# Patient Record
Sex: FEMALE | Race: WHITE | NOT HISPANIC OR LATINO | Employment: FULL TIME | URBAN - METROPOLITAN AREA
[De-identification: names, ages, dates, MRNs, and addresses within clinical notes are randomized per-mention and may not be internally consistent; named-entity substitution may affect disease eponyms.]

---

## 2017-02-07 ENCOUNTER — TELEPHONE (OUTPATIENT)
Dept: ENDOCRINOLOGY | Age: 47
End: 2017-02-07

## 2017-02-15 ENCOUNTER — TELEPHONE (OUTPATIENT)
Dept: ENDOCRINOLOGY | Age: 47
End: 2017-02-15

## 2017-03-03 RX ORDER — PIOGLITAZONEHYDROCHLORIDE 30 MG/1
30 TABLET ORAL DAILY
Qty: 30 TABLET | Refills: 0 | Status: SHIPPED | OUTPATIENT
Start: 2017-03-03 | End: 2017-08-04 | Stop reason: SDUPTHER

## 2017-08-04 RX ORDER — PIOGLITAZONEHYDROCHLORIDE 30 MG/1
30 TABLET ORAL DAILY
Qty: 30 TABLET | Refills: 0 | Status: SHIPPED | OUTPATIENT
Start: 2017-08-04 | End: 2017-08-07 | Stop reason: SDUPTHER

## 2017-08-07 RX ORDER — COLESEVELAM HYDROCHLORIDE 625 MG/1
1875 TABLET, FILM COATED ORAL 2 TIMES DAILY WITH MEALS
Qty: 120 TABLET | Refills: 2 | Status: SHIPPED | OUTPATIENT
Start: 2017-08-07 | End: 2017-08-11 | Stop reason: SDUPTHER

## 2017-08-07 RX ORDER — LISINOPRIL 20 MG/1
20 TABLET ORAL DAILY
Qty: 30 TABLET | Refills: 2 | Status: SHIPPED | OUTPATIENT
Start: 2017-08-07 | End: 2017-08-11 | Stop reason: SDUPTHER

## 2017-08-07 RX ORDER — PIOGLITAZONEHYDROCHLORIDE 30 MG/1
30 TABLET ORAL DAILY
Qty: 30 TABLET | Refills: 2 | Status: SHIPPED | OUTPATIENT
Start: 2017-08-07 | End: 2017-08-11 | Stop reason: SDUPTHER

## 2017-08-10 ENCOUNTER — OFFICE VISIT (OUTPATIENT)
Dept: ENDOCRINOLOGY | Age: 47
End: 2017-08-10

## 2017-08-10 VITALS
DIASTOLIC BLOOD PRESSURE: 78 MMHG | BODY MASS INDEX: 32.99 KG/M2 | HEIGHT: 65 IN | WEIGHT: 198 LBS | SYSTOLIC BLOOD PRESSURE: 126 MMHG

## 2017-08-10 DIAGNOSIS — E55.9 VITAMIN D DEFICIENCY: ICD-10-CM

## 2017-08-10 DIAGNOSIS — IMO0002 UNCONTROLLED TYPE 2 DIABETES MELLITUS WITH COMPLICATION, WITH LONG-TERM CURRENT USE OF INSULIN: ICD-10-CM

## 2017-08-10 DIAGNOSIS — Z91.14 NONCOMPLIANCE WITH MEDICATION REGIMEN: Primary | ICD-10-CM

## 2017-08-10 DIAGNOSIS — R80.9 MICROALBUMINURIA: ICD-10-CM

## 2017-08-10 DIAGNOSIS — E78.5 HYPERLIPIDEMIA, UNSPECIFIED HYPERLIPIDEMIA TYPE: ICD-10-CM

## 2017-08-10 DIAGNOSIS — E66.9 ADIPOSITY: ICD-10-CM

## 2017-08-10 DIAGNOSIS — I10 ESSENTIAL HYPERTENSION: ICD-10-CM

## 2017-08-10 PROCEDURE — 99214 OFFICE O/P EST MOD 30 MIN: CPT | Performed by: NURSE PRACTITIONER

## 2017-08-10 RX ORDER — DICYCLOMINE HCL 20 MG
20 TABLET ORAL
COMMUNITY
Start: 2017-08-08 | End: 2018-08-17 | Stop reason: ALTCHOICE

## 2017-08-10 RX ORDER — TERBINAFINE HYDROCHLORIDE 250 MG/1
250 TABLET ORAL DAILY
COMMUNITY
Start: 2017-08-07 | End: 2018-03-05 | Stop reason: ALTCHOICE

## 2017-08-10 RX ORDER — HYDROCHLOROTHIAZIDE 25 MG/1
25 TABLET ORAL DAILY
Refills: 4 | COMMUNITY
Start: 2017-08-04

## 2017-08-10 NOTE — PROGRESS NOTES
"Yenny Clement is a 47 y.o. female is here today for follow-up.  Chief Complaint   Patient presents with   • Diabetes     no recent labs,testing BG several times a week, pt brought meter   • Hyperlipidemia   • Hypertension   • Vitamin D Deficiency     /78  Ht 65\" (165.1 cm)  Wt 198 lb (89.8 kg)  BMI 32.95 kg/m2  Current Outpatient Prescriptions on File Prior to Visit   Medication Sig   • aspirin-acetaminophen-caffeine (EXCEDRIN MIGRAINE) 250-250-65 MG per tablet Take 1 tablet by mouth 3 (three) times a day.   • cetirizine (ZyrTEC) 10 MG tablet Take 1 tablet by mouth daily.   • Insulin Glargine 300 UNIT/ML solution pen-injector Inject 10 Units under the skin Daily.   • Insulin Pen Needle (BD PEN NEEDLE PAUL U/F) 32G X 4 MM misc Use as directed   • Liraglutide (VICTOZA) 18 MG/3ML solution pen-injector Inject 1.8 mg under the skin Daily.   • lisinopril (PRINIVIL,ZESTRIL) 20 MG tablet Take 1 tablet by mouth Daily.   • naproxen sodium (ALEVE) 220 MG tablet Take 1 tablet by mouth 2 (two) times a day.   • omeprazole (PriLOSEC) 20 MG capsule Take 1 capsule by mouth daily.   • pioglitazone (ACTOS) 30 MG tablet Take 1 tablet by mouth Daily. Patient needs to make an appt   • sertraline (ZOLOFT) 100 MG tablet Take 2 tablets by mouth.   • WELCHOL 625 MG tablet Take 3 tablets by mouth 2 (Two) Times a Day With Meals.   • [DISCONTINUED] chlorthalidone (HYGROTEN) 25 MG tablet Take 1 tablet by mouth daily.   • [DISCONTINUED] Insulin Pen Needle (BD PEN NEEDLE PAUL U/F) 32G X 4 MM misc Use as directed     No current facility-administered medications on file prior to visit.      Family History   Problem Relation Age of Onset   • Thyroid disease Mother    • Heart disease Father    • Stroke Father    • Hypertension Father      Social History   Substance Use Topics   • Smoking status: Current Every Day Smoker   • Smokeless tobacco: None   • Alcohol use Yes      Comment: SOCIAL     Allergies   Allergen Reactions   • " Metformin    • Statins      Muscle aches   • Xigduo Xr  [Dapagliflozin-Metformin Hcl Er]          History of Present Illness  Encounter Diagnoses   Name Primary?   • Noncompliance with medication regimen Yes   • Hyperlipidemia, unspecified hyperlipidemia type    • Essential hypertension    • Adiposity    • Vitamin D deficiency    • Uncontrolled type 2 diabetes mellitus with complication, with long-term current use of insulin    • Microalbuminuria      This is a 47-year-old female patient here today for a routine follow-up visit.  She was last seen in September of last year.  Her last hemoglobin A1c reflects her diabetes is not under good control.  She has had no recent labs.  She is here today because she needs her medications refilled.  She is not checking her blood sugars very often.  She states she is not exercising however she stays very busy with her job.  She is accompanied today by her daughter and granddaughter.  She denies any hypoglycemic events.  She has not lost weight however her weight has remained stable.  The following portions of the patient's history were reviewed and updated as appropriate: allergies, current medications, past family history, past medical history, past social history, past surgical history and problem list.    Review of Systems   Constitutional: Negative for fatigue.   HENT: Negative for trouble swallowing.    Eyes: Negative for visual disturbance.   Respiratory: Negative for shortness of breath.    Cardiovascular: Negative for leg swelling.   Endocrine: Negative for polyuria.   Skin: Negative for wound.   Neurological: Negative for numbness.       Objective   Physical Exam   Constitutional: She is oriented to person, place, and time. She appears well-developed and well-nourished. No distress.   HENT:   Head: Normocephalic and atraumatic.   Right Ear: External ear normal.   Left Ear: External ear normal.   Nose: Nose normal.   Mouth/Throat: Oropharynx is clear and moist. No  oropharyngeal exudate.   Eyes: EOM are normal. Pupils are equal, round, and reactive to light. Right eye exhibits no discharge. Left eye exhibits no discharge.   Neck: Trachea normal, normal range of motion and full passive range of motion without pain. Neck supple. No tracheal tenderness present. Carotid bruit is not present. No tracheal deviation, no edema and no erythema present. No thyroid mass and no thyromegaly present.   Cardiovascular: Normal rate, regular rhythm, normal heart sounds and intact distal pulses.  Exam reveals no gallop and no friction rub.    No murmur heard.  Pulmonary/Chest: Effort normal and breath sounds normal. No stridor. No respiratory distress. She has no wheezes. She has no rales.   Abdominal: Soft. Bowel sounds are normal. She exhibits no distension.   Musculoskeletal: Normal range of motion. She exhibits no edema or deformity.   Lymphadenopathy:     She has no cervical adenopathy.   Neurological: She is alert and oriented to person, place, and time.   Skin: Skin is warm and dry. No rash noted. She is not diaphoretic. No erythema. No pallor.   Psychiatric: She has a normal mood and affect. Her behavior is normal. Judgment and thought content normal.   Nursing note and vitals reviewed.      Office Visit on 09/08/2016   Component Date Value Ref Range Status   • Glucose 09/08/2016 146* 65 - 99 mg/dL Final   • BUN 09/08/2016 23* 6 - 20 mg/dL Final   • Creatinine 09/08/2016 0.94  0.57 - 1.00 mg/dL Final   • eGFR Non  Am 09/08/2016 64  >60 mL/min/1.73 Final    <15 Indicative of kidney failure.   • eGFR  Am 09/08/2016 77  >60 mL/min/1.73 Final    <15 Indicative of kidney failure.   • BUN/Creatinine Ratio 09/08/2016 24.5  7.0 - 25.0 Final   • Sodium 09/08/2016 139  136 - 145 mmol/L Final   • Potassium 09/08/2016 5.2  3.5 - 5.2 mmol/L Final   • Chloride 09/08/2016 99  98 - 107 mmol/L Final   • Total CO2 09/08/2016 24.4  22.0 - 29.0 mmol/L Final   • Calcium 09/08/2016 10.0  8.6 -  10.5 mg/dL Final   • Total Protein 09/08/2016 7.3  6.0 - 8.5 g/dL Final   • Albumin 09/08/2016 4.50  3.50 - 5.20 g/dL Final   • Globulin 09/08/2016 2.8  gm/dL Final   • A/G Ratio 09/08/2016 1.6  g/dL Final   • Total Bilirubin 09/08/2016 <0.2  0.1 - 1.2 mg/dL Final   • Alkaline Phosphatase 09/08/2016 53  39 - 117 U/L Final   • AST (SGOT) 09/08/2016 10  1 - 32 U/L Final   • ALT (SGPT) 09/08/2016 13  1 - 33 U/L Final   • C-Peptide 09/08/2016 4.7* 1.1 - 4.4 ng/mL Final    C-Peptide reference interval is for fasting patients.   • Hemoglobin A1C 09/08/2016 7.90* 4.80 - 5.60 % Final    Comment: Hemoglobin A1C Ranges:  Increased Risk for Diabetes  5.7% to 6.4%  Diabetes                     >= 6.5%  Diabetic Goal                < 7.0%     • Total Cholesterol 09/08/2016 331* 0 - 200 mg/dL Final   • Triglycerides 09/08/2016 287* 0 - 150 mg/dL Final   • HDL Cholesterol 09/08/2016 42  40 - 60 mg/dL Final   • VLDL Cholesterol 09/08/2016 57.4* 5 - 40 mg/dL Final   • LDL Cholesterol  09/08/2016 232* 0 - 100 mg/dL Final   • Microalbumin, Urine 09/08/2016 <3.0  Not Estab. ug/mL Final   • TSH 09/08/2016 1.090  0.270 - 4.200 mIU/mL Final   • 25 Hydroxy, Vitamin D 09/08/2016 38.8  30.0 - 100.0 ng/mL Final    Comment: Reference Range for Total Vitamin D 25(OH)  Deficiency    <20.0 ng/mL  Insufficiency 21-29 ng/mL  Sufficiency    ng/mL  Toxicity      >100 ng/ml            Assessment/Plan   Jade was seen today for diabetes, hyperlipidemia, hypertension and vitamin d deficiency.    Diagnoses and all orders for this visit:    Noncompliance with medication regimen    Hyperlipidemia, unspecified hyperlipidemia type    Essential hypertension    Adiposity    Vitamin D deficiency    Uncontrolled type 2 diabetes mellitus with complication, with long-term current use of insulin    Microalbuminuria    In summary, patient was seen and examined.  She will continue all her current medications with the exception of toujeo I have increased that to  20 units once daily from 10 units.  She has been advised to monitor blood sugars closely and has been cautioned to monitor for hypoglycemia.  I've asked that she check her blood sugars more often even it if is at random times throughout the day so that we can get a better idea of her we need to make changes based on blood sugars.  She will have extensive laboratory evaluation at today's visit will be notified of the results with any further recommendations.  Encouraged her to contact the office with any questions or concerns prior to her next visit.  Her prescriptions will be sent to her local pharmacy for 90 day refills per her request.

## 2017-08-11 DIAGNOSIS — E78.5 HYPERLIPIDEMIA, UNSPECIFIED HYPERLIPIDEMIA TYPE: ICD-10-CM

## 2017-08-11 DIAGNOSIS — E66.9 ADIPOSITY: ICD-10-CM

## 2017-08-11 DIAGNOSIS — IMO0002 UNCONTROLLED TYPE 2 DIABETES MELLITUS WITH COMPLICATION, WITH LONG-TERM CURRENT USE OF INSULIN: ICD-10-CM

## 2017-08-11 DIAGNOSIS — I10 ESSENTIAL HYPERTENSION: ICD-10-CM

## 2017-08-11 DIAGNOSIS — E55.9 VITAMIN D DEFICIENCY: ICD-10-CM

## 2017-08-11 DIAGNOSIS — R80.9 MICROALBUMINURIA: ICD-10-CM

## 2017-08-11 DIAGNOSIS — Z91.14 NONCOMPLIANCE WITH MEDICATION REGIMEN: ICD-10-CM

## 2017-08-11 LAB
25(OH)D3+25(OH)D2 SERPL-MCNC: 29.2 NG/ML (ref 30–100)
ALBUMIN SERPL-MCNC: 4.8 G/DL (ref 3.5–5.2)
ALBUMIN/GLOB SERPL: 1.4 G/DL
ALP SERPL-CCNC: 71 U/L (ref 39–117)
ALT SERPL-CCNC: 15 U/L (ref 1–33)
AST SERPL-CCNC: 11 U/L (ref 1–32)
BILIRUB SERPL-MCNC: <0.2 MG/DL (ref 0.1–1.2)
BUN SERPL-MCNC: 19 MG/DL (ref 6–20)
BUN/CREAT SERPL: 20.7 (ref 7–25)
C PEPTIDE SERPL-MCNC: 5.9 NG/ML (ref 1.1–4.4)
CALCIUM SERPL-MCNC: 10.6 MG/DL (ref 8.6–10.5)
CHLORIDE SERPL-SCNC: 99 MMOL/L (ref 98–107)
CHOLEST SERPL-MCNC: 434 MG/DL (ref 0–200)
CO2 SERPL-SCNC: 26.8 MMOL/L (ref 22–29)
CREAT SERPL-MCNC: 0.92 MG/DL (ref 0.57–1)
FT4I SERPL CALC-MCNC: 1.8 (ref 1.2–4.9)
GLOBULIN SER CALC-MCNC: 3.5 GM/DL
GLUCOSE SERPL-MCNC: 159 MG/DL (ref 65–99)
HBA1C MFR BLD: 9.39 % (ref 4.8–5.6)
HDLC SERPL-MCNC: 45 MG/DL (ref 40–60)
LDLC SERPL CALC-MCNC: ABNORMAL MG/DL
MICROALBUMIN UR-MCNC: 41.7 UG/ML
POTASSIUM SERPL-SCNC: 4.5 MMOL/L (ref 3.5–5.2)
PROT SERPL-MCNC: 8.3 G/DL (ref 6–8.5)
SODIUM SERPL-SCNC: 139 MMOL/L (ref 136–145)
T3FREE SERPL-MCNC: 3 PG/ML (ref 2–4.4)
T3RU NFR SERPL: 27 % (ref 24–39)
T4 FREE SERPL-MCNC: 1.12 NG/DL (ref 0.93–1.7)
T4 SERPL-MCNC: 6.8 UG/DL (ref 4.5–12)
TRIGL SERPL-MCNC: 715 MG/DL (ref 0–150)
TSH SERPL DL<=0.005 MIU/L-ACNC: 1.42 UIU/ML (ref 0.45–4.5)
VLDLC SERPL CALC-MCNC: ABNORMAL MG/DL

## 2017-08-11 RX ORDER — PIOGLITAZONEHYDROCHLORIDE 30 MG/1
30 TABLET ORAL DAILY
Qty: 90 TABLET | Refills: 1 | Status: SHIPPED | OUTPATIENT
Start: 2017-08-11 | End: 2018-02-05 | Stop reason: SDUPTHER

## 2017-08-11 RX ORDER — LISINOPRIL 20 MG/1
20 TABLET ORAL DAILY
Qty: 90 TABLET | Refills: 1 | Status: SHIPPED | OUTPATIENT
Start: 2017-08-11 | End: 2020-10-19

## 2017-08-11 RX ORDER — COLESEVELAM HYDROCHLORIDE 625 MG/1
1875 TABLET, FILM COATED ORAL 2 TIMES DAILY WITH MEALS
Qty: 360 TABLET | Refills: 1 | Status: SHIPPED | OUTPATIENT
Start: 2017-08-11 | End: 2017-08-15

## 2017-08-15 ENCOUNTER — TELEPHONE (OUTPATIENT)
Dept: ENDOCRINOLOGY | Age: 47
End: 2017-08-15

## 2017-08-15 PROBLEM — E83.52 HYPERCALCEMIA: Status: ACTIVE | Noted: 2017-08-15

## 2017-08-15 PROBLEM — E78.1 HYPERTRIGLYCERIDEMIA: Status: ACTIVE | Noted: 2017-08-15

## 2017-08-15 RX ORDER — FENOFIBRATE 145 MG/1
145 TABLET, COATED ORAL DAILY
Qty: 30 TABLET | Refills: 5 | Status: SHIPPED | OUTPATIENT
Start: 2017-08-15 | End: 2018-03-05

## 2017-08-15 RX ORDER — ERGOCALCIFEROL 1.25 MG/1
CAPSULE ORAL
Qty: 12 CAPSULE | Refills: 1 | Status: SHIPPED | OUTPATIENT
Start: 2017-08-15 | End: 2018-02-07 | Stop reason: SDUPTHER

## 2017-08-15 RX ORDER — ICOSAPENT ETHYL 1000 MG/1
2 CAPSULE ORAL 2 TIMES DAILY WITH MEALS
Qty: 120 CAPSULE | Refills: 5 | Status: SHIPPED | OUTPATIENT
Start: 2017-08-15 | End: 2018-04-24 | Stop reason: SDUPTHER

## 2017-08-15 RX ORDER — EMPAGLIFLOZIN 25 MG/1
1 TABLET, FILM COATED ORAL DAILY
Qty: 30 TABLET | Refills: 5 | Status: SHIPPED | OUTPATIENT
Start: 2017-08-15 | End: 2017-08-28 | Stop reason: CLARIF

## 2017-08-15 NOTE — TELEPHONE ENCOUNTER
I informed patient of med changes as well as her lab results. She expressed understanding and I will mail her copay cards as well.

## 2017-09-08 RX ORDER — PIOGLITAZONEHYDROCHLORIDE 30 MG/1
TABLET ORAL
Qty: 30 TABLET | Refills: 0 | Status: SHIPPED | OUTPATIENT
Start: 2017-09-08 | End: 2017-10-16 | Stop reason: SDUPTHER

## 2017-09-22 ENCOUNTER — TELEPHONE (OUTPATIENT)
Dept: ENDOCRINOLOGY | Age: 47
End: 2017-09-22

## 2017-09-22 RX ORDER — EMPAGLIFLOZIN 25 MG/1
1 TABLET, FILM COATED ORAL DAILY
Qty: 30 TABLET | Refills: 5 | Status: SHIPPED | OUTPATIENT
Start: 2017-09-22 | End: 2018-03-05

## 2017-09-22 NOTE — TELEPHONE ENCOUNTER
----- Message from KEVIN Hernández sent at 9/22/2017  9:33 AM EDT -----  jardiance 25 mg once daily  ----- Message -----     From: Poly Pierre MA     Sent: 9/22/2017   9:16 AM       To: KEVIN Hernández    Patient's insurance will not cover farxiga. They are asking for jardiance,januvia or invokana. Please advise.

## 2017-10-16 RX ORDER — PIOGLITAZONEHYDROCHLORIDE 30 MG/1
TABLET ORAL
Qty: 30 TABLET | Refills: 0 | Status: SHIPPED | OUTPATIENT
Start: 2017-10-16 | End: 2017-12-09 | Stop reason: SDUPTHER

## 2017-12-11 RX ORDER — PIOGLITAZONEHYDROCHLORIDE 30 MG/1
TABLET ORAL
Qty: 30 TABLET | Refills: 0 | Status: SHIPPED | OUTPATIENT
Start: 2017-12-11 | End: 2018-02-05 | Stop reason: SDUPTHER

## 2017-12-18 RX ORDER — LIRAGLUTIDE 6 MG/ML
INJECTION SUBCUTANEOUS
Qty: 3 PEN | Refills: 0 | Status: SHIPPED | OUTPATIENT
Start: 2017-12-18 | End: 2018-02-05 | Stop reason: SDUPTHER

## 2018-01-17 DIAGNOSIS — E66.9 OBESITY, UNSPECIFIED CLASSIFICATION, UNSPECIFIED OBESITY TYPE, UNSPECIFIED WHETHER SERIOUS COMORBIDITY PRESENT: ICD-10-CM

## 2018-01-17 DIAGNOSIS — Z91.14 NONCOMPLIANCE WITH MEDICATION REGIMEN: ICD-10-CM

## 2018-01-17 DIAGNOSIS — E55.9 VITAMIN D DEFICIENCY: ICD-10-CM

## 2018-01-17 DIAGNOSIS — R80.9 MICROALBUMINURIA: ICD-10-CM

## 2018-01-17 DIAGNOSIS — I10 ESSENTIAL HYPERTENSION: ICD-10-CM

## 2018-01-17 DIAGNOSIS — IMO0002 UNCONTROLLED TYPE 2 DIABETES MELLITUS WITH COMPLICATION, WITH LONG-TERM CURRENT USE OF INSULIN: ICD-10-CM

## 2018-01-17 DIAGNOSIS — E78.5 HYPERLIPIDEMIA, UNSPECIFIED HYPERLIPIDEMIA TYPE: ICD-10-CM

## 2018-01-18 DIAGNOSIS — IMO0002 UNCONTROLLED TYPE 2 DIABETES MELLITUS WITH COMPLICATION, WITH LONG-TERM CURRENT USE OF INSULIN: ICD-10-CM

## 2018-01-18 DIAGNOSIS — E66.9 OBESITY, UNSPECIFIED CLASSIFICATION, UNSPECIFIED OBESITY TYPE, UNSPECIFIED WHETHER SERIOUS COMORBIDITY PRESENT: ICD-10-CM

## 2018-01-18 DIAGNOSIS — R80.9 MICROALBUMINURIA: ICD-10-CM

## 2018-01-18 DIAGNOSIS — Z91.14 NONCOMPLIANCE WITH MEDICATION REGIMEN: ICD-10-CM

## 2018-01-18 DIAGNOSIS — E78.5 HYPERLIPIDEMIA, UNSPECIFIED HYPERLIPIDEMIA TYPE: ICD-10-CM

## 2018-01-18 DIAGNOSIS — I10 ESSENTIAL HYPERTENSION: ICD-10-CM

## 2018-01-18 DIAGNOSIS — E55.9 VITAMIN D DEFICIENCY: ICD-10-CM

## 2018-01-26 ENCOUNTER — PRIOR AUTHORIZATION (OUTPATIENT)
Dept: ENDOCRINOLOGY | Age: 48
End: 2018-01-26

## 2018-01-26 RX ORDER — PIOGLITAZONEHYDROCHLORIDE 30 MG/1
TABLET ORAL
Qty: 30 TABLET | Refills: 0 | OUTPATIENT
Start: 2018-01-26

## 2018-01-30 RX ORDER — LIRAGLUTIDE 6 MG/ML
INJECTION SUBCUTANEOUS
Refills: 0 | OUTPATIENT
Start: 2018-01-30

## 2018-02-05 RX ORDER — PIOGLITAZONEHYDROCHLORIDE 30 MG/1
30 TABLET ORAL DAILY
Qty: 30 TABLET | Refills: 0 | Status: SHIPPED | OUTPATIENT
Start: 2018-02-05 | End: 2018-03-05 | Stop reason: SDUPTHER

## 2018-02-07 RX ORDER — ERGOCALCIFEROL 1.25 MG/1
CAPSULE ORAL
Qty: 4 CAPSULE | Refills: 0 | Status: SHIPPED | OUTPATIENT
Start: 2018-02-07 | End: 2018-03-05 | Stop reason: SDUPTHER

## 2018-02-12 ENCOUNTER — PRIOR AUTHORIZATION (OUTPATIENT)
Dept: ENDOCRINOLOGY | Age: 48
End: 2018-02-12

## 2018-02-12 NOTE — TELEPHONE ENCOUNTER
PT'S PA FOR VICTOZA WAS SUBMITTED TO [facility] ON 2/12/18 AND WAS APPROVED ON 2/13/18. PHARMACY WAS NOTIFIED.

## 2018-03-05 ENCOUNTER — OFFICE VISIT (OUTPATIENT)
Dept: ENDOCRINOLOGY | Age: 48
End: 2018-03-05

## 2018-03-05 VITALS
BODY MASS INDEX: 33.09 KG/M2 | WEIGHT: 198.6 LBS | SYSTOLIC BLOOD PRESSURE: 122 MMHG | DIASTOLIC BLOOD PRESSURE: 84 MMHG | HEIGHT: 65 IN

## 2018-03-05 DIAGNOSIS — E83.52 HYPERCALCEMIA: ICD-10-CM

## 2018-03-05 DIAGNOSIS — IMO0002 UNCONTROLLED TYPE 2 DIABETES MELLITUS WITH COMPLICATION, WITH LONG-TERM CURRENT USE OF INSULIN: Primary | ICD-10-CM

## 2018-03-05 DIAGNOSIS — E55.9 VITAMIN D DEFICIENCY: ICD-10-CM

## 2018-03-05 DIAGNOSIS — E78.1 HYPERTRIGLYCERIDEMIA: ICD-10-CM

## 2018-03-05 DIAGNOSIS — I10 ESSENTIAL HYPERTENSION: ICD-10-CM

## 2018-03-05 DIAGNOSIS — E78.5 HYPERLIPIDEMIA, UNSPECIFIED HYPERLIPIDEMIA TYPE: ICD-10-CM

## 2018-03-05 DIAGNOSIS — E66.9 OBESITY, UNSPECIFIED CLASSIFICATION, UNSPECIFIED OBESITY TYPE, UNSPECIFIED WHETHER SERIOUS COMORBIDITY PRESENT: ICD-10-CM

## 2018-03-05 DIAGNOSIS — R80.9 MICROALBUMINURIA: ICD-10-CM

## 2018-03-05 DIAGNOSIS — Z91.14 NONCOMPLIANCE WITH MEDICATION REGIMEN: ICD-10-CM

## 2018-03-05 PROCEDURE — 99214 OFFICE O/P EST MOD 30 MIN: CPT | Performed by: NURSE PRACTITIONER

## 2018-03-05 RX ORDER — INSULIN DEGLUDEC 200 U/ML
40 INJECTION, SOLUTION SUBCUTANEOUS DAILY
Qty: 3 ML | Refills: 5 | Status: SHIPPED | OUTPATIENT
Start: 2018-03-05 | End: 2019-02-25

## 2018-03-05 RX ORDER — PIOGLITAZONEHYDROCHLORIDE 30 MG/1
30 TABLET ORAL DAILY
Qty: 30 TABLET | Refills: 5 | Status: SHIPPED | OUTPATIENT
Start: 2018-03-05 | End: 2018-06-27 | Stop reason: SDUPTHER

## 2018-03-05 RX ORDER — NAPROXEN 500 MG/1
1 TABLET ORAL 2 TIMES DAILY
Refills: 2 | COMMUNITY
Start: 2018-01-22 | End: 2018-03-05 | Stop reason: SDUPTHER

## 2018-03-05 RX ORDER — ERGOCALCIFEROL 1.25 MG/1
50000 CAPSULE ORAL
Qty: 12 CAPSULE | Refills: 1 | Status: SHIPPED | OUTPATIENT
Start: 2018-03-05 | End: 2018-08-16 | Stop reason: SDUPTHER

## 2018-03-05 NOTE — PATIENT INSTRUCTIONS
Schedule eye doctor appt  jardiance 25 mg once daily  Stop victoza  Start bydureon bcise 2 mg weekly subcut  Stop toujeo  Start tresiba 40 units once daily  Restart pioglitizone

## 2018-03-05 NOTE — PROGRESS NOTES
"Yenny Clement is a 48 y.o. female is here today for follow-up.  Chief Complaint   Patient presents with   • Diabetes     no recent labs, pt test BG occasionally, pt brought meter   • Hyperlipidemia     pt is not taking praluent, jardiance, farxiga   • Hypertension     pt is wanting to know if there is anyway to downsize on medications   • Vitamin D Deficiency   • microalbuminuria     /84  Ht 165.1 cm (65\")  Wt 90.1 kg (198 lb 9.6 oz)  BMI 33.05 kg/m2  Current Outpatient Prescriptions on File Prior to Visit   Medication Sig   • aspirin-acetaminophen-caffeine (EXCEDRIN MIGRAINE) 250-250-65 MG per tablet Take 1 tablet by mouth 3 (three) times a day.   • cetirizine (ZyrTEC) 10 MG tablet Take 1 tablet by mouth daily.   • dicyclomine (BENTYL) 20 MG tablet Take 20 mg by mouth 3 (Three) Times a Day Before Meals.   • hydrochlorothiazide (HYDRODIURIL) 25 MG tablet Take 25 mg by mouth Daily.   • Insulin Glargine 300 UNIT/ML solution pen-injector Inject 20 Units under the skin Daily.   • Insulin Pen Needle (BD PEN NEEDLE PAUL U/F) 32G X 4 MM misc Use to inject insulin 2 times daily   • Liraglutide (VICTOZA) 18 MG/3ML solution pen-injector injection Inject 1.8 mg under the skin Daily.   • lisinopril (PRINIVIL,ZESTRIL) 20 MG tablet Take 1 tablet by mouth Daily.   • naproxen sodium (ALEVE) 220 MG tablet Take 1 tablet by mouth As Needed.   • omeprazole (PriLOSEC) 20 MG capsule Take 1 capsule by mouth daily.   • pioglitazone (ACTOS) 30 MG tablet Take 1 tablet by mouth Daily.   • sertraline (ZOLOFT) 100 MG tablet Take 2 tablets by mouth.   • VASCEPA 1 g capsule capsule Take 2 g by mouth 2 (Two) Times a Day With Meals.   • vitamin D (ERGOCALCIFEROL) 35385 units capsule capsule TAKE 1 CAPSULE ONCE A WEEK   • [DISCONTINUED] JARDIANCE 25 MG tablet Take 1 tablet by mouth Daily.   • [DISCONTINUED] Dapagliflozin Propanediol (FARXIGA) 10 MG tablet Take 1 tablet by mouth Daily.   • [DISCONTINUED] fenofibrate (TRICOR) 145 MG " tablet Take 1 tablet by mouth Daily.   • [DISCONTINUED] PRALUENT 75 MG/ML solution prefilled syringe Inject 1 dose under the skin Every 14 (Fourteen) Days.   • [DISCONTINUED] terbinafine (lamiSIL) 250 MG tablet Take 250 mg by mouth Daily.     No current facility-administered medications on file prior to visit.      Family History   Problem Relation Age of Onset   • Thyroid disease Mother    • Heart disease Father    • Stroke Father    • Hypertension Father      Social History   Substance Use Topics   • Smoking status: Current Every Day Smoker   • Smokeless tobacco: None   • Alcohol use Yes      Comment: SOCIAL     Allergies   Allergen Reactions   • Metformin    • Statins      Muscle aches   • Xigduo Xr  [Dapagliflozin-Metformin Hcl Er]          History of Present Illness  Encounter Diagnoses   Name Primary?   • Hyperlipidemia, unspecified hyperlipidemia type    • Essential hypertension    • Hypertriglyceridemia    • Vitamin D deficiency    • Uncontrolled type 2 diabetes mellitus with complication, with long-term current use of insulin Yes   • Microalbuminuria    • Hypercalcemia    • Noncompliance with medication regimen    This is a 48-year-old female patient here today for routine follow-up visit.  She is being seen for the above-mentioned problems.  She has not had any recent labs done.  She states she has been without her medications for weeks due to the cost of her medications.  She states currently she is having to pay over $200 a month for her medications.  She states her Victoza at her toujeo are $50 each.  She is wanting cheaper medications.  She also states that she never was notified that jardiance was approved.  She was provided a copy of the approval letter at today's visit.  She states she also never heard any information on praluent.  Her medication list was reviewed today.  Her blood sugars when she checks her in the 2-300 range.  Her 14 day average according to her meter was 346    The following  portions of the patient's history were reviewed and updated as appropriate: allergies, current medications, past family history, past medical history, past social history, past surgical history and problem list.    Review of Systems   Constitutional: Positive for fatigue.   HENT: Negative for trouble swallowing.    Eyes: Positive for visual disturbance.   Respiratory: Negative for shortness of breath.    Cardiovascular: Negative for leg swelling.   Endocrine: Negative for polyuria.   Skin: Negative for wound.   Neurological: Negative for numbness.       Objective   Physical Exam   Constitutional: She is oriented to person, place, and time. She appears well-developed and well-nourished. No distress.   HENT:   Head: Normocephalic and atraumatic.   Right Ear: External ear normal.   Left Ear: External ear normal.   Nose: Nose normal.   Mouth/Throat: Oropharynx is clear and moist. No oropharyngeal exudate.   Eyes: EOM are normal. Pupils are equal, round, and reactive to light. Right eye exhibits no discharge. Left eye exhibits no discharge.   Neck: Trachea normal, normal range of motion and full passive range of motion without pain. Neck supple. No tracheal tenderness present. Carotid bruit is not present. No tracheal deviation, no edema and no erythema present. No thyroid mass and no thyromegaly present.   Cardiovascular: Normal rate, regular rhythm, normal heart sounds and intact distal pulses.  Exam reveals no gallop and no friction rub.    No murmur heard.  Pulmonary/Chest: Effort normal and breath sounds normal. No stridor. No respiratory distress. She has no wheezes. She has no rales.   Abdominal: Soft. Bowel sounds are normal. She exhibits no distension.   Musculoskeletal: Normal range of motion. She exhibits no edema or deformity.   Lymphadenopathy:     She has no cervical adenopathy.   Neurological: She is alert and oriented to person, place, and time.   Skin: Skin is warm and dry. No rash noted. She is not  diaphoretic. No erythema. No pallor.   Psychiatric: She has a normal mood and affect. Her behavior is normal. Judgment and thought content normal.   Nursing note and vitals reviewed.    Results for orders placed or performed in visit on 08/10/17   Comprehensive Metabolic Panel   Result Value Ref Range    Glucose 159 (H) 65 - 99 mg/dL    BUN 19 6 - 20 mg/dL    Creatinine 0.92 0.57 - 1.00 mg/dL    eGFR Non African Am 65 >60 mL/min/1.73    eGFR African Am 79 >60 mL/min/1.73    BUN/Creatinine Ratio 20.7 7.0 - 25.0    Sodium 139 136 - 145 mmol/L    Potassium 4.5 3.5 - 5.2 mmol/L    Chloride 99 98 - 107 mmol/L    Total CO2 26.8 22.0 - 29.0 mmol/L    Calcium 10.6 (H) 8.6 - 10.5 mg/dL    Total Protein 8.3 6.0 - 8.5 g/dL    Albumin 4.80 3.50 - 5.20 g/dL    Globulin 3.5 gm/dL    A/G Ratio 1.4 g/dL    Total Bilirubin <0.2 0.1 - 1.2 mg/dL    Alkaline Phosphatase 71 39 - 117 U/L    AST (SGOT) 11 1 - 32 U/L    ALT (SGPT) 15 1 - 33 U/L   C-Peptide   Result Value Ref Range    C-Peptide 5.9 (H) 1.1 - 4.4 ng/mL   Hemoglobin A1c   Result Value Ref Range    Hemoglobin A1C 9.39 (H) 4.80 - 5.60 %   Lipid Panel   Result Value Ref Range    Total Cholesterol 434 (H) 0 - 200 mg/dL    Triglycerides 715 (H) 0 - 150 mg/dL    HDL Cholesterol 45 40 - 60 mg/dL    VLDL Cholesterol CANCELED mg/dL    LDL Cholesterol  CANCELED mg/dL   MicroAlbumin, Urine, Random   Result Value Ref Range    Microalbumin, Urine 41.7 Not Estab. ug/mL   T3, Free   Result Value Ref Range    T3, Free 3.0 2.0 - 4.4 pg/mL   T4, Free   Result Value Ref Range    Free T4 1.12 0.93 - 1.70 ng/dL   Thyroid Panel With TSH   Result Value Ref Range    TSH 1.420 0.450 - 4.500 uIU/mL    T4, Total 6.8 4.5 - 12.0 ug/dL    T3 Uptake 27 24 - 39 %    Free Thyroxine Index 1.8 1.2 - 4.9   Vitamin D 25 Hydroxy   Result Value Ref Range    25 Hydroxy, Vitamin D 29.2 (L) 30.0 - 100.0 ng/mL         Assessment/Plan   Problems Addressed this Visit        Cardiovascular and Mediastinum     Hyperlipidemia    Hypertension    Hypertriglyceridemia       Digestive    Vitamin D deficiency       Endocrine    Uncontrolled type 2 diabetes mellitus with complication, with long-term current use of insulin - Primary       Genitourinary    Microalbuminuria       Other    Noncompliance with medication regimen    Hypercalcemia          In summary, patient was seen and examined.  She's had a history noncompliance with her medications due to cost.  Her medication list was reviewed at today's visit.  She has been restarted on Jardiance 25 mg once daily.  The rest of her changes are listed below.  She needs to have an eye doctors appointments and she is complaining of blurred vision.  She has been cautioned to monitor blood sugars closely and for any hypoglycemic events.  She will have extensive laboratory evaluation done at today's visit will be notified of the results along with any further recommendations  Schedule eye doctor appt  jardiance 25 mg once daily  Stop victoza  Start bydureon bcise 2 mg weekly subcut  Stop toujeo  Start tresiba 40 units once daily  Restart pioglitizone       Current Outpatient Prescriptions:   •  aspirin-acetaminophen-caffeine (EXCEDRIN MIGRAINE) 250-250-65 MG per tablet, Take 1 tablet by mouth 3 (three) times a day., Disp: , Rfl:   •  cetirizine (ZyrTEC) 10 MG tablet, Take 1 tablet by mouth daily., Disp: , Rfl:   •  dicyclomine (BENTYL) 20 MG tablet, Take 20 mg by mouth 3 (Three) Times a Day Before Meals., Disp: , Rfl:   •  exenatide er (BYDUREON BCISE) 2 MG/0.85ML auto-injector injection, Inject 0.85 mL under the skin 1 (One) Time Per Week., Disp: 4 pen, Rfl: 5  •  hydrochlorothiazide (HYDRODIURIL) 25 MG tablet, Take 25 mg by mouth Daily., Disp: , Rfl: 4  •  Insulin Pen Needle (BD PEN NEEDLE PAUL U/F) 32G X 4 MM misc, Use to inject insulin 2 times daily, Disp: 300 each, Rfl: 1  •  lisinopril (PRINIVIL,ZESTRIL) 20 MG tablet, Take 1 tablet by mouth Daily., Disp: 90 tablet, Rfl: 1  •   naproxen sodium (ALEVE) 220 MG tablet, Take 1 tablet by mouth As Needed., Disp: , Rfl:   •  omeprazole (PriLOSEC) 20 MG capsule, Take 1 capsule by mouth daily., Disp: , Rfl:   •  pioglitazone (ACTOS) 30 MG tablet, Take 1 tablet by mouth Daily., Disp: 30 tablet, Rfl: 5  •  sertraline (ZOLOFT) 100 MG tablet, Take 2 tablets by mouth., Disp: , Rfl:   •  VASCEPA 1 g capsule capsule, Take 2 g by mouth 2 (Two) Times a Day With Meals., Disp: 120 capsule, Rfl: 5  •  vitamin D (ERGOCALCIFEROL) 69561 units capsule capsule, Take 1 capsule by mouth Every 7 (Seven) Days., Disp: 12 capsule, Rfl: 1  •  TRESIBA FLEXTOUCH 200 UNIT/ML solution pen-injector, Inject 40 Units under the skin Daily., Disp: 3 mL, Rfl: 5

## 2018-03-06 LAB
25(OH)D3+25(OH)D2 SERPL-MCNC: 30.3 NG/ML (ref 30–100)
ALBUMIN SERPL-MCNC: 4.4 G/DL (ref 3.5–5.2)
ALBUMIN/GLOB SERPL: 1.6 G/DL
ALP SERPL-CCNC: 84 U/L (ref 39–117)
ALT SERPL-CCNC: 16 U/L (ref 1–33)
AST SERPL-CCNC: 11 U/L (ref 1–32)
BILIRUB SERPL-MCNC: 0.2 MG/DL (ref 0.1–1.2)
BUN SERPL-MCNC: 25 MG/DL (ref 6–20)
BUN/CREAT SERPL: 28.7 (ref 7–25)
C PEPTIDE SERPL-MCNC: 4.5 NG/ML (ref 1.1–4.4)
CALCIUM SERPL-MCNC: 10.5 MG/DL (ref 8.6–10.5)
CHLORIDE SERPL-SCNC: 97 MMOL/L (ref 98–107)
CHOLEST SERPL-MCNC: 361 MG/DL (ref 0–200)
CO2 SERPL-SCNC: 22.7 MMOL/L (ref 22–29)
CREAT SERPL-MCNC: 0.87 MG/DL (ref 0.57–1)
FSH SERPL-ACNC: 70.6 MIU/ML
FT4I SERPL CALC-MCNC: 2 (ref 1.2–4.9)
GFR SERPLBLD CREATININE-BSD FMLA CKD-EPI: 69 ML/MIN/1.73
GFR SERPLBLD CREATININE-BSD FMLA CKD-EPI: 84 ML/MIN/1.73
GLOBULIN SER CALC-MCNC: 2.8 GM/DL
GLUCOSE SERPL-MCNC: 256 MG/DL (ref 65–99)
HBA1C MFR BLD: 10.8 % (ref 4.8–5.6)
HDLC SERPL-MCNC: 38 MG/DL (ref 40–60)
INTERPRETATION: NORMAL
LDLC SERPL CALC-MCNC: ABNORMAL MG/DL
LH SERPL-ACNC: 40.1 MIU/ML
Lab: NORMAL
POTASSIUM SERPL-SCNC: 4.9 MMOL/L (ref 3.5–5.2)
PROT SERPL-MCNC: 7.2 G/DL (ref 6–8.5)
SODIUM SERPL-SCNC: 136 MMOL/L (ref 136–145)
T3FREE SERPL-MCNC: 2.8 PG/ML (ref 2–4.4)
T3RU NFR SERPL: 29 % (ref 24–39)
T4 FREE SERPL-MCNC: 1.11 NG/DL (ref 0.93–1.7)
T4 SERPL-MCNC: 7 UG/DL (ref 4.5–12)
TRIGL SERPL-MCNC: 771 MG/DL (ref 0–150)
TSH SERPL DL<=0.005 MIU/L-ACNC: 1.32 UIU/ML (ref 0.45–4.5)
VLDLC SERPL CALC-MCNC: ABNORMAL MG/DL

## 2018-03-07 ENCOUNTER — TELEPHONE (OUTPATIENT)
Dept: ENDOCRINOLOGY | Age: 48
End: 2018-03-07

## 2018-03-07 RX ORDER — EZETIMIBE 10 MG/1
10 TABLET ORAL DAILY
Qty: 30 TABLET | Refills: 5 | Status: SHIPPED | OUTPATIENT
Start: 2018-03-07 | End: 2018-08-23 | Stop reason: SDUPTHER

## 2018-03-07 RX ORDER — FENOFIBRATE 130 MG/1
130 CAPSULE ORAL
Qty: 30 CAPSULE | Refills: 5 | Status: SHIPPED | OUTPATIENT
Start: 2018-03-07 | End: 2018-07-26 | Stop reason: SDUPTHER

## 2018-03-07 NOTE — TELEPHONE ENCOUNTER
----- Message from KEVIN Hernández sent at 3/7/2018 10:17 AM EST -----  Med changes    Spoke with patient and informed her that Jolene is starting new medications. Informed patient to start novolog 10 units prior to meals up to 3x daily, Zetia 10mg once daily, and fenofibrate 130 mg daily. Pt expressed understanding.

## 2018-03-12 DIAGNOSIS — E11.9 CONTROLLED TYPE 2 DIABETES MELLITUS WITHOUT COMPLICATION, UNSPECIFIED LONG TERM INSULIN USE STATUS: Primary | ICD-10-CM

## 2018-03-29 ENCOUNTER — TELEPHONE (OUTPATIENT)
Dept: ENDOCRINOLOGY | Age: 48
End: 2018-03-29

## 2018-03-29 NOTE — TELEPHONE ENCOUNTER
----- Message from Agata Villanueva sent at 3/29/2018 10:59 AM EDT -----  Contact: PATIENT     PATIENT/PHAMRCY REQUEST TO REFIL MEDICATION:  MEDICATION:  insulin lispro (humaLOG) injection 15 Units   MESSAGE:  PATIENT HAS CALLED AGAIN IN REGARDS TO THIS SHE HAS BEEN WAITING FOR 2 WEEKS FOR THE MEDICATION. PLEASE RE FAX OVER THE ABOVE MEDIATION TO THE BELOW PHARMACY.    Pharmacy:  [facility]/pharmacy #[id number] - [place], [place] - [address] [place] [address] - [phone]  - [phone] FX Phone:  [phone] Fax:  [phone]   Address:  [address] [place] [id number]     Pharmacy Comments:  --      Patient is on novolog, Rx was sent in to the above pharmacy.

## 2018-03-29 NOTE — TELEPHONE ENCOUNTER
----- Message from Agata Villanueva sent at 3/29/2018 12:12 PM EDT -----  Contact: PATIENT  /PHARMACY   THE PHARMACY HAS NOW ALSO CALLED IN REGARDS TO THIS AND NEED TO RE FAX IN THE CORRECT MEDICATION.     [phone]    ----- Message -----  From: Agata Villanueva  Sent: 3/29/2018  10:59 AM  To: Sabrina Burger MA    PATIENT/PHAMRCY REQUEST TO REFIL MEDICATION:  MEDICATION:  insulin lispro (humaLOG) injection 15 Units   MESSAGE:  PATIENT HAS CALLED AGAIN IN REGARDS TO THIS SHE HAS BEEN WAITING FOR 2 WEEKS FOR THE MEDICATION. PLEASE RE FAX OVER THE ABOVE MEDIATION TO THE BELOW PHARMACY.    Pharmacy:  [facility]/pharmacy #[id number] - [place], [place] - [address] [place] [address] - [phone]  - [phone]  Phone:  [phone] Fax:  [phone]   Address:  [address] [place] [id number]     Pharmacy Comments:  --    Called pharmacy and they said the the preferred medication in humalog. Told pharmacy I would send in a new script and he said the medication change had already been okayed by whoever answered the phone.

## 2018-04-24 RX ORDER — ICOSAPENT ETHYL 1000 MG/1
2 CAPSULE ORAL 2 TIMES DAILY WITH MEALS
Qty: 120 CAPSULE | Refills: 5 | Status: SHIPPED | OUTPATIENT
Start: 2018-04-24 | End: 2020-10-19

## 2018-04-24 RX ORDER — LIRAGLUTIDE 6 MG/ML
INJECTION SUBCUTANEOUS
Refills: 0 | COMMUNITY
Start: 2018-02-13 | End: 2018-04-24

## 2018-06-27 DIAGNOSIS — Z91.14 NONCOMPLIANCE WITH MEDICATION REGIMEN: ICD-10-CM

## 2018-06-27 DIAGNOSIS — E83.52 HYPERCALCEMIA: ICD-10-CM

## 2018-06-27 DIAGNOSIS — I10 ESSENTIAL HYPERTENSION: ICD-10-CM

## 2018-06-27 DIAGNOSIS — E78.5 HYPERLIPIDEMIA, UNSPECIFIED HYPERLIPIDEMIA TYPE: ICD-10-CM

## 2018-06-27 DIAGNOSIS — E78.1 HYPERTRIGLYCERIDEMIA: ICD-10-CM

## 2018-06-27 DIAGNOSIS — E55.9 VITAMIN D DEFICIENCY: ICD-10-CM

## 2018-06-27 DIAGNOSIS — E66.9 OBESITY, UNSPECIFIED CLASSIFICATION, UNSPECIFIED OBESITY TYPE, UNSPECIFIED WHETHER SERIOUS COMORBIDITY PRESENT: ICD-10-CM

## 2018-06-27 DIAGNOSIS — R80.9 MICROALBUMINURIA: ICD-10-CM

## 2018-06-27 DIAGNOSIS — IMO0002 UNCONTROLLED TYPE 2 DIABETES MELLITUS WITH COMPLICATION, WITH LONG-TERM CURRENT USE OF INSULIN: ICD-10-CM

## 2018-06-27 RX ORDER — PIOGLITAZONEHYDROCHLORIDE 30 MG/1
30 TABLET ORAL DAILY
Qty: 30 TABLET | Refills: 1 | Status: SHIPPED | OUTPATIENT
Start: 2018-06-27 | End: 2018-08-17

## 2018-07-26 RX ORDER — FENOFIBRATE 130 MG/1
130 CAPSULE ORAL
Qty: 30 CAPSULE | Refills: 0 | Status: SHIPPED | OUTPATIENT
Start: 2018-07-26 | End: 2019-07-26

## 2018-08-16 DIAGNOSIS — I10 ESSENTIAL HYPERTENSION: ICD-10-CM

## 2018-08-16 DIAGNOSIS — R80.9 MICROALBUMINURIA: ICD-10-CM

## 2018-08-16 DIAGNOSIS — IMO0002 UNCONTROLLED TYPE 2 DIABETES MELLITUS WITH COMPLICATION, WITH LONG-TERM CURRENT USE OF INSULIN: ICD-10-CM

## 2018-08-16 DIAGNOSIS — E78.1 HYPERTRIGLYCERIDEMIA: ICD-10-CM

## 2018-08-16 DIAGNOSIS — Z91.14 NONCOMPLIANCE WITH MEDICATION REGIMEN: ICD-10-CM

## 2018-08-16 DIAGNOSIS — E83.52 HYPERCALCEMIA: ICD-10-CM

## 2018-08-16 DIAGNOSIS — E78.5 HYPERLIPIDEMIA, UNSPECIFIED HYPERLIPIDEMIA TYPE: ICD-10-CM

## 2018-08-16 DIAGNOSIS — E66.9 OBESITY, UNSPECIFIED CLASSIFICATION, UNSPECIFIED OBESITY TYPE, UNSPECIFIED WHETHER SERIOUS COMORBIDITY PRESENT: ICD-10-CM

## 2018-08-16 DIAGNOSIS — E55.9 VITAMIN D DEFICIENCY: ICD-10-CM

## 2018-08-17 ENCOUNTER — OFFICE VISIT (OUTPATIENT)
Dept: ENDOCRINOLOGY | Age: 48
End: 2018-08-17

## 2018-08-17 VITALS
DIASTOLIC BLOOD PRESSURE: 94 MMHG | BODY MASS INDEX: 34.32 KG/M2 | SYSTOLIC BLOOD PRESSURE: 140 MMHG | WEIGHT: 206 LBS | HEIGHT: 65 IN

## 2018-08-17 DIAGNOSIS — Z91.14 NONCOMPLIANCE WITH MEDICATION REGIMEN: ICD-10-CM

## 2018-08-17 DIAGNOSIS — E83.52 HYPERCALCEMIA: ICD-10-CM

## 2018-08-17 DIAGNOSIS — E55.9 VITAMIN D DEFICIENCY: ICD-10-CM

## 2018-08-17 DIAGNOSIS — R80.9 MICROALBUMINURIA: ICD-10-CM

## 2018-08-17 DIAGNOSIS — IMO0002 UNCONTROLLED TYPE 2 DIABETES MELLITUS WITH COMPLICATION, WITH LONG-TERM CURRENT USE OF INSULIN: ICD-10-CM

## 2018-08-17 DIAGNOSIS — E78.5 HYPERLIPIDEMIA, UNSPECIFIED HYPERLIPIDEMIA TYPE: Primary | ICD-10-CM

## 2018-08-17 DIAGNOSIS — R53.82 CHRONIC FATIGUE: ICD-10-CM

## 2018-08-17 DIAGNOSIS — I10 ESSENTIAL HYPERTENSION: ICD-10-CM

## 2018-08-17 DIAGNOSIS — E78.1 HYPERTRIGLYCERIDEMIA: ICD-10-CM

## 2018-08-17 PROCEDURE — 99214 OFFICE O/P EST MOD 30 MIN: CPT | Performed by: NURSE PRACTITIONER

## 2018-08-17 RX ORDER — SEMAGLUTIDE 1.34 MG/ML
0.5 INJECTION, SOLUTION SUBCUTANEOUS WEEKLY
Qty: 4 PEN | Refills: 5 | Status: SHIPPED | OUTPATIENT
Start: 2018-08-17 | End: 2018-10-02 | Stop reason: SDUPTHER

## 2018-08-17 RX ORDER — ERGOCALCIFEROL 1.25 MG/1
CAPSULE ORAL
Qty: 12 CAPSULE | Refills: 1 | Status: SHIPPED | OUTPATIENT
Start: 2018-08-17 | End: 2018-08-20 | Stop reason: SDUPTHER

## 2018-08-17 NOTE — PROGRESS NOTES
"Yenny Clement is a 48 y.o. female is here today for follow-up.  Chief Complaint   Patient presents with   • Diabetes     No recent labs, pt tests 1x daily, pt brought meter   • Abnormal Calcium     pt is no longer taking pioglitizone   • Hyperlipidemia   • Hypertension   • Vitamin D Deficiency     /94   Ht 165.1 cm (65\")   Wt 93.4 kg (206 lb)   BMI 34.28 kg/m²   Current Outpatient Prescriptions on File Prior to Visit   Medication Sig   • aspirin-acetaminophen-caffeine (EXCEDRIN MIGRAINE) 250-250-65 MG per tablet Take 1 tablet by mouth 3 (three) times a day.   • cetirizine (ZyrTEC) 10 MG tablet Take 1 tablet by mouth daily.   • ezetimibe (ZETIA) 10 MG tablet Take 1 tablet by mouth Daily.   • fenofibrate micronized (ANTARA) 130 MG capsule TAKE 1 CAPSULE BY MOUTH EVERY MORNING BEFORE BREAKFAST.   • hydrochlorothiazide (HYDRODIURIL) 25 MG tablet Take 25 mg by mouth Daily.   • Insulin Lispro (HUMALOG KWIKPEN) 100 UNIT/ML solution pen-injector Take 10 Units by mouth 3 (Three) Times a Day Before Meals.   • Insulin Pen Needle (BD PEN NEEDLE PAUL U/F) 32G X 4 MM misc Use to inject insulin 2 times daily   • lisinopril (PRINIVIL,ZESTRIL) 20 MG tablet Take 1 tablet by mouth Daily.   • naproxen sodium (ALEVE) 220 MG tablet Take 1 tablet by mouth As Needed.   • omeprazole (PriLOSEC) 20 MG capsule Take 1 capsule by mouth daily.   • sertraline (ZOLOFT) 100 MG tablet Take 1 tablet by mouth 2 (Two) Times a Day.   • TRESIBA FLEXTOUCH 200 UNIT/ML solution pen-injector Inject 40 Units under the skin Daily.   • VASCEPA 1 g capsule capsule Take 2 g by mouth 2 (Two) Times a Day With Meals.   • vitamin D (ERGOCALCIFEROL) 78260 units capsule capsule Take 1 capsule by mouth Every 7 (Seven) Days.   • [DISCONTINUED] dicyclomine (BENTYL) 20 MG tablet Take 20 mg by mouth 3 (Three) Times a Day Before Meals.   • [DISCONTINUED] pioglitazone (ACTOS) 30 MG tablet Take 1 tablet by mouth Daily.     Current Facility-Administered " Medications on File Prior to Visit   Medication   • insulin lispro (humaLOG) injection 15 Units     Family History   Problem Relation Age of Onset   • Thyroid disease Mother    • Heart disease Father    • Stroke Father    • Hypertension Father      Social History   Substance Use Topics   • Smoking status: Current Every Day Smoker   • Smokeless tobacco: Not on file   • Alcohol use Yes      Comment: SOCIAL     Allergies   Allergen Reactions   • Bydureon [Exenatide] Other (See Comments)     Site reaction   • Metformin    • Statins      Muscle aches   • Xigduo Xr  [Dapagliflozin-Metformin Hcl Er]          History of Present Illness  Encounter Diagnoses   Name Primary?   • Hyperlipidemia, unspecified hyperlipidemia type Yes   • Essential hypertension    • Hypertriglyceridemia    • Vitamin D deficiency    • Uncontrolled type 2 diabetes mellitus with complication, with long-term current use of insulin (CMS/[facility])    • Microalbuminuria    • Hypercalcemia    • Noncompliance with medication regimen    • Chronic fatigue      48yr female seen in office today for a routine follow up with the above mentioned problems. She brought her meter and her log was reviewed. She states she does not check her sugars regularly, but she has not experienced any lows. She feels at this point her insulin dose made need to be adjusted as she has had consistent high sugar readings when she does check it. Reviewed log demonstrates 14-day average of 188mg/dL, 30-day average of 211mg/dL. She has not tolerated bydureon in the past due to localized reaction.   She states she was seen by her pcp yesterday in office and diagnosed with bronchitis and was prescribed an antibiotic. She is also going to start taking chantix to help her quit smoking. Currently she smokes 1 pack per day. She states she is taking her medications as prescribed and has not had compliance issues. She will have comprehensive labs drawn today after visit.        The following portions  of the patient's history were reviewed and updated as appropriate: allergies, current medications, past family history, past medical history, past social history, past surgical history and problem list.    Review of Systems   Constitutional: Negative for fatigue.   HENT: Negative for trouble swallowing.    Eyes: Negative for visual disturbance.   Respiratory: Negative for shortness of breath.    Cardiovascular: Negative for leg swelling.   Endocrine: Negative for polyuria.   Skin: Negative for wound.   Neurological: Negative for numbness.       Objective   Physical Exam   Constitutional: She is oriented to person, place, and time. She appears well-developed and well-nourished. No distress.   HENT:   Head: Normocephalic and atraumatic.   Right Ear: External ear normal.   Left Ear: External ear normal.   Nose: Nose normal.   Mouth/Throat: Oropharynx is clear and moist. No oropharyngeal exudate.   Eyes: Pupils are equal, round, and reactive to light. EOM are normal. Right eye exhibits no discharge. Left eye exhibits no discharge.   Neck: Trachea normal, normal range of motion and full passive range of motion without pain. Neck supple. No tracheal tenderness present. Carotid bruit is not present. No tracheal deviation, no edema and no erythema present. No thyroid mass and no thyromegaly present.   Cardiovascular: Normal rate, regular rhythm, normal heart sounds and intact distal pulses.  Exam reveals no gallop and no friction rub.    No murmur heard.  Pulmonary/Chest: Effort normal and breath sounds normal. No stridor. No respiratory distress. She has no wheezes. She has no rales.   Abdominal: Soft. Bowel sounds are normal. She exhibits no distension.   Musculoskeletal: Normal range of motion. She exhibits no edema or deformity.   Lymphadenopathy:     She has no cervical adenopathy.   Neurological: She is alert and oriented to person, place, and time.   Skin: Skin is warm and dry. No rash noted. She is not diaphoretic.  No erythema. No pallor.   Psychiatric: She has a normal mood and affect. Her behavior is normal. Judgment and thought content normal.   Nursing note and vitals reviewed.    Results for orders placed or performed in visit on 03/05/18   Comprehensive Metabolic Panel   Result Value Ref Range    Glucose 256 (H) 65 - 99 mg/dL    BUN 25 (H) 6 - 20 mg/dL    Creatinine 0.87 0.57 - 1.00 mg/dL    eGFR Non African Am 69 >60 mL/min/1.73    eGFR African Am 84 >60 mL/min/1.73    BUN/Creatinine Ratio 28.7 (H) 7.0 - 25.0    Sodium 136 136 - 145 mmol/L    Potassium 4.9 3.5 - 5.2 mmol/L    Chloride 97 (L) 98 - 107 mmol/L    Total CO2 22.7 22.0 - 29.0 mmol/L    Calcium 10.5 8.6 - 10.5 mg/dL    Total Protein 7.2 6.0 - 8.5 g/dL    Albumin 4.40 3.50 - 5.20 g/dL    Globulin 2.8 gm/dL    A/G Ratio 1.6 g/dL    Total Bilirubin 0.2 0.1 - 1.2 mg/dL    Alkaline Phosphatase 84 39 - 117 U/L    AST (SGOT) 11 1 - 32 U/L    ALT (SGPT) 16 1 - 33 U/L   C-Peptide   Result Value Ref Range    C-Peptide 4.5 (H) 1.1 - 4.4 ng/mL   Hemoglobin A1c   Result Value Ref Range    Hemoglobin A1C 10.80 (H) 4.80 - 5.60 %   Lipid Panel   Result Value Ref Range    Total Cholesterol 361 (H) 0 - 200 mg/dL    Triglycerides 771 (H) 0 - 150 mg/dL    HDL Cholesterol 38 (L) 40 - 60 mg/dL    VLDL Cholesterol CANCELED mg/dL    LDL Cholesterol  CANCELED mg/dL   T3, Free   Result Value Ref Range    T3, Free 2.8 2.0 - 4.4 pg/mL   T4, Free   Result Value Ref Range    Free T4 1.11 0.93 - 1.70 ng/dL   Thyroid Panel With TSH   Result Value Ref Range    TSH 1.320 0.450 - 4.500 uIU/mL    T4, Total 7.0 4.5 - 12.0 ug/dL    T3 Uptake 29 24 - 39 %    Free Thyroxine Index 2.0 1.2 - 4.9   Vitamin D 25 Hydroxy   Result Value Ref Range    25 Hydroxy, Vitamin D 30.3 30.0 - 100.0 ng/ml   FSH & LH   Result Value Ref Range    LH 40.1 mIU/mL    FSH 70.6 mIU/mL   Cardiovascular Risk Assessment   Result Value Ref Range    Interpretation Note    Diabetes Patient Education   Result Value Ref Range     PDF Image Not applicable          Assessment/Plan   Problems Addressed this Visit        Cardiovascular and Mediastinum    Hyperlipidemia - Primary    Hypertension    Hypertriglyceridemia       Digestive    Vitamin D deficiency       Endocrine    Uncontrolled type 2 diabetes mellitus with complication, with long-term current use of insulin (CMS/[facility])       Genitourinary    Microalbuminuria       Other    Fatigue    Noncompliance with medication regimen    Hypercalcemia            In summary, the patient was seen an examined. She has not had recent labs drawn. She did bring her blood glucose monitor and log was reviewed but she states she does not check her sugar regularly. 14 day average blood glucose was 188, with a 30-day average of 211. At this point we will add ozempic 0.25 weekly for 4 weeks then increase to 0.5 mg weekly. She is advised to check her blood glucose more often and to notify us of any low readings. Continue all other current medications. Labs will be drawn on this visit and the patient will be notified with result and any other further recommendations at that time. She will follow up with us in office in 6 months and is advised to contact the office sooner if and questions or concerns arise.

## 2018-08-17 NOTE — PATIENT INSTRUCTIONS
ozempic 0.25 mg weekly for 4 weeks then increase to 0.5 mg weekly  Continue all other meds  Check bs's more often  Labs pending

## 2018-08-18 LAB
25(OH)D3+25(OH)D2 SERPL-MCNC: 29.9 NG/ML (ref 30–100)
ALBUMIN SERPL-MCNC: 5 G/DL (ref 3.5–5.2)
ALBUMIN/GLOB SERPL: 2 G/DL
ALP SERPL-CCNC: 69 U/L (ref 39–117)
ALT SERPL-CCNC: 17 U/L (ref 1–33)
AST SERPL-CCNC: 10 U/L (ref 1–32)
BILIRUB SERPL-MCNC: <0.2 MG/DL (ref 0.1–1.2)
BUN SERPL-MCNC: 28 MG/DL (ref 6–20)
BUN/CREAT SERPL: 30.1 (ref 7–25)
C PEPTIDE SERPL-MCNC: 5.8 NG/ML (ref 1.1–4.4)
CALCIUM SERPL-MCNC: 10 MG/DL (ref 8.6–10.5)
CHLORIDE SERPL-SCNC: 96 MMOL/L (ref 98–107)
CHOLEST SERPL-MCNC: 260 MG/DL (ref 0–200)
CO2 SERPL-SCNC: 25.5 MMOL/L (ref 22–29)
CREAT SERPL-MCNC: 0.93 MG/DL (ref 0.57–1)
FT4I SERPL CALC-MCNC: 2.1 (ref 1.2–4.9)
GLOBULIN SER CALC-MCNC: 2.5 GM/DL
GLUCOSE SERPL-MCNC: 249 MG/DL (ref 65–99)
HBA1C MFR BLD: 8.5 % (ref 4.8–5.6)
HDLC SERPL-MCNC: 39 MG/DL (ref 40–60)
INTERPRETATION: NORMAL
LDLC SERPL CALC-MCNC: 152 MG/DL (ref 0–100)
Lab: NORMAL
MICROALBUMIN UR-MCNC: 22.6 UG/ML
POTASSIUM SERPL-SCNC: 4.2 MMOL/L (ref 3.5–5.2)
PROT SERPL-MCNC: 7.5 G/DL (ref 6–8.5)
SODIUM SERPL-SCNC: 139 MMOL/L (ref 136–145)
T3FREE SERPL-MCNC: 3.1 PG/ML (ref 2–4.4)
T3RU NFR SERPL: 30 % (ref 24–39)
T4 FREE SERPL-MCNC: 1.25 NG/DL (ref 0.93–1.7)
T4 SERPL-MCNC: 6.9 UG/DL (ref 4.5–12)
TRIGL SERPL-MCNC: 344 MG/DL (ref 0–150)
TSH SERPL DL<=0.005 MIU/L-ACNC: 1.98 UIU/ML (ref 0.45–4.5)
VLDLC SERPL CALC-MCNC: 68.8 MG/DL (ref 5–40)

## 2018-08-20 DIAGNOSIS — E78.5 HYPERLIPIDEMIA, UNSPECIFIED HYPERLIPIDEMIA TYPE: ICD-10-CM

## 2018-08-20 DIAGNOSIS — E55.9 VITAMIN D DEFICIENCY: ICD-10-CM

## 2018-08-20 DIAGNOSIS — E78.1 HYPERTRIGLYCERIDEMIA: ICD-10-CM

## 2018-08-20 DIAGNOSIS — IMO0002 UNCONTROLLED TYPE 2 DIABETES MELLITUS WITH COMPLICATION, WITH LONG-TERM CURRENT USE OF INSULIN: ICD-10-CM

## 2018-08-20 DIAGNOSIS — R80.9 MICROALBUMINURIA: ICD-10-CM

## 2018-08-20 DIAGNOSIS — I10 ESSENTIAL HYPERTENSION: ICD-10-CM

## 2018-08-20 DIAGNOSIS — Z91.14 NONCOMPLIANCE WITH MEDICATION REGIMEN: ICD-10-CM

## 2018-08-20 DIAGNOSIS — E83.52 HYPERCALCEMIA: ICD-10-CM

## 2018-08-20 DIAGNOSIS — E66.9 OBESITY, UNSPECIFIED CLASSIFICATION, UNSPECIFIED OBESITY TYPE, UNSPECIFIED WHETHER SERIOUS COMORBIDITY PRESENT: ICD-10-CM

## 2018-08-20 RX ORDER — ERGOCALCIFEROL 1.25 MG/1
CAPSULE ORAL
Qty: 24 CAPSULE | Refills: 1 | Status: SHIPPED | OUTPATIENT
Start: 2018-08-20 | End: 2019-02-26 | Stop reason: SDUPTHER

## 2018-08-21 ENCOUNTER — TELEPHONE (OUTPATIENT)
Dept: ENDOCRINOLOGY | Age: 48
End: 2018-08-21

## 2018-08-21 NOTE — TELEPHONE ENCOUNTER
----- Message from KEVIN Hernández sent at 8/20/2018  8:35 PM EDT -----  Med change        Spoke to the patient and informed her that her A1c has improved, but is not at goal of less than 7. Informed her that hopefully with the addition of ozempic, it will improve. Told the patient to contact the office if her BG continues to stay above 150 on avg. Increase vit D to 2x weekly. Pt expressed understanding.

## 2018-08-23 RX ORDER — EZETIMIBE 10 MG/1
10 TABLET ORAL DAILY
Qty: 30 TABLET | Refills: 5 | Status: SHIPPED | OUTPATIENT
Start: 2018-08-23 | End: 2019-01-22 | Stop reason: SDUPTHER

## 2018-10-02 RX ORDER — SEMAGLUTIDE 1.34 MG/ML
0.5 INJECTION, SOLUTION SUBCUTANEOUS WEEKLY
Qty: 2 PEN | Refills: 4 | Status: SHIPPED | OUTPATIENT
Start: 2018-10-02 | End: 2018-12-12 | Stop reason: SINTOL

## 2019-01-22 RX ORDER — EZETIMIBE 10 MG/1
10 TABLET ORAL DAILY
Qty: 901 TABLET | Refills: 0 | Status: SHIPPED | OUTPATIENT
Start: 2019-01-22 | End: 2019-07-03 | Stop reason: SDUPTHER

## 2019-02-25 ENCOUNTER — OFFICE VISIT (OUTPATIENT)
Dept: ENDOCRINOLOGY | Age: 49
End: 2019-02-25

## 2019-02-25 VITALS
SYSTOLIC BLOOD PRESSURE: 128 MMHG | DIASTOLIC BLOOD PRESSURE: 80 MMHG | BODY MASS INDEX: 33.27 KG/M2 | HEIGHT: 66 IN | WEIGHT: 207 LBS

## 2019-02-25 DIAGNOSIS — E78.5 HYPERLIPIDEMIA, UNSPECIFIED HYPERLIPIDEMIA TYPE: ICD-10-CM

## 2019-02-25 DIAGNOSIS — E55.9 VITAMIN D DEFICIENCY: ICD-10-CM

## 2019-02-25 DIAGNOSIS — E66.9 OBESITY, UNSPECIFIED CLASSIFICATION, UNSPECIFIED OBESITY TYPE, UNSPECIFIED WHETHER SERIOUS COMORBIDITY PRESENT: ICD-10-CM

## 2019-02-25 DIAGNOSIS — E78.1 HYPERTRIGLYCERIDEMIA: ICD-10-CM

## 2019-02-25 DIAGNOSIS — E83.52 HYPERCALCEMIA: ICD-10-CM

## 2019-02-25 DIAGNOSIS — Z91.14 NONCOMPLIANCE WITH MEDICATION REGIMEN: ICD-10-CM

## 2019-02-25 DIAGNOSIS — I10 ESSENTIAL HYPERTENSION: ICD-10-CM

## 2019-02-25 DIAGNOSIS — IMO0002 UNCONTROLLED TYPE 2 DIABETES MELLITUS WITH COMPLICATION, WITH LONG-TERM CURRENT USE OF INSULIN: Primary | ICD-10-CM

## 2019-02-25 DIAGNOSIS — R80.9 MICROALBUMINURIA: ICD-10-CM

## 2019-02-25 PROCEDURE — 99214 OFFICE O/P EST MOD 30 MIN: CPT | Performed by: NURSE PRACTITIONER

## 2019-02-25 RX ORDER — INSULIN DEGLUDEC 200 U/ML
70 INJECTION, SOLUTION SUBCUTANEOUS DAILY
Qty: 18 ML | Refills: 5 | Status: SHIPPED | OUTPATIENT
Start: 2019-02-25 | End: 2020-10-19

## 2019-02-25 NOTE — PROGRESS NOTES
Yenny Clement is a 49 y.o. female is here today for follow-up.  Chief Complaint   Patient presents with   • Diabetes     no recent labs, testing BG 2 times daily, pt brought meter   • Hyperlipidemia     pt is not taking vascepa and antara   • Hypertension   • Vitamin D Deficiency   • Abnormal Calcium     Current Outpatient Medications on File Prior to Visit   Medication Sig   • aspirin-acetaminophen-caffeine (EXCEDRIN MIGRAINE) 250-250-65 MG per tablet Take 1 tablet by mouth 3 (three) times a day.   • cetirizine (ZyrTEC) 10 MG tablet Take 1 tablet by mouth daily.   • ezetimibe (ZETIA) 10 MG tablet Take 1 tablet by mouth Daily.   • hydrochlorothiazide (HYDRODIURIL) 25 MG tablet Take 25 mg by mouth Daily.   • Insulin Lispro (HUMALOG KWIKPEN) 100 UNIT/ML solution pen-injector Take 10 Units by mouth 3 (Three) Times a Day Before Meals.   • Insulin Pen Needle (BD PEN NEEDLE PAUL U/F) 32G X 4 MM misc Use to inject insulin 2 times daily   • lisinopril (PRINIVIL,ZESTRIL) 20 MG tablet Take 1 tablet by mouth Daily.   • naproxen sodium (ALEVE) 220 MG tablet Take 1 tablet by mouth As Needed.   • omeprazole (PriLOSEC) 20 MG capsule Take 1 capsule by mouth daily.   • sertraline (ZOLOFT) 100 MG tablet Take 1 tablet by mouth 2 (Two) Times a Day.   • TRESIBA FLEXTOUCH 200 UNIT/ML solution pen-injector Inject 40 Units under the skin Daily. (Patient taking differently: Inject 66 Units under the skin into the appropriate area as directed Daily.)   • vitamin D (ERGOCALCIFEROL) 17985 units capsule capsule Take 1 cap twice weekly   • fenofibrate micronized (ANTARA) 130 MG capsule TAKE 1 CAPSULE BY MOUTH EVERY MORNING BEFORE BREAKFAST.   • VASCEPA 1 g capsule capsule Take 2 g by mouth 2 (Two) Times a Day With Meals.     Current Facility-Administered Medications on File Prior to Visit   Medication   • insulin lispro (humaLOG) injection 15 Units     Family History   Problem Relation Age of Onset   • Thyroid disease Mother    • Heart  "disease Father    • Stroke Father    • Hypertension Father      Social History     Tobacco Use   • Smoking status: Current Every Day Smoker   Substance Use Topics   • Alcohol use: Yes     Comment: SOCIAL   • Drug use: Not on file     /80   Ht 167.6 cm (66\")   Wt 93.9 kg (207 lb)   BMI 33.41 kg/m²   Blood Pressures during visit    02/25/19 1111   BP: 128/80     Allergies   Allergen Reactions   • Bydureon [Exenatide] Other (See Comments)     Site reaction   • Metformin    • Ozempic [Semaglutide] GI Intolerance     Pt stopped due to stomach pain   • Statins      Muscle aches   • Xigduo Xr  [Dapagliflozin-Metformin Hcl Er]          Diabetes   She has type 2 diabetes mellitus. No MedicAlert identification noted. The initial diagnosis of diabetes was made 4 years ago. Hypoglycemia symptoms include headaches. Pertinent negatives for hypoglycemia include no confusion, dizziness, hunger, mood changes, nervousness/anxiousness, pallor, seizures, sleepiness, speech difficulty, sweats or tremors. Associated symptoms include polyuria. Pertinent negatives for diabetes include no blurred vision, no chest pain, no fatigue, no foot paresthesias, no foot ulcerations, no polyphagia, no weakness and no weight loss. Pertinent negatives for hypoglycemia complications include no blackouts, no hospitalization, no required assistance and no required glucagon injection. Symptoms are improving. Pertinent negatives for diabetic complications include no CVA, heart disease, impotence, nephropathy, peripheral neuropathy, PVD or retinopathy. Risk factors for coronary artery disease include dyslipidemia, family history, obesity and post-menopausal. Current diabetic treatment includes insulin injections. She is compliant with treatment most of the time. She is currently taking insulin pre-breakfast, pre-lunch and pre-dinner. Insulin injections are given by patient. Rotation sites for injection include the abdominal wall. Her weight is " fluctuating minimally. She is following a generally unhealthy diet. Meal planning includes carbohydrate counting. She has not had a previous visit with a dietitian. She rarely participates in exercise. She monitors blood glucose at home 3-4 x per week. She monitors urine at home <1 x per month. Blood glucose monitoring compliance is poor. Her home blood glucose trend is decreasing steadily. Her breakfast blood glucose is taken after 10 am. Her breakfast blood glucose range is generally 140-180 mg/dl. Her highest blood glucose is >200 mg/dl. Her overall blood glucose range is 140-180 mg/dl. She does not see a podiatrist.Eye exam is current.     Encounter Diagnoses   Name Primary?   • Hyperlipidemia, unspecified hyperlipidemia type    • Essential hypertension    • Hypertriglyceridemia    • Vitamin D deficiency    • Uncontrolled type 2 diabetes mellitus with complication, with long-term current use of insulin (CMS/[facility]) Yes   • Hypercalcemia    • Noncompliance with medication regimen      49-year-old female seen today for a routine office visit. She is here for the above diagnoses.  Patient reports that she has struggled with regularly checking her blood sugars 4 times a day as instructed.  Discussed and educated on setting alarms rather than reminders on her phone.  Verbal understanding received.  Patient also reports recurrent UTIs for 3-4 months and has been intermittently been treated with antibiotics.  Due to recurrence she is seeing her urologist tomorrow.  Patient currently taking Tresiba 66 units daily and increasing by 2 units every week for a goal blood sugar of 110 or less.  Patient denies issues with hypoglycemia episodes.  Patient normally obtains blood work same days evaluation.  Labs will be collected today.  She denies any hypoglycemic events.  She does states she has issues of compliance with medications at times.  Her medication list was reviewed and updated at today's visit.        The following  portions of the patient's history were reviewed and updated as appropriate: allergies, current medications, past family history, past medical history, past social history, past surgical history and problem list.    Review of Systems   Constitutional: Negative for fatigue and weight loss.   HENT: Negative for trouble swallowing.    Eyes: Negative for blurred vision and visual disturbance.   Cardiovascular: Negative for chest pain and leg swelling.   Endocrine: Positive for polyuria. Negative for polyphagia.   Genitourinary: Negative for impotence.   Skin: Negative for pallor and wound.   Neurological: Positive for headaches. Negative for dizziness, tremors, seizures, speech difficulty and weakness.   Psychiatric/Behavioral: Negative for confusion. The patient is not nervous/anxious.        Objective   Physical Exam   Constitutional: She is oriented to person, place, and time. She appears well-developed and well-nourished. No distress.   HENT:   Head: Normocephalic and atraumatic.   Right Ear: External ear normal.   Left Ear: External ear normal.   Nose: Nose normal.   Mouth/Throat: Oropharynx is clear and moist. No oropharyngeal exudate.   Eyes: EOM are normal. Pupils are equal, round, and reactive to light. Right eye exhibits no discharge. Left eye exhibits no discharge.   Neck: Trachea normal, normal range of motion and full passive range of motion without pain. Neck supple. No tracheal tenderness present. Carotid bruit is not present. No tracheal deviation, no edema and no erythema present. No thyroid mass and no thyromegaly present.   Cardiovascular: Normal rate, regular rhythm, normal heart sounds and intact distal pulses. Exam reveals no gallop and no friction rub.   No murmur heard.  Pulmonary/Chest: Effort normal and breath sounds normal. No stridor. No respiratory distress. She has no wheezes. She has no rales.   Abdominal: Soft. Bowel sounds are normal. She exhibits no distension.   Musculoskeletal: Normal  range of motion. She exhibits no edema or deformity.   Lymphadenopathy:     She has no cervical adenopathy.   Neurological: She is alert and oriented to person, place, and time.   Skin: Skin is warm and dry. No rash noted. She is not diaphoretic. No erythema. No pallor.   Psychiatric: She has a normal mood and affect. Her behavior is normal. Judgment and thought content normal.   Nursing note and vitals reviewed.    Results for orders placed or performed in visit on 08/17/18   Comprehensive Metabolic Panel   Result Value Ref Range    Glucose 249 (H) 65 - 99 mg/dL    BUN 28 (H) 6 - 20 mg/dL    Creatinine 0.93 0.57 - 1.00 mg/dL    eGFR Non African Am 64 >60 mL/min/1.73    eGFR African Am 77 >60 mL/min/1.73    BUN/Creatinine Ratio 30.1 (H) 7.0 - 25.0    Sodium 139 136 - 145 mmol/L    Potassium 4.2 3.5 - 5.2 mmol/L    Chloride 96 (L) 98 - 107 mmol/L    Total CO2 25.5 22.0 - 29.0 mmol/L    Calcium 10.0 8.6 - 10.5 mg/dL    Total Protein 7.5 6.0 - 8.5 g/dL    Albumin 5.00 3.50 - 5.20 g/dL    Globulin 2.5 gm/dL    A/G Ratio 2.0 g/dL    Total Bilirubin <0.2 0.1 - 1.2 mg/dL    Alkaline Phosphatase 69 39 - 117 U/L    AST (SGOT) 10 1 - 32 U/L    ALT (SGPT) 17 1 - 33 U/L   C-Peptide   Result Value Ref Range    C-Peptide 5.8 (H) 1.1 - 4.4 ng/mL   Hemoglobin A1c   Result Value Ref Range    Hemoglobin A1C 8.50 (H) 4.80 - 5.60 %   Lipid Panel   Result Value Ref Range    Total Cholesterol 260 (H) 0 - 200 mg/dL    Triglycerides 344 (H) 0 - 150 mg/dL    HDL Cholesterol 39 (L) 40 - 60 mg/dL    VLDL Cholesterol 68.8 (H) 5 - 40 mg/dL    LDL Cholesterol  152 (H) 0 - 100 mg/dL   MicroAlbumin, Urine, Random - Urine, Clean Catch   Result Value Ref Range    Microalbumin, Urine 22.6 Not Estab. ug/mL   T3, Free   Result Value Ref Range    T3, Free 3.1 2.0 - 4.4 pg/mL   T4, Free   Result Value Ref Range    Free T4 1.25 0.93 - 1.70 ng/dL   Thyroid Panel With TSH   Result Value Ref Range    TSH 1.980 0.450 - 4.500 uIU/mL    T4, Total 6.9 4.5 - 12.0  ug/dL    T3 Uptake 30 24 - 39 %    Free Thyroxine Index 2.1 1.2 - 4.9   Vitamin D 25 Hydroxy   Result Value Ref Range    25 Hydroxy, Vitamin D 29.9 (L) 30.0 - 100.0 ng/ml   Cardiovascular Risk Assessment   Result Value Ref Range    Interpretation Note    Diabetes Patient Education   Result Value Ref Range    PDF Image Not applicable          Assessment/Plan   Problems Addressed this Visit        Cardiovascular and Mediastinum    Hyperlipidemia    Hypertension    Hypertriglyceridemia       Digestive    Vitamin D deficiency       Endocrine    Uncontrolled type 2 diabetes mellitus with complication, with long-term current use of insulin (CMS/[facility]) - Primary       Other    Noncompliance with medication regimen    Hypercalcemia        In summary patient seen and evaluated.  Discussed A1c level from previous visit.  A1c has improved but due to previous A1c and the glucometer log patient will be increased to 70 units daily on Tresiba.  Labs to be completed today and results will be reviewed and patient will be updated accordingly for any changes or recommendations.  Patient is to continue 4 times daily blood sugar checks.  Discussed diet and exercise changes.  Patient will follow-up in 6 months.  Encouraged patient to call with any new or worsening conditions or concerns prior to the 6 months visit.  Based on her current blood sugar readings I have increased her Tresiba to 70 units once daily.  She is to continue to titrate by 2 units every 4 days for a goal of morning blood sugars less than 110 mg/dL.    Increase Tresiba to 70 units daily  Labs to be completed today

## 2019-02-26 ENCOUNTER — CONVERSION ENCOUNTER (OUTPATIENT)
Dept: SURGERY | Facility: CLINIC | Age: 49
End: 2019-02-26

## 2019-02-26 ENCOUNTER — OFFICE VISIT CONVERTED (OUTPATIENT)
Dept: UROLOGY | Facility: CLINIC | Age: 49
End: 2019-02-26
Attending: UROLOGY

## 2019-02-26 DIAGNOSIS — Z91.14 NONCOMPLIANCE WITH MEDICATION REGIMEN: ICD-10-CM

## 2019-02-26 DIAGNOSIS — IMO0002 UNCONTROLLED TYPE 2 DIABETES MELLITUS WITH COMPLICATION, WITH LONG-TERM CURRENT USE OF INSULIN: ICD-10-CM

## 2019-02-26 DIAGNOSIS — E78.5 HYPERLIPIDEMIA, UNSPECIFIED HYPERLIPIDEMIA TYPE: ICD-10-CM

## 2019-02-26 DIAGNOSIS — E55.9 VITAMIN D DEFICIENCY: ICD-10-CM

## 2019-02-26 DIAGNOSIS — E83.52 HYPERCALCEMIA: ICD-10-CM

## 2019-02-26 DIAGNOSIS — I10 ESSENTIAL HYPERTENSION: ICD-10-CM

## 2019-02-26 DIAGNOSIS — R80.9 MICROALBUMINURIA: ICD-10-CM

## 2019-02-26 DIAGNOSIS — E78.1 HYPERTRIGLYCERIDEMIA: ICD-10-CM

## 2019-02-26 DIAGNOSIS — E66.9 OBESITY, UNSPECIFIED CLASSIFICATION, UNSPECIFIED OBESITY TYPE, UNSPECIFIED WHETHER SERIOUS COMORBIDITY PRESENT: ICD-10-CM

## 2019-02-26 LAB
25(OH)D3+25(OH)D2 SERPL-MCNC: 27 NG/ML (ref 30–100)
ALBUMIN SERPL-MCNC: 4.6 G/DL (ref 3.5–5.2)
ALBUMIN/GLOB SERPL: 1.4 G/DL
ALP SERPL-CCNC: 71 U/L (ref 39–117)
ALT SERPL-CCNC: 13 U/L (ref 1–33)
AST SERPL-CCNC: 9 U/L (ref 1–32)
BILIRUB SERPL-MCNC: 0.2 MG/DL (ref 0.1–1.2)
BUN SERPL-MCNC: 23 MG/DL (ref 6–20)
BUN/CREAT SERPL: 28.4 (ref 7–25)
C PEPTIDE SERPL-MCNC: 2.4 NG/ML (ref 1.1–4.4)
CALCIUM SERPL-MCNC: 10.5 MG/DL (ref 8.6–10.5)
CHLORIDE SERPL-SCNC: 104 MMOL/L (ref 98–107)
CHOLEST SERPL-MCNC: 251 MG/DL (ref 0–200)
CO2 SERPL-SCNC: 23.1 MMOL/L (ref 22–29)
CREAT SERPL-MCNC: 0.81 MG/DL (ref 0.57–1)
FT4I SERPL CALC-MCNC: 1.8 (ref 1.2–4.9)
GLOBULIN SER CALC-MCNC: 3.2 GM/DL
GLUCOSE SERPL-MCNC: 175 MG/DL (ref 65–99)
HBA1C MFR BLD: 9.48 % (ref 4.8–5.6)
HDLC SERPL-MCNC: 42 MG/DL (ref 40–60)
INTERPRETATION: NORMAL
LDLC SERPL CALC-MCNC: ABNORMAL MG/DL
Lab: NORMAL
MICROALBUMIN UR-MCNC: 61.6 UG/ML
POTASSIUM SERPL-SCNC: 4.8 MMOL/L (ref 3.5–5.2)
PROT SERPL-MCNC: 7.8 G/DL (ref 6–8.5)
SODIUM SERPL-SCNC: 141 MMOL/L (ref 136–145)
T3FREE SERPL-MCNC: 2.9 PG/ML (ref 2–4.4)
T3RU NFR SERPL: 27 % (ref 24–39)
T4 FREE SERPL-MCNC: 1.07 NG/DL (ref 0.93–1.7)
T4 SERPL-MCNC: 6.5 UG/DL (ref 4.5–12)
TRIGL SERPL-MCNC: 401 MG/DL (ref 0–150)
TSH SERPL DL<=0.005 MIU/L-ACNC: 1.63 UIU/ML (ref 0.45–4.5)
VLDLC SERPL CALC-MCNC: ABNORMAL MG/DL

## 2019-02-26 RX ORDER — ERGOCALCIFEROL 1.25 MG/1
CAPSULE ORAL
Qty: 36 CAPSULE | Refills: 1 | Status: SHIPPED | OUTPATIENT
Start: 2019-02-26 | End: 2019-05-02 | Stop reason: SDUPTHER

## 2019-02-28 ENCOUNTER — TELEPHONE (OUTPATIENT)
Dept: ENDOCRINOLOGY | Age: 49
End: 2019-02-28

## 2019-03-06 DIAGNOSIS — IMO0002 UNCONTROLLED TYPE 2 DIABETES MELLITUS WITH COMPLICATION, WITH LONG-TERM CURRENT USE OF INSULIN: ICD-10-CM

## 2019-03-06 DIAGNOSIS — E66.9 OBESITY, UNSPECIFIED CLASSIFICATION, UNSPECIFIED OBESITY TYPE, UNSPECIFIED WHETHER SERIOUS COMORBIDITY PRESENT: ICD-10-CM

## 2019-03-06 DIAGNOSIS — E78.1 HYPERTRIGLYCERIDEMIA: ICD-10-CM

## 2019-03-06 DIAGNOSIS — I10 ESSENTIAL HYPERTENSION: ICD-10-CM

## 2019-03-06 DIAGNOSIS — E55.9 VITAMIN D DEFICIENCY: ICD-10-CM

## 2019-03-06 DIAGNOSIS — E83.52 HYPERCALCEMIA: ICD-10-CM

## 2019-03-06 DIAGNOSIS — E78.5 HYPERLIPIDEMIA, UNSPECIFIED HYPERLIPIDEMIA TYPE: ICD-10-CM

## 2019-03-06 DIAGNOSIS — Z91.14 NONCOMPLIANCE WITH MEDICATION REGIMEN: ICD-10-CM

## 2019-03-06 DIAGNOSIS — R80.9 MICROALBUMINURIA: ICD-10-CM

## 2019-03-06 RX ORDER — INSULIN DEGLUDEC 200 U/ML
40 INJECTION, SOLUTION SUBCUTANEOUS DAILY
Qty: 9 PEN | Refills: 5 | Status: SHIPPED | OUTPATIENT
Start: 2019-03-06 | End: 2020-10-19 | Stop reason: SDUPTHER

## 2019-03-11 ENCOUNTER — HOSPITAL ENCOUNTER (OUTPATIENT)
Dept: SURGERY | Facility: CLINIC | Age: 49
Discharge: HOME | End: 2019-03-11
Attending: UROLOGY

## 2019-03-13 LAB — BACTERIA UR CULT: NORMAL

## 2019-05-02 DIAGNOSIS — E78.1 HYPERTRIGLYCERIDEMIA: ICD-10-CM

## 2019-05-02 DIAGNOSIS — E66.9 OBESITY, UNSPECIFIED CLASSIFICATION, UNSPECIFIED OBESITY TYPE, UNSPECIFIED WHETHER SERIOUS COMORBIDITY PRESENT: ICD-10-CM

## 2019-05-02 DIAGNOSIS — E83.52 HYPERCALCEMIA: ICD-10-CM

## 2019-05-02 DIAGNOSIS — I10 ESSENTIAL HYPERTENSION: ICD-10-CM

## 2019-05-02 DIAGNOSIS — Z91.14 NONCOMPLIANCE WITH MEDICATION REGIMEN: ICD-10-CM

## 2019-05-02 DIAGNOSIS — R80.9 MICROALBUMINURIA: ICD-10-CM

## 2019-05-02 DIAGNOSIS — E55.9 VITAMIN D DEFICIENCY: ICD-10-CM

## 2019-05-02 DIAGNOSIS — E78.5 HYPERLIPIDEMIA, UNSPECIFIED HYPERLIPIDEMIA TYPE: ICD-10-CM

## 2019-05-02 DIAGNOSIS — IMO0002 UNCONTROLLED TYPE 2 DIABETES MELLITUS WITH COMPLICATION, WITH LONG-TERM CURRENT USE OF INSULIN: ICD-10-CM

## 2019-05-02 RX ORDER — ERGOCALCIFEROL 1.25 MG/1
CAPSULE ORAL
Qty: 36 CAPSULE | Refills: 1 | Status: SHIPPED | OUTPATIENT
Start: 2019-05-02 | End: 2020-12-15 | Stop reason: SDUPTHER

## 2019-07-03 RX ORDER — EZETIMIBE 10 MG/1
10 TABLET ORAL DAILY
Qty: 30 TABLET | Refills: 0 | Status: SHIPPED | OUTPATIENT
Start: 2019-07-03 | End: 2020-07-02

## 2020-02-12 RX ORDER — EZETIMIBE 10 MG/1
TABLET ORAL
Qty: 30 TABLET | Refills: 0 | OUTPATIENT
Start: 2020-02-12

## 2020-03-11 DIAGNOSIS — E78.5 HYPERLIPIDEMIA, UNSPECIFIED HYPERLIPIDEMIA TYPE: ICD-10-CM

## 2020-03-11 DIAGNOSIS — E83.52 HYPERCALCEMIA: ICD-10-CM

## 2020-03-11 DIAGNOSIS — E55.9 VITAMIN D DEFICIENCY: ICD-10-CM

## 2020-03-11 DIAGNOSIS — IMO0002 UNCONTROLLED TYPE 2 DIABETES MELLITUS WITH COMPLICATION, WITH LONG-TERM CURRENT USE OF INSULIN: ICD-10-CM

## 2020-03-11 DIAGNOSIS — E66.9 OBESITY, UNSPECIFIED CLASSIFICATION, UNSPECIFIED OBESITY TYPE, UNSPECIFIED WHETHER SERIOUS COMORBIDITY PRESENT: ICD-10-CM

## 2020-03-11 DIAGNOSIS — R80.9 MICROALBUMINURIA: ICD-10-CM

## 2020-03-11 DIAGNOSIS — Z91.14 NONCOMPLIANCE WITH MEDICATION REGIMEN: ICD-10-CM

## 2020-03-11 DIAGNOSIS — E78.1 HYPERTRIGLYCERIDEMIA: ICD-10-CM

## 2020-03-11 DIAGNOSIS — I10 ESSENTIAL HYPERTENSION: ICD-10-CM

## 2020-03-11 RX ORDER — INSULIN DEGLUDEC 200 U/ML
INJECTION, SOLUTION SUBCUTANEOUS
Qty: 18 PEN | Refills: 5 | OUTPATIENT
Start: 2020-03-11

## 2020-06-01 DIAGNOSIS — E55.9 VITAMIN D DEFICIENCY: ICD-10-CM

## 2020-06-01 DIAGNOSIS — E78.1 HYPERTRIGLYCERIDEMIA: ICD-10-CM

## 2020-06-01 DIAGNOSIS — I10 ESSENTIAL HYPERTENSION: ICD-10-CM

## 2020-06-01 DIAGNOSIS — IMO0002 UNCONTROLLED TYPE 2 DIABETES MELLITUS WITH COMPLICATION, WITH LONG-TERM CURRENT USE OF INSULIN: ICD-10-CM

## 2020-06-01 DIAGNOSIS — Z91.14 NONCOMPLIANCE WITH MEDICATION REGIMEN: ICD-10-CM

## 2020-06-01 DIAGNOSIS — R80.9 MICROALBUMINURIA: ICD-10-CM

## 2020-06-01 DIAGNOSIS — E83.52 HYPERCALCEMIA: ICD-10-CM

## 2020-06-01 DIAGNOSIS — E78.5 HYPERLIPIDEMIA, UNSPECIFIED HYPERLIPIDEMIA TYPE: ICD-10-CM

## 2020-06-01 DIAGNOSIS — E66.9 OBESITY, UNSPECIFIED CLASSIFICATION, UNSPECIFIED OBESITY TYPE, UNSPECIFIED WHETHER SERIOUS COMORBIDITY PRESENT: ICD-10-CM

## 2020-06-01 RX ORDER — EZETIMIBE 10 MG/1
TABLET ORAL
Qty: 30 TABLET | Refills: 0 | OUTPATIENT
Start: 2020-06-01

## 2020-06-01 RX ORDER — ERGOCALCIFEROL 1.25 MG/1
CAPSULE ORAL
Qty: 36 CAPSULE | Refills: 1 | OUTPATIENT
Start: 2020-06-01

## 2020-10-09 NOTE — PROGRESS NOTES
Yenny Clement is a 50 y.o. female.     F/u for dm 2, hyperlipidemia, hypertension, vitamin d def, abn calcium / testing bs 0 x day / last dm eye exam 2019/ last dm  foot exam today with dr Cheney / flu vaccine declined      Patient is a 50-year-old female who has been lost to follow-up since February 2019.  She is new to me.    She was diagnosed to have diabetes mellitus in 2015 and started on insulin in 2018.  She is on Tresiba 80 units every evening and Humalog 15 units 3 times a day with meals.  She does not check his blood sugars.  She has occasional hypoglycemic symptoms.  Her last meal was last night.    Metformin causes diarrhea.  Ozempic causes abdominal pain.  Bydureon because irritation and injection site.  Xigduo caused diarrhea.    Her last eye examination was in August 2019.  She denies retinopathy.  She denies numbness, tingling or burning in her hands or feet.  She denies nephropathy.    She has hyperlipidemia and stopped taking Livalo 4 mg on her own.  She is on red yeast rice 600 mg 2 capsules once a day several months ago.  She was on atorvastatin 80 mg and fenofibrate which were discontinued in September 2016 because she was having restless legs at that time.  Restless legs did not improve after the discontinuation of the medication.    She has hypertension since 2015 and is on hydrochlorothiazide 25 mg/day.  Lisinopril 20 mg was discontinued 6 weeks ago because of low blood pressure.  She has no history of heart attack or stroke.    She has a right adrenal mass and follow-up CT scan done in July 2020 showed a stable 2.4 cm right adrenal adenoma which is stable in appearance compared to October 2019.  CT scanning done on August 28, 2020 showed the right adrenal gland has an oval low-density nodule measuring 2.7 cm in maximum dimension and measures 11 Hounsfield units.  There are old studies dating back to at least 2015.  Radiologist conclusion was a stable right adrenal adenoma unchanged  for at least 4 years.  Work-up done in 2020 showed normal plasma free normetanephrine at 21 pg/mL, normal free metanephrine at 39 pg/mL, normal aldosterone 5 ng per DL and normal plasma renin activity at 1.15 ng/mL/h.  Serum dexamethasone was reported at 368.3 ng per DL on September 3, 2020.  No serum cortisol was available.      She complains of easy bruising.  She is able to get up from a deep couch.  She has daily headaches but no palpitations.  She denies increase in facial or body hair or deepening of her voice..    She has been complaining of poor energy and poor appetite for the past year.  She has lost 40 pounds since 2019.  She had a normal colonoscopy in .  She had an EGD done in 2020 and was found to have gastric ulcers and hiatal hernia.  She is on Prilosec 40 mg/day and Carafate 1 g 4 times daily.    She is .  She has been amenorrheic since age 44.  She is not on HRT.  She denies hot flashes.        She denies heavy snoring or waking herself up from sleep snoring.  She sleeps 3 hours a night and has difficulty staying asleep.                     The following portions of the patient's history were reviewed and updated as appropriate: allergies, current medications, past family history, past medical history, past social history, past surgical history and problem list.    Review of Systems   Constitutional: Positive for fatigue. Negative for chills and fever.   Eyes: Negative.    Respiratory: Negative for chest tightness, shortness of breath and wheezing.    Cardiovascular: Negative for chest pain, palpitations and leg swelling.   Gastrointestinal: Positive for abdominal distention, abdominal pain, diarrhea and nausea. Negative for anal bleeding, blood in stool, constipation and vomiting.   Endocrine: Negative for cold intolerance, heat intolerance and polyuria.   Genitourinary: Positive for urgency. Negative for difficulty urinating and frequency.        Nocturia 3-4 times  "  Musculoskeletal: Positive for joint swelling. Negative for back pain and neck pain. Arthralgias: right hand.   Neurological: Positive for light-headedness and headaches. Negative for dizziness, tremors, seizures and numbness.   Hematological: Bruises/bleeds easily (bruise ).   Psychiatric/Behavioral: Positive for agitation. Negative for confusion and sleep disturbance. The patient is nervous/anxious.      Objective      Vitals:    10/19/20 0950   BP: 118/62   BP Location: Right arm   Patient Position: Sitting   Cuff Size: Large Adult   Pulse: 64   SpO2: 99%   Weight: 76.1 kg (167 lb 12.8 oz)   Height: 162.6 cm (64\")     Physical Exam  Constitutional:       General: She is not in acute distress.     Appearance: Normal appearance. She is obese. She is not ill-appearing, toxic-appearing or diaphoretic.   HENT:      Nose: Nose normal. No congestion or rhinorrhea.      Mouth/Throat:      Mouth: Mucous membranes are dry.      Pharynx: Oropharynx is clear. No oropharyngeal exudate or posterior oropharyngeal erythema.   Eyes:      General: No scleral icterus.        Right eye: No discharge.         Left eye: No discharge.      Extraocular Movements: Extraocular movements intact.      Conjunctiva/sclera: Conjunctivae normal.   Neck:      Musculoskeletal: Neck supple. No neck rigidity or muscular tenderness.      Vascular: No carotid bruit.   Cardiovascular:      Rate and Rhythm: Normal rate and regular rhythm.      Pulses: Normal pulses.      Heart sounds: Normal heart sounds. No murmur. No friction rub. No gallop.    Pulmonary:      Effort: Pulmonary effort is normal. No respiratory distress.      Breath sounds: Normal breath sounds. No stridor. No wheezing, rhonchi or rales.   Chest:      Chest wall: No tenderness.   Abdominal:      General: Bowel sounds are normal. There is no distension.      Palpations: Abdomen is soft. There is no mass.      Tenderness: There is no abdominal tenderness. There is no right CVA " tenderness, left CVA tenderness, guarding or rebound.      Hernia: No hernia is present.      Comments: No prominent striae   Musculoskeletal: Normal range of motion.         General: No swelling, tenderness, deformity or signs of injury.      Right lower leg: No edema.      Left lower leg: No edema.   Lymphadenopathy:      Cervical: No cervical adenopathy.   Skin:     General: Skin is warm and dry.      Coloration: Skin is not jaundiced or pale.      Findings: No bruising.   Neurological:      General: No focal deficit present.      Mental Status: She is alert and oriented to person, place, and time.      Comments: Able to get up from a deep squat.  Intact light touch in lower extremities   Psychiatric:         Judgment: Judgment normal.       Office Visit on 02/25/2019   Component Date Value Ref Range Status   • Glucose 02/25/2019 175* 65 - 99 mg/dL Final   • BUN 02/25/2019 23* 6 - 20 mg/dL Final   • Creatinine 02/25/2019 0.81  0.57 - 1.00 mg/dL Final   • eGFR Non  Am 02/25/2019 75  >60 mL/min/1.73 Final   • eGFR African Am 02/25/2019 90  >60 mL/min/1.73 Final   • BUN/Creatinine Ratio 02/25/2019 28.4* 7.0 - 25.0 Final   • Sodium 02/25/2019 141  136 - 145 mmol/L Final   • Potassium 02/25/2019 4.8  3.5 - 5.2 mmol/L Final   • Chloride 02/25/2019 104  98 - 107 mmol/L Final   • Total CO2 02/25/2019 23.1  22.0 - 29.0 mmol/L Final   • Calcium 02/25/2019 10.5  8.6 - 10.5 mg/dL Final   • Total Protein 02/25/2019 7.8  6.0 - 8.5 g/dL Final   • Albumin 02/25/2019 4.60  3.50 - 5.20 g/dL Final   • Globulin 02/25/2019 3.2  gm/dL Final   • A/G Ratio 02/25/2019 1.4  g/dL Final   • Total Bilirubin 02/25/2019 0.2  0.1 - 1.2 mg/dL Final   • Alkaline Phosphatase 02/25/2019 71  39 - 117 U/L Final   • AST (SGOT) 02/25/2019 9  1 - 32 U/L Final   • ALT (SGPT) 02/25/2019 13  1 - 33 U/L Final   • C-Peptide 02/25/2019 2.4  1.1 - 4.4 ng/mL Final    C-Peptide reference interval is for fasting patients.   • Hemoglobin A1C 02/25/2019  9.48* 4.80 - 5.60 % Final    Comment: Hemoglobin A1C Ranges:  Increased Risk for Diabetes  5.7% to 6.4%  Diabetes                     >= 6.5%  Diabetic Goal                < 7.0%     • Total Cholesterol 02/25/2019 251* 0 - 200 mg/dL Final   • Triglycerides 02/25/2019 401* 0 - 150 mg/dL Final   • HDL Cholesterol 02/25/2019 42  40 - 60 mg/dL Final   • VLDL Cholesterol 02/25/2019 CANCELED  mg/dL Final-Edited    Comment: Test not performed  Unable to calculate    Result canceled by the ancillary.     • LDL Cholesterol  02/25/2019 CANCELED  mg/dL Final-Edited    Comment: Test not performed  Unable to calculate    Result canceled by the ancillary.     • 25 Hydroxy, Vitamin D 02/25/2019 27.0* 30.0 - 100.0 ng/ml Final    Comment: Reference Range for Total Vitamin D 25(OH)  Deficiency <20.0 ng/mL  Insufficiency 21-29 ng/mL  Sufficiency  ng/mL  Toxicity >100 ng/ml     • T3, Free 02/25/2019 2.9  2.0 - 4.4 pg/mL Final   • Free T4 02/25/2019 1.07  0.93 - 1.70 ng/dL Final   • TSH 02/25/2019 1.630  0.450 - 4.500 uIU/mL Final   • T4, Total 02/25/2019 6.5  4.5 - 12.0 ug/dL Final   • T3 Uptake 02/25/2019 27  24 - 39 % Final   • Free Thyroxine Index 02/25/2019 1.8  1.2 - 4.9 Final   • Microalbumin, Urine 02/25/2019 61.6  Not Estab. ug/mL Final   • Interpretation 02/25/2019 Note   Final    Comment: Medical Director's Note: A Cardiovascular Report was not  sent because both LDL cholesterol and non-HDL cholesterol  results are required to generate a report.  Supplemental report is available.     • PDF Image 02/25/2019 Not applicable   Final     Assessment/Plan   Diagnoses and all orders for this visit:    1. Poorly controlled type 2 diabetes mellitus (CMS/HCC) (Primary)  -     Comprehensive Metabolic Panel  -     Hemoglobin A1c  -     TSH  -     T4, Free  -     Microalbumin / Creatinine Urine Ratio - Urine, Clean Catch  -     C-Peptide  -     Glutamic Acid Decarboxylase    2. Adrenal nodule (CMS/HCC)    3. Hyperlipidemia,  unspecified hyperlipidemia type  -     Comprehensive Metabolic Panel  -     Lipid Panel  -     TSH  -     T4, Free    4. Essential hypertension  -     Comprehensive Metabolic Panel    5. Peptic ulcer disease    6. Hiatal hernia      Patient has diabetes mellitus and has been lost to follow-up since February 2019.  She was advised to check her blood sugars before meals and at bedtime and send results in 1 week.  Will adjust insulin at that point.    She has incidental adrenal nodule which is most likely nonfunctional.  Adrenal nodule size has no change since 2015.  She had a normal plasma catecholamine, aldosterone and renin.  She might have had an overnight dexamethasone suppression test but no serum cortisol report is available to be reviewed.  Patient was asked to send me the result.  She has no signs or symptoms of androgen excess.    She has hyperlipidemia and was on atorvastatin and fenofibrate in the past.  The reason atorvastatin and fenofibrate were discontinued because she was having restless leg problems which did not improve after the discontinuation of the medication.  She stopped Livalo on her own and switched red yeast rice several months ago.  Consider restarting low-dose statin.  Continue on low-fat diet.    Continue Prilosec and Carafate.  Suggest GI consultation.  Patient has poor sleep.  Suggest consultation with sleep specialist.    Copy of my note sent to Dr. Heavenly Monsivais and Jolene Martin NP    RTC 4 mos

## 2020-10-16 ENCOUNTER — RESULTS ENCOUNTER (OUTPATIENT)
Dept: ENDOCRINOLOGY | Age: 50
End: 2020-10-16

## 2020-10-16 DIAGNOSIS — I10 ESSENTIAL HYPERTENSION: ICD-10-CM

## 2020-10-16 DIAGNOSIS — R68.89 ABNORMAL ENDOCRINE LABORATORY TEST FINDING: ICD-10-CM

## 2020-10-16 DIAGNOSIS — E55.9 VITAMIN D DEFICIENCY: ICD-10-CM

## 2020-10-16 DIAGNOSIS — IMO0002 UNCONTROLLED TYPE 2 DIABETES MELLITUS WITH COMPLICATION, WITH LONG-TERM CURRENT USE OF INSULIN: ICD-10-CM

## 2020-10-16 DIAGNOSIS — E78.5 HYPERLIPIDEMIA, UNSPECIFIED HYPERLIPIDEMIA TYPE: Primary | ICD-10-CM

## 2020-10-16 DIAGNOSIS — E83.52 HYPERCALCEMIA: ICD-10-CM

## 2020-10-19 ENCOUNTER — OFFICE VISIT (OUTPATIENT)
Dept: ENDOCRINOLOGY | Age: 50
End: 2020-10-19

## 2020-10-19 VITALS
SYSTOLIC BLOOD PRESSURE: 118 MMHG | WEIGHT: 167.8 LBS | BODY MASS INDEX: 28.65 KG/M2 | OXYGEN SATURATION: 99 % | HEART RATE: 64 BPM | DIASTOLIC BLOOD PRESSURE: 62 MMHG | HEIGHT: 64 IN

## 2020-10-19 DIAGNOSIS — K27.9 PEPTIC ULCER DISEASE: ICD-10-CM

## 2020-10-19 DIAGNOSIS — E78.1 HYPERTRIGLYCERIDEMIA: ICD-10-CM

## 2020-10-19 DIAGNOSIS — R80.9 MICROALBUMINURIA: ICD-10-CM

## 2020-10-19 DIAGNOSIS — E11.65 POORLY CONTROLLED TYPE 2 DIABETES MELLITUS (HCC): Primary | ICD-10-CM

## 2020-10-19 DIAGNOSIS — E66.9 OBESITY, UNSPECIFIED CLASSIFICATION, UNSPECIFIED OBESITY TYPE, UNSPECIFIED WHETHER SERIOUS COMORBIDITY PRESENT: ICD-10-CM

## 2020-10-19 DIAGNOSIS — E55.9 VITAMIN D DEFICIENCY: ICD-10-CM

## 2020-10-19 DIAGNOSIS — E27.8 ADRENAL NODULE (HCC): ICD-10-CM

## 2020-10-19 DIAGNOSIS — I10 ESSENTIAL HYPERTENSION: ICD-10-CM

## 2020-10-19 DIAGNOSIS — K44.9 HIATAL HERNIA: ICD-10-CM

## 2020-10-19 DIAGNOSIS — E78.5 HYPERLIPIDEMIA, UNSPECIFIED HYPERLIPIDEMIA TYPE: ICD-10-CM

## 2020-10-19 DIAGNOSIS — E83.52 HYPERCALCEMIA: ICD-10-CM

## 2020-10-19 DIAGNOSIS — IMO0002 UNCONTROLLED TYPE 2 DIABETES MELLITUS WITH COMPLICATION, WITH LONG-TERM CURRENT USE OF INSULIN: ICD-10-CM

## 2020-10-19 DIAGNOSIS — Z91.14 NONCOMPLIANCE WITH MEDICATION REGIMEN: ICD-10-CM

## 2020-10-19 PROBLEM — E27.9 ADRENAL NODULE: Status: ACTIVE | Noted: 2020-10-19

## 2020-10-19 PROCEDURE — 99214 OFFICE O/P EST MOD 30 MIN: CPT | Performed by: INTERNAL MEDICINE

## 2020-10-19 RX ORDER — FLUCONAZOLE 150 MG/1
TABLET ORAL
COMMUNITY
Start: 2020-10-14 | End: 2020-10-19

## 2020-10-19 RX ORDER — BUSPIRONE HYDROCHLORIDE 5 MG/1
5 TABLET ORAL 2 TIMES DAILY
COMMUNITY

## 2020-10-19 RX ORDER — DEXAMETHASONE 2 MG/1
TABLET ORAL
COMMUNITY
Start: 2020-09-02 | End: 2021-03-16

## 2020-10-19 RX ORDER — SUCRALFATE 1 G/1
TABLET ORAL 2 TIMES DAILY
COMMUNITY
End: 2022-01-26

## 2020-10-19 RX ORDER — ONDANSETRON 4 MG/1
TABLET, FILM COATED ORAL
COMMUNITY
Start: 2020-08-28 | End: 2021-03-16

## 2020-10-19 RX ORDER — AMPICILLIN TRIHYDRATE 250 MG
CAPSULE ORAL
COMMUNITY
End: 2020-10-29

## 2020-10-19 RX ORDER — PREDNISONE 10 MG/1
TABLET ORAL
COMMUNITY
Start: 2020-08-21 | End: 2020-10-19

## 2020-10-19 RX ORDER — OMEPRAZOLE 40 MG/1
40 CAPSULE, DELAYED RELEASE ORAL DAILY
COMMUNITY
Start: 2020-10-09 | End: 2022-01-26

## 2020-10-19 RX ORDER — INSULIN DEGLUDEC 200 U/ML
80 INJECTION, SOLUTION SUBCUTANEOUS DAILY
Qty: 18 ML | Refills: 5 | Status: SHIPPED | COMMUNITY
Start: 2020-10-19 | End: 2020-12-15 | Stop reason: SDUPTHER

## 2020-10-21 ENCOUNTER — RESULTS ENCOUNTER (OUTPATIENT)
Dept: ENDOCRINOLOGY | Age: 50
End: 2020-10-21

## 2020-10-21 DIAGNOSIS — E78.5 HYPERLIPIDEMIA, UNSPECIFIED HYPERLIPIDEMIA TYPE: ICD-10-CM

## 2020-10-21 DIAGNOSIS — IMO0002 UNCONTROLLED TYPE 2 DIABETES MELLITUS WITH COMPLICATION, WITH LONG-TERM CURRENT USE OF INSULIN: ICD-10-CM

## 2020-10-23 LAB
ALBUMIN SERPL-MCNC: 4.6 G/DL (ref 3.8–4.9)
ALBUMIN/CREAT UR: 82 MG/G CREAT (ref 0–29)
ALBUMIN/GLOB SERPL: 1.4 {RATIO} (ref 1.2–2.2)
ALP SERPL-CCNC: 98 IU/L (ref 39–117)
ALT SERPL-CCNC: 14 IU/L (ref 0–32)
AST SERPL-CCNC: 16 IU/L (ref 0–40)
BILIRUB SERPL-MCNC: 0.3 MG/DL (ref 0–1.2)
BUN SERPL-MCNC: 18 MG/DL (ref 6–24)
BUN/CREAT SERPL: 18 (ref 9–23)
C PEPTIDE SERPL-MCNC: 3.6 NG/ML (ref 1.1–4.4)
CALCIUM SERPL-MCNC: 9.9 MG/DL (ref 8.7–10.2)
CHLORIDE SERPL-SCNC: 92 MMOL/L (ref 96–106)
CHOLEST SERPL-MCNC: 402 MG/DL (ref 100–199)
CO2 SERPL-SCNC: 22 MMOL/L (ref 20–29)
CREAT SERPL-MCNC: 1 MG/DL (ref 0.57–1)
CREAT UR-MCNC: 39.8 MG/DL
DHEA-S SERPL-MCNC: 76.7 UG/DL (ref 41.2–243.7)
GAD65 AB SER IA-ACNC: <5 U/ML (ref 0–5)
GLOBULIN SER CALC-MCNC: 3.3 G/DL (ref 1.5–4.5)
GLUCOSE SERPL-MCNC: 463 MG/DL (ref 65–99)
HBA1C MFR BLD: 12.1 % (ref 4.8–5.6)
HDLC SERPL-MCNC: 28 MG/DL
INTERPRETATION: NORMAL
LDLC SERPL CALC-MCNC: ABNORMAL MG/DL (ref 0–99)
Lab: NORMAL
MICROALBUMIN UR-MCNC: 32.7 UG/ML
POTASSIUM SERPL-SCNC: 3.8 MMOL/L (ref 3.5–5.2)
PROT SERPL-MCNC: 7.9 G/DL (ref 6–8.5)
SODIUM SERPL-SCNC: 133 MMOL/L (ref 134–144)
T4 FREE SERPL-MCNC: 1.22 NG/DL (ref 0.82–1.77)
TESTOST SERPL-MCNC: 18.1 NG/DL
TRIGL SERPL-MCNC: 1262 MG/DL (ref 0–149)
TSH SERPL DL<=0.005 MIU/L-ACNC: 1.01 UIU/ML (ref 0.45–4.5)
VLDLC SERPL CALC-MCNC: ABNORMAL MG/DL (ref 5–40)

## 2020-10-26 ENCOUNTER — TELEPHONE (OUTPATIENT)
Dept: ENDOCRINOLOGY | Age: 50
End: 2020-10-26

## 2020-10-26 NOTE — TELEPHONE ENCOUNTER
Called pt to advised [employer] was refunding credit card that was double chrgd for a visit on 10/19/20.the patient stated they actually credit her card back over the weekend..GRACIELAR

## 2020-11-03 ENCOUNTER — RESULTS ENCOUNTER (OUTPATIENT)
Dept: ENDOCRINOLOGY | Age: 50
End: 2020-11-03

## 2020-11-03 DIAGNOSIS — E78.5 HYPERLIPIDEMIA, UNSPECIFIED HYPERLIPIDEMIA TYPE: ICD-10-CM

## 2020-11-03 DIAGNOSIS — I10 ESSENTIAL HYPERTENSION: ICD-10-CM

## 2020-11-16 ENCOUNTER — RESULTS ENCOUNTER (OUTPATIENT)
Dept: ENDOCRINOLOGY | Age: 50
End: 2020-11-16

## 2020-11-16 DIAGNOSIS — R80.9 MICROALBUMINURIA: ICD-10-CM

## 2020-11-16 DIAGNOSIS — R68.89 ABNORMAL ENDOCRINE LABORATORY TEST FINDING: ICD-10-CM

## 2020-11-16 DIAGNOSIS — IMO0002 UNCONTROLLED TYPE 2 DIABETES MELLITUS WITH COMPLICATION, WITH LONG-TERM CURRENT USE OF INSULIN: ICD-10-CM

## 2020-11-16 DIAGNOSIS — Z91.14 NONCOMPLIANCE WITH MEDICATION REGIMEN: ICD-10-CM

## 2020-11-16 DIAGNOSIS — E55.9 VITAMIN D DEFICIENCY: ICD-10-CM

## 2020-11-16 DIAGNOSIS — E78.1 HYPERTRIGLYCERIDEMIA: ICD-10-CM

## 2020-11-16 DIAGNOSIS — I10 ESSENTIAL HYPERTENSION: ICD-10-CM

## 2020-11-16 DIAGNOSIS — E83.52 HYPERCALCEMIA: ICD-10-CM

## 2020-11-16 DIAGNOSIS — E78.5 HYPERLIPIDEMIA, UNSPECIFIED HYPERLIPIDEMIA TYPE: ICD-10-CM

## 2020-11-16 DIAGNOSIS — E66.9 OBESITY, UNSPECIFIED CLASSIFICATION, UNSPECIFIED OBESITY TYPE, UNSPECIFIED WHETHER SERIOUS COMORBIDITY PRESENT: ICD-10-CM

## 2020-11-16 RX ORDER — ERGOCALCIFEROL 1.25 MG/1
CAPSULE ORAL
Qty: 36 CAPSULE | Refills: 1 | OUTPATIENT
Start: 2020-11-16

## 2020-11-17 NOTE — TELEPHONE ENCOUNTER
Patient is taking a big dose of ergocalciferol.  Please check with patient if she taking the medication regularly and obtain 25 hydroxyvitamin D levels

## 2020-11-19 DIAGNOSIS — I10 ESSENTIAL HYPERTENSION: ICD-10-CM

## 2020-11-19 DIAGNOSIS — E55.9 VITAMIN D DEFICIENCY: ICD-10-CM

## 2020-11-19 DIAGNOSIS — E83.52 HYPERCALCEMIA: ICD-10-CM

## 2020-11-19 DIAGNOSIS — Z91.14 NONCOMPLIANCE WITH MEDICATION REGIMEN: ICD-10-CM

## 2020-11-19 DIAGNOSIS — IMO0002 UNCONTROLLED TYPE 2 DIABETES MELLITUS WITH COMPLICATION, WITH LONG-TERM CURRENT USE OF INSULIN: ICD-10-CM

## 2020-11-19 DIAGNOSIS — E78.5 HYPERLIPIDEMIA, UNSPECIFIED HYPERLIPIDEMIA TYPE: ICD-10-CM

## 2020-11-19 DIAGNOSIS — E78.1 HYPERTRIGLYCERIDEMIA: ICD-10-CM

## 2020-11-19 DIAGNOSIS — E66.9 OBESITY, UNSPECIFIED CLASSIFICATION, UNSPECIFIED OBESITY TYPE, UNSPECIFIED WHETHER SERIOUS COMORBIDITY PRESENT: ICD-10-CM

## 2020-11-19 DIAGNOSIS — R80.9 MICROALBUMINURIA: ICD-10-CM

## 2020-11-19 RX ORDER — ERGOCALCIFEROL 1.25 MG/1
CAPSULE ORAL
Qty: 36 CAPSULE | Refills: 0 | OUTPATIENT
Start: 2020-11-19

## 2020-12-03 DIAGNOSIS — E78.5 HYPERLIPIDEMIA, UNSPECIFIED HYPERLIPIDEMIA TYPE: ICD-10-CM

## 2020-12-03 DIAGNOSIS — Z91.14 NONCOMPLIANCE WITH MEDICATION REGIMEN: ICD-10-CM

## 2020-12-03 DIAGNOSIS — E55.9 VITAMIN D DEFICIENCY: ICD-10-CM

## 2020-12-03 DIAGNOSIS — E83.52 HYPERCALCEMIA: ICD-10-CM

## 2020-12-03 DIAGNOSIS — I10 ESSENTIAL HYPERTENSION: ICD-10-CM

## 2020-12-03 DIAGNOSIS — E66.9 OBESITY, UNSPECIFIED CLASSIFICATION, UNSPECIFIED OBESITY TYPE, UNSPECIFIED WHETHER SERIOUS COMORBIDITY PRESENT: ICD-10-CM

## 2020-12-03 DIAGNOSIS — IMO0002 UNCONTROLLED TYPE 2 DIABETES MELLITUS WITH COMPLICATION, WITH LONG-TERM CURRENT USE OF INSULIN: ICD-10-CM

## 2020-12-03 DIAGNOSIS — E78.1 HYPERTRIGLYCERIDEMIA: ICD-10-CM

## 2020-12-03 DIAGNOSIS — R80.9 MICROALBUMINURIA: ICD-10-CM

## 2020-12-04 RX ORDER — INSULIN DEGLUDEC 200 U/ML
80 INJECTION, SOLUTION SUBCUTANEOUS DAILY
Qty: 74 ML | Refills: 1 | Status: CANCELLED | OUTPATIENT
Start: 2020-12-04

## 2020-12-09 ENCOUNTER — RESULTS ENCOUNTER (OUTPATIENT)
Dept: ENDOCRINOLOGY | Age: 50
End: 2020-12-09

## 2020-12-09 DIAGNOSIS — R80.9 MICROALBUMINURIA: ICD-10-CM

## 2020-12-09 DIAGNOSIS — IMO0002 UNCONTROLLED TYPE 2 DIABETES MELLITUS WITH COMPLICATION, WITH LONG-TERM CURRENT USE OF INSULIN: ICD-10-CM

## 2020-12-09 DIAGNOSIS — R68.89 ABNORMAL ENDOCRINE LABORATORY TEST FINDING: ICD-10-CM

## 2020-12-09 DIAGNOSIS — E78.5 HYPERLIPIDEMIA, UNSPECIFIED HYPERLIPIDEMIA TYPE: Primary | ICD-10-CM

## 2020-12-09 DIAGNOSIS — I10 ESSENTIAL HYPERTENSION: ICD-10-CM

## 2020-12-14 ENCOUNTER — RESULTS ENCOUNTER (OUTPATIENT)
Dept: ENDOCRINOLOGY | Age: 50
End: 2020-12-14

## 2020-12-14 DIAGNOSIS — IMO0002 UNCONTROLLED TYPE 2 DIABETES MELLITUS WITH COMPLICATION, WITH LONG-TERM CURRENT USE OF INSULIN: ICD-10-CM

## 2020-12-14 DIAGNOSIS — I10 ESSENTIAL HYPERTENSION: ICD-10-CM

## 2020-12-14 DIAGNOSIS — E78.5 HYPERLIPIDEMIA, UNSPECIFIED HYPERLIPIDEMIA TYPE: ICD-10-CM

## 2020-12-15 ENCOUNTER — TELEPHONE (OUTPATIENT)
Dept: ENDOCRINOLOGY | Age: 50
End: 2020-12-15

## 2020-12-15 DIAGNOSIS — E66.9 OBESITY, UNSPECIFIED CLASSIFICATION, UNSPECIFIED OBESITY TYPE, UNSPECIFIED WHETHER SERIOUS COMORBIDITY PRESENT: ICD-10-CM

## 2020-12-15 DIAGNOSIS — Z91.14 NONCOMPLIANCE WITH MEDICATION REGIMEN: ICD-10-CM

## 2020-12-15 DIAGNOSIS — IMO0002 UNCONTROLLED TYPE 2 DIABETES MELLITUS WITH COMPLICATION, WITH LONG-TERM CURRENT USE OF INSULIN: ICD-10-CM

## 2020-12-15 DIAGNOSIS — E83.52 HYPERCALCEMIA: ICD-10-CM

## 2020-12-15 DIAGNOSIS — R80.9 MICROALBUMINURIA: ICD-10-CM

## 2020-12-15 DIAGNOSIS — E55.9 VITAMIN D DEFICIENCY: ICD-10-CM

## 2020-12-15 DIAGNOSIS — E78.5 HYPERLIPIDEMIA, UNSPECIFIED HYPERLIPIDEMIA TYPE: ICD-10-CM

## 2020-12-15 DIAGNOSIS — E78.1 HYPERTRIGLYCERIDEMIA: ICD-10-CM

## 2020-12-15 DIAGNOSIS — I10 ESSENTIAL HYPERTENSION: ICD-10-CM

## 2020-12-15 RX ORDER — ERGOCALCIFEROL 1.25 MG/1
CAPSULE ORAL
Qty: 36 CAPSULE | Refills: 1 | Status: SHIPPED | OUTPATIENT
Start: 2020-12-15 | End: 2021-05-26 | Stop reason: SDUPTHER

## 2020-12-15 RX ORDER — INSULIN DEGLUDEC 200 U/ML
80 INJECTION, SOLUTION SUBCUTANEOUS DAILY
Qty: 74 ML | Refills: 1 | Status: SHIPPED | OUTPATIENT
Start: 2020-12-15 | End: 2020-12-16 | Stop reason: ALTCHOICE

## 2020-12-15 RX ORDER — ERGOCALCIFEROL 1.25 MG/1
CAPSULE ORAL
Qty: 36 CAPSULE | Refills: 1 | OUTPATIENT
Start: 2020-12-15

## 2020-12-15 RX ORDER — INSULIN LISPRO 100 [IU]/ML
15 INJECTION, SOLUTION INTRAVENOUS; SUBCUTANEOUS 3 TIMES DAILY
Qty: 45 ML | Refills: 1 | Status: SHIPPED | OUTPATIENT
Start: 2020-12-15

## 2020-12-15 NOTE — TELEPHONE ENCOUNTER
Wants refills on both insulins and vitamin d rx sent to her pharmacy she is out and needs refills asap please. Also needs external labs sent to her by mail in order for her to get labs done closer to her so that she doesnt have to travel so far here do to weather concerns tomorrow.

## 2020-12-16 RX ORDER — INSULIN GLARGINE 100 [IU]/ML
80 INJECTION, SOLUTION SUBCUTANEOUS DAILY
Qty: 74 ML | Refills: 1 | Status: SHIPPED | OUTPATIENT
Start: 2020-12-16 | End: 2021-03-16 | Stop reason: SDUPTHER

## 2021-03-10 NOTE — PROGRESS NOTES
Yenny Clement is a 51 y.o. female.      F/u for dm 2, hyperlipidemia, hypertension, vitamin d def, abn calcium / testing bs 0 x day / last dm eye exam / last dm  foot exam today with dr Cheney       Patient is a 50-year-old female who consented to a video visit by Litaarthur     She was diagnosed to have diabetes mellitus in  and started on insulin in .  She is on Lantus 60 units every evening and Humalog 15 units 3 times a day with meals.  She reduced her Lantus dose from 80 units to 60 units every evening recently. She checks her blood sugars a few times a week.  She feels better and has more energy since her blood sugars are lower.  She has lost 5 pounds since 2020.  She did not get labs done.     Metformin causes diarrhea.  Ozempic causes abdominal pain.  Bydureon because irritation and injection site.  Xigduo caused diarrhea.     Her last eye examination was in 2019.  She denies retinopathy.  She denies numbness, tingling or burning in her hands or feet.  Urine microalbumin was elevated in 10/20.     She has hyperlipidemia. She is on red yeast rice 600 mg 2 capsules once a day.  She was on atorvastatin 80 mg and fenofibrate which were discontinued in 2016 because she was having restless legs at that time.  Restless legs did not improve after the discontinuation of the medication.  She has used Livalo 4 mg in the past but does not remember why she stopped taking it.     She has hypertension since  and is on hydrochlorothiazide 25 mg/day.  Lisinopril 20 mg was discontinued because of low blood pressure.  She has no history of heart attack or stroke.     She had an EGD done in 2020 and was found to have gastric ulcers and hiatal hernia.  She is on Prilosec 40 mg/day and Carafate 1 g 4 times daily.     She is .  She has been amenorrheic since age 44.  She is not on HRT.  She denies hot flashes.  She has history of vitamin D deficiency and is on ergocalciferol  50,000 units 3 times a week.        She denies heavy snoring or waking herself up from sleep snoring.  She sleeps 3 hours a night and has difficulty staying asleep.  She has not seen sleep specialist as suggested during previous visit.      The following portions of the patient's history were reviewed and updated as appropriate: allergies, current medications, past family history, past medical history, past social history, past surgical history and problem list.    Review of Systems   Respiratory: Negative for shortness of breath.    Cardiovascular: Negative for chest pain and palpitations.   Gastrointestinal: Negative.    Genitourinary: Negative.      Objective   Physical Exam  Constitutional:       General: She is not in acute distress.     Appearance: Normal appearance. She is not ill-appearing, toxic-appearing or diaphoretic.   Pulmonary:      Effort: No respiratory distress.   Neurological:      Mental Status: She is alert and oriented to person, place, and time.   Psychiatric:         Mood and Affect: Mood normal.         Behavior: Behavior normal.       Office Visit on 10/19/2020   Component Date Value Ref Range Status   • Glucose 10/19/2020 463* 65 - 99 mg/dL Final   • BUN 10/19/2020 18  6 - 24 mg/dL Final   • Creatinine 10/19/2020 1.00  0.57 - 1.00 mg/dL Final   • eGFR Non  Am 10/19/2020 65  >59 mL/min/1.73 Final   • eGFR African Am 10/19/2020 75  >59 mL/min/1.73 Final   • BUN/Creatinine Ratio 10/19/2020 18  9 - 23 Final   • Sodium 10/19/2020 133* 134 - 144 mmol/L Final   • Potassium 10/19/2020 3.8  3.5 - 5.2 mmol/L Final   • Chloride 10/19/2020 92* 96 - 106 mmol/L Final   • Total CO2 10/19/2020 22  20 - 29 mmol/L Final   • Calcium 10/19/2020 9.9  8.7 - 10.2 mg/dL Final   • Total Protein 10/19/2020 7.9  6.0 - 8.5 g/dL Final   • Albumin 10/19/2020 4.6  3.8 - 4.9 g/dL Final   • Globulin 10/19/2020 3.3  1.5 - 4.5 g/dL Final   • A/G Ratio 10/19/2020 1.4  1.2 - 2.2 Final   • Total Bilirubin 10/19/2020 0.3   0.0 - 1.2 mg/dL Final   • Alkaline Phosphatase 10/19/2020 98  39 - 117 IU/L Final   • AST (SGOT) 10/19/2020 16  0 - 40 IU/L Final   • ALT (SGPT) 10/19/2020 14  0 - 32 IU/L Final   • Total Cholesterol 10/19/2020 402* 100 - 199 mg/dL Final   • Triglycerides 10/19/2020 1,262* 0 - 149 mg/dL Final    Comment: Results confirmed on  dilution.     • HDL Cholesterol 10/19/2020 28* >39 mg/dL Final   • VLDL Cholesterol Keyur 10/19/2020 Comment* 5 - 40 mg/dL Final    Comment: The calculation for the VLDL cholesterol is not valid when  triglyceride level is >800 mg/dL.     • LDL Chol Calc (NIH) 10/19/2020 Comment* 0 - 99 mg/dL Final    Comment: Triglyceride result indicated is too high for an accurate LDL  cholesterol estimation.     • Hemoglobin A1C 10/19/2020 12.1* 4.8 - 5.6 % Final    Comment:          Prediabetes: 5.7 - 6.4           Diabetes: >6.4           Glycemic control for adults with diabetes: <7.0     • TSH 10/19/2020 1.010  0.450 - 4.500 uIU/mL Final   • Free T4 10/19/2020 1.22  0.82 - 1.77 ng/dL Final   • Creatinine, Urine 10/19/2020 39.8  Not Estab. mg/dL Final   • Microalbumin, Urine 10/19/2020 32.7  Not Estab. ug/mL Final   • Microalbumin/Creatinine Ratio 10/19/2020 82* 0 - 29 mg/g creat Final    Comment:                        Normal:                0 -  29                         Moderately increased: 30 - 300                         Severely increased:       >300                **Please note reference interval change**     • C-Peptide 10/19/2020 3.6  1.1 - 4.4 ng/mL Final    C-Peptide reference interval is for fasting patients.   • NOELLE-65 10/19/2020 <5.0  0.0 - 5.0 U/mL Final   • DHEA-Sulfate 10/19/2020 76.7  41.2 - 243.7 ug/dL Final   • Testosterone, Total 10/19/2020 18.1  ng/dL Final    Comment:                          Female:                            Premenopausal    10.0 - 55.0                            Postmenopausal    7.0 - 40.0  This test was developed and its performance  characteristics  determined by [facility]. It has not been cleared or approved  by the Food and Drug Administration.     • Interpretation 10/19/2020 Note   Final    Comment: Medical Director's Note: A Cardiovascular Report was not  sent because both LDL cholesterol and non-HDL cholesterol  results are required to generate a report.  Supplemental report is available.     • PDF Image 10/19/2020 Not applicable   Final     Assessment/Plan   Diagnoses and all orders for this visit:    1. Uncontrolled type 2 diabetes mellitus with complication, with long-term current use of insulin (CMS/[facility]) (Primary)    2. Hyperlipidemia, unspecified hyperlipidemia type    3. Essential hypertension    4. Microalbuminuria    5. Vitamin D deficiency    Other orders  -     Insulin Glargine (Lantus SoloStar) 100 UNIT/ML injection pen; 65 units every evening  Dispense: 20 pen; Refill: 2      Increase Lantus to 65 units every evening.  Continue Humalog 15 units with each meal  Check blood sugar before meals and 2 hours after main meal and send blood sugar results next week.  Recheck urine microalbumin and consider restarting a lower dose of lisinopril.  Suggest switching from red yeast rice to lovastatin.  Check 25-hydroxy vitamin D levels and consider switching to daily vitamin D3.  Lab request for CMP, lipid panel, hemoglobin A1c, urine microalbumin to creatinine ratio, and 25-hydroxy vitamin D level sent to the patient    Copy of my note sent to Dr. Heavenly Monsivais    Follow-up in 4 months

## 2021-03-16 ENCOUNTER — OFFICE VISIT (OUTPATIENT)
Dept: ENDOCRINOLOGY | Age: 51
End: 2021-03-16

## 2021-03-16 DIAGNOSIS — E55.9 VITAMIN D DEFICIENCY: ICD-10-CM

## 2021-03-16 DIAGNOSIS — I10 ESSENTIAL HYPERTENSION: ICD-10-CM

## 2021-03-16 DIAGNOSIS — R80.9 MICROALBUMINURIA: ICD-10-CM

## 2021-03-16 DIAGNOSIS — E78.5 HYPERLIPIDEMIA, UNSPECIFIED HYPERLIPIDEMIA TYPE: ICD-10-CM

## 2021-03-16 DIAGNOSIS — IMO0002 UNCONTROLLED TYPE 2 DIABETES MELLITUS WITH COMPLICATION, WITH LONG-TERM CURRENT USE OF INSULIN: Primary | ICD-10-CM

## 2021-03-16 PROCEDURE — 99214 OFFICE O/P EST MOD 30 MIN: CPT | Performed by: INTERNAL MEDICINE

## 2021-03-16 RX ORDER — UBIDECARENONE 100 MG
CAPSULE ORAL DAILY
COMMUNITY

## 2021-03-16 RX ORDER — INSULIN GLARGINE 100 [IU]/ML
INJECTION, SOLUTION SUBCUTANEOUS
Qty: 20 PEN | Refills: 2 | Status: SHIPPED | OUTPATIENT
Start: 2021-03-16 | End: 2021-09-28 | Stop reason: ALTCHOICE

## 2021-03-16 RX ORDER — CRANBERRY FRUIT EXTRACT 200 MG
1 CAPSULE ORAL DAILY
COMMUNITY

## 2021-04-09 ENCOUNTER — OFFICE VISIT (OUTPATIENT)
Dept: GASTROENTEROLOGY | Facility: CLINIC | Age: 51
End: 2021-04-09

## 2021-04-09 VITALS — WEIGHT: 171.6 LBS | HEIGHT: 64 IN | TEMPERATURE: 96 F | BODY MASS INDEX: 29.3 KG/M2

## 2021-04-09 DIAGNOSIS — R10.10 PAIN OF UPPER ABDOMEN: Primary | ICD-10-CM

## 2021-04-09 DIAGNOSIS — R10.13 DYSPEPSIA: ICD-10-CM

## 2021-04-09 DIAGNOSIS — R11.0 NAUSEA: ICD-10-CM

## 2021-04-09 DIAGNOSIS — R63.0 POOR APPETITE: ICD-10-CM

## 2021-04-09 LAB
BASOPHILS # BLD AUTO: 0.08 10*3/MM3 (ref 0–0.2)
BASOPHILS NFR BLD AUTO: 0.6 % (ref 0–1.5)
EOSINOPHIL # BLD AUTO: 0.3 10*3/MM3 (ref 0–0.4)
EOSINOPHIL NFR BLD AUTO: 2.3 % (ref 0.3–6.2)
ERYTHROCYTE [DISTWIDTH] IN BLOOD BY AUTOMATED COUNT: 11.9 % (ref 12.3–15.4)
HCT VFR BLD AUTO: 41.7 % (ref 34–46.6)
HGB BLD-MCNC: 14.7 G/DL (ref 12–15.9)
IMM GRANULOCYTES # BLD AUTO: 0.06 10*3/MM3 (ref 0–0.05)
IMM GRANULOCYTES NFR BLD AUTO: 0.5 % (ref 0–0.5)
LYMPHOCYTES # BLD AUTO: 3.5 10*3/MM3 (ref 0.7–3.1)
LYMPHOCYTES NFR BLD AUTO: 26.7 % (ref 19.6–45.3)
MCH RBC QN AUTO: 31.7 PG (ref 26.6–33)
MCHC RBC AUTO-ENTMCNC: 35.3 G/DL (ref 31.5–35.7)
MCV RBC AUTO: 90.1 FL (ref 79–97)
MONOCYTES # BLD AUTO: 0.52 10*3/MM3 (ref 0.1–0.9)
MONOCYTES NFR BLD AUTO: 4 % (ref 5–12)
NEUTROPHILS # BLD AUTO: 8.65 10*3/MM3 (ref 1.7–7)
NEUTROPHILS NFR BLD AUTO: 65.9 % (ref 42.7–76)
NRBC BLD AUTO-RTO: 0 /100 WBC (ref 0–0.2)
PLATELET # BLD AUTO: 281 10*3/MM3 (ref 140–450)
RBC # BLD AUTO: 4.63 10*6/MM3 (ref 3.77–5.28)
WBC # BLD AUTO: 13.11 10*3/MM3 (ref 3.4–10.8)

## 2021-04-09 PROCEDURE — 99214 OFFICE O/P EST MOD 30 MIN: CPT | Performed by: NURSE PRACTITIONER

## 2021-04-09 RX ORDER — VARENICLINE TARTRATE 1 MG/1
1 TABLET, FILM COATED ORAL 2 TIMES DAILY
COMMUNITY

## 2021-04-09 NOTE — PROGRESS NOTES
Chief Complaint   Patient presents with   • Abdominal Pain     HPI    Jade Clement is a  51 y.o. female here to establish care as a new patient seeking a third opinion for complaints of abdominal pain.  She will also follow with Dr. Nguyen.  She is best medical history of diabetes, GERD, and anxiety.  She is also had her gallbladder removed.    Apparently she was evaluated by Dr. Morales with [facility] in December of last year for complaints of abdominal pain, weight loss, and diarrhea.  Unfortunately documents were scanned into care everywhere and not available for review.  I did review Dr. Morales's notes reviewed and revealing the following information:    Last CLS- 2019- normal  Last EGD- 6/25/2020- Gastritis and small ulcers- no report. Dr. Lantigua - [facility]  Patient she has adrenal tumor- benign 2.5 cm    She had a repeat EGD with Dr. Morales on 12/4/2020 which demonstrated normal esophagus, regular Z-line, erythematous mucosa in the antrum and prepyloric region of the stomach as well as erosive gastropathy.  She was instructed to use PPI therapy p.o. daily.  I reviewed pathology which included antral and duodenal biopsies which were benign.    This patient became displeased with Dr. Lantigua ([facility]) and Dr. Morales ([facility]) therefore decided to seek out our services.    On visit today she still having issues with epigastric pain, nausea, poor appetite, and early satiety.  Symptoms despite once daily dosing omeprazole and twice daily dosing Carafate.  She has been on omeprazole for a number of years.  She avoids alcohol.  She occasionally takes Excedrin for headaches.  She uses marijuana nightly which helps improve her GI symptoms.  She reports having her gallbladder removed in 2017 for multiple gallstones 1 was quite large.  Frequently her upper abdominal pain can radiate to the right upper quadrant and around to her back.  She has been dealing with these issues for  approximately a year.  She initially lost around 30 pounds but weight loss has plateaued over the last 4 months.  Her weight has fluctuated between 160 pounds and 170 pounds as of late.  She weighs 171 pounds on visit today.    Her bowel pattern is fluctuant.  She is prone to episodes of diarrhea approximately twice a week otherwise she passes normal stools.  No rectal bleeding.    She brought over 100 pages of medical records for my review which were scanned into epic.    Additional data reviewed:    EGD 2020 with superficial prepyloric ulcerations and a small hiatal hernia.  Colonoscopy 5/10/2019 with diverticulosis of the sigmoid otherwise normal.  CT of the abdomen and pelvis with contrast 2020 normal findings due to cholecystectomy, stable right adrenal adenoma.  CT A/P without contrast 2020 with stable right at renal adenoma unchanged for 4 years.  Past Medical History:   Diagnosis Date   • Anxiety    • Diabetes mellitus (CMS/HCC)    • GERD (gastroesophageal reflux disease)    • Head ache    • Hyperlipidemia        Past Surgical History:   Procedure Laterality Date   •  SECTION     • CHOLECYSTECTOMY     • COLONOSCOPY  05/10/2019    Grzegorz ABEL   • CYSTOSCOPY     • ENDOSCOPY  2020   • UPPER GASTROINTESTINAL ENDOSCOPY  2020    Grzegorz ABEL gastritis, ulcers   • UPPER GASTROINTESTINAL ENDOSCOPY  2020    Andrew ABEL       Scheduled Meds:  Outpatient Encounter Medications as of 2021   Medication Sig Dispense Refill   • aspirin-acetaminophen-caffeine (EXCEDRIN MIGRAINE) 250-250-65 MG per tablet Take 1 tablet by mouth 3 (three) times a day.     • busPIRone (BUSPAR) 5 MG tablet 5 mg 2 (Two) Times a Day.     • cetirizine (ZyrTEC) 10 MG tablet Take 1 tablet by mouth daily.     • coenzyme Q10 100 MG capsule Take  by mouth.     • hydrochlorothiazide (HYDRODIURIL) 25 MG tablet Take 25 mg by mouth Daily.  4   • Insulin Glargine (Lantus SoloStar) 100 UNIT/ML injection pen 65 units  every evening 20 pen 2   • Insulin Lispro, 1 Unit Dial, (HumaLOG KwikPen) 100 UNIT/ML solution pen-injector Inject 15 Units under the skin into the appropriate area as directed 3 (Three) Times a Day. 45 mL 1   • Insulin Pen Needle (BD PEN NEEDLE PAUL U/F) 32G X 4 MM misc Use to inject insulin 2 times daily 300 each 1   • naproxen sodium (ALEVE) 220 MG tablet Take 1 tablet by mouth As Needed.     • omeprazole (priLOSEC) 40 MG capsule Take 40 mg by mouth Daily.     • Red Yeast Rice Extract 600 MG capsule Take 1 capsule by mouth.     • sertraline (ZOLOFT) 100 MG tablet Take 1 tablet by mouth 2 (Two) Times a Day.     • sucralfate (CARAFATE) 1 g tablet 2 (two) times a day.     • varenicline (CHANTIX) 1 MG tablet Take 1 mg by mouth 2 (Two) Times a Day.     • vitamin D (ERGOCALCIFEROL) 1.25 MG (55414 UT) capsule capsule TAKE 1 CAPSULE BY MOUTH 3 TIMES A WEEK 36 capsule 1     Facility-Administered Encounter Medications as of 4/9/2021   Medication Dose Route Frequency Provider Last Rate Last Admin   • insulin lispro (humaLOG) injection 15 Units  15 Units Subcutaneous TID With Meals Jolene Martin APRN           Continuous Infusions:     PRN Meds:.    Allergies   Allergen Reactions   • Metformin Diarrhea   • Ozempic [Semaglutide] GI Intolerance     Pt stopped due to stomach pain   • Bydureon [Exenatide] Other (See Comments)     Site reaction   • Statins Myalgia     Muscle aches   • Xigduo Xr [Dapagliflozin-Metformin Hcl Er] Diarrhea       Social History     Socioeconomic History   • Marital status: Single     Spouse name: Not on file   • Number of children: Not on file   • Years of education: Not on file   • Highest education level: Not on file   Tobacco Use   • Smoking status: Current Every Day Smoker     Packs/day: 1.00     Types: Cigarettes     Start date: 1983   • Smokeless tobacco: Never Used   Substance and Sexual Activity   • Alcohol use: Not Currently   • Drug use: Yes     Types: Marijuana     Comment: daily        Family History   Problem Relation Age of Onset   • Thyroid disease Mother         goiter   • Hyperlipidemia Mother    • ALS Mother    • Heart disease Father    • Stroke Father    • Hypertension Father    • Drug abuse Brother        Review of Systems   Constitutional: Positive for appetite change. Negative for activity change, fatigue, fever and unexpected weight change.   HENT: Negative for trouble swallowing and voice change.    Eyes: Negative.    Respiratory: Negative for apnea, cough, choking, chest tightness, shortness of breath and wheezing.    Cardiovascular: Negative for chest pain, palpitations and leg swelling.   Gastrointestinal: Positive for abdominal pain and nausea. Negative for abdominal distention, anal bleeding, blood in stool, constipation, diarrhea, rectal pain and vomiting.   Endocrine: Negative.    Genitourinary: Negative.    Musculoskeletal: Negative.    Skin: Negative.    Allergic/Immunologic: Negative.    Neurological: Negative.    Hematological: Negative.    Psychiatric/Behavioral: Negative.        Vitals:    04/09/21 1035   Temp: 96 °F (35.6 °C)       Physical Exam  Constitutional:       Appearance: She is well-developed.   HENT:      Head: Normocephalic.      Nose: Nose normal.   Eyes:      Pupils: Pupils are equal, round, and reactive to light.   Cardiovascular:      Rate and Rhythm: Normal rate and regular rhythm.      Heart sounds: Normal heart sounds.   Pulmonary:      Effort: Pulmonary effort is normal. No respiratory distress.      Breath sounds: Normal breath sounds. No wheezing.   Abdominal:      General: Bowel sounds are normal. There is no distension.      Palpations: Abdomen is soft. There is no mass.      Tenderness: There is abdominal tenderness. There is no guarding.      Hernia: No hernia is present.   Musculoskeletal:         General: Normal range of motion.      Cervical back: Normal range of motion.   Skin:     General: Skin is warm and dry.      Capillary Refill:  Capillary refill takes less than 2 seconds.   Neurological:      Mental Status: She is alert and oriented to person, place, and time.   Psychiatric:         Behavior: Behavior normal.     Assessment    Upper abdominal pain  Dyspepsia  Poor appetite  Nausea  Early satiety  History of cholecystectomy due to gallstones  History of gastric ulcers    Plan    Pleasant 51-year-old female seen today to seek a third opinion on her symptoms which include upper abdominal pain, dyspepsia, poor appetite, nausea, early satiety with history of cholecystectomy due to gallstones and a history of gastric ulcers.    Moving forward recommend MRCP to evaluate her biliary tree.  Stop omeprazole and start Protonix as she has been on this particular PPI for a number of years.  Continue Carafate at twice daily dosing.  Arrange EGD with Dr. Nguyen as well.    Labs today to include CBC, CMP, TSH, and celiac panel.  Depending on the aforementioned work-up moved to gastric emptying study to rule out gastroparesis.    Antireflux dietary precautions reviewed with the patient.  I also counseled her on the need to avoid NSAIDs.  She is in the process of tapering off of cigarettes on Chantix.    Patient to follow-up back in the office after the aforementioned work-up has been completed.

## 2021-04-10 LAB
ALBUMIN SERPL-MCNC: 4.6 G/DL (ref 3.5–5.2)
ALBUMIN/GLOB SERPL: 1.8 G/DL
ALP SERPL-CCNC: 71 U/L (ref 39–117)
ALT SERPL-CCNC: 11 U/L (ref 1–33)
AST SERPL-CCNC: 8 U/L (ref 1–32)
BILIRUB SERPL-MCNC: 0.2 MG/DL (ref 0–1.2)
BUN SERPL-MCNC: 30 MG/DL (ref 6–20)
BUN/CREAT SERPL: 33 (ref 7–25)
CALCIUM SERPL-MCNC: 10.1 MG/DL (ref 8.6–10.5)
CHLORIDE SERPL-SCNC: 100 MMOL/L (ref 98–107)
CO2 SERPL-SCNC: 25.6 MMOL/L (ref 22–29)
CREAT SERPL-MCNC: 0.91 MG/DL (ref 0.57–1)
GLOBULIN SER CALC-MCNC: 2.6 GM/DL
GLUCOSE SERPL-MCNC: 227 MG/DL (ref 65–99)
POTASSIUM SERPL-SCNC: 4.3 MMOL/L (ref 3.5–5.2)
PROT SERPL-MCNC: 7.2 G/DL (ref 6–8.5)
SODIUM SERPL-SCNC: 139 MMOL/L (ref 136–145)
TSH SERPL DL<=0.005 MIU/L-ACNC: 1.2 UIU/ML (ref 0.27–4.2)

## 2021-04-12 ENCOUNTER — TELEPHONE (OUTPATIENT)
Dept: GASTROENTEROLOGY | Facility: CLINIC | Age: 51
End: 2021-04-12

## 2021-04-12 ENCOUNTER — TRANSCRIBE ORDERS (OUTPATIENT)
Dept: SLEEP MEDICINE | Facility: HOSPITAL | Age: 51
End: 2021-04-12

## 2021-04-12 DIAGNOSIS — Z01.818 OTHER SPECIFIED PRE-OPERATIVE EXAMINATION: Primary | ICD-10-CM

## 2021-04-12 LAB
ENDOMYSIUM IGA SER QL: NEGATIVE
GLIADIN PEPTIDE IGA SER-ACNC: 4 UNITS (ref 0–19)
GLIADIN PEPTIDE IGG SER-ACNC: 1 UNITS (ref 0–19)
IGA SERPL-MCNC: 182 MG/DL (ref 87–352)
TTG IGA SER-ACNC: <2 U/ML (ref 0–3)
TTG IGG SER-ACNC: <2 U/ML (ref 0–5)

## 2021-04-12 NOTE — TELEPHONE ENCOUNTER
----- Message from Danni Prajapati sent at 4/12/2021  4:05 PM EDT -----  Regarding: blood results  Contact: [phone]  Pt called concerning her blood results. Please call pt. Thank you

## 2021-04-12 NOTE — TELEPHONE ENCOUNTER
Returned patient's phone call. She questions her lab results, states she had issues with diarrhea over the weekend. She suspects she may have  a GI bug. Advised will send an update to Lea. She verb understanding.

## 2021-04-13 NOTE — PROGRESS NOTES
Please inform the patient that her blood work shows elevated glucose of 227, follow-up with PCP or endocrinologist for that.  Normal celiac panel.  Normal liver function tests.  Normal thyroid function.  White blood cell count is elevated at 13.  Lets have her complete stool testing to include GI PCR and C. difficile to make sure she is not experiencing a colonic infection.  She is agreeable have her come by and  stool tests today.  Await MRI findings.

## 2021-04-16 ENCOUNTER — TELEPHONE (OUTPATIENT)
Dept: GASTROENTEROLOGY | Facility: CLINIC | Age: 51
End: 2021-04-16

## 2021-04-16 NOTE — TELEPHONE ENCOUNTER
----- Message from Danni Tsai sent at 4/16/2021  2:55 PM EDT -----  Regarding: Phenegran  Contact: [phone]  PT would like to have a prescription called in for Phenergan , please call PT      [facility] - [place], [place] - [facility] [address] - [phone]  - [phone] [facility] [address] [facility] [address]   Phone:  [phone]  Fax:  [phone]

## 2021-04-16 NOTE — TELEPHONE ENCOUNTER
Returned patient's phone call. She states she has had diarrhea since last Saturday. She has had issue with nausea too. Requesting something nausea and diarrhea. Advised an update will be sent to Lea. She verb understanding.

## 2021-04-19 DIAGNOSIS — R19.7 DIARRHEA, UNSPECIFIED TYPE: Primary | ICD-10-CM

## 2021-04-19 RX ORDER — ONDANSETRON 4 MG/1
4 TABLET, ORALLY DISINTEGRATING ORAL EVERY 8 HOURS PRN
Qty: 25 TABLET | Refills: 2 | Status: SHIPPED | OUTPATIENT
Start: 2021-04-19 | End: 2021-09-28

## 2021-04-19 NOTE — TELEPHONE ENCOUNTER
I called her in some Zofran.  Given complaints of diarrhea complete ordered stool testing with GI PCR, C. difficile, fecal calprotectin.

## 2021-04-21 NOTE — TELEPHONE ENCOUNTER
Patient called. Advised as per Lea's note. She states he diarrhea has stopped and she is feeling much better. Advised if her symptoms return to call and she will have to do the stool studies.

## 2021-04-23 ENCOUNTER — LAB (OUTPATIENT)
Dept: LAB | Facility: HOSPITAL | Age: 51
End: 2021-04-23

## 2021-04-23 DIAGNOSIS — Z01.818 OTHER SPECIFIED PRE-OPERATIVE EXAMINATION: ICD-10-CM

## 2021-04-23 PROCEDURE — C9803 HOPD COVID-19 SPEC COLLECT: HCPCS

## 2021-04-23 PROCEDURE — U0004 COV-19 TEST NON-CDC HGH THRU: HCPCS

## 2021-04-23 RX ORDER — PROMETHAZINE HYDROCHLORIDE 25 MG/1
25 TABLET ORAL EVERY 6 HOURS PRN
COMMUNITY
End: 2021-09-28

## 2021-04-24 LAB — SARS-COV-2 ORF1AB RESP QL NAA+PROBE: NOT DETECTED

## 2021-04-26 ENCOUNTER — ANESTHESIA (OUTPATIENT)
Dept: GASTROENTEROLOGY | Facility: HOSPITAL | Age: 51
End: 2021-04-26

## 2021-04-26 ENCOUNTER — HOSPITAL ENCOUNTER (OUTPATIENT)
Facility: HOSPITAL | Age: 51
Setting detail: HOSPITAL OUTPATIENT SURGERY
Discharge: HOME | End: 2021-04-26
Attending: INTERNAL MEDICINE | Admitting: INTERNAL MEDICINE

## 2021-04-26 ENCOUNTER — ANESTHESIA EVENT (OUTPATIENT)
Dept: GASTROENTEROLOGY | Facility: HOSPITAL | Age: 51
End: 2021-04-26

## 2021-04-26 VITALS
BODY MASS INDEX: 28.1 KG/M2 | HEIGHT: 64 IN | DIASTOLIC BLOOD PRESSURE: 89 MMHG | HEART RATE: 88 BPM | OXYGEN SATURATION: 98 % | SYSTOLIC BLOOD PRESSURE: 114 MMHG | RESPIRATION RATE: 18 BRPM | WEIGHT: 164.6 LBS

## 2021-04-26 DIAGNOSIS — R63.0 POOR APPETITE: ICD-10-CM

## 2021-04-26 DIAGNOSIS — R11.0 NAUSEA: ICD-10-CM

## 2021-04-26 DIAGNOSIS — R10.10 PAIN OF UPPER ABDOMEN: ICD-10-CM

## 2021-04-26 DIAGNOSIS — R10.13 DYSPEPSIA: ICD-10-CM

## 2021-04-26 LAB
GLUCOSE BLDC GLUCOMTR-MCNC: 320 MG/DL (ref 70–130)
GLUCOSE BLDC GLUCOMTR-MCNC: 348 MG/DL (ref 70–130)

## 2021-04-26 PROCEDURE — 25010000002 PROPOFOL 10 MG/ML EMULSION: Performed by: NURSE ANESTHETIST, CERTIFIED REGISTERED

## 2021-04-26 PROCEDURE — 43239 EGD BIOPSY SINGLE/MULTIPLE: CPT | Performed by: INTERNAL MEDICINE

## 2021-04-26 PROCEDURE — 82962 GLUCOSE BLOOD TEST: CPT

## 2021-04-26 PROCEDURE — 88305 TISSUE EXAM BY PATHOLOGIST: CPT | Performed by: INTERNAL MEDICINE

## 2021-04-26 RX ORDER — LIDOCAINE HYDROCHLORIDE 20 MG/ML
INJECTION, SOLUTION INFILTRATION; PERINEURAL AS NEEDED
Status: DISCONTINUED | OUTPATIENT
Start: 2021-04-26 | End: 2021-04-26 | Stop reason: SURG

## 2021-04-26 RX ORDER — SODIUM CHLORIDE, SODIUM LACTATE, POTASSIUM CHLORIDE, CALCIUM CHLORIDE 600; 310; 30; 20 MG/100ML; MG/100ML; MG/100ML; MG/100ML
30 INJECTION, SOLUTION INTRAVENOUS CONTINUOUS PRN
Status: DISCONTINUED | OUTPATIENT
Start: 2021-04-26 | End: 2021-04-26 | Stop reason: HOSPADM

## 2021-04-26 RX ORDER — FAMOTIDINE 10 MG/ML
20 INJECTION, SOLUTION INTRAVENOUS ONCE
Status: CANCELLED | OUTPATIENT
Start: 2021-04-26 | End: 2021-04-26

## 2021-04-26 RX ORDER — PROPOFOL 10 MG/ML
VIAL (ML) INTRAVENOUS CONTINUOUS PRN
Status: DISCONTINUED | OUTPATIENT
Start: 2021-04-26 | End: 2021-04-26 | Stop reason: SURG

## 2021-04-26 RX ORDER — SODIUM CHLORIDE, SODIUM LACTATE, POTASSIUM CHLORIDE, CALCIUM CHLORIDE 600; 310; 30; 20 MG/100ML; MG/100ML; MG/100ML; MG/100ML
9 INJECTION, SOLUTION INTRAVENOUS CONTINUOUS
Status: CANCELLED | OUTPATIENT
Start: 2021-04-26

## 2021-04-26 RX ORDER — FAMOTIDINE 10 MG/ML
INJECTION, SOLUTION INTRAVENOUS AS NEEDED
Status: DISCONTINUED | OUTPATIENT
Start: 2021-04-26 | End: 2021-04-26 | Stop reason: SURG

## 2021-04-26 RX ORDER — SODIUM CHLORIDE 0.9 % (FLUSH) 0.9 %
3 SYRINGE (ML) INJECTION EVERY 12 HOURS SCHEDULED
Status: DISCONTINUED | OUTPATIENT
Start: 2021-04-26 | End: 2021-04-26 | Stop reason: HOSPADM

## 2021-04-26 RX ORDER — SODIUM CHLORIDE 0.9 % (FLUSH) 0.9 %
3-10 SYRINGE (ML) INJECTION AS NEEDED
Status: CANCELLED | OUTPATIENT
Start: 2021-04-26

## 2021-04-26 RX ORDER — GLYCOPYRROLATE 0.2 MG/ML
INJECTION INTRAMUSCULAR; INTRAVENOUS AS NEEDED
Status: DISCONTINUED | OUTPATIENT
Start: 2021-04-26 | End: 2021-04-26 | Stop reason: SURG

## 2021-04-26 RX ORDER — PROPOFOL 10 MG/ML
VIAL (ML) INTRAVENOUS AS NEEDED
Status: DISCONTINUED | OUTPATIENT
Start: 2021-04-26 | End: 2021-04-26 | Stop reason: SURG

## 2021-04-26 RX ORDER — SODIUM CHLORIDE 0.9 % (FLUSH) 0.9 %
10 SYRINGE (ML) INJECTION AS NEEDED
Status: DISCONTINUED | OUTPATIENT
Start: 2021-04-26 | End: 2021-04-26 | Stop reason: HOSPADM

## 2021-04-26 RX ORDER — LIDOCAINE HYDROCHLORIDE 10 MG/ML
0.5 INJECTION, SOLUTION EPIDURAL; INFILTRATION; INTRACAUDAL; PERINEURAL ONCE AS NEEDED
Status: CANCELLED | OUTPATIENT
Start: 2021-04-26

## 2021-04-26 RX ORDER — SODIUM CHLORIDE 0.9 % (FLUSH) 0.9 %
3 SYRINGE (ML) INJECTION EVERY 12 HOURS SCHEDULED
Status: CANCELLED | OUTPATIENT
Start: 2021-04-26

## 2021-04-26 RX ADMIN — PROPOFOL 100 MG: 10 INJECTION, EMULSION INTRAVENOUS at 14:00

## 2021-04-26 RX ADMIN — FAMOTIDINE 20 MG: 10 INJECTION, SOLUTION INTRAVENOUS at 14:02

## 2021-04-26 RX ADMIN — LIDOCAINE HYDROCHLORIDE 100 MG: 20 INJECTION, SOLUTION INFILTRATION; PERINEURAL at 14:00

## 2021-04-26 RX ADMIN — PROPOFOL 250 MCG/KG/MIN: 10 INJECTION, EMULSION INTRAVENOUS at 14:00

## 2021-04-26 RX ADMIN — GLYCOPYRROLATE 0.2 MG: 0.2 INJECTION INTRAMUSCULAR; INTRAVENOUS at 13:57

## 2021-04-26 RX ADMIN — SODIUM CHLORIDE, POTASSIUM CHLORIDE, SODIUM LACTATE AND CALCIUM CHLORIDE: 600; 310; 30; 20 INJECTION, SOLUTION INTRAVENOUS at 13:55

## 2021-04-26 NOTE — ANESTHESIA PREPROCEDURE EVALUATION
Anesthesia Evaluation     Patient summary reviewed and Nursing notes reviewed   NPO Solid Status: > 8 hours  NPO Liquid Status: > 2 hours           Airway   Mallampati: II  Dental - normal exam     Pulmonary - normal exam   (+) a smoker Current Smoked day of surgery,   Cardiovascular - normal exam    (+) hypertension, hyperlipidemia,       Neuro/Psych  (+) headaches,     GI/Hepatic/Renal/Endo    (+) obesity,  hiatal hernia, GERD, PUD,  diabetes mellitus type 2 poorly controlled,     Musculoskeletal     Abdominal   (+) obese,    Substance History      OB/GYN          Other                        Anesthesia Plan    ASA 3     MAC

## 2021-04-26 NOTE — ANESTHESIA POSTPROCEDURE EVALUATION
"Patient: Jade VALADEZ Urbano    Procedure Summary     Date: 04/26/21 Room / Location: [facility] ENDOSCOPY 8 / [facility] ENDOSCOPY    Anesthesia Start: 1355 Anesthesia Stop: 1411    Procedure: ESOPHAGOGASTRODUODENOSCOPY WITH COLD BIOPSIES (N/A Esophagus) Diagnosis:       Pain of upper abdomen      Dyspepsia      Poor appetite      Nausea      (Pain of upper abdomen [R10.10])      (Dyspepsia [R10.13])      (Poor appetite [R63.0])      (Nausea [R11.0])    Surgeons: Jasson Nguyen MD Provider: Elba Lopez MD    Anesthesia Type: MAC ASA Status: 3          Anesthesia Type: MAC    Vitals  Vitals Value Taken Time   /89 04/26/21 1421   Temp     Pulse 88 04/26/21 1421   Resp 18 04/26/21 1421   SpO2 97 % 04/26/21 1411           Post Anesthesia Care and Evaluation    Patient location during evaluation: PHASE II  Patient participation: complete - patient participated  Level of consciousness: awake  Pain management: adequate  Airway patency: patent  Anesthetic complications: No anesthetic complications    Cardiovascular status: acceptable  Respiratory status: acceptable  Hydration status: acceptable    Comments: /89 (BP Location: Left arm, Patient Position: Sitting)   Pulse 88   Resp 18   Ht 162.6 cm (64\")   Wt 74.7 kg (164 lb 9.6 oz)   SpO2 97%   BMI 28.25 kg/m²       "

## 2021-04-27 LAB
CYTO UR: NORMAL
LAB AP CASE REPORT: NORMAL
PATH REPORT.FINAL DX SPEC: NORMAL
PATH REPORT.GROSS SPEC: NORMAL

## 2021-05-07 ENCOUNTER — HOSPITAL ENCOUNTER (OUTPATIENT)
Dept: MRI IMAGING | Facility: HOSPITAL | Age: 51
Discharge: HOME | End: 2021-05-07
Admitting: NURSE PRACTITIONER

## 2021-05-07 DIAGNOSIS — R10.10 PAIN OF UPPER ABDOMEN: ICD-10-CM

## 2021-05-07 PROCEDURE — A9577 INJ MULTIHANCE: HCPCS | Performed by: NURSE PRACTITIONER

## 2021-05-07 PROCEDURE — 0 GADOBENATE DIMEGLUMINE 529 MG/ML SOLUTION: Performed by: NURSE PRACTITIONER

## 2021-05-07 PROCEDURE — 74183 MRI ABD W/O CNTR FLWD CNTR: CPT

## 2021-05-07 RX ADMIN — GADOBENATE DIMEGLUMINE 15 ML: 529 INJECTION, SOLUTION INTRAVENOUS at 17:23

## 2021-05-13 ENCOUNTER — TELEPHONE (OUTPATIENT)
Dept: GASTROENTEROLOGY | Facility: CLINIC | Age: 51
End: 2021-05-13

## 2021-05-13 NOTE — TELEPHONE ENCOUNTER
----- Message from Danni Hernandez Rep sent at 5/13/2021 10:14 AM EDT -----  Regarding: Results  Contact: [phone]  Pt is calling for results

## 2021-05-14 NOTE — PROGRESS NOTES
Her MRCP results are in.  Looks like there is an area in the posterior dome of the liver that needs follow-up in to ensure stability.  Fatty liver and a right at renal adenoma.  Do you want me to have her set up for an MRCP in 3 months? Your thoughts?

## 2021-05-14 NOTE — TELEPHONE ENCOUNTER
CT scan results from 8/28/20 are under media tab.       Per Dr. Nguyen: have to get me the CT results, it appears there are images but no report  MRI shows the abnormality described, she will need a repeat MRI in 3 months

## 2021-05-15 VITALS — RESPIRATION RATE: 14 BRPM | HEIGHT: 66 IN | WEIGHT: 207 LBS | BODY MASS INDEX: 33.27 KG/M2

## 2021-05-26 DIAGNOSIS — E66.9 OBESITY, UNSPECIFIED CLASSIFICATION, UNSPECIFIED OBESITY TYPE, UNSPECIFIED WHETHER SERIOUS COMORBIDITY PRESENT: ICD-10-CM

## 2021-05-26 DIAGNOSIS — IMO0002 UNCONTROLLED TYPE 2 DIABETES MELLITUS WITH COMPLICATION, WITH LONG-TERM CURRENT USE OF INSULIN: ICD-10-CM

## 2021-05-26 DIAGNOSIS — E78.1 HYPERTRIGLYCERIDEMIA: ICD-10-CM

## 2021-05-26 DIAGNOSIS — I10 ESSENTIAL HYPERTENSION: ICD-10-CM

## 2021-05-26 DIAGNOSIS — R80.9 MICROALBUMINURIA: ICD-10-CM

## 2021-05-26 DIAGNOSIS — E83.52 HYPERCALCEMIA: ICD-10-CM

## 2021-05-26 DIAGNOSIS — E78.5 HYPERLIPIDEMIA, UNSPECIFIED HYPERLIPIDEMIA TYPE: ICD-10-CM

## 2021-05-26 DIAGNOSIS — Z91.14 NONCOMPLIANCE WITH MEDICATION REGIMEN: ICD-10-CM

## 2021-05-26 DIAGNOSIS — E55.9 VITAMIN D DEFICIENCY: ICD-10-CM

## 2021-05-27 RX ORDER — ERGOCALCIFEROL 1.25 MG/1
CAPSULE ORAL
Qty: 36 CAPSULE | Refills: 1 | Status: SHIPPED | OUTPATIENT
Start: 2021-05-27 | End: 2021-06-02 | Stop reason: SDUPTHER

## 2021-06-02 DIAGNOSIS — IMO0002 UNCONTROLLED TYPE 2 DIABETES MELLITUS WITH COMPLICATION, WITH LONG-TERM CURRENT USE OF INSULIN: ICD-10-CM

## 2021-06-02 DIAGNOSIS — R80.9 MICROALBUMINURIA: ICD-10-CM

## 2021-06-02 DIAGNOSIS — E55.9 VITAMIN D DEFICIENCY: ICD-10-CM

## 2021-06-02 DIAGNOSIS — E78.5 HYPERLIPIDEMIA, UNSPECIFIED HYPERLIPIDEMIA TYPE: ICD-10-CM

## 2021-06-02 DIAGNOSIS — E66.9 OBESITY, UNSPECIFIED CLASSIFICATION, UNSPECIFIED OBESITY TYPE, UNSPECIFIED WHETHER SERIOUS COMORBIDITY PRESENT: ICD-10-CM

## 2021-06-02 DIAGNOSIS — E83.52 HYPERCALCEMIA: ICD-10-CM

## 2021-06-02 DIAGNOSIS — I10 ESSENTIAL HYPERTENSION: ICD-10-CM

## 2021-06-02 DIAGNOSIS — E78.1 HYPERTRIGLYCERIDEMIA: ICD-10-CM

## 2021-06-02 DIAGNOSIS — Z91.14 NONCOMPLIANCE WITH MEDICATION REGIMEN: ICD-10-CM

## 2021-06-02 RX ORDER — ERGOCALCIFEROL 1.25 MG/1
CAPSULE ORAL
Qty: 36 CAPSULE | Refills: 1 | Status: SHIPPED | OUTPATIENT
Start: 2021-06-02 | End: 2021-12-22 | Stop reason: SDUPTHER

## 2021-09-16 NOTE — PROGRESS NOTES
Yenny Clement is a 51 y.o. female.       F/u for dm 2, hyperlipidemia, hypertension, vitamin d def, abn calcium / testing bs 0 x day / last dm eye exam aug 2021/ last dm  foot exam 3/16/21 with dr Cheney          She was diagnosed to have diabetes mellitus in 2015 and started on insulin in 2018.  She is on Lantus 65 units every evening and Humalog 15 units 3 times a day with meals.  She ran out of Lantus in 6/21. Lantus stings.  She does not check her blood sugars.  She grazes but no regular meals.  She has lost 17 pounds since October 2020.     Metformin causes diarrhea.  Ozempic causes abdominal pain.  Bydureon because irritation and injection site.  Xigduo caused diarrhea.     Her last eye examination was in August 2021.  She denies retinopathy.  She denies numbness, tingling or burning in her hands or feet.  Urine microalbumin was elevated in 10/20.     She has hyperlipidemia. She is on red yeast rice 600 mg 2 capsules once a day.  She was on atorvastatin 80 mg and fenofibrate which were discontinued in September 2016 because she was having restless legs at that time.  Restless legs did not improve after the discontinuation of the medication.  She has used Livalo 4 mg in the past but does not remember why she stopped taking it.     She has hypertension since 2015 and is on hydrochlorothiazide 25 mg/day.  Lisinopril 20 mg was discontinued because of low blood pressure.  She has no history of heart attack or stroke.     She had an EGD done in July 2020 and was found to have gastric ulcers and hiatal hernia.  She is on Prilosec 40 mg/day and Carafate as needed.    She had upper abdominal pain when she eats regular meals.  She was seen by Dr. Nguyen and had an EGD in April 2021 which was normal.  MRI of the abdomen done in May 2021 showed mild hepatic steatosis, a 2.4 cm lipid rich right adrenal nodule and subtle area of hyperenhancement within the posterior hepatic dome.  Celiac antibody was  "normal.     She is .  She has been amenorrheic since age 44.  She is not on HRT.  She denies hot flashes. She has never had a BMD.  She has no parental history of hip fractures.    She has history of vitamin D deficiency and is on ergocalciferol 50,000 units 3 times a week.       She had labs drawn at [facility] several days ago but results are not available.    She smokes 1/2 pack of cigarettes per day since age 13.  She denies chronic lung disease due to smoking.  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     She is sleeping better with melatonin.      The following portions of the patient's history were reviewed and updated as appropriate: allergies, current medications, past family history, past medical history, past social history, past surgical history and problem list.    Review of Systems   Eyes: Negative for visual disturbance.   Respiratory: Negative for shortness of breath.    Cardiovascular: Negative for chest pain and palpitations.   Gastrointestinal: Positive for abdominal pain. Negative for anal bleeding and blood in stool.   Endocrine: Negative.    Genitourinary: Negative.    Musculoskeletal: Negative.  Negative for myalgias.   Neurological: Negative for numbness.     Objective      Vitals:    21 1618   BP: 106/62   BP Location: Right arm   Patient Position: Sitting   Cuff Size: Small Adult   Weight: 68 kg (150 lb)   Height: 162.6 cm (64.02\")     Physical Exam  Constitutional:       General: She is not in acute distress.     Appearance: Normal appearance. She is not ill-appearing, toxic-appearing or diaphoretic.   Eyes:      General: No scleral icterus.        Right eye: No discharge.         Left eye: No discharge.      Extraocular Movements: Extraocular movements intact.      Conjunctiva/sclera: Conjunctivae normal.   Cardiovascular:      Rate and Rhythm: Normal rate and regular rhythm.      Heart sounds: Normal heart sounds. No murmur heard.   No friction rub. No gallop.    Pulmonary: "      Effort: Pulmonary effort is normal. No respiratory distress.      Breath sounds: Normal breath sounds. No stridor. No wheezing, rhonchi or rales.   Chest:      Chest wall: No tenderness.   Abdominal:      General: Bowel sounds are normal. There is no distension.      Palpations: Abdomen is soft.      Tenderness: There is no right CVA tenderness or left CVA tenderness.      Comments: No prominent striae   Musculoskeletal:         General: No tenderness. Normal range of motion.      Cervical back: Normal range of motion and neck supple. No tenderness.      Comments: No plantar ulcers.  Abrasion on on dorsum of right foot after she fell from her treadmill   Skin:     General: Skin is warm and dry.   Neurological:      Mental Status: She is alert and oriented to person, place, and time.      Comments: Intact light touch on lower  ext   Psychiatric:         Mood and Affect: Mood normal.         Behavior: Behavior normal.       Admission on 04/26/2021, Discharged on 04/26/2021   Component Date Value Ref Range Status   • Glucose 04/26/2021 348* 70 - 130 mg/dL Final   • Case Report 04/26/2021    Final                    Value:Surgical Pathology Report                         Case: [id number]                                  Authorizing Provider:  Jasson Nguyen MD        Collected:           04/26/2021 02:02 PM          Ordering Location:     [facility]  Received:            04/26/2021 02:48 PM                                 ENDO SUITES                                                                  Pathologist:           Nito Al MD                                                          Specimens:   1) - Small Intestine, Duodenum, DUODENAL BIOPSIES                                                   2) - Stomach, GASTRIC BIOPSIES                                                            • Final Diagnosis 04/26/2021    Final                    Value:This result contains rich text formatting  which cannot be displayed here.   • Gross Description 04/26/2021    Final                    Value:This result contains rich text formatting which cannot be displayed here.   • Microscopic Description 04/26/2021    Final                    Value:This result contains rich text formatting which cannot be displayed here.   • Glucose 04/26/2021 320* 70 - 130 mg/dL Final     Assessment/Plan   Diagnoses and all orders for this visit:    1. Type 2 diabetes mellitus with microalbuminuria, with long-term current use of insulin (HCC) (Primary)    2. Hyperlipidemia, unspecified hyperlipidemia type    3. Essential hypertension    4. Microalbuminuria    5. Vitamin D deficiency    6. Type 2 diabetes mellitus with complications (HCC)    Other orders  -     Insulin Degludec (Tresiba FlexTouch) 200 UNIT/ML solution pen-injector pen injection; 65 units every evening  Dispense: 32 mL; Refill: 2      Discontinue Lantus.  Start Tresiba U200 65 units every evening.  Continue Humalog 15 units 3 times a day.    Need labs that were done [facility].    Advised to follow-up with COURTNEY Miner NP for possible gastric emptying study.    Continue hydrochlorothiazide.  Consider switching back to lisinopril    Continue ergocalciferol 50,000 units 3 times a week.    Copy of my note sent to Dr. Heavenly Pablo and Lea Miner NP.    RTC 4 mos

## 2021-09-28 ENCOUNTER — OFFICE VISIT (OUTPATIENT)
Dept: ENDOCRINOLOGY | Age: 51
End: 2021-09-28

## 2021-09-28 VITALS
HEIGHT: 64 IN | WEIGHT: 150 LBS | SYSTOLIC BLOOD PRESSURE: 106 MMHG | BODY MASS INDEX: 25.61 KG/M2 | DIASTOLIC BLOOD PRESSURE: 62 MMHG

## 2021-09-28 DIAGNOSIS — R80.9 MICROALBUMINURIA: ICD-10-CM

## 2021-09-28 DIAGNOSIS — Z79.4 TYPE 2 DIABETES MELLITUS WITH MICROALBUMINURIA, WITH LONG-TERM CURRENT USE OF INSULIN (HCC): Primary | ICD-10-CM

## 2021-09-28 DIAGNOSIS — I10 ESSENTIAL HYPERTENSION: ICD-10-CM

## 2021-09-28 DIAGNOSIS — E11.29 TYPE 2 DIABETES MELLITUS WITH MICROALBUMINURIA, WITH LONG-TERM CURRENT USE OF INSULIN (HCC): Primary | ICD-10-CM

## 2021-09-28 DIAGNOSIS — E78.5 HYPERLIPIDEMIA, UNSPECIFIED HYPERLIPIDEMIA TYPE: ICD-10-CM

## 2021-09-28 DIAGNOSIS — E55.9 VITAMIN D DEFICIENCY: ICD-10-CM

## 2021-09-28 DIAGNOSIS — R80.9 TYPE 2 DIABETES MELLITUS WITH MICROALBUMINURIA, WITH LONG-TERM CURRENT USE OF INSULIN (HCC): Primary | ICD-10-CM

## 2021-09-28 DIAGNOSIS — E11.8 TYPE 2 DIABETES MELLITUS WITH COMPLICATIONS (HCC): ICD-10-CM

## 2021-09-28 PROCEDURE — 99214 OFFICE O/P EST MOD 30 MIN: CPT | Performed by: INTERNAL MEDICINE

## 2021-09-28 RX ORDER — INSULIN DEGLUDEC 200 U/ML
INJECTION, SOLUTION SUBCUTANEOUS
Qty: 32 ML | Refills: 2 | Status: SHIPPED | OUTPATIENT
Start: 2021-09-28 | End: 2021-10-04 | Stop reason: ALTCHOICE

## 2021-09-28 RX ORDER — MULTIVIT-MIN/IRON/FOLIC ACID/K 18-600-40
CAPSULE ORAL
COMMUNITY

## 2021-09-28 RX ORDER — MULTIPLE VITAMINS W/ MINERALS TAB 9MG-400MCG
1 TAB ORAL DAILY
COMMUNITY

## 2021-09-28 RX ORDER — PREDNISONE 20 MG/1
TABLET ORAL
COMMUNITY
Start: 2021-08-31 | End: 2021-09-28

## 2021-09-28 RX ORDER — DEXAMETHASONE SODIUM PHOSPHATE 10 MG/ML
10 INJECTION INTRAMUSCULAR; INTRAVENOUS
COMMUNITY
End: 2021-09-28

## 2021-09-28 RX ORDER — EZETIMIBE 10 MG/1
TABLET ORAL
COMMUNITY
End: 2021-09-28

## 2021-09-28 RX ORDER — LISINOPRIL 20 MG/1
TABLET ORAL
COMMUNITY
End: 2021-09-28

## 2021-12-22 DIAGNOSIS — E66.9 OBESITY, UNSPECIFIED CLASSIFICATION, UNSPECIFIED OBESITY TYPE, UNSPECIFIED WHETHER SERIOUS COMORBIDITY PRESENT: ICD-10-CM

## 2021-12-22 DIAGNOSIS — E78.1 HYPERTRIGLYCERIDEMIA: ICD-10-CM

## 2021-12-22 DIAGNOSIS — E78.5 HYPERLIPIDEMIA, UNSPECIFIED HYPERLIPIDEMIA TYPE: ICD-10-CM

## 2021-12-22 DIAGNOSIS — E55.9 VITAMIN D DEFICIENCY: ICD-10-CM

## 2021-12-22 DIAGNOSIS — R80.9 MICROALBUMINURIA: ICD-10-CM

## 2021-12-22 DIAGNOSIS — I10 ESSENTIAL HYPERTENSION: ICD-10-CM

## 2021-12-22 DIAGNOSIS — Z91.14 NONCOMPLIANCE WITH MEDICATION REGIMEN: ICD-10-CM

## 2021-12-22 DIAGNOSIS — IMO0002 UNCONTROLLED TYPE 2 DIABETES MELLITUS WITH COMPLICATION, WITH LONG-TERM CURRENT USE OF INSULIN: ICD-10-CM

## 2021-12-22 DIAGNOSIS — E83.52 HYPERCALCEMIA: ICD-10-CM

## 2021-12-22 RX ORDER — ERGOCALCIFEROL 1.25 MG/1
CAPSULE ORAL
Qty: 36 CAPSULE | Refills: 1 | Status: SHIPPED | OUTPATIENT
Start: 2021-12-22 | End: 2022-02-16 | Stop reason: SDUPTHER

## 2022-01-25 ENCOUNTER — OFFICE VISIT (OUTPATIENT)
Dept: GASTROENTEROLOGY | Facility: CLINIC | Age: 52
End: 2022-01-25

## 2022-01-25 VITALS — WEIGHT: 147 LBS | TEMPERATURE: 96.6 F | BODY MASS INDEX: 25.1 KG/M2 | HEIGHT: 64 IN

## 2022-01-25 DIAGNOSIS — R11.0 NAUSEA: ICD-10-CM

## 2022-01-25 DIAGNOSIS — R68.81 EARLY SATIETY: ICD-10-CM

## 2022-01-25 DIAGNOSIS — R93.2 ABNORMAL MRI, LIVER: Primary | ICD-10-CM

## 2022-01-25 DIAGNOSIS — R12 HEARTBURN: ICD-10-CM

## 2022-01-25 DIAGNOSIS — R10.13 EPIGASTRIC PAIN: ICD-10-CM

## 2022-01-25 PROCEDURE — 99214 OFFICE O/P EST MOD 30 MIN: CPT | Performed by: NURSE PRACTITIONER

## 2022-01-25 RX ORDER — METOCLOPRAMIDE 5 MG/1
5 TABLET ORAL
COMMUNITY
End: 2022-01-26

## 2022-01-25 RX ORDER — INSULIN DEGLUDEC 200 U/ML
INJECTION, SOLUTION SUBCUTANEOUS
COMMUNITY

## 2022-01-25 NOTE — PROGRESS NOTES
Chief Complaint   Patient presents with   • Heartburn       HPI    Jade Clement is a  52 y.o. female here for a follow up visit for abdominal pain.    This patient follows with Dr. Lowe and myself.    Underwent EGD for her symptoms on 2021:Normal findings.    MRCP performed in May:    Subtle area of hyperenhancement within the posterior hepatic dome  measuring 1.1 cm. In the absence of chronic liver disease and known  malignancy, findings are likely benign; however, they remain  nonspecific.  Mild hepatic steatosis. Findings of the right adrenal adenoma.    Follow-up imaging 3 months recommended to assess stability.    On visit today she complains of heartburn, epigastric pain described as a fullness aching sensation, early satiety, and nausea.  No vomiting, dysphagia, odynophagia.  Currently on omeprazole 40 mg once daily.  She is been on this particular PPI for a number of years.  Recently given a prescription for Reglan by PCP but did not start the medication.  Has not had a gastric emptying study.      No diarrhea, constipation, rectal bleeding.      Last colonoscopy was normal in .      Past Medical History:   Diagnosis Date   • Anxiety    • Diabetes mellitus (HCC)    • Diarrhea    • GERD (gastroesophageal reflux disease)    • Head ache    • Hyperlipidemia    • Nausea        Past Surgical History:   Procedure Laterality Date   •  SECTION     • CHOLECYSTECTOMY     • COLONOSCOPY  05/10/2019    Grzegorz ABEL   • CYSTOSCOPY     • ENDOSCOPY  2020   • ENDOSCOPY N/A 2021    Procedure: ESOPHAGOGASTRODUODENOSCOPY WITH COLD BIOPSIES;  Surgeon: Jasson Nguyen MD;  Location: [facility];  Service: Gastroenterology;  Laterality: N/A;  PRE- ABD PAIN  POST- NORMAL   • UPPER GASTROINTESTINAL ENDOSCOPY  2020    Grzegorz ABEL gastritis, ulcers   • UPPER GASTROINTESTINAL ENDOSCOPY  2020    Andrew ABEL       Scheduled Meds: insulin lispro, 15 Units, Subcutaneous, TID With  Meals        Continuous Infusions:      PRN Meds:     Allergies   Allergen Reactions   • Metformin Diarrhea   • Ozempic [Semaglutide] GI Intolerance     Pt stopped due to stomach pain   • Bydureon [Exenatide] Other (See Comments)     Site reaction   • Statins Myalgia     Muscle aches   • Xigduo Xr [Dapagliflozin-Metformin Hcl Er] Diarrhea       Social History     Socioeconomic History   • Marital status: Single   Tobacco Use   • Smoking status: Current Every Day Smoker     Packs/day: 0.50     Types: Cigarettes     Start date: 1983   • Smokeless tobacco: Never Used   • Tobacco comment: TAKING CHANTIX   Vaping Use   • Vaping Use: Never used   Substance and Sexual Activity   • Alcohol use: Not Currently   • Drug use: Yes     Types: Marijuana     Comment: daily   • Sexual activity: Defer       Family History   Problem Relation Age of Onset   • Thyroid disease Mother         goiter   • Hyperlipidemia Mother    • ALS Mother    • Heart disease Father    • Stroke Father    • Hypertension Father    • Drug abuse Brother    • Malig Hyperthermia Neg Hx        Review of Systems   Constitutional: Negative for activity change, appetite change, fatigue, fever and unexpected weight change.   HENT: Negative for trouble swallowing.    Respiratory: Negative for apnea, cough, choking, chest tightness, shortness of breath and wheezing.    Cardiovascular: Negative for chest pain, palpitations and leg swelling.   Gastrointestinal: Positive for abdominal pain and nausea. Negative for abdominal distention, anal bleeding, blood in stool, constipation, diarrhea, rectal pain and vomiting.       Vitals:    01/25/22 1117   Temp: 96.6 °F (35.9 °C)       Physical Exam  Constitutional:       Appearance: She is well-developed.   Abdominal:      General: Bowel sounds are normal. There is no distension.      Palpations: Abdomen is soft. There is no mass.      Tenderness: There is no abdominal tenderness. There is no guarding.      Hernia: No hernia is  present.   Skin:     General: Skin is warm and dry.      Capillary Refill: Capillary refill takes less than 2 seconds.   Neurological:      Mental Status: She is alert and oriented to person, place, and time.   Psychiatric:         Behavior: Behavior normal.       Assessment    Diagnoses and all orders for this visit:    1. Abnormal MRI, liver (Primary)  -     MRI abdomen w wo contrast mrcp; Future    2. Early satiety  -     NM Gastric Emptying; Future    3. Heartburn  -     NM Gastric Emptying; Future    4. Epigastric pain  -     NM Gastric Emptying; Future    5. Nausea  Overview:  Added automatically from request for surgery [id number]    Orders:  -     NM Gastric Emptying; Future    Other orders  -     pantoprazole (PROTONIX) 40 MG EC tablet; Take 1 tablet by mouth Daily.  Dispense: 30 tablet; Refill: 5     Plan    Arrange follow-up MRI as previously recommended.  Stop omeprazole.  Start Protonix 40 mg once daily.  Antireflux measures and dietary modifications reviewed. Low acid diet reviewed. Keep head of bed elevated. Stop eating/drinking at least 3 hours prior to bedtime. Eliminate caffeine and carbonated beverages.   Arrange gastric emptying study to rule out gastroparesis.  Further recommendations and follow-up pending the aforementioned work-up.          KEVIN Braswell  [facility]  [address] [place] [address]  Office: [phone]

## 2022-01-26 RX ORDER — PANTOPRAZOLE SODIUM 40 MG/1
40 TABLET, DELAYED RELEASE ORAL DAILY
Qty: 30 TABLET | Refills: 5 | Status: SHIPPED | OUTPATIENT
Start: 2022-01-26

## 2022-02-08 ENCOUNTER — TELEPHONE (OUTPATIENT)
Dept: GASTROENTEROLOGY | Facility: CLINIC | Age: 52
End: 2022-02-08

## 2022-02-08 NOTE — TELEPHONE ENCOUNTER
"----- Message from Danni Worthington sent at 2/8/2022 12:54 PM EST -----  Contact: [phone]  Pt is calling stating they they wont cover the MRI with or without contrast in a hospital setting. Stating that it would have to be done at a \" mobile one\" or a \"stand alone company\" says that he need more information on why it needs to be done joan hospital setting.     "

## 2022-02-08 NOTE — TELEPHONE ENCOUNTER
Returned patient's phone call. [facility] in [place], [place], so her insurance will cover. She verb understanding and is in agreement with the plan.

## 2022-02-16 ENCOUNTER — TELEPHONE (OUTPATIENT)
Dept: ENDOCRINOLOGY | Age: 52
End: 2022-02-16

## 2022-02-16 DIAGNOSIS — E55.9 VITAMIN D DEFICIENCY: ICD-10-CM

## 2022-02-16 RX ORDER — ERGOCALCIFEROL 1.25 MG/1
CAPSULE ORAL
Qty: 36 CAPSULE | Refills: 1 | Status: SHIPPED | OUTPATIENT
Start: 2022-02-16

## 2022-02-17 ENCOUNTER — HOSPITAL ENCOUNTER (OUTPATIENT)
Dept: NUCLEAR MEDICINE | Facility: HOSPITAL | Age: 52
Discharge: HOME | End: 2022-02-17

## 2022-02-17 ENCOUNTER — APPOINTMENT (OUTPATIENT)
Dept: MRI IMAGING | Facility: HOSPITAL | Age: 52
End: 2022-02-17

## 2022-02-17 ENCOUNTER — TELEPHONE (OUTPATIENT)
Dept: GASTROENTEROLOGY | Facility: CLINIC | Age: 52
End: 2022-02-17

## 2022-02-17 DIAGNOSIS — R11.0 NAUSEA: ICD-10-CM

## 2022-02-17 DIAGNOSIS — R10.13 EPIGASTRIC PAIN: ICD-10-CM

## 2022-02-17 DIAGNOSIS — R12 HEARTBURN: ICD-10-CM

## 2022-02-17 DIAGNOSIS — R68.81 EARLY SATIETY: ICD-10-CM

## 2022-02-17 PROCEDURE — 0 TECHNETIUM SULFUR COLLOID: Performed by: NURSE PRACTITIONER

## 2022-02-17 PROCEDURE — 78264 GASTRIC EMPTYING IMG STUDY: CPT

## 2022-02-17 PROCEDURE — A9541 TC99M SULFUR COLLOID: HCPCS | Performed by: NURSE PRACTITIONER

## 2022-02-17 RX ADMIN — TECHNETIUM TC 99M SULFUR COLLOID 1 DOSE: KIT at 08:10

## 2022-02-17 NOTE — TELEPHONE ENCOUNTER
Moraima Ocampo, Abiola Pratt, JENNIFER Culver-   From the notes in this patients chart- test is to be done at [facility]? Can you please confirm- if so we will cancel 2/18 @ [facility].     Thank you   Tana Ocampo   Financial    [address] [place] [address] NATY Rahman [address] [phone]

## 2022-02-17 NOTE — TELEPHONE ENCOUNTER
Called pt and advised of Lea NP's note.  Pt verbalized understanding.       13-Jun-2017 13-Jun-2017 11:09

## 2022-02-17 NOTE — TELEPHONE ENCOUNTER
----- Message from KEVIN Braswell sent at 2/17/2022 12:59 PM EST -----  Please inform the patient that her gastric emptying study is normal.

## 2022-02-25 ENCOUNTER — APPOINTMENT (OUTPATIENT)
Dept: CT IMAGING | Facility: HOSPITAL | Age: 52
End: 2022-02-25

## 2022-02-25 ENCOUNTER — HOSPITAL ENCOUNTER (EMERGENCY)
Facility: HOSPITAL | Age: 52
Discharge: ANOTHER HOSPITAL | End: 2022-02-25
Attending: EMERGENCY MEDICINE | Admitting: EMERGENCY MEDICINE

## 2022-02-25 VITALS
HEIGHT: 64 IN | HEART RATE: 81 BPM | TEMPERATURE: 98 F | DIASTOLIC BLOOD PRESSURE: 84 MMHG | RESPIRATION RATE: 19 BRPM | BODY MASS INDEX: 27.93 KG/M2 | SYSTOLIC BLOOD PRESSURE: 110 MMHG | OXYGEN SATURATION: 97 % | WEIGHT: 163.58 LBS

## 2022-02-25 DIAGNOSIS — S22.088A OTHER CLOSED FRACTURE OF TWELFTH THORACIC VERTEBRA, INITIAL ENCOUNTER: ICD-10-CM

## 2022-02-25 DIAGNOSIS — S22.41XA CLOSED FRACTURE OF MULTIPLE RIBS OF RIGHT SIDE, INITIAL ENCOUNTER: Primary | ICD-10-CM
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
## 2022-02-25 LAB
ALBUMIN SERPL-MCNC: 4.3 G/DL (ref 3.5–5.2)
ALBUMIN/GLOB SERPL: 1.3 G/DL
ALP SERPL-CCNC: 74 U/L (ref 39–117)
ALT SERPL W P-5'-P-CCNC: 15 U/L (ref 1–33)
ANION GAP SERPL CALCULATED.3IONS-SCNC: 13.1 MMOL/L (ref 5–15)
AST SERPL-CCNC: 26 U/L (ref 1–32)
BASOPHILS # BLD AUTO: 0.05 10*3/MM3 (ref 0–0.2)
BASOPHILS NFR BLD AUTO: 0.3 % (ref 0–1.5)
BILIRUB SERPL-MCNC: 0.3 MG/DL (ref 0–1.2)
BUN SERPL-MCNC: 27 MG/DL (ref 6–20)
BUN/CREAT SERPL: 31.4 (ref 7–25)
CALCIUM SPEC-SCNC: 10 MG/DL (ref 8.6–10.5)
CHLORIDE SERPL-SCNC: 100 MMOL/L (ref 98–107)
CO2 SERPL-SCNC: 22.9 MMOL/L (ref 22–29)
CREAT SERPL-MCNC: 0.86 MG/DL (ref 0.57–1)
DEPRECATED RDW RBC AUTO: 39.2 FL (ref 37–54)
EOSINOPHIL # BLD AUTO: 0.1 10*3/MM3 (ref 0–0.4)
EOSINOPHIL NFR BLD AUTO: 0.6 % (ref 0.3–6.2)
ERYTHROCYTE [DISTWIDTH] IN BLOOD BY AUTOMATED COUNT: 11.9 % (ref 12.3–15.4)
ETHANOL BLD-MCNC: <10 MG/DL (ref 0–10)
ETHANOL UR QL: <0.01 %
GFR SERPL CREATININE-BSD FRML MDRD: 69 ML/MIN/1.73
GFR SERPL CREATININE-BSD FRML MDRD: 83 ML/MIN/1.73
GLOBULIN UR ELPH-MCNC: 3.4 GM/DL
GLUCOSE SERPL-MCNC: 149 MG/DL (ref 65–99)
HCT VFR BLD AUTO: 42.7 % (ref 34–46.6)
HGB BLD-MCNC: 15.2 G/DL (ref 12–15.9)
IMM GRANULOCYTES # BLD AUTO: 0.14 10*3/MM3 (ref 0–0.05)
IMM GRANULOCYTES NFR BLD AUTO: 0.8 % (ref 0–0.5)
LIPASE SERPL-CCNC: 35 U/L (ref 13–60)
LYMPHOCYTES # BLD AUTO: 2.07 10*3/MM3 (ref 0.7–3.1)
LYMPHOCYTES NFR BLD AUTO: 12 % (ref 19.6–45.3)
MCH RBC QN AUTO: 31.9 PG (ref 26.6–33)
MCHC RBC AUTO-ENTMCNC: 35.6 G/DL (ref 31.5–35.7)
MCV RBC AUTO: 89.7 FL (ref 79–97)
MONOCYTES # BLD AUTO: 0.48 10*3/MM3 (ref 0.1–0.9)
MONOCYTES NFR BLD AUTO: 2.8 % (ref 5–12)
NEUTROPHILS NFR BLD AUTO: 14.42 10*3/MM3 (ref 1.7–7)
NEUTROPHILS NFR BLD AUTO: 83.5 % (ref 42.7–76)
NRBC BLD AUTO-RTO: 0 /100 WBC (ref 0–0.2)
PLATELET # BLD AUTO: 291 10*3/MM3 (ref 140–450)
PMV BLD AUTO: 9.1 FL (ref 6–12)
POTASSIUM SERPL-SCNC: 3.4 MMOL/L (ref 3.5–5.2)
PROT SERPL-MCNC: 7.7 G/DL (ref 6–8.5)
RBC # BLD AUTO: 4.76 10*6/MM3 (ref 3.77–5.28)
SODIUM SERPL-SCNC: 136 MMOL/L (ref 136–145)
WBC NRBC COR # BLD: 17.26 10*3/MM3 (ref 3.4–10.8)

## 2022-02-25 PROCEDURE — 72125 CT NECK SPINE W/O DYE: CPT

## 2022-02-25 PROCEDURE — 85025 COMPLETE CBC W/AUTO DIFF WBC: CPT | Performed by: EMERGENCY MEDICINE

## 2022-02-25 PROCEDURE — 74177 CT ABD & PELVIS W/CONTRAST: CPT

## 2022-02-25 PROCEDURE — 80053 COMPREHEN METABOLIC PANEL: CPT | Performed by: EMERGENCY MEDICINE

## 2022-02-25 PROCEDURE — 96376 TX/PRO/DX INJ SAME DRUG ADON: CPT

## 2022-02-25 PROCEDURE — 96375 TX/PRO/DX INJ NEW DRUG ADDON: CPT

## 2022-02-25 PROCEDURE — 99284 EMERGENCY DEPT VISIT MOD MDM: CPT

## 2022-02-25 PROCEDURE — 70450 CT HEAD/BRAIN W/O DYE: CPT

## 2022-02-25 PROCEDURE — 25010000002 FENTANYL CITRATE (PF) 50 MCG/ML SOLUTION: Performed by: EMERGENCY MEDICINE

## 2022-02-25 PROCEDURE — 0 IOPAMIDOL PER 1 ML: Performed by: EMERGENCY MEDICINE

## 2022-02-25 PROCEDURE — 71260 CT THORAX DX C+: CPT

## 2022-02-25 PROCEDURE — 25010000002 ONDANSETRON PER 1 MG: Performed by: EMERGENCY MEDICINE

## 2022-02-25 PROCEDURE — 83690 ASSAY OF LIPASE: CPT | Performed by: EMERGENCY MEDICINE

## 2022-02-25 PROCEDURE — 82077 ASSAY SPEC XCP UR&BREATH IA: CPT | Performed by: EMERGENCY MEDICINE

## 2022-02-25 PROCEDURE — 96374 THER/PROPH/DIAG INJ IV PUSH: CPT

## 2022-02-25 RX ORDER — OMEPRAZOLE 20 MG/1
20 CAPSULE, DELAYED RELEASE ORAL DAILY
COMMUNITY

## 2022-02-25 RX ORDER — FENTANYL CITRATE 50 UG/ML
50 INJECTION, SOLUTION INTRAMUSCULAR; INTRAVENOUS ONCE
Status: COMPLETED | OUTPATIENT
Start: 2022-02-25 | End: 2022-02-25

## 2022-02-25 RX ORDER — FENTANYL CITRATE 50 UG/ML
50 INJECTION, SOLUTION INTRAMUSCULAR; INTRAVENOUS
Status: DISCONTINUED | OUTPATIENT
Start: 2022-02-25 | End: 2022-02-25 | Stop reason: HOSPADM

## 2022-02-25 RX ORDER — ONDANSETRON 2 MG/ML
4 INJECTION INTRAMUSCULAR; INTRAVENOUS ONCE
Status: COMPLETED | OUTPATIENT
Start: 2022-02-25 | End: 2022-02-25

## 2022-02-25 RX ADMIN — FENTANYL CITRATE 50 MCG: 0.05 INJECTION, SOLUTION INTRAMUSCULAR; INTRAVENOUS at 12:56

## 2022-02-25 RX ADMIN — IOPAMIDOL 100 ML: 755 INJECTION, SOLUTION INTRAVENOUS at 10:05

## 2022-02-25 RX ADMIN — ONDANSETRON 4 MG: 2 INJECTION INTRAMUSCULAR; INTRAVENOUS at 09:06

## 2022-02-25 RX ADMIN — FENTANYL CITRATE 50 MCG: 50 INJECTION, SOLUTION INTRAMUSCULAR; INTRAVENOUS at 11:05

## 2022-02-25 RX ADMIN — FENTANYL CITRATE 50 MCG: 50 INJECTION, SOLUTION INTRAMUSCULAR; INTRAVENOUS at 09:06

## 2022-12-30 DIAGNOSIS — E55.9 VITAMIN D DEFICIENCY: ICD-10-CM

## 2022-12-30 RX ORDER — ERGOCALCIFEROL 1.25 MG/1
CAPSULE ORAL
Qty: 36 CAPSULE | Refills: 1 | OUTPATIENT
Start: 2022-12-30

## 2023-02-08 DIAGNOSIS — E55.9 VITAMIN D DEFICIENCY: ICD-10-CM

## 2023-02-08 RX ORDER — ERGOCALCIFEROL 1.25 MG/1
CAPSULE ORAL
Qty: 36 CAPSULE | Refills: 1 | OUTPATIENT
Start: 2023-02-08

## 2023-07-24 ENCOUNTER — OFFICE VISIT (OUTPATIENT)
Dept: ENDOCRINOLOGY | Age: 53
End: 2023-07-24
Payer: COMMERCIAL

## 2023-07-24 VITALS
HEIGHT: 64 IN | TEMPERATURE: 96.6 F | HEART RATE: 105 BPM | SYSTOLIC BLOOD PRESSURE: 110 MMHG | OXYGEN SATURATION: 98 % | BODY MASS INDEX: 23.73 KG/M2 | DIASTOLIC BLOOD PRESSURE: 74 MMHG | WEIGHT: 139 LBS

## 2023-07-24 DIAGNOSIS — E78.5 HYPERLIPIDEMIA, UNSPECIFIED HYPERLIPIDEMIA TYPE: ICD-10-CM

## 2023-07-24 DIAGNOSIS — R80.9 TYPE 2 DIABETES MELLITUS WITH MICROALBUMINURIA, WITH LONG-TERM CURRENT USE OF INSULIN: Primary | ICD-10-CM

## 2023-07-24 DIAGNOSIS — Z79.4 TYPE 2 DIABETES MELLITUS WITH MICROALBUMINURIA, WITH LONG-TERM CURRENT USE OF INSULIN: Primary | ICD-10-CM

## 2023-07-24 DIAGNOSIS — E55.9 VITAMIN D DEFICIENCY: ICD-10-CM

## 2023-07-24 DIAGNOSIS — I10 PRIMARY HYPERTENSION: ICD-10-CM

## 2023-07-24 DIAGNOSIS — E11.29 TYPE 2 DIABETES MELLITUS WITH MICROALBUMINURIA, WITH LONG-TERM CURRENT USE OF INSULIN: Primary | ICD-10-CM

## 2023-07-24 PROCEDURE — 99214 OFFICE O/P EST MOD 30 MIN: CPT | Performed by: NURSE PRACTITIONER

## 2023-07-24 RX ORDER — BUSPIRONE HYDROCHLORIDE 10 MG/1
10 TABLET ORAL DAILY
COMMUNITY

## 2023-07-24 RX ORDER — BLOOD-GLUCOSE SENSOR
1 EACH MISCELLANEOUS
Qty: 6 EACH | Refills: 1 | Status: SHIPPED | OUTPATIENT
Start: 2023-07-24

## 2023-07-24 RX ORDER — OMEPRAZOLE 40 MG/1
1 CAPSULE, DELAYED RELEASE ORAL DAILY
COMMUNITY
Start: 2023-06-24 | End: 2023-07-24 | Stop reason: SDUPTHER

## 2023-07-24 RX ORDER — ONDANSETRON 4 MG/1
TABLET, ORALLY DISINTEGRATING ORAL
COMMUNITY
Start: 2023-07-19

## 2023-07-24 NOTE — PATIENT INSTRUCTIONS
Continue with Tresiba 40 units every evening   Continue with Humalog 10 units 3 times a day with meals  Start Balwinder 3 - download after wearing it for 2 weeks

## 2023-07-24 NOTE — PROGRESS NOTES
Chief Complaint  Chief Complaint   Patient presents with    Diabetes     Does not check blood sugars often, due for eye exam hx of diabetic retinopathy, hx of neuropathy: yes    Need refill on vit D       Subjective          History of Present Illness    Jade Clement 53 y.o. presents for a follow-up evaluation for type 2 DM    She has been diabetic since 2015 and started insulin in 2018    Pt is on the following medications for their DM:  Tresiba 40 units every evening and Humalog 10 units 3 times a day with meals      Metformin causes diarrhea.    Ozempic causes abdominal pain.    Bydureon because irritation and injection site.    Xigduo caused diarrhea  Lantus changed to Tresiba        Pt complains of numbness and tingling in feet/hands and vision changes when BG is high.    Denies chest pain and shortness of breath    Weight loss of 11 lbs since last visit om 10/2021    Pt does not have a history of diabetic retinopathy.  Last eye exam was about a year ago    Pt does have a history of nephropathy.  Patient is not currently taking ACE/ARB    Pt does have neuropathy.  Current takes Cymbalta for this condition per PCP    Pt does not have a history of CAD or CVA.    Last A1C in 09/21 was 13.5    Last microalbumin in 09/21 was positive          Blood Sugars    Blood glucoses are checked once a week, if that    Fasting blood glucoses: 170 to under 200    Pt has rare episodes of hypoglycemia.            Hyperlipidemia     Pt is currently taking nothing  Statins cause myalgia  Used fenofibrate in the past  She has used Livalo 4 mg in the past but does not remember why she stopped taking it.     MRI of the abdomen done in May 2021 showed mild hepatic steatosis     Last lipid panel in 09/21 showed Total 388, HDL 41,  and Triglycerides 612            Hypertension    Pt denies any chest pain, palpitations, shortness of breath or headache    Current regimen includes hydrochlorothiazide 25 mg daily.    Lisinopril 20 mg  "was discontinued because of low blood pressure           Vitamin D Deficiency    Current treatment includes 50,000 units 3 times a week - been out a year    Last Vit D level was 73.8 in 09/21             Other History    Pt has a lot of stomach issues and takes CBD gummies to help with it    Gastric emptying study was normal per patient           I have reviewed the patient's allergies, medicines, past medical hx, family hx and social hx.    Objective   Vital Signs:   /74   Pulse 105   Temp 96.6 °F (35.9 °C) (Temporal)   Ht 162.6 cm (64\")   Wt 63 kg (139 lb)   SpO2 98%   BMI 23.86 kg/m²       Physical Exam   Physical Exam  Constitutional:       General: She is not in acute distress.     Appearance: Normal appearance. She is not diaphoretic.   HENT:      Head: Normocephalic and atraumatic.   Eyes:      General:         Right eye: No discharge.         Left eye: No discharge.   Skin:     General: Skin is warm and dry.   Neurological:      Mental Status: She is alert.   Psychiatric:         Mood and Affect: Mood normal.         Behavior: Behavior normal.                  Results Review:   Hemoglobin A1C   Date Value Ref Range Status   10/19/2020 12.1 (H) 4.8 - 5.6 % Final     Comment:              Prediabetes: 5.7 - 6.4           Diabetes: >6.4           Glycemic control for adults with diabetes: <7.0       Triglycerides   Date Value Ref Range Status   10/19/2020 1,262 (H) 0 - 149 mg/dL Final     Comment:     Results confirmed on  dilution.       HDL Cholesterol   Date Value Ref Range Status   10/19/2020 28 (L) >39 mg/dL Final     LDL Chol Calc (NIH)   Date Value Ref Range Status   10/19/2020 Comment (A) 0 - 99 mg/dL Final     Comment:     Triglyceride result indicated is too high for an accurate LDL  cholesterol estimation.       VLDL Cholesterol Keyur   Date Value Ref Range Status   10/19/2020 Comment (A) 5 - 40 mg/dL Final     Comment:     The calculation for the VLDL cholesterol is not valid " when  triglyceride level is >800 mg/dL.           Assessment and Plan {CC Problem List  Visit Diagnosis  ROS  Review (Popup)  Health Maintenance  Quality  BestPractice  Medications  SmartSets  SnapShot Encounters  Media :23  Diagnoses and all orders for this visit:    1. Type 2 diabetes mellitus with microalbuminuria, with long-term current use of insulin (Primary)  -     Continuous Blood Gluc Sensor (FreeStyle Balwinder 3 Sensor) misc; 1 each Every 14 (Fourteen) Days.  Dispense: 6 each; Refill: 1  -     Hemoglobin A1c  -     Comprehensive Metabolic Panel  -     Microalbumin / Creatinine Urine Ratio - Urine, Clean Catch  -     TSH Rfx On Abnormal To Free T4    Check labs today  Continue with Tresiba 40 units every evening   Continue with Humalog 10 units 3 times a day with meals  Have no BG numbers to adjust insulin at this time  Start Balwinder 3 - download after wearing it for 2 weeks  Advised to make eye exam appointment      2. Hyperlipidemia, unspecified hyperlipidemia type  -     Comprehensive Metabolic Panel  -     Lipid Panel    Check labs today  No on anything at this time  May need to try Zetia       3. Primary hypertension  -     Comprehensive Metabolic Panel    Stable  Continue with current medication regimen  Defer management to PCP      4. Vitamin D deficiency  -     Vitamin D,25-Hydroxy    Check labs today  Off supplement for over a year  Check levels before restarting          Labs today  RTC in 3 months with me and 6 months with Dr. Cheney      Follow Up     Patient was given instructions and counseling regarding her condition or for health maintenance advice. Please see specific information pulled into the AVS if appropriate.              Radha Vela, KEVIN  07/24/23

## 2023-07-25 DIAGNOSIS — E55.9 VITAMIN D DEFICIENCY: ICD-10-CM

## 2023-07-25 DIAGNOSIS — E78.5 HYPERLIPIDEMIA, UNSPECIFIED HYPERLIPIDEMIA TYPE: Primary | ICD-10-CM

## 2023-07-25 LAB
25(OH)D3+25(OH)D2 SERPL-MCNC: 19.7 NG/ML (ref 30–100)
ALBUMIN SERPL-MCNC: 4.2 G/DL (ref 3.8–4.9)
ALBUMIN/GLOB SERPL: 1.6 {RATIO} (ref 1.2–2.2)
ALP SERPL-CCNC: 86 IU/L (ref 44–121)
ALT SERPL-CCNC: 15 IU/L (ref 0–32)
AST SERPL-CCNC: 12 IU/L (ref 0–40)
BILIRUB SERPL-MCNC: <0.2 MG/DL (ref 0–1.2)
BUN SERPL-MCNC: 30 MG/DL (ref 6–24)
BUN/CREAT SERPL: 42 (ref 9–23)
CALCIUM SERPL-MCNC: 9.7 MG/DL (ref 8.7–10.2)
CHLORIDE SERPL-SCNC: 104 MMOL/L (ref 96–106)
CHOLEST SERPL-MCNC: 275 MG/DL (ref 100–199)
CO2 SERPL-SCNC: 22 MMOL/L (ref 20–29)
CREAT SERPL-MCNC: 0.71 MG/DL (ref 0.57–1)
EGFRCR SERPLBLD CKD-EPI 2021: 100 ML/MIN/1.73
GLOBULIN SER CALC-MCNC: 2.6 G/DL (ref 1.5–4.5)
GLUCOSE SERPL-MCNC: 183 MG/DL (ref 70–99)
HBA1C MFR BLD: 10.1 % (ref 4.8–5.6)
HDLC SERPL-MCNC: 44 MG/DL
IMP & REVIEW OF LAB RESULTS: NORMAL
LDLC SERPL CALC-MCNC: 133 MG/DL (ref 0–99)
POTASSIUM SERPL-SCNC: 3.9 MMOL/L (ref 3.5–5.2)
PROT SERPL-MCNC: 6.8 G/DL (ref 6–8.5)
SODIUM SERPL-SCNC: 141 MMOL/L (ref 134–144)
TRIGL SERPL-MCNC: 535 MG/DL (ref 0–149)
TSH SERPL DL<=0.005 MIU/L-ACNC: 1.61 UIU/ML (ref 0.45–4.5)
UNABLE TO VOID: NORMAL
VLDLC SERPL CALC-MCNC: 98 MG/DL (ref 5–40)

## 2023-07-25 RX ORDER — ERGOCALCIFEROL 1.25 MG/1
50000 CAPSULE ORAL WEEKLY
Qty: 12 CAPSULE | Refills: 1 | Status: SHIPPED | OUTPATIENT
Start: 2023-07-25

## 2023-07-25 RX ORDER — EZETIMIBE 10 MG/1
10 TABLET ORAL DAILY
Qty: 90 TABLET | Refills: 1 | Status: SHIPPED | OUTPATIENT
Start: 2023-07-25

## 2023-08-14 ENCOUNTER — TELEPHONE (OUTPATIENT)
Dept: ENDOCRINOLOGY | Age: 53
End: 2023-08-14

## 2023-08-14 NOTE — TELEPHONE ENCOUNTER
Caller: Jade Clement    Relationship to patient: Self    Best call back number: [phone]    Patient is needing: PER INSTRUCTION, PATIENT CALLED IN TO BE ADVISED ON HOW TO MAKE HER iLost 3 DATA VISIBLE TO THE OFFICE. PLEASE CALL TO ADVISE. OKAY TO LEAVE VM.

## 2023-10-03 ENCOUNTER — OFFICE VISIT (OUTPATIENT)
Dept: OBSTETRICS AND GYNECOLOGY | Facility: CLINIC | Age: 53
End: 2023-10-03
Payer: COMMERCIAL

## 2023-10-03 VITALS
SYSTOLIC BLOOD PRESSURE: 118 MMHG | HEIGHT: 64 IN | DIASTOLIC BLOOD PRESSURE: 83 MMHG | BODY MASS INDEX: 25.23 KG/M2 | WEIGHT: 147.8 LBS

## 2023-10-03 DIAGNOSIS — A63.0 CONDYLOMA ACUMINATUM IN FEMALE: Primary | ICD-10-CM

## 2023-10-03 NOTE — PROGRESS NOTES
"Chief Complaint   Patient presents with   • Follow-up     Patient is here today reporting the last time she was here she found out she had HPV. She was given a cream but it isn't working for her. States she needs something to help get rid of these \"places\"        SUBJECTIVE:     Jade Clement is a 53 y.o.  [facility] She was seen in  by Dr. Crowe for this and given 4-6 weeks of aldara to treat. She does not feel this has helped any. She would like to discuss other treatment options. This is my first time meeting Jade Clement      Past Medical History:   Diagnosis Date   • Adrenal adenoma    • Anxiety    • Diabetes mellitus    • Diarrhea    • GERD (gastroesophageal reflux disease)    • Gestational diabetes    • Head ache    • Hyperlipidemia    • Hypertension    • Kidney stone    • Nausea    • Type 2 diabetes mellitus    • Urinary tract infection     Frequently   • Varicella Unknown   • Vitamin D deficiency       Past Surgical History:   Procedure Laterality Date   •  SECTION     •  SECTION WITH TUBAL  2001   • CHOLECYSTECTOMY     • COLONOSCOPY  05/10/2019    Grzegorz ABEL   • CYSTOSCOPY     • ENDOSCOPY  2020   • ENDOSCOPY N/A 2021    Procedure: ESOPHAGOGASTRODUODENOSCOPY WITH COLD BIOPSIES;  Surgeon: Jasson Nguyen MD;  Location: [facility];  Service: Gastroenterology;  Laterality: N/A;  PRE- ABD PAIN  POST- NORMAL   • LAPAROSCOPIC CHOLECYSTECTOMY     • TUBAL ABDOMINAL LIGATION  2001   • UPPER GASTROINTESTINAL ENDOSCOPY  2020    Grzegorz ABEL gastritis, ulcers   • UPPER GASTROINTESTINAL ENDOSCOPY  2020    Andrew ABEL        Review of Systems   Constitutional:  Negative for chills, fatigue and fever.   Gastrointestinal:  Negative for abdominal distention and abdominal pain.   Genitourinary:  Positive for genital sores. Negative for dyspareunia, dysuria, frequency, pelvic pain, urgency, vaginal bleeding, vaginal discharge " "and vaginal pain.     OBJECTIVE:   Vitals:    10/03/23 1459   BP: 118/83   Weight: 67 kg (147 lb 12.8 oz)   Height: 162.6 cm (64.02\")        Physical Exam  Constitutional:       General: She is not in acute distress.     Appearance: Normal appearance. She is not ill-appearing, toxic-appearing or diaphoretic.   Genitourinary:      Right Labia: No rash, tenderness, lesions, skin changes or Bartholin's cyst.     Left Labia: lesions.      Left Labia: No tenderness, skin changes, Bartholin's cyst or rash.     No labial fusion noted.      Vulva exam comments: 4 condyloma in total, marked in image above.            No inguinal adenopathy present in the right or left side.  Abdominal:      General: There is no distension.      Palpations: Abdomen is soft. There is no mass.      Tenderness: There is no abdominal tenderness. There is no guarding.      Hernia: No hernia is present. There is no hernia in the left inguinal area or right inguinal area.   Lymphadenopathy:      Lower Body: No right inguinal adenopathy. No left inguinal adenopathy.   Neurological:      General: No focal deficit present.      Mental Status: She is alert and oriented to person, place, and time.      Cranial Nerves: No cranial nerve deficit.   Vitals and nursing note reviewed.     Assessment/Plan    Diagnoses and all orders for this visit:    1. Condyloma acuminatum in female (Primary)    Dede did not help  Discussed TCA treatments and she would like to proceed with this  Recommend treating 2 lesions today and returning in 1 week to treat another 1 or 2 lesions,she is still working every day. Concerned about discomfort if all are treated at once  Encouraged condoms with IC.   Call with any problems or concerns    Return in about 1 week (around 10/10/2023) for Follow up.    I spent 21 minutes caring for Jade on this date of service. This time includes time spent by me in the following activities: preparing for the visit, reviewing tests, obtaining " and/or reviewing a separately obtained history, performing a medically appropriate examination and/or evaluation, counseling and educating the patient/family/caregiver, ordering medications, tests, or procedures, referring and communicating with other health care professionals, and documenting information in the medical record    Carissa Metz, APRN  10/3/2023  15:17 EDT

## 2023-10-16 ENCOUNTER — OFFICE VISIT (OUTPATIENT)
Dept: OBSTETRICS AND GYNECOLOGY | Facility: CLINIC | Age: 53
End: 2023-10-16
Payer: COMMERCIAL

## 2023-10-16 VITALS
WEIGHT: 153 LBS | BODY MASS INDEX: 26.12 KG/M2 | DIASTOLIC BLOOD PRESSURE: 76 MMHG | HEIGHT: 64 IN | SYSTOLIC BLOOD PRESSURE: 115 MMHG

## 2023-10-16 DIAGNOSIS — A63.0 CONDYLOMA ACUMINATUM IN FEMALE: Primary | ICD-10-CM

## 2023-10-16 NOTE — PROGRESS NOTES
"Chief Complaint   Patient presents with    Follow-up     Patient is here today wanting to have warts from HPV removed.         SUBJECTIVE:     Jade Clement is a 53 y.o.  who presents for repeat TCA treatment of genital warts. She has tried aldara in the past with no improvement. She reports no negative side effects or significant discomfort following last application of TCA. She would therefore like to treat all 3 lesions today. She feels lesion treated 10/3/23 at [facility] is smaller, but is still present. This is a new problem.    Past Medical History:   Diagnosis Date    Adrenal adenoma     Anxiety     Diabetes mellitus     Diarrhea     GERD (gastroesophageal reflux disease)     Gestational diabetes     Head ache     Hyperlipidemia     Hypertension     Kidney stone     Nausea     Type 2 diabetes mellitus     Urinary tract infection     Frequently    Varicella Unknown    Vitamin D deficiency           Review of Systems   Constitutional:  Negative for chills, fatigue and fever.   Gastrointestinal:  Negative for abdominal distention and abdominal pain.   Genitourinary:  Negative for pelvic pain, vaginal bleeding, vaginal discharge and vaginal pain.        + genital warts       OBJECTIVE:   Vitals:    10/16/23 1123   BP: 115/76   Weight: 69.4 kg (153 lb)   Height: 162.6 cm (64.02\")        Physical Exam  Constitutional:       General: She is not in acute distress.     Appearance: Normal appearance. She is not ill-appearing, toxic-appearing or diaphoretic.   Genitourinary:      Right Labia: No rash, tenderness, lesions, skin changes or Bartholin's cyst.     Left Labia: lesions.      Left Labia: No tenderness, skin changes, Bartholin's cyst or rash.     No labial fusion noted.      Vulva exam comments: 3 condyloma present as marked in image.            No inguinal adenopathy present in the right or left side.  Cardiovascular:      Rate and Rhythm: Normal rate.   Pulmonary:      Effort: Pulmonary " effort is normal.   Abdominal:      Hernia: There is no hernia in the left inguinal area or right inguinal area.   Musculoskeletal:         General: Normal range of motion.      Cervical back: Normal range of motion.   Lymphadenopathy:      Lower Body: No right inguinal adenopathy. No left inguinal adenopathy.   Neurological:      General: No focal deficit present.      Mental Status: She is alert and oriented to person, place, and time.      Cranial Nerves: No cranial nerve deficit.   Skin:     General: Skin is warm and dry.   Psychiatric:         Mood and Affect: Mood normal.         Behavior: Behavior normal.         Thought Content: Thought content normal.         Judgment: Judgment normal.   Vitals and nursing note reviewed.         Assessment/Plan    Diagnoses and all orders for this visit:    1. Condyloma acuminatum in female (Primary)    Condyloma X3 treated with TCA. She tolerated this well  Discussed pain management with warm tub soaks, tucks pad, and lidocaine if needed  Discussed this may require repeat treatment. She will schedule a f/u in 1-2 weeks for repeat treatment if lesions are not resolved     Return in about 1 week (around 10/23/2023) for Follow up, KEVIN Cole.    I spent 20 minutes caring for Jade on this date of service. This time includes time spent by me in the following activities: preparing for the visit, reviewing tests, obtaining and/or reviewing a separately obtained history, performing a medically appropriate examination and/or evaluation, counseling and educating the patient/family/caregiver, ordering medications, tests, or procedures, referring and communicating with other health care professionals, and documenting information in the medical record    KEVIN Zayas  10/16/2023  11:46 EDT

## 2023-10-25 ENCOUNTER — OFFICE VISIT (OUTPATIENT)
Dept: ENDOCRINOLOGY | Age: 53
End: 2023-10-25
Payer: COMMERCIAL

## 2023-10-25 VITALS
SYSTOLIC BLOOD PRESSURE: 122 MMHG | HEART RATE: 97 BPM | WEIGHT: 153.4 LBS | HEIGHT: 64 IN | DIASTOLIC BLOOD PRESSURE: 82 MMHG | OXYGEN SATURATION: 99 % | TEMPERATURE: 97.2 F | BODY MASS INDEX: 26.19 KG/M2

## 2023-10-25 DIAGNOSIS — E78.5 HYPERLIPIDEMIA, UNSPECIFIED HYPERLIPIDEMIA TYPE: ICD-10-CM

## 2023-10-25 DIAGNOSIS — Z72.0 CURRENT TOBACCO USE: ICD-10-CM

## 2023-10-25 DIAGNOSIS — E11.29 TYPE 2 DIABETES MELLITUS WITH MICROALBUMINURIA, WITH LONG-TERM CURRENT USE OF INSULIN: Primary | ICD-10-CM

## 2023-10-25 DIAGNOSIS — I10 PRIMARY HYPERTENSION: ICD-10-CM

## 2023-10-25 DIAGNOSIS — R80.9 TYPE 2 DIABETES MELLITUS WITH MICROALBUMINURIA, WITH LONG-TERM CURRENT USE OF INSULIN: Primary | ICD-10-CM

## 2023-10-25 DIAGNOSIS — Z79.4 TYPE 2 DIABETES MELLITUS WITH MICROALBUMINURIA, WITH LONG-TERM CURRENT USE OF INSULIN: Primary | ICD-10-CM

## 2023-10-25 DIAGNOSIS — E55.9 VITAMIN D DEFICIENCY: ICD-10-CM

## 2023-10-25 DIAGNOSIS — K27.9 PEPTIC ULCER DISEASE: ICD-10-CM

## 2023-10-25 PROCEDURE — 99214 OFFICE O/P EST MOD 30 MIN: CPT | Performed by: INTERNAL MEDICINE

## 2023-10-25 RX ORDER — INSULIN DEGLUDEC 200 U/ML
INJECTION, SOLUTION SUBCUTANEOUS
Qty: 18 ML | Refills: 2 | Status: SHIPPED | OUTPATIENT
Start: 2023-10-25

## 2023-10-25 RX ORDER — INSULIN LISPRO 100 [IU]/ML
INJECTION, SOLUTION INTRAVENOUS; SUBCUTANEOUS
Qty: 38 ML | Refills: 2 | Status: SHIPPED | OUTPATIENT
Start: 2023-10-25

## 2023-10-25 NOTE — PROGRESS NOTES
Yenny Clement is a 53 y.o. female.     History of Present Illness     She was diagnosed to have diabetes mellitus in 2015 and started on insulin in 2018.  She is on Tresiba 20 units twice a day, and Humalog 10 units with each meal.  Lantus stings. She is using freestyle shadi 3 sensor but her co-pay is high.  She has lost 14 pounds since 7/23.  Hemoglobin A1c done at her PCP in     Metformin causes diarrhea. September 2023 is 6.8%  Ozempic causes abdominal pain.  Bydureon because irritation at injection site.  Xigduo caused diarrhea.    Sensor data from October 8, 2023 to October 21, 2023 reviewed.  Average glucose 172 mg per DL.  GMI 7.4%.  Time in target 56%.  Time above target 43%.  Time below target 1%.  She has intermittent hypoglycemia in the early morning.  She has intermittent postprandial hyperglycemia mostly after lunch and after supper.     Her last eye examination was in August 2021.  She denies retinopathy.  Urine microalbumin was elevated in October 2020.  She has tingling in her hands and feet and is on Cymbalta prescribed by her PCP.     She has hyperlipidemia. She is not red yeast rice 600 mg 2 capsules once a day.  She had leg pain while on Zetia.  She was on atorvastatin 80 mg and fenofibrate which were discontinued in September 2016 because she was having restless legs at that time.  Restless legs did not improve after the discontinuation of the medication.  She has used Livalo 4 mg in the past but does not remember why she stopped taking it.     She has hypertension since 2015 and is on hydrochlorothiazide 25 mg/day.  Lisinopril 20 mg was discontinued because of low blood pressure.  She has no history of heart attack or stroke.     She had an EGD done in July 2020 and was found to have gastric ulcers and hiatal hernia.  She is on Prilosec 20 mg/day and Carafate as needed.     She had upper abdominal pain when she eats regular meals.  She was seen by Dr. Nguyen and had an EGD in April  " which was normal.  MRI of the abdomen done in May 2021 showed mild hepatic steatosis, a 2.4 cm lipid rich right adrenal nodule and subtle area of hyperenhancement within the posterior hepatic dome.  Celiac antibody was normal.     She is .  She has been amenorrheic since age 44.  She is not on HRT.  She denies hot flashes. She has never had a BMD.  She has no parental history of hip fractures.     She has history of vitamin D deficiency and is on ergocalciferol 50,000 units/week.        She smokes 1/2 pack of cigarettes per day since age 13.  She denies chronic lung disease due to smoking.  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          The following portions of the patient's history were reviewed and updated as appropriate: allergies, current medications, past family history, past medical history, past social history, past surgical history, and problem list.    Review of Systems   Respiratory:  Negative for shortness of breath.    Cardiovascular:  Negative for chest pain and palpitations.   Gastrointestinal: Negative.    Endocrine: Positive for cold intolerance.   Genitourinary: Negative.    Musculoskeletal:  Negative for arthralgias and myalgias.   Neurological:  Positive for headaches. Negative for numbness.     Vitals:    10/25/23 1312   BP: 122/82   Pulse: 97   Temp: 97.2 °F (36.2 °C)   TempSrc: Temporal   SpO2: 99%   Weight: 69.6 kg (153 lb 6.4 oz)   Height: 162.6 cm (64.02\")      Objective   Physical Exam  Constitutional:       Appearance: Normal appearance.   Eyes:      General: No scleral icterus.        Right eye: No discharge.         Left eye: No discharge.   Neck:      Vascular: No carotid bruit.   Cardiovascular:      Rate and Rhythm: Normal rate and regular rhythm.      Pulses: Normal pulses.      Heart sounds: Normal heart sounds.   Pulmonary:      Breath sounds: Normal breath sounds. No rales.   Chest:      Chest wall: No tenderness.   Abdominal:      General: Bowel sounds are normal.      " Palpations: Abdomen is soft.      Tenderness: There is no right CVA tenderness or left CVA tenderness.   Musculoskeletal:      Right lower leg: No edema.      Left lower leg: No edema.   Lymphadenopathy:      Cervical: No cervical adenopathy.   Skin:     General: Skin is warm and dry.   Neurological:      General: No focal deficit present.      Mental Status: She is alert and oriented to person, place, and time.      Comments: Intact light touch on lower ext       Office Visit on 07/24/2023   Component Date Value Ref Range Status    Hemoglobin A1C 07/24/2023 10.1 (H)  4.8 - 5.6 % Final    Comment:          Prediabetes: 5.7 - 6.4           Diabetes: >6.4           Glycemic control for adults with diabetes: <7.0      Glucose 07/24/2023 183 (H)  70 - 99 mg/dL Final    BUN 07/24/2023 30 (H)  6 - 24 mg/dL Final    Creatinine 07/24/2023 0.71  0.57 - 1.00 mg/dL Final    EGFR Result 07/24/2023 100  >59 mL/min/1.73 Final    BUN/Creatinine Ratio 07/24/2023 42 (H)  9 - 23 Final    Sodium 07/24/2023 141  134 - 144 mmol/L Final    Potassium 07/24/2023 3.9  3.5 - 5.2 mmol/L Final    Chloride 07/24/2023 104  96 - 106 mmol/L Final    Total CO2 07/24/2023 22  20 - 29 mmol/L Final    Calcium 07/24/2023 9.7  8.7 - 10.2 mg/dL Final    Total Protein 07/24/2023 6.8  6.0 - 8.5 g/dL Final    Albumin 07/24/2023 4.2  3.8 - 4.9 g/dL Final                  **Please note reference interval change**    Globulin 07/24/2023 2.6  1.5 - 4.5 g/dL Final    A/G Ratio 07/24/2023 1.6  1.2 - 2.2 Final    Total Bilirubin 07/24/2023 <0.2  0.0 - 1.2 mg/dL Final    Alkaline Phosphatase 07/24/2023 86  44 - 121 IU/L Final    AST (SGOT) 07/24/2023 12  0 - 40 IU/L Final    ALT (SGPT) 07/24/2023 15  0 - 32 IU/L Final    Total Cholesterol 07/24/2023 275 (H)  100 - 199 mg/dL Final    Triglycerides 07/24/2023 535 (H)  0 - 149 mg/dL Final    HDL Cholesterol 07/24/2023 44  >39 mg/dL Final    VLDL Cholesterol Keyur 07/24/2023 98 (H)  5 - 40 mg/dL Final    LDL Chol Calc  ([facility]) 07/24/2023 133 (H)  0 - 99 mg/dL Final    TSH 07/24/2023 1.610  0.450 - 4.500 uIU/mL Final    25 Hydroxy, Vitamin D 07/24/2023 19.7 (L)  30.0 - 100.0 ng/mL Final    Comment: Vitamin D deficiency has been defined by the [place] of  Medicine and an Endocrine Society practice guideline as a  level of serum 25-OH vitamin D less than 20 ng/mL (1,2).  The Endocrine Society went on to further define vitamin D  insufficiency as a level between 21 and 29 ng/mL (2).  1. IOM ([place] of [employer]). 2010. Dietary reference     intakes for calcium and D. [place]: The     National Academies Press.  2. Joe MF, Manuel CERNA, Ashli CONDE, et al.     Evaluation, treatment, and prevention of vitamin D     deficiency: an Endocrine Society clinical practice     guideline. JCEM. 2011 Jul; 96(7):1911-30.      Unable to Void 07/24/2023 Comment   Final    Comment: The patient was not able to render a urine sample and has been  instructed to return for a urine collection at their earliest  convenience.  The urine testing that you have requested has  been deleted from this report.  When the patient returns and  provides a urine specimen, the urine testing will be performed  and separately reported.      Interpretation 07/24/2023 Note   Final    Supplemental report is available.     Assessment & Plan   Diagnoses and all orders for this visit:    1. Type 2 diabetes mellitus with microalbuminuria, with long-term current use of insulin (Primary)    2. Hyperlipidemia, unspecified hyperlipidemia type    3. Primary hypertension    4. Peptic ulcer disease    5. Vitamin D deficiency    6. Current tobacco use    Other orders  -     Insulin Degludec (Tresiba FlexTouch) 200 UNIT/ML solution pen-injector pen injection; 36 units every evening.  Prime needle with 4 units before each use.  Dispense: 18 mL; Refill: 2  -     Insulin Lispro, 1 Unit Dial, (HumaLOG KwikPen) 100 UNIT/ML solution pen-injector; 12 units 3 times daily with  each meal.  Prime needle with 2 units before each use.  Dispense: 38 mL; Refill: 2      Change Tresiba to 36 units every evening starting tomorrow.  Increase Humalog to 12 units with each meal.    Continue no concentrated sweet low-fat diet.  Check urine microalbumin.  Consider restarting lower dose of lisinopril and discontinue hydrochlorothiazide.  Continue Prilosec.  Continue ergocalciferol 50,000 units weekly.    Copy of my note sent to Dr. Monsivais.    RTC 4 mos with ANALY Vela NP and with me in 8 mos

## 2023-12-26 ENCOUNTER — HOSPITAL ENCOUNTER (INPATIENT)
Facility: HOSPITAL | Age: 53
LOS: 14 days | Discharge: HOME | End: 2024-01-09
Attending: INTERNAL MEDICINE | Admitting: INTERNAL MEDICINE
Payer: COMMERCIAL

## 2023-12-26 DIAGNOSIS — I25.700 CORONARY ARTERY DISEASE INVOLVING CORONARY BYPASS GRAFT OF NATIVE HEART WITH UNSTABLE ANGINA PECTORIS: ICD-10-CM

## 2023-12-26 DIAGNOSIS — E11.9 CONTROLLED TYPE 2 DIABETES MELLITUS WITHOUT COMPLICATION, WITH LONG-TERM CURRENT USE OF INSULIN: ICD-10-CM

## 2023-12-26 DIAGNOSIS — Z79.4 CONTROLLED TYPE 2 DIABETES MELLITUS WITHOUT COMPLICATION, WITH LONG-TERM CURRENT USE OF INSULIN: ICD-10-CM

## 2023-12-26 DIAGNOSIS — I24.9 ACS (ACUTE CORONARY SYNDROME): Primary | ICD-10-CM

## 2023-12-26 PROCEDURE — 93010 ELECTROCARDIOGRAM REPORT: CPT | Performed by: INTERNAL MEDICINE

## 2023-12-26 PROCEDURE — 99223 1ST HOSP IP/OBS HIGH 75: CPT | Performed by: INTERNAL MEDICINE

## 2023-12-26 PROCEDURE — 93005 ELECTROCARDIOGRAM TRACING: CPT | Performed by: INTERNAL MEDICINE

## 2023-12-26 RX ORDER — DEXTROSE MONOHYDRATE 25 G/50ML
25 INJECTION, SOLUTION INTRAVENOUS
Status: DISCONTINUED | OUTPATIENT
Start: 2023-12-26 | End: 2024-01-09 | Stop reason: HOSPADM

## 2023-12-26 RX ORDER — METOPROLOL SUCCINATE 25 MG/1
12.5 TABLET, EXTENDED RELEASE ORAL
Status: DISCONTINUED | OUTPATIENT
Start: 2023-12-27 | End: 2023-12-31

## 2023-12-26 RX ORDER — HEPARIN SODIUM 5000 [USP'U]/ML
30-55.7 INJECTION, SOLUTION INTRAVENOUS; SUBCUTANEOUS EVERY 6 HOURS PRN
Status: DISCONTINUED | OUTPATIENT
Start: 2023-12-26 | End: 2023-12-28

## 2023-12-26 RX ORDER — CETIRIZINE HYDROCHLORIDE 10 MG/1
10 TABLET ORAL DAILY
Status: DISCONTINUED | OUTPATIENT
Start: 2023-12-27 | End: 2024-01-09 | Stop reason: HOSPADM

## 2023-12-26 RX ORDER — BUSPIRONE HYDROCHLORIDE 10 MG/1
10 TABLET ORAL DAILY
Status: DISCONTINUED | OUTPATIENT
Start: 2023-12-27 | End: 2024-01-09 | Stop reason: HOSPADM

## 2023-12-26 RX ORDER — ASPIRIN 81 MG/1
81 TABLET ORAL DAILY
Status: DISCONTINUED | OUTPATIENT
Start: 2023-12-27 | End: 2024-01-09 | Stop reason: HOSPADM

## 2023-12-26 RX ORDER — SODIUM CHLORIDE 0.9 % (FLUSH) 0.9 %
10 SYRINGE (ML) INJECTION EVERY 12 HOURS SCHEDULED
Status: DISCONTINUED | OUTPATIENT
Start: 2023-12-27 | End: 2024-01-09 | Stop reason: HOSPADM

## 2023-12-26 RX ORDER — HEPARIN SODIUM 10000 [USP'U]/100ML
12 INJECTION, SOLUTION INTRAVENOUS
Status: DISCONTINUED | OUTPATIENT
Start: 2023-12-27 | End: 2023-12-28

## 2023-12-26 RX ORDER — SERTRALINE HYDROCHLORIDE 25 MG/1
25 TABLET, FILM COATED ORAL DAILY
Status: DISCONTINUED | OUTPATIENT
Start: 2023-12-27 | End: 2024-01-09 | Stop reason: HOSPADM

## 2023-12-26 RX ORDER — INSULIN LISPRO 100 [IU]/ML
15 INJECTION, SOLUTION INTRAVENOUS; SUBCUTANEOUS
Status: DISCONTINUED | OUTPATIENT
Start: 2023-12-27 | End: 2023-12-27

## 2023-12-26 RX ORDER — INSULIN LISPRO 100 [IU]/ML
3-14 INJECTION, SOLUTION INTRAVENOUS; SUBCUTANEOUS
Status: DISCONTINUED | OUTPATIENT
Start: 2023-12-27 | End: 2023-12-27

## 2023-12-26 RX ORDER — PANTOPRAZOLE SODIUM 40 MG/1
40 TABLET, DELAYED RELEASE ORAL
Status: DISCONTINUED | OUTPATIENT
Start: 2023-12-27 | End: 2024-01-09 | Stop reason: HOSPADM

## 2023-12-26 RX ORDER — DULOXETIN HYDROCHLORIDE 60 MG/1
60 CAPSULE, DELAYED RELEASE ORAL 2 TIMES DAILY
Status: DISCONTINUED | OUTPATIENT
Start: 2023-12-27 | End: 2024-01-09 | Stop reason: HOSPADM

## 2023-12-26 RX ORDER — NICOTINE POLACRILEX 4 MG
15 LOZENGE BUCCAL
Status: DISCONTINUED | OUTPATIENT
Start: 2023-12-26 | End: 2024-01-09 | Stop reason: HOSPADM

## 2023-12-26 RX ORDER — SODIUM CHLORIDE 9 MG/ML
40 INJECTION, SOLUTION INTRAVENOUS AS NEEDED
Status: DISCONTINUED | OUTPATIENT
Start: 2023-12-26 | End: 2023-12-28

## 2023-12-26 RX ORDER — IBUPROFEN 600 MG/1
1 TABLET ORAL
Status: DISCONTINUED | OUTPATIENT
Start: 2023-12-26 | End: 2024-01-09 | Stop reason: HOSPADM

## 2023-12-26 RX ORDER — PRAVASTATIN SODIUM 20 MG
20 TABLET ORAL NIGHTLY
Status: DISCONTINUED | OUTPATIENT
Start: 2023-12-27 | End: 2024-01-09 | Stop reason: HOSPADM

## 2023-12-26 RX ORDER — FUROSEMIDE 10 MG/ML
40 INJECTION INTRAMUSCULAR; INTRAVENOUS ONCE
Status: COMPLETED | OUTPATIENT
Start: 2023-12-27 | End: 2023-12-27

## 2023-12-26 RX ORDER — SODIUM CHLORIDE 0.9 % (FLUSH) 0.9 %
10 SYRINGE (ML) INJECTION AS NEEDED
Status: DISCONTINUED | OUTPATIENT
Start: 2023-12-26 | End: 2024-01-09 | Stop reason: HOSPADM

## 2023-12-26 NOTE — Clinical Note
First balloon inflation max pressure = 8 avery. Second inflation of balloon - Max pressure = 8 avery. The balloon is positioned in the Mid segment of the vessel. Third inflation of balloon - Max pressure = 8 avery. The balloon is positioned in the Proximal segment of the vessel.

## 2023-12-26 NOTE — Clinical Note
Hemostasis started on the right femoral artery. Perclose was used in achieving hemostasis. Closure device deployed in the vessel. Hemostasis achieved successfully. Closure device additional comment: Third Perclose device was deployed

## 2023-12-26 NOTE — Clinical Note
First balloon inflation max pressure = 12 avery. Second inflation of balloon - Max pressure = 10 avery.

## 2023-12-26 NOTE — Clinical Note
First balloon inflation max pressure = 20 avery. Second inflation of balloon - Max pressure = 16 avery.

## 2023-12-26 NOTE — Clinical Note
First balloon inflation max pressure = 8 avery. Second inflation of balloon - Max pressure = 8 avery. Third inflation of balloon - Max pressure = 8 avery.

## 2023-12-26 NOTE — Clinical Note
First balloon inflation max pressure = 16 avery. Second inflation of balloon - Max pressure = 20 avery.

## 2023-12-26 NOTE — Clinical Note
First balloon inflation max pressure = 8 avery. Second inflation of balloon - Max pressure = 10 avery.

## 2023-12-26 NOTE — Clinical Note
Hemostasis started on the right radial artery. R-Band was used in achieving hemostasis. Radial compression device applied to vessel. Hemostasis achieved successfully. Closure device additional comment: 13cc vasc band

## 2023-12-26 NOTE — Clinical Note
First balloon inflation max pressure = 8 avery. Second inflation of balloon - Max pressure = 8 avery.

## 2023-12-26 NOTE — Clinical Note
The left DP pulse is detected w/ doppler. The right DP pulse is non-palpable. The PT pulses are detected w/ doppler bilaterally.

## 2023-12-26 NOTE — Clinical Note
First balloon inflation max pressure = 6 avery. Second inflation of balloon - Max pressure = 12 avery. Third inflation of balloon - Max pressure = 20 avery.

## 2023-12-26 NOTE — Clinical Note
First balloon inflation max pressure = 12 avery. Second inflation of balloon - Max pressure = 16 avery.

## 2023-12-26 NOTE — Clinical Note
The left DP pulse is detected w/ doppler. The right DP pulse is non-palpable. The left PT pulse is detected w/ doppler. The right PT pulse is detected w/ doppler. The left femoral pulse is +2. The right femoral pulse is +1.

## 2023-12-26 NOTE — Clinical Note
First balloon inflation max pressure = 16 avery. Second inflation of balloon - Max pressure = 14 avery.

## 2023-12-27 ENCOUNTER — APPOINTMENT (OUTPATIENT)
Dept: GENERAL RADIOLOGY | Facility: HOSPITAL | Age: 53
End: 2023-12-27
Payer: COMMERCIAL

## 2023-12-27 ENCOUNTER — APPOINTMENT (OUTPATIENT)
Dept: CARDIOLOGY | Facility: HOSPITAL | Age: 53
End: 2023-12-27
Payer: COMMERCIAL

## 2023-12-27 LAB
ALBUMIN SERPL-MCNC: 3.8 G/DL (ref 3.5–5.2)
ALBUMIN/GLOB SERPL: 1.6 G/DL
ALP SERPL-CCNC: 85 U/L (ref 39–117)
ALT SERPL W P-5'-P-CCNC: 55 U/L (ref 1–33)
AMYLASE SERPL-CCNC: 22 U/L (ref 28–100)
ANION GAP SERPL CALCULATED.3IONS-SCNC: 11 MMOL/L (ref 5–15)
AORTIC DIMENSIONLESS INDEX: 0.6 (DI)
APTT PPP: 28.2 SECONDS (ref 22.7–35.4)
APTT PPP: 36.9 SECONDS (ref 22.7–35.4)
APTT PPP: 65.5 SECONDS (ref 22.7–35.4)
ASCENDING AORTA: 3.2 CM
AST SERPL-CCNC: 29 U/L (ref 1–32)
BASOPHILS # BLD AUTO: 0.05 10*3/MM3 (ref 0–0.2)
BASOPHILS # BLD AUTO: 0.05 10*3/MM3 (ref 0–0.2)
BASOPHILS NFR BLD AUTO: 0.5 % (ref 0–1.5)
BASOPHILS NFR BLD AUTO: 0.5 % (ref 0–1.5)
BH CV ECHO MEAS - ACS: 1.54 CM
BH CV ECHO MEAS - AO MAX PG: 3.3 MMHG
BH CV ECHO MEAS - AO MEAN PG: 1.6 MMHG
BH CV ECHO MEAS - AO ROOT DIAM: 2.7 CM
BH CV ECHO MEAS - AO V2 MAX: 90.9 CM/SEC
BH CV ECHO MEAS - AO V2 VTI: 14.8 CM
BH CV ECHO MEAS - AVA(I,D): 1.54 CM2
BH CV ECHO MEAS - EDV(CUBED): 157.1 ML
BH CV ECHO MEAS - EDV(MOD-SP2): 219 ML
BH CV ECHO MEAS - EDV(MOD-SP4): 194 ML
BH CV ECHO MEAS - EF(MOD-BP): 17.2 %
BH CV ECHO MEAS - EF(MOD-SP2): 18.7 %
BH CV ECHO MEAS - EF(MOD-SP4): 18 %
BH CV ECHO MEAS - ESV(CUBED): 110.8 ML
BH CV ECHO MEAS - ESV(MOD-SP2): 178 ML
BH CV ECHO MEAS - ESV(MOD-SP4): 159 ML
BH CV ECHO MEAS - FS: 11 %
BH CV ECHO MEAS - IVS/LVPW: 1.29 CM
BH CV ECHO MEAS - IVSD: 1.08 CM
BH CV ECHO MEAS - LAT PEAK E' VEL: 8.5 CM/SEC
BH CV ECHO MEAS - LV DIASTOLIC VOL/BSA (35-75): 109.6 CM2
BH CV ECHO MEAS - LV MASS(C)D: 195.4 GRAMS
BH CV ECHO MEAS - LV MAX PG: 0.95 MMHG
BH CV ECHO MEAS - LV MEAN PG: 0.51 MMHG
BH CV ECHO MEAS - LV SYSTOLIC VOL/BSA (12-30): 89.9 CM2
BH CV ECHO MEAS - LV V1 MAX: 48.7 CM/SEC
BH CV ECHO MEAS - LV V1 VTI: 6.9 CM
BH CV ECHO MEAS - LVIDD: 5.4 CM
BH CV ECHO MEAS - LVIDS: 4.8 CM
BH CV ECHO MEAS - LVOT AREA: 3.3 CM2
BH CV ECHO MEAS - LVOT DIAM: 2.05 CM
BH CV ECHO MEAS - LVPWD: 0.84 CM
BH CV ECHO MEAS - MED PEAK E' VEL: 7.5 CM/SEC
BH CV ECHO MEAS - MR MAX PG: 74.6 MMHG
BH CV ECHO MEAS - MR MAX VEL: 431.9 CM/SEC
BH CV ECHO MEAS - MV DEC SLOPE: 532.1 CM/SEC2
BH CV ECHO MEAS - MV DEC TIME: 0.18 SEC
BH CV ECHO MEAS - MV E MAX VEL: 81.5 CM/SEC
BH CV ECHO MEAS - MV MAX PG: 2.6 MMHG
BH CV ECHO MEAS - MV MEAN PG: 1.36 MMHG
BH CV ECHO MEAS - MV P1/2T: 45.7 MSEC
BH CV ECHO MEAS - MV V2 VTI: 12 CM
BH CV ECHO MEAS - MVA(P1/2T): 4.8 CM2
BH CV ECHO MEAS - MVA(VTI): 1.9 CM2
BH CV ECHO MEAS - PA ACC TIME: 0.1 SEC
BH CV ECHO MEAS - PA V2 MAX: 106.9 CM/SEC
BH CV ECHO MEAS - PI END-D VEL: 127.8 CM/SEC
BH CV ECHO MEAS - PULM A REVS DUR: 0.11 SEC
BH CV ECHO MEAS - PULM A REVS VEL: 29.9 CM/SEC
BH CV ECHO MEAS - PULM DIAS VEL: 34.6 CM/SEC
BH CV ECHO MEAS - PULM S/D: 1.38
BH CV ECHO MEAS - PULM SYS VEL: 47.7 CM/SEC
BH CV ECHO MEAS - QP/QS: 4.7
BH CV ECHO MEAS - RAP SYSTOLE: 8 MMHG
BH CV ECHO MEAS - RV MAX PG: 2.38 MMHG
BH CV ECHO MEAS - RV V1 MAX: 77.1 CM/SEC
BH CV ECHO MEAS - RV V1 VTI: 15.4 CM
BH CV ECHO MEAS - RVOT DIAM: 3 CM
BH CV ECHO MEAS - RVSP: 41 MMHG
BH CV ECHO MEAS - SI(MOD-SP2): 23.2 ML/M2
BH CV ECHO MEAS - SI(MOD-SP4): 19.8 ML/M2
BH CV ECHO MEAS - SUP REN AO DIAM: 1.9 CM
BH CV ECHO MEAS - SV(LVOT): 22.8 ML
BH CV ECHO MEAS - SV(MOD-SP2): 41 ML
BH CV ECHO MEAS - SV(MOD-SP4): 35 ML
BH CV ECHO MEAS - SV(RVOT): 107 ML
BH CV ECHO MEAS - TAPSE (>1.6): 1.7 CM
BH CV ECHO MEAS - TR MAX PG: 33.4 MMHG
BH CV ECHO MEAS - TR MAX VEL: 289 CM/SEC
BH CV ECHO MEASUREMENTS AVERAGE E/E' RATIO: 10.19
BH CV XLRA - RV BASE: 3.3 CM
BH CV XLRA - RV LENGTH: 7.8 CM
BH CV XLRA - RV MID: 2.33 CM
BH CV XLRA - TDI S': 8.6 CM/SEC
BILIRUB SERPL-MCNC: 0.4 MG/DL (ref 0–1.2)
BUN SERPL-MCNC: 16 MG/DL (ref 6–20)
BUN/CREAT SERPL: 23.2 (ref 7–25)
CALCIUM SPEC-SCNC: 9.3 MG/DL (ref 8.6–10.5)
CHLORIDE SERPL-SCNC: 101 MMOL/L (ref 98–107)
CHOLEST SERPL-MCNC: 199 MG/DL (ref 0–200)
CO2 SERPL-SCNC: 26 MMOL/L (ref 22–29)
CREAT SERPL-MCNC: 0.69 MG/DL (ref 0.57–1)
DEPRECATED RDW RBC AUTO: 41.8 FL (ref 37–54)
DEPRECATED RDW RBC AUTO: 42.5 FL (ref 37–54)
EGFRCR SERPLBLD CKD-EPI 2021: 102.6 ML/MIN/1.73
EOSINOPHIL # BLD AUTO: 0.08 10*3/MM3 (ref 0–0.4)
EOSINOPHIL # BLD AUTO: 0.11 10*3/MM3 (ref 0–0.4)
EOSINOPHIL NFR BLD AUTO: 0.8 % (ref 0.3–6.2)
EOSINOPHIL NFR BLD AUTO: 1.1 % (ref 0.3–6.2)
ERYTHROCYTE [DISTWIDTH] IN BLOOD BY AUTOMATED COUNT: 11.8 % (ref 12.3–15.4)
ERYTHROCYTE [DISTWIDTH] IN BLOOD BY AUTOMATED COUNT: 11.8 % (ref 12.3–15.4)
GEN 5 2HR TROPONIN T REFLEX: 229 NG/L
GLOBULIN UR ELPH-MCNC: 2.4 GM/DL
GLUCOSE BLDC GLUCOMTR-MCNC: 289 MG/DL (ref 70–130)
GLUCOSE BLDC GLUCOMTR-MCNC: 317 MG/DL (ref 70–130)
GLUCOSE BLDC GLUCOMTR-MCNC: 351 MG/DL (ref 70–130)
GLUCOSE BLDC GLUCOMTR-MCNC: 361 MG/DL (ref 70–130)
GLUCOSE BLDC GLUCOMTR-MCNC: 85 MG/DL (ref 70–130)
GLUCOSE SERPL-MCNC: 334 MG/DL (ref 65–99)
HBA1C MFR BLD: 8.1 % (ref 4.8–5.6)
HCT VFR BLD AUTO: 38.4 % (ref 34–46.6)
HCT VFR BLD AUTO: 38.9 % (ref 34–46.6)
HDLC SERPL-MCNC: 31 MG/DL (ref 40–60)
HGB BLD-MCNC: 12.8 G/DL (ref 12–15.9)
HGB BLD-MCNC: 12.9 G/DL (ref 12–15.9)
IMM GRANULOCYTES # BLD AUTO: 0.02 10*3/MM3 (ref 0–0.05)
IMM GRANULOCYTES # BLD AUTO: 0.03 10*3/MM3 (ref 0–0.05)
IMM GRANULOCYTES NFR BLD AUTO: 0.2 % (ref 0–0.5)
IMM GRANULOCYTES NFR BLD AUTO: 0.3 % (ref 0–0.5)
INR PPP: 1.17 (ref 0.9–1.1)
LDLC SERPL CALC-MCNC: 131 MG/DL (ref 0–100)
LDLC/HDLC SERPL: 4.08 {RATIO}
LEFT ATRIUM VOLUME INDEX: 36.2 ML/M2
LIPASE SERPL-CCNC: 21 U/L (ref 13–60)
LV EF 2D ECHO EST: 20 %
LYMPHOCYTES # BLD AUTO: 2.74 10*3/MM3 (ref 0.7–3.1)
LYMPHOCYTES # BLD AUTO: 3.73 10*3/MM3 (ref 0.7–3.1)
LYMPHOCYTES NFR BLD AUTO: 28.4 % (ref 19.6–45.3)
LYMPHOCYTES NFR BLD AUTO: 37.1 % (ref 19.6–45.3)
MCH RBC QN AUTO: 32.3 PG (ref 26.6–33)
MCH RBC QN AUTO: 32.4 PG (ref 26.6–33)
MCHC RBC AUTO-ENTMCNC: 33.2 G/DL (ref 31.5–35.7)
MCHC RBC AUTO-ENTMCNC: 33.3 G/DL (ref 31.5–35.7)
MCV RBC AUTO: 97.2 FL (ref 79–97)
MCV RBC AUTO: 97.3 FL (ref 79–97)
MONOCYTES # BLD AUTO: 0.55 10*3/MM3 (ref 0.1–0.9)
MONOCYTES # BLD AUTO: 0.59 10*3/MM3 (ref 0.1–0.9)
MONOCYTES NFR BLD AUTO: 5.7 % (ref 5–12)
MONOCYTES NFR BLD AUTO: 5.9 % (ref 5–12)
NEUTROPHILS NFR BLD AUTO: 5.58 10*3/MM3 (ref 1.7–7)
NEUTROPHILS NFR BLD AUTO: 55.4 % (ref 42.7–76)
NEUTROPHILS NFR BLD AUTO: 6.17 10*3/MM3 (ref 1.7–7)
NEUTROPHILS NFR BLD AUTO: 64.1 % (ref 42.7–76)
NRBC BLD AUTO-RTO: 0 /100 WBC (ref 0–0.2)
NRBC BLD AUTO-RTO: 0 /100 WBC (ref 0–0.2)
PLATELET # BLD AUTO: 261 10*3/MM3 (ref 140–450)
PLATELET # BLD AUTO: 284 10*3/MM3 (ref 140–450)
PMV BLD AUTO: 9.7 FL (ref 6–12)
PMV BLD AUTO: 9.8 FL (ref 6–12)
POTASSIUM SERPL-SCNC: 3.3 MMOL/L (ref 3.5–5.2)
PROT SERPL-MCNC: 6.2 G/DL (ref 6–8.5)
PROTHROMBIN TIME: 15 SECONDS (ref 11.7–14.2)
QT INTERVAL: 338 MS
QT INTERVAL: 375 MS
QTC INTERVAL: 460 MS
QTC INTERVAL: 498 MS
RBC # BLD AUTO: 3.95 10*6/MM3 (ref 3.77–5.28)
RBC # BLD AUTO: 4 10*6/MM3 (ref 3.77–5.28)
SINUS: 2.5 CM
SODIUM SERPL-SCNC: 138 MMOL/L (ref 136–145)
STJ: 2.06 CM
TRIGL SERPL-MCNC: 207 MG/DL (ref 0–150)
TROPONIN T DELTA: 21 NG/L
TROPONIN T SERPL HS-MCNC: 208 NG/L
TSH SERPL DL<=0.05 MIU/L-ACNC: 1.16 UIU/ML (ref 0.27–4.2)
VLDLC SERPL-MCNC: 37 MG/DL (ref 5–40)
WBC NRBC COR # BLD AUTO: 10.06 10*3/MM3 (ref 3.4–10.8)
WBC NRBC COR # BLD AUTO: 9.64 10*3/MM3 (ref 3.4–10.8)

## 2023-12-27 PROCEDURE — 84484 ASSAY OF TROPONIN QUANT: CPT | Performed by: INTERNAL MEDICINE

## 2023-12-27 PROCEDURE — 82948 REAGENT STRIP/BLOOD GLUCOSE: CPT

## 2023-12-27 PROCEDURE — 83690 ASSAY OF LIPASE: CPT | Performed by: INTERNAL MEDICINE

## 2023-12-27 PROCEDURE — 93005 ELECTROCARDIOGRAM TRACING: CPT | Performed by: INTERNAL MEDICINE

## 2023-12-27 PROCEDURE — 85025 COMPLETE CBC W/AUTO DIFF WBC: CPT | Performed by: INTERNAL MEDICINE

## 2023-12-27 PROCEDURE — 85730 THROMBOPLASTIN TIME PARTIAL: CPT | Performed by: INTERNAL MEDICINE

## 2023-12-27 PROCEDURE — 63710000001 INSULIN GLARGINE PER 5 UNITS: Performed by: HOSPITALIST

## 2023-12-27 PROCEDURE — 71046 X-RAY EXAM CHEST 2 VIEWS: CPT

## 2023-12-27 PROCEDURE — 25510000001 PERFLUTREN (DEFINITY) 8.476 MG IN SODIUM CHLORIDE (PF) 0.9 % 10 ML INJECTION: Performed by: INTERNAL MEDICINE

## 2023-12-27 PROCEDURE — 93306 TTE W/DOPPLER COMPLETE: CPT | Performed by: INTERNAL MEDICINE

## 2023-12-27 PROCEDURE — 84443 ASSAY THYROID STIM HORMONE: CPT | Performed by: INTERNAL MEDICINE

## 2023-12-27 PROCEDURE — 25010000002 FUROSEMIDE PER 20 MG: Performed by: INTERNAL MEDICINE

## 2023-12-27 PROCEDURE — 63710000001 INSULIN LISPRO (HUMAN) PER 5 UNITS: Performed by: INTERNAL MEDICINE

## 2023-12-27 PROCEDURE — 83036 HEMOGLOBIN GLYCOSYLATED A1C: CPT | Performed by: INTERNAL MEDICINE

## 2023-12-27 PROCEDURE — 25010000002 HEPARIN (PORCINE) 25000-0.45 UT/250ML-% SOLUTION: Performed by: INTERNAL MEDICINE

## 2023-12-27 PROCEDURE — 80053 COMPREHEN METABOLIC PANEL: CPT | Performed by: INTERNAL MEDICINE

## 2023-12-27 PROCEDURE — 80061 LIPID PANEL: CPT | Performed by: INTERNAL MEDICINE

## 2023-12-27 PROCEDURE — 82150 ASSAY OF AMYLASE: CPT | Performed by: INTERNAL MEDICINE

## 2023-12-27 PROCEDURE — 85610 PROTHROMBIN TIME: CPT | Performed by: INTERNAL MEDICINE

## 2023-12-27 PROCEDURE — 25010000002 HEPARIN (PORCINE) PER 1000 UNITS: Performed by: INTERNAL MEDICINE

## 2023-12-27 PROCEDURE — 99233 SBSQ HOSP IP/OBS HIGH 50: CPT | Performed by: INTERNAL MEDICINE

## 2023-12-27 PROCEDURE — 63710000001 INSULIN LISPRO (HUMAN) PER 5 UNITS: Performed by: HOSPITALIST

## 2023-12-27 PROCEDURE — 93010 ELECTROCARDIOGRAM REPORT: CPT | Performed by: INTERNAL MEDICINE

## 2023-12-27 PROCEDURE — 93306 TTE W/DOPPLER COMPLETE: CPT

## 2023-12-27 RX ORDER — FUROSEMIDE 10 MG/ML
80 INJECTION INTRAMUSCULAR; INTRAVENOUS ONCE
Status: COMPLETED | OUTPATIENT
Start: 2023-12-27 | End: 2023-12-27

## 2023-12-27 RX ORDER — INSULIN LISPRO 100 [IU]/ML
2-9 INJECTION, SOLUTION INTRAVENOUS; SUBCUTANEOUS
Status: DISCONTINUED | OUTPATIENT
Start: 2023-12-27 | End: 2024-01-09 | Stop reason: HOSPADM

## 2023-12-27 RX ORDER — INSULIN LISPRO 100 [IU]/ML
10 INJECTION, SOLUTION INTRAVENOUS; SUBCUTANEOUS
Status: DISCONTINUED | OUTPATIENT
Start: 2023-12-27 | End: 2023-12-29

## 2023-12-27 RX ORDER — INSULIN GLARGINE 100 [IU]/ML
10 INJECTION, SOLUTION SUBCUTANEOUS ONCE
Status: DISCONTINUED | OUTPATIENT
Start: 2023-12-27 | End: 2023-12-27

## 2023-12-27 RX ORDER — POTASSIUM CHLORIDE 750 MG/1
40 TABLET, FILM COATED, EXTENDED RELEASE ORAL ONCE
Status: COMPLETED | OUTPATIENT
Start: 2023-12-27 | End: 2023-12-27

## 2023-12-27 RX ADMIN — PRAVASTATIN SODIUM 20 MG: 20 TABLET ORAL at 01:31

## 2023-12-27 RX ADMIN — INSULIN LISPRO 6 UNITS: 100 INJECTION, SOLUTION INTRAVENOUS; SUBCUTANEOUS at 21:33

## 2023-12-27 RX ADMIN — HEPARIN SODIUM 4000 UNITS: 5000 INJECTION INTRAVENOUS; SUBCUTANEOUS at 10:23

## 2023-12-27 RX ADMIN — PERFLUTREN 2 ML: 6.52 INJECTION, SUSPENSION INTRAVENOUS at 08:53

## 2023-12-27 RX ADMIN — Medication 10 ML: at 09:39

## 2023-12-27 RX ADMIN — FUROSEMIDE 40 MG: 10 INJECTION, SOLUTION INTRAMUSCULAR; INTRAVENOUS at 00:46

## 2023-12-27 RX ADMIN — INSULIN LISPRO 15 UNITS: 100 INJECTION, SOLUTION INTRAVENOUS; SUBCUTANEOUS at 12:19

## 2023-12-27 RX ADMIN — INSULIN GLARGINE 18 UNITS: 100 INJECTION, SOLUTION SUBCUTANEOUS at 21:34

## 2023-12-27 RX ADMIN — CETIRIZINE HYDROCHLORIDE 10 MG: 10 TABLET ORAL at 09:38

## 2023-12-27 RX ADMIN — ASPIRIN 81 MG: 81 TABLET, COATED ORAL at 09:38

## 2023-12-27 RX ADMIN — DULOXETINE HYDROCHLORIDE 60 MG: 60 CAPSULE, DELAYED RELEASE ORAL at 00:45

## 2023-12-27 RX ADMIN — METOPROLOL SUCCINATE 12.5 MG: 25 TABLET, EXTENDED RELEASE ORAL at 01:31

## 2023-12-27 RX ADMIN — POTASSIUM CHLORIDE 40 MEQ: 750 TABLET, EXTENDED RELEASE ORAL at 21:33

## 2023-12-27 RX ADMIN — DULOXETINE HYDROCHLORIDE 60 MG: 60 CAPSULE, DELAYED RELEASE ORAL at 09:38

## 2023-12-27 RX ADMIN — PRAVASTATIN SODIUM 20 MG: 20 TABLET ORAL at 21:33

## 2023-12-27 RX ADMIN — HEPARIN SODIUM 12 UNITS/KG/HR: 10000 INJECTION, SOLUTION INTRAVENOUS at 01:05

## 2023-12-27 RX ADMIN — Medication 10 ML: at 21:34

## 2023-12-27 RX ADMIN — INSULIN LISPRO 8 UNITS: 100 INJECTION, SOLUTION INTRAVENOUS; SUBCUTANEOUS at 09:38

## 2023-12-27 RX ADMIN — Medication 10 ML: at 01:20

## 2023-12-27 RX ADMIN — FUROSEMIDE 80 MG: 40 INJECTION, SOLUTION INTRAMUSCULAR; INTRAVENOUS at 09:38

## 2023-12-27 RX ADMIN — DULOXETINE HYDROCHLORIDE 60 MG: 60 CAPSULE, DELAYED RELEASE ORAL at 21:33

## 2023-12-27 RX ADMIN — SERTRALINE 25 MG: 25 TABLET, FILM COATED ORAL at 09:38

## 2023-12-27 NOTE — CONSULTS
Patient Name:  Jade Clement  YOB: 1970  Patient Care Team:  Heavenly Monsivais MD as PCP - General (Family Medicine)  Hesham Cheney MD as Consulting Physician (Endocrinology)    Date of Admission:  2023  Date of Consult:  2023    Inpatient Internal Medicine Consult  Consult performed by: Jarrett Julien MD  Consult ordered by: Billy Esquivel MD  Reason for consult: Evaluate status and make recommendations regarding treatment for diabetes.          Subjective   History of Present Illness  Ms. Clement is a 53 y.o. female that has been admitted to [facility] due to cardiac issues.  She has been admitted by Billy Esquivel MD and we were asked to see and assist with her medical problems, specifically relating to her diabetes. She came in because of what she thought was a GI bug.  She has had nausea, vomiting and even diarrhea the last few days.  Decreased appetite.  On arrival was found to have elevated troponin and abnormal EKG and has been admitted by cardiology for further evaluation of acute MI.  We were asked to see and assist with her diabetes.  She is on insulin at home but states since she has not been eating very much she has not been taking her insulin as prescribed.  Generally she reports compliance and denies any complications related to her diabetes.  She follows Dr. cheney with endocrinology.  She reports her last A1c was 6.8% just a few months ago.      Past Medical History:   Diagnosis Date    Adrenal adenoma     Anxiety     Diabetes mellitus     Diarrhea     GERD (gastroesophageal reflux disease)     Gestational diabetes     Head ache     Hyperlipidemia     Hypertension     Kidney stone     Nausea     Type 2 diabetes mellitus     Urinary tract infection     Frequently    Varicella Unknown    Vitamin D deficiency      Past Surgical History:   Procedure Laterality Date     SECTION       SECTION WITH TUBAL  2001     CHOLECYSTECTOMY      COLONOSCOPY  05/10/2019    Grzegorz ABEL    CYSTOSCOPY      ENDOSCOPY  2020    ENDOSCOPY N/A 2021    Procedure: ESOPHAGOGASTRODUODENOSCOPY WITH COLD BIOPSIES;  Surgeon: Jasson Nguyen MD;  Location: [facility] ENDOSCOPY;  Service: Gastroenterology;  Laterality: N/A;  PRE- ABD PAIN  POST- NORMAL    LAPAROSCOPIC CHOLECYSTECTOMY  2016    TUBAL ABDOMINAL LIGATION  2001    UPPER GASTROINTESTINAL ENDOSCOPY  2020    Grzegorz ABEL gastritis, ulcers    UPPER GASTROINTESTINAL ENDOSCOPY  2020    Andrew ABEL     Family History   Problem Relation Age of Onset    Thyroid disease Mother         goiter    Hyperlipidemia Mother     ALS Mother     Heart disease Father     Stroke Father     Hypertension Father     Drug abuse Brother     Diabetes Paternal Grandfather     Diabetes Paternal Aunt     Diabetes Paternal Uncle     Malig Hyperthermia Neg Hx      Social History     Tobacco Use    Smoking status: Former     Packs/day: 0.50     Years: 25.00     Additional pack years: 0.00     Total pack years: 12.50     Types: Cigarettes     Start date: 1/10/1985     Quit date: 2023     Years since quittin.0    Smokeless tobacco: Never   Vaping Use    Vaping Use: Never used   Substance Use Topics    Alcohol use: Yes     Comment: Only drink about 2 times a year    Drug use: Yes     Types: Marijuana     Comment: daily     Facility-Administered Medications Prior to Admission   Medication Dose Route Frequency Provider Last Rate Last Admin    insulin lispro (humaLOG) injection 15 Units  15 Units Subcutaneous TID With Meals Mario, Jolene, APRN         Medications Prior to Admission   Medication Sig Dispense Refill Last Dose    cetirizine (ZyrTEC) 10 MG tablet Take 1 tablet by mouth Daily.   Past Week    Cymbalta 60 MG capsule Take 1 capsule by mouth 2 (Two) Times a Day.   Past Week    ergocalciferol (ERGOCALCIFEROL) 1.25 MG (85874 UT) capsule Take 1 capsule by mouth 1 (One) Time Per Week. 12  capsule 1 Past Week    hydrochlorothiazide (HYDRODIURIL) 25 MG tablet Take 1 tablet by mouth Daily.  4 Past Week    Insulin Degludec (Tresiba FlexTouch) 200 UNIT/ML solution pen-injector pen injection 36 units every evening.  Prime needle with 4 units before each use. 18 mL 2 Past Week    Insulin Lispro, 1 Unit Dial, (HumaLOG KwikPen) 100 UNIT/ML solution pen-injector 12 units 3 times daily with each meal.  Prime needle with 2 units before each use. (Patient taking differently: 12 units 3 times daily with each meal.  Prime needle with 2 units before each use.    Patient currently not takign this, only does sliding scale) 38 mL 2 Patient Taking Differently    omeprazole (priLOSEC) 20 MG capsule Take 1 capsule by mouth Daily.   Past Week    ondansetron ODT (ZOFRAN-ODT) 4 MG disintegrating tablet    12/25/2023    sertraline (ZOLOFT) 50 MG tablet Take 0.5 tablets by mouth Daily.   Past Week    aspirin-acetaminophen-caffeine (EXCEDRIN MIGRAINE) 250-250-65 MG per tablet Take 1 tablet by mouth Every 8 (Eight) Hours As Needed.   12/23/2023    busPIRone (BUSPAR) 10 MG tablet Take 1 tablet by mouth Daily.   More than a month    Continuous Blood Gluc Sensor (FreeStyle Balwinder 3 Sensor) misc 1 each Every 14 (Fourteen) Days. 6 each 1     Insulin Pen Needle (BD PEN NEEDLE PAUL U/F) 32G X 4 MM misc Use to inject insulin 2 times daily 300 each 1      Allergies:  Metformin, Ozempic [semaglutide], Bydureon [exenatide], Statins, and Xigduo xr [dapagliflozin pro-metformin er]    Review of Systems   Constitutional: Negative.    HENT: Negative.     Respiratory: Negative.     Cardiovascular: Negative.    Gastrointestinal: Negative.    Endocrine: Negative.    Genitourinary: Negative.    Musculoskeletal: Negative.    Skin: Negative.    Neurological: Negative.    Hematological: Negative.    Psychiatric/Behavioral: Negative.         Objective      Vital Signs  Temp:  [97.5 °F (36.4 °C)-98.2 °F (36.8 °C)] 98 °F (36.7 °C)  Heart Rate:  [101-117]  107  Resp:  [16-20] 16  BP: ()/(63-75) 96/74  Body mass index is 27.12 kg/m².    Physical Exam  Vitals and nursing note reviewed.   Eyes:      General: No scleral icterus.  Cardiovascular:      Rate and Rhythm: Normal rate and regular rhythm.   Pulmonary:      Effort: Pulmonary effort is normal.      Breath sounds: Normal breath sounds.   Abdominal:      General: Bowel sounds are normal.      Palpations: Abdomen is soft.      Tenderness: There is no abdominal tenderness.   Skin:     General: Skin is warm and dry.      Findings: No rash.   Neurological:      Mental Status: She is alert. Mental status is at baseline.         Results Review:   I reviewed the patient's new clinical results.  I reviewed the patient's new imaging results and agree with the interpretation.  I reviewed the patient's other test results and agree with the interpretation  I personally viewed and interpreted the patient's EKG/Telemetry data  Hemoglobin A1C   Date/Time Value Ref Range Status   12/27/2023 0052 8.10 (H) 4.80 - 5.60 % Final     Glucose   Date/Time Value Ref Range Status   12/27/2023 1059 361 (H) 70 - 130 mg/dL Final   12/27/2023 0923 317 (H) 70 - 130 mg/dL Final   12/27/2023 0542 351 (H) 70 - 130 mg/dL Final            Active Hospital Problems    Diagnosis  POA    **ACS (acute coronary syndrome) [I24.9]  Yes    Type 2 diabetes mellitus with complications [E11.8]  Yes    Type 2 diabetes mellitus with microalbuminuria, with long-term current use of insulin [E11.29, R80.9, Z79.4]  Not Applicable         She has been restarted on her home diabetic regimen.  Given there is no long-acting insulin ordered till this evening we will change her home basal unit from 36 units in the evening to 18 mg twice daily and give 10 units now.  She will be n.p.o. in the morning.  Blood sugars up now likely due to stress of illness along with poor recent compliance with her insulin.  Will provide correctional factor insulin.  Will make adjustments  to this regimen as needed.  Note plans for heart catheterization tomorrow.      Thank you very much for asking A to be involved in this patient's care. We will follow along with you.      Jarrett Julien MD  [facility]  12/27/23  13:35 EST    ADDENDUM  Subsequent blood sugar came back at 85.  Will hold on the one-time dose Lantus this afternoon.  Decrease mealtime and correctional factor insulin.    Electronically signed by Jarrett Julien MD, 12/27/23, 3:39 PM EST.

## 2023-12-27 NOTE — CASE MANAGEMENT/SOCIAL WORK
Discharge Planning Assessment  [facility]     Patient Name: aJde Clement  MRN: [id number]  Today's Date: 12/27/2023    Admit Date: 12/26/2023    Plan: Home   Discharge Needs Assessment       Row Name 12/27/23 1522       Living Environment    People in Home alone    Current Living Arrangements home    Potentially Unsafe Housing Conditions none    Primary Care Provided by self    Provides Primary Care For no one    Family Caregiver if Needed child(joanie), adult    Quality of Family Relationships helpful;involved;supportive    Able to Return to Prior Arrangements yes       Resource/Environmental Concerns    Resource/Environmental Concerns none    Transportation Concerns none       Transition Planning    Patient/Family Anticipates Transition to home    Patient/Family Anticipated Services at Transition none    Transportation Anticipated family or friend will provide       Discharge Needs Assessment    Readmission Within the Last 30 Days no previous admission in last 30 days    Equipment Currently Used at Home glucometer    Concerns to be Addressed denies needs/concerns at this time;no discharge needs identified    Anticipated Changes Related to Illness none    Equipment Needed After Discharge none    Provided Post Acute Provider List? N/A    Provided Post Acute Provider Quality & Resource List? N/A                   Discharge Plan       Row Name 12/27/23 1522       Plan    Plan Home    Plan Comments CCP met with the patient at bedside. Introduced self and explained role of CCP. Patient confirmed the information on her face sheet. Patients’ pharmacy is [facility]. Patients PCP is Heavenly Monsivais. Patient lives home alone and is independent. Patient does not use any DME. Patient has no history of SNF or HH. Patient plans home at discharge. States daughter can transport her. CCP to follow.                  Continued Care and Services - Admitted Since 12/26/2023    Coordination has not been started for this  encounter.       Expected Discharge Date and Time       Expected Discharge Date Expected Discharge Time    Dec 30, 2023            Demographic Summary       Row Name 12/27/23 1522       General Information    Admission Type inpatient    Arrived From emergency department    Referral Source admission list    Reason for Consult discharge planning    Preferred Language English                   Functional Status       Row Name 12/27/23 1522       Functional Status    Usual Activity Tolerance good    Current Activity Tolerance good       Functional Status, IADL    Medications independent    Meal Preparation independent    Housekeeping independent    Laundry independent    Shopping independent       Mental Status    General Appearance WDL WDL       Mental Status Summary    Recent Changes in Mental Status/Cognitive Functioning no changes       Employment/    Employment Status unemployed                   Psychosocial    No documentation.                  Abuse/Neglect    No documentation.                  Legal    No documentation.                  Substance Abuse    No documentation.                  Patient Forms    No documentation.

## 2023-12-27 NOTE — H&P
"Date of Admission: 23    Encounter Provider: Billy Esquivel MD    Group of Service: [facility]     Patient Name: Jade Clement    :1970    Chief complaint: Shortness of breath.    History of Present Illness:      This is a very pleasant 53 year-old female with a history of insulin dependent diabetes, hypertension, GERD, and tobacco use (quit last week).  The patient has had diabetes since , and her father had a three-vessel CABG in his early 60s.  She has not had known prior cardiac history in the past.    The patient states that on 2023, she ate a bag of hot potato chips.  Shortly afterwards, she began to feel epigastric discomfort and indigestion type sensation, which she attributed to the potato chips.  She became short of breath over the course of several hours, and eventually developed significant diarrhea and vomiting for the next 2 days.  After the gastrointestinal symptoms subsided, she continued to have fairly severe dyspnea on exertion.  She also was having orthopnea, and often had to lean forward from a recumbent position to catch her breath.  She was diagnosed as having a potential peptic ulcer at her PCP.  However, her symptoms persisted over the weekend and through Rian.  Today, she finally went to the emergency department because she states she could no longer walk several feet without having to stop and rest.  She also states that she had gained approximately 10 pounds over the last week for \"unclear reasons\".  She did continue to have intermittent epigastric pain and fullness as well, although not as severe as the initial episode.  She has not actually had upper chest pain.    She presented to the emergency department at [facility] in [place], [place].  Her initial EKG showed sinus tachycardia and a potential old anteroseptal infarct with Q waves noted in leads V1 through V3.  Her initial troponin was noted to be elevated in the " setting of normal renal function at 748.  This later peaked at 843.  She was found to be in congestive heart failure, and her proBNP was 11,256.  She did have a CT of the chest without contrast which showed cardiomegaly, pulmonary edema, moderate right pleural effusion, and small left pleural effusion.  She also had a right lower lobe groundglass opacity and smaller left apical opacity, and follow-up CT scan was recommended in 3 months.  Her pulmonary artery was also dilated, suggestive of pulmonary hypertension.  Her CT scan of the abdomen and pelvis showed nonspecific edema and a right adrenal adenoma.    She was treated with aspirin, Lasix 40 mg IV, and a cardiac heparin protocol drip.  She was then transferred to [facility] for further care.  She is still mildly tachycardic upon arrival, and still has evidence of congestive heart failure on exam.  She does feel much better since the Lasix earlier today.  She is not having any chest or epigastric pain currently.  She did tell me she quit smoking on 2023 when all of her symptoms started.  However, she did smoke about 1/2 pack of cigarettes per day for over 25 years.    Past Medical History:   Diagnosis Date    Adrenal adenoma     Anxiety     Diabetes mellitus     Diarrhea     GERD (gastroesophageal reflux disease)     Gestational diabetes     Head ache     Hyperlipidemia     Hypertension     Kidney stone     Nausea     Type 2 diabetes mellitus     Urinary tract infection 1986    Frequently    Varicella Unknown    Vitamin D deficiency          Past Surgical History:   Procedure Laterality Date     SECTION       SECTION WITH TUBAL  2001    CHOLECYSTECTOMY      COLONOSCOPY  05/10/2019    Grzegorz ABEL    CYSTOSCOPY      ENDOSCOPY  2020    ENDOSCOPY N/A 2021    Procedure: ESOPHAGOGASTRODUODENOSCOPY WITH COLD BIOPSIES;  Surgeon: Jasson Nguyen MD;  Location: [facility] ENDOSCOPY;  Service: Gastroenterology;   Laterality: N/A;  PRE- ABD PAIN  POST- NORMAL    LAPAROSCOPIC CHOLECYSTECTOMY  2016    TUBAL ABDOMINAL LIGATION  01/29/2001    UPPER GASTROINTESTINAL ENDOSCOPY  06/25/2020    Grzegorz ABEL gastritis, ulcers    UPPER GASTROINTESTINAL ENDOSCOPY  12/04/2020    Andrew ABEL         Allergies   Allergen Reactions    Metformin Diarrhea    Ozempic [Semaglutide] GI Intolerance     Pt stopped due to stomach pain    Bydureon [Exenatide] Other (See Comments)     Site reaction    Statins Myalgia     Muscle aches    Xigduo Xr [Dapagliflozin Pro-Metformin Er] Diarrhea         No current facility-administered medications on file prior to encounter.     Current Outpatient Medications on File Prior to Encounter   Medication Sig Dispense Refill    cetirizine (ZyrTEC) 10 MG tablet Take 1 tablet by mouth Daily.      Cymbalta 60 MG capsule Take 1 capsule by mouth 2 (Two) Times a Day.      ergocalciferol (ERGOCALCIFEROL) 1.25 MG (80922 UT) capsule Take 1 capsule by mouth 1 (One) Time Per Week. 12 capsule 1    hydrochlorothiazide (HYDRODIURIL) 25 MG tablet Take 1 tablet by mouth Daily.  4    Insulin Degludec (Tresiba FlexTouch) 200 UNIT/ML solution pen-injector pen injection 36 units every evening.  Prime needle with 4 units before each use. 18 mL 2    Insulin Lispro, 1 Unit Dial, (HumaLOG KwikPen) 100 UNIT/ML solution pen-injector 12 units 3 times daily with each meal.  Prime needle with 2 units before each use. (Patient taking differently: 12 units 3 times daily with each meal.  Prime needle with 2 units before each use.    Patient currently not takign this, only does sliding scale) 38 mL 2    omeprazole (priLOSEC) 20 MG capsule Take 1 capsule by mouth Daily.      ondansetron ODT (ZOFRAN-ODT) 4 MG disintegrating tablet       sertraline (ZOLOFT) 50 MG tablet Take 0.5 tablets by mouth Daily.      aspirin-acetaminophen-caffeine (EXCEDRIN MIGRAINE) 250-250-65 MG per tablet Take 1 tablet by mouth Every 8 (Eight) Hours As Needed.      busPIRone  "(BUSPAR) 10 MG tablet Take 1 tablet by mouth Daily.      Continuous Blood Gluc Sensor (FreeStyle Balwinder 3 Sensor) misc 1 each Every 14 (Fourteen) Days. 6 each 1    Insulin Pen Needle (BD PEN NEEDLE PAUL U/F) 32G X 4 MM misc Use to inject insulin 2 times daily 300 each 1         Social History     Socioeconomic History    Marital status: Single   Tobacco Use    Smoking status: Former     Packs/day: 0.50     Years: 25.00     Additional pack years: 0.00     Total pack years: 12.50     Types: Cigarettes     Start date: 1/10/1985     Quit date: 2023     Years since quittin.0    Smokeless tobacco: Never   Vaping Use    Vaping Use: Never used   Substance and Sexual Activity    Alcohol use: Yes     Comment: Only drink about 2 times a year    Drug use: Yes     Types: Marijuana     Comment: daily    Sexual activity: Yes     Partners: Male     Birth control/protection: Surgical, Post-menopausal         Family History   Problem Relation Age of Onset    Thyroid disease Mother         goiter    Hyperlipidemia Mother     ALS Mother     Heart disease Father     Stroke Father     Hypertension Father     Drug abuse Brother     Diabetes Paternal Grandfather     Diabetes Paternal Aunt     Diabetes Paternal Uncle     Malig Hyperthermia Neg Hx        REVIEW OF SYSTEMS:   Pertinent positives are noted in HPI above.  Otherwise, all other systems were reviewed, and are negative.     Objective:     Vitals:    23   BP: 101/63   BP Location: Left arm   Patient Position: Lying   Pulse: 117   Resp: 20   SpO2: 97%   Weight: 71.8 kg (158 lb 3.2 oz)   Height: 162.6 cm (64\")     Body mass index is 27.15 kg/m².  Flowsheet Rows      Flowsheet Row First Filed Value   Admission Height 162.6 cm (64\") Documented at 2023 221   Admission Weight 71.8 kg (158 lb 3.2 oz) Documented at 2023 221             General:    No acute distress, alert and oriented x4, pleasant                   Head:    Normocephalic, atraumatic. "   Eyes:          Conjunctivae and sclerae normal, no icterus.   Throat:   No oral lesions, no thrush, oral mucosa moist.    Neck:   Supple, trachea midline.   Lungs:     Bilateral rales, decreased breath sounds at the right base.    Heart:    Regular rhythm and tachycardic rate.  No murmurs, gallops, or rubs noted.   Abdomen:     Soft, non-tender, non-distended, positive bowel sounds.    Extremities:   Trace to 1+ edema of the lower extremities.   Pulses:   Pulses palpable and equal bilaterally.    Skin:   No bleeding or rash.   Neuro:   Non-focal.  Moves all extremities well.    Psychiatric:   Normal mood and affect.         Lab Review:                                              EKG (reviewed by me personally): EKG at [facility] is pending.  Review from outside hospital EKG by me personally shows sinus tachycardia, likely old anteroseptal infarct, and right axis deviation.      Assessment:   1.  Type I NSTEMI versus late presentation anteroseptal infarct  2.  Acute CHF, suspected systolic  3.  Moderate size right pleural effusion and small left pleural effusion by CT of the chest  4.  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  Sinus tachycardia  6.  Tobacco use, quit on 12/18/2023  7.  Nonspecific right lower lobe groundglass opacity and left apical opacity on CT (follow-up CT of the chest recommended in 3 months)  8.  Right adrenal adenoma by CT of the abdomen  9.  Hypertension  10.  Hyperlipidemia  11.  Statin intolerance (myalgias)  12.  Gastroesophageal reflux disease    Plan:       At this point, it is not entirely clear whether the patient has a primary type I NSTEMI or a late presentation anteroseptal infarct.  Her EKG is strongly suggestive that there may have already been an anteroseptal transmural infarct with Q waves in V1 through V3.  However, I am going to treat her for ACS until proven otherwise.  I will keep her on a cardiac protocol heparin drip.  She will be continued on  aspirin.  I am not going to use dual antiplatelet therapy at this juncture until she has her heart catheterization (to ensure we do not delay a potential CABG).  She does have evidence of congestive heart failure, which I suspect is systolic in nature.  I am going to give her a small dose of a beta-blocker with Toprol-XL 12.5 mg/day to treat the coronary artery disease.  I do not feel this dose will harm her from a CHF perspective.  Her blood pressure is too low to tolerate an ACE inhibitor or ARB currently.  She has been highly intolerant to multiple statins with myalgias, although she did agree to try a low-dose of pravastatin.  This can be discontinued if she develops significant side effects.    She still needs diuresis.  I am going to give her another 40 mg of IV Lasix tonight and reevaluate her volume status in the morning.  She also did have a moderate-sized right pleural effusion which needs to be monitored with imaging while hospitalized.    An echocardiogram has been ordered for first thing tomorrow morning.  This should give us more information about the type of congestive heart failure, as well as her ejection fraction.  I did order an amylase and lipase to ensure that her epigastric symptoms are not from her pancreas, although I suspect that this may be an anginal equivalent (especially in the setting of insulin-dependent diabetes).    She ultimately will need a left heart catheterization, although the timing on this will depend on her clinical status.  This could be as early as tomorrow depending on how she diuresis overnight.  She is not having any unstable symptoms currently.     Of note, her CT scan at the outside facility did show a nonspecific opacity in the right lower lobe and left apex.  They recommended a repeat CT in 3 months.  Also, she had a benign appearing right adrenal adenoma incidentally noted.    I discussed all this in detail with the patient, and she was in agreement with the  plan.    Chente Esquivel MD

## 2023-12-27 NOTE — PROGRESS NOTES
LOS: 1 day   Patient Care Team:  Heavenly Monsivais MD as PCP - General (Family Medicine)  Hesham Cheney MD as Consulting Physician (Endocrinology)    Chief Complaint: Follow-up NSTEMI versus late presentation anteroseptal infarct, acute systolic CHF, new cardiomyopathy.    Interval History: No further epigastric discomfort.  Her shortness of breath has improved, although she still has significant rales on exam.  Her ejection fraction was approximately 20% on her echocardiogram.    Vital Signs:  Temp:  [97.5 °F (36.4 °C)-98.2 °F (36.8 °C)] 97.5 °F (36.4 °C)  Heart Rate:  [] 93  Resp:  [16-20] 16  BP: ()/(52-75) 94/52    Intake/Output Summary (Last 24 hours) at 12/27/2023 1702  Last data filed at 12/27/2023 1519  Gross per 24 hour   Intake 720 ml   Output 1750 ml   Net -1030 ml       Physical Exam:   General Appearance:    No acute distress, alert and oriented x4   Lungs:     Prominent bilateral rales.    Heart:    Regular rhythm and normal rate.  No murmurs, gallops, or   rubs.   Abdomen:     Soft, nontender, nondistended.    Extremities:   Trace edema of the lower extremities.     Results Review:    Results from last 7 days   Lab Units 12/27/23 0922   SODIUM mmol/L 138   POTASSIUM mmol/L 3.3*   CHLORIDE mmol/L 101   CO2 mmol/L 26.0   BUN mg/dL 16   CREATININE mg/dL 0.69   GLUCOSE mg/dL 334*   CALCIUM mg/dL 9.3     Results from last 7 days   Lab Units 12/27/23 0922 12/27/23 0052   HSTROP T ng/L 229* 208*     Results from last 7 days   Lab Units 12/27/23 0922   WBC 10*3/mm3 9.64   HEMOGLOBIN g/dL 12.9   HEMATOCRIT % 38.9   PLATELETS 10*3/mm3 284     Results from last 7 days   Lab Units 12/27/23 0922 12/27/23 0052   INR   --  1.17*   APTT seconds 36.9* 28.2     Results from last 7 days   Lab Units 12/27/23 0922   CHOLESTEROL mg/dL 199         Results from last 7 days   Lab Units 12/27/23 0922   CHOLESTEROL mg/dL 199   TRIGLYCERIDES mg/dL 207*   HDL CHOL mg/dL 31*   LDL CHOL mg/dL 131*        I reviewed the patient's new clinical results.        Assessment:  1.  Type I NSTEMI versus late presentation LAD distribution infarct  2.  Acute systolic CHF  3.  New cardiomyopathy, likely ischemic - EF 20% (akinesis of the septum, apex, mid to distal anterior wall, and mid to distal lateral wall)   4.  Insulin-dependent diabetes since 2014, with neuropathy  5.  Moderate right pleural effusion a CT of the chest  6.  Sinus tachycardia  7.  Tobacco use, quit on 12/18/2023  8.  Nonspecific right lower lobe groundglass opacity and left apical opacity on CT (follow-up CT of the chest recommended in 3 months)   9.  Right adrenal adenoma by CT of the abdomen  10.  Dyslipidemia   11.  Statin intolerance (myalgias)  12.  Gastroesophageal reflux disease  13.  Hypertension    Plan:  -I had a long and detailed discussion with her earlier today.  Unfortunately, her echocardiogram showed multiple akinetic segments with an EF of 20%.  I suspect that this is going to be an ischemic cardiomyopathy.    -The distribution of segments would point to an LAD infarct.  I am concerned based on her echo and EKG that she actually infarcted approximately 1 week ago when her symptoms first started, and now has significant congestive heart failure.    -I am going to hold on a heart catheterization today as she is not having unstable symptoms.  She still has prominent rales on exam, and I feel she needs more diuresis before the heart catheterization.  I am going to give her 80 mg of IV Lasix one-time today.    -Plan tentatively for left heart catheterization tomorrow morning if her renal function is stable and she is able to lie flat.  Continue heparin per ACS protocol until called to the cath lab.    -Continue Toprol-XL 12.5 mg/day as her blood pressure tolerates.  She cannot be on an ACE inhibitor, ARB, Entresto, spironolactone, or SGLT2 inhibitor for now given lower blood pressure.    -Continue aspirin.  She has had severe myalgias  with statins in the past.  I started her on pravastatin 20 mg/day.  Hopefully, she can tolerate this.    -Appreciate Dr. Julien seeing the patient and assisting with glucose management.    Billy Esquivel MD  12/27/23  17:02 EST

## 2023-12-28 LAB
ANION GAP SERPL CALCULATED.3IONS-SCNC: 10.1 MMOL/L (ref 5–15)
APTT PPP: 49.1 SECONDS (ref 22.7–35.4)
APTT PPP: 61.4 SECONDS (ref 22.7–35.4)
ARTERIAL PATENCY WRIST A: POSITIVE
ATMOSPHERIC PRESS: 743.6 MMHG
BASE EXCESS BLDA CALC-SCNC: 0.9 MMOL/L (ref 0–2)
BASOPHILS # BLD AUTO: 0.07 10*3/MM3 (ref 0–0.2)
BASOPHILS NFR BLD AUTO: 0.6 % (ref 0–1.5)
BDY SITE: ABNORMAL
BUN SERPL-MCNC: 18 MG/DL (ref 6–20)
BUN/CREAT SERPL: 26.1 (ref 7–25)
CALCIUM SPEC-SCNC: 9.1 MG/DL (ref 8.6–10.5)
CHLORIDE SERPL-SCNC: 102 MMOL/L (ref 98–107)
CO2 BLDA-SCNC: 25.5 MMOL/L (ref 23–27)
CO2 SERPL-SCNC: 24.9 MMOL/L (ref 22–29)
CREAT SERPL-MCNC: 0.69 MG/DL (ref 0.57–1)
DEPRECATED RDW RBC AUTO: 41 FL (ref 37–54)
EGFRCR SERPLBLD CKD-EPI 2021: 102.6 ML/MIN/1.73
EOSINOPHIL # BLD AUTO: 0.22 10*3/MM3 (ref 0–0.4)
EOSINOPHIL NFR BLD AUTO: 1.9 % (ref 0.3–6.2)
ERYTHROCYTE [DISTWIDTH] IN BLOOD BY AUTOMATED COUNT: 11.9 % (ref 12.3–15.4)
GLUCOSE BLDC GLUCOMTR-MCNC: 136 MG/DL (ref 70–130)
GLUCOSE BLDC GLUCOMTR-MCNC: 176 MG/DL (ref 70–130)
GLUCOSE BLDC GLUCOMTR-MCNC: 212 MG/DL (ref 70–130)
GLUCOSE BLDC GLUCOMTR-MCNC: 216 MG/DL (ref 70–130)
GLUCOSE BLDC GLUCOMTR-MCNC: 250 MG/DL (ref 70–130)
GLUCOSE SERPL-MCNC: 207 MG/DL (ref 65–99)
HCO3 BLDA-SCNC: 24.4 MMOL/L (ref 22–28)
HCT VFR BLD AUTO: 39.1 % (ref 34–46.6)
HEMODILUTION: NO
HGB BLD-MCNC: 13.4 G/DL (ref 12–15.9)
IMM GRANULOCYTES # BLD AUTO: 0.04 10*3/MM3 (ref 0–0.05)
IMM GRANULOCYTES NFR BLD AUTO: 0.3 % (ref 0–0.5)
LYMPHOCYTES # BLD AUTO: 4.46 10*3/MM3 (ref 0.7–3.1)
LYMPHOCYTES NFR BLD AUTO: 37.6 % (ref 19.6–45.3)
MAGNESIUM SERPL-MCNC: 1.8 MG/DL (ref 1.6–2.6)
MCH RBC QN AUTO: 32.5 PG (ref 26.6–33)
MCHC RBC AUTO-ENTMCNC: 34.3 G/DL (ref 31.5–35.7)
MCV RBC AUTO: 94.9 FL (ref 79–97)
MODALITY: ABNORMAL
MONOCYTES # BLD AUTO: 0.64 10*3/MM3 (ref 0.1–0.9)
MONOCYTES NFR BLD AUTO: 5.4 % (ref 5–12)
NEUTROPHILS NFR BLD AUTO: 54.2 % (ref 42.7–76)
NEUTROPHILS NFR BLD AUTO: 6.43 10*3/MM3 (ref 1.7–7)
NRBC BLD AUTO-RTO: 0 /100 WBC (ref 0–0.2)
PCO2 BLDA: 34.7 MM HG (ref 35–45)
PH BLDA: 7.46 PH UNITS (ref 7.35–7.45)
PLATELET # BLD AUTO: 289 10*3/MM3 (ref 140–450)
PMV BLD AUTO: 9.8 FL (ref 6–12)
PO2 BLDA: 58.7 MM HG (ref 80–100)
POTASSIUM SERPL-SCNC: 3.9 MMOL/L (ref 3.5–5.2)
RBC # BLD AUTO: 4.12 10*6/MM3 (ref 3.77–5.28)
SAO2 % BLDCOA: 91.6 % (ref 92–98.5)
SODIUM SERPL-SCNC: 137 MMOL/L (ref 136–145)
TOTAL RATE: 18 BREATHS/MINUTE
WBC NRBC COR # BLD AUTO: 11.86 10*3/MM3 (ref 3.4–10.8)

## 2023-12-28 PROCEDURE — 85730 THROMBOPLASTIN TIME PARTIAL: CPT | Performed by: INTERNAL MEDICINE

## 2023-12-28 PROCEDURE — 25010000002 HEPARIN (PORCINE) PER 1000 UNITS: Performed by: INTERNAL MEDICINE

## 2023-12-28 PROCEDURE — 93458 L HRT ARTERY/VENTRICLE ANGIO: CPT | Performed by: INTERNAL MEDICINE

## 2023-12-28 PROCEDURE — B2111ZZ FLUOROSCOPY OF MULTIPLE CORONARY ARTERIES USING LOW OSMOLAR CONTRAST: ICD-10-PCS | Performed by: INTERNAL MEDICINE

## 2023-12-28 PROCEDURE — 25010000002 ONDANSETRON PER 1 MG: Performed by: INTERNAL MEDICINE

## 2023-12-28 PROCEDURE — 25010000002 FUROSEMIDE PER 20 MG: Performed by: INTERNAL MEDICINE

## 2023-12-28 PROCEDURE — C1894 INTRO/SHEATH, NON-LASER: HCPCS | Performed by: INTERNAL MEDICINE

## 2023-12-28 PROCEDURE — 82948 REAGENT STRIP/BLOOD GLUCOSE: CPT

## 2023-12-28 PROCEDURE — 86901 BLOOD TYPING SEROLOGIC RH(D): CPT

## 2023-12-28 PROCEDURE — 80048 BASIC METABOLIC PNL TOTAL CA: CPT | Performed by: INTERNAL MEDICINE

## 2023-12-28 PROCEDURE — 86900 BLOOD TYPING SEROLOGIC ABO: CPT

## 2023-12-28 PROCEDURE — 82803 BLOOD GASES ANY COMBINATION: CPT

## 2023-12-28 PROCEDURE — 25010000002 LORAZEPAM PER 2 MG: Performed by: INTERNAL MEDICINE

## 2023-12-28 PROCEDURE — 63710000001 INSULIN GLARGINE PER 5 UNITS: Performed by: INTERNAL MEDICINE

## 2023-12-28 PROCEDURE — 25010000002 MIDAZOLAM PER 1 MG: Performed by: INTERNAL MEDICINE

## 2023-12-28 PROCEDURE — 83735 ASSAY OF MAGNESIUM: CPT | Performed by: INTERNAL MEDICINE

## 2023-12-28 PROCEDURE — 99255 IP/OBS CONSLTJ NEW/EST HI 80: CPT | Performed by: PHYSICIAN ASSISTANT

## 2023-12-28 PROCEDURE — 99152 MOD SED SAME PHYS/QHP 5/>YRS: CPT | Performed by: INTERNAL MEDICINE

## 2023-12-28 PROCEDURE — C1769 GUIDE WIRE: HCPCS | Performed by: INTERNAL MEDICINE

## 2023-12-28 PROCEDURE — 25510000001 IOPAMIDOL PER 1 ML: Performed by: INTERNAL MEDICINE

## 2023-12-28 PROCEDURE — 25010000002 FENTANYL CITRATE (PF) 50 MCG/ML SOLUTION: Performed by: INTERNAL MEDICINE

## 2023-12-28 PROCEDURE — 63710000001 INSULIN LISPRO (HUMAN) PER 5 UNITS: Performed by: HOSPITALIST

## 2023-12-28 PROCEDURE — 85025 COMPLETE CBC W/AUTO DIFF WBC: CPT | Performed by: INTERNAL MEDICINE

## 2023-12-28 PROCEDURE — 63710000001 INSULIN LISPRO (HUMAN) PER 5 UNITS: Performed by: INTERNAL MEDICINE

## 2023-12-28 PROCEDURE — 25010000002 HEPARIN (PORCINE) 25000-0.45 UT/250ML-% SOLUTION: Performed by: INTERNAL MEDICINE

## 2023-12-28 PROCEDURE — 99232 SBSQ HOSP IP/OBS MODERATE 35: CPT | Performed by: INTERNAL MEDICINE

## 2023-12-28 PROCEDURE — 36600 WITHDRAWAL OF ARTERIAL BLOOD: CPT

## 2023-12-28 PROCEDURE — 4A023N7 MEASUREMENT OF CARDIAC SAMPLING AND PRESSURE, LEFT HEART, PERCUTANEOUS APPROACH: ICD-10-PCS | Performed by: INTERNAL MEDICINE

## 2023-12-28 RX ORDER — SODIUM CHLORIDE 0.9 % (FLUSH) 0.9 %
10 SYRINGE (ML) INJECTION AS NEEDED
Status: DISCONTINUED | OUTPATIENT
Start: 2023-12-28 | End: 2023-12-28

## 2023-12-28 RX ORDER — SODIUM CHLORIDE 9 MG/ML
40 INJECTION, SOLUTION INTRAVENOUS AS NEEDED
Status: DISCONTINUED | OUTPATIENT
Start: 2023-12-28 | End: 2024-01-03

## 2023-12-28 RX ORDER — CHLORHEXIDINE GLUCONATE ORAL RINSE 1.2 MG/ML
15 SOLUTION DENTAL ONCE
Status: DISCONTINUED | OUTPATIENT
Start: 2024-01-03 | End: 2024-01-02

## 2023-12-28 RX ORDER — ACETAMINOPHEN 325 MG/1
650 TABLET ORAL EVERY 4 HOURS PRN
Status: DISCONTINUED | OUTPATIENT
Start: 2023-12-28 | End: 2024-01-03

## 2023-12-28 RX ORDER — NITROGLYCERIN 0.4 MG/1
0.4 TABLET SUBLINGUAL
Status: DISCONTINUED | OUTPATIENT
Start: 2023-12-28 | End: 2024-01-09 | Stop reason: HOSPADM

## 2023-12-28 RX ORDER — MIDAZOLAM HYDROCHLORIDE 1 MG/ML
INJECTION INTRAMUSCULAR; INTRAVENOUS
Status: DISCONTINUED | OUTPATIENT
Start: 2023-12-28 | End: 2023-12-28 | Stop reason: HOSPADM

## 2023-12-28 RX ORDER — ONDANSETRON 2 MG/ML
4 INJECTION INTRAMUSCULAR; INTRAVENOUS EVERY 6 HOURS PRN
Status: DISCONTINUED | OUTPATIENT
Start: 2023-12-28 | End: 2024-01-09 | Stop reason: HOSPADM

## 2023-12-28 RX ORDER — HEPARIN SODIUM 1000 [USP'U]/ML
INJECTION, SOLUTION INTRAVENOUS; SUBCUTANEOUS
Status: DISCONTINUED | OUTPATIENT
Start: 2023-12-28 | End: 2023-12-28 | Stop reason: HOSPADM

## 2023-12-28 RX ORDER — SODIUM CHLORIDE 9 MG/ML
INJECTION, SOLUTION INTRAVENOUS
Status: COMPLETED | OUTPATIENT
Start: 2023-12-28 | End: 2023-12-28

## 2023-12-28 RX ORDER — CHLORHEXIDINE GLUCONATE 500 MG/1
1 CLOTH TOPICAL EVERY 12 HOURS PRN
Status: DISCONTINUED | OUTPATIENT
Start: 2024-01-02 | End: 2024-01-03

## 2023-12-28 RX ORDER — VERAPAMIL HYDROCHLORIDE 2.5 MG/ML
INJECTION, SOLUTION INTRAVENOUS
Status: DISCONTINUED | OUTPATIENT
Start: 2023-12-28 | End: 2023-12-28 | Stop reason: HOSPADM

## 2023-12-28 RX ORDER — FENTANYL CITRATE 50 UG/ML
INJECTION, SOLUTION INTRAMUSCULAR; INTRAVENOUS
Status: DISCONTINUED | OUTPATIENT
Start: 2023-12-28 | End: 2023-12-28 | Stop reason: HOSPADM

## 2023-12-28 RX ORDER — LORAZEPAM 2 MG/ML
1 INJECTION INTRAMUSCULAR ONCE AS NEEDED
Status: COMPLETED | OUTPATIENT
Start: 2023-12-28 | End: 2023-12-28

## 2023-12-28 RX ORDER — SODIUM CHLORIDE 0.9 % (FLUSH) 0.9 %
10 SYRINGE (ML) INJECTION EVERY 12 HOURS SCHEDULED
Status: DISCONTINUED | OUTPATIENT
Start: 2023-12-28 | End: 2024-01-03

## 2023-12-28 RX ORDER — FUROSEMIDE 10 MG/ML
40 INJECTION INTRAMUSCULAR; INTRAVENOUS ONCE
Status: COMPLETED | OUTPATIENT
Start: 2023-12-28 | End: 2023-12-28

## 2023-12-28 RX ORDER — ONDANSETRON 4 MG/1
4 TABLET, ORALLY DISINTEGRATING ORAL EVERY 6 HOURS PRN
Status: DISCONTINUED | OUTPATIENT
Start: 2023-12-28 | End: 2024-01-09 | Stop reason: HOSPADM

## 2023-12-28 RX ORDER — CEFAZOLIN SODIUM 2 G/100ML
2000 INJECTION, SOLUTION INTRAVENOUS ONCE
Status: DISCONTINUED | OUTPATIENT
Start: 2024-01-03 | End: 2024-01-02

## 2023-12-28 RX ORDER — ALPRAZOLAM 0.25 MG/1
0.25 TABLET ORAL 4 TIMES DAILY PRN
Status: DISPENSED | OUTPATIENT
Start: 2023-12-28 | End: 2024-01-07

## 2023-12-28 RX ORDER — LIDOCAINE HYDROCHLORIDE 20 MG/ML
INJECTION, SOLUTION INFILTRATION; PERINEURAL
Status: DISCONTINUED | OUTPATIENT
Start: 2023-12-28 | End: 2023-12-28 | Stop reason: HOSPADM

## 2023-12-28 RX ADMIN — INSULIN LISPRO 2 UNITS: 100 INJECTION, SOLUTION INTRAVENOUS; SUBCUTANEOUS at 16:16

## 2023-12-28 RX ADMIN — METOPROLOL SUCCINATE 12.5 MG: 25 TABLET, EXTENDED RELEASE ORAL at 09:04

## 2023-12-28 RX ADMIN — INSULIN LISPRO 4 UNITS: 100 INJECTION, SOLUTION INTRAVENOUS; SUBCUTANEOUS at 21:39

## 2023-12-28 RX ADMIN — ONDANSETRON HYDROCHLORIDE 4 MG: 2 INJECTION, SOLUTION INTRAMUSCULAR; INTRAVENOUS at 09:32

## 2023-12-28 RX ADMIN — PRAVASTATIN SODIUM 20 MG: 20 TABLET ORAL at 21:40

## 2023-12-28 RX ADMIN — Medication 10 ML: at 21:40

## 2023-12-28 RX ADMIN — DULOXETINE HYDROCHLORIDE 60 MG: 60 CAPSULE, DELAYED RELEASE ORAL at 21:40

## 2023-12-28 RX ADMIN — Medication 10 ML: at 09:04

## 2023-12-28 RX ADMIN — FUROSEMIDE 40 MG: 10 INJECTION, SOLUTION INTRAMUSCULAR; INTRAVENOUS at 17:00

## 2023-12-28 RX ADMIN — DULOXETINE HYDROCHLORIDE 60 MG: 60 CAPSULE, DELAYED RELEASE ORAL at 09:04

## 2023-12-28 RX ADMIN — ASPIRIN 81 MG: 81 TABLET, COATED ORAL at 09:04

## 2023-12-28 RX ADMIN — INSULIN GLARGINE 18 UNITS: 100 INJECTION, SOLUTION SUBCUTANEOUS at 21:39

## 2023-12-28 RX ADMIN — SERTRALINE 25 MG: 25 TABLET, FILM COATED ORAL at 09:04

## 2023-12-28 RX ADMIN — HEPARIN SODIUM 2200 UNITS: 5000 INJECTION INTRAVENOUS; SUBCUTANEOUS at 05:16

## 2023-12-28 RX ADMIN — PANTOPRAZOLE SODIUM 40 MG: 40 TABLET, DELAYED RELEASE ORAL at 06:39

## 2023-12-28 RX ADMIN — INSULIN LISPRO 4 UNITS: 100 INJECTION, SOLUTION INTRAVENOUS; SUBCUTANEOUS at 06:44

## 2023-12-28 RX ADMIN — LORAZEPAM 1 MG: 2 INJECTION INTRAMUSCULAR; INTRAVENOUS at 11:28

## 2023-12-28 RX ADMIN — CETIRIZINE HYDROCHLORIDE 10 MG: 10 TABLET ORAL at 09:04

## 2023-12-28 RX ADMIN — HEPARIN SODIUM 15 UNITS/KG/HR: 10000 INJECTION, SOLUTION INTRAVENOUS at 04:10

## 2023-12-28 NOTE — PROGRESS NOTES
LOS: 2 days   Patient Care Team:  Heavenly Monsivais MD as PCP - General (Family Medicine)  Hesham Cheney MD as Consulting Physician (Endocrinology)    Chief Complaint: Follow-up NSTEMI versus late presentation anteroseptal infarct, acute systolic CHF, new cardiomyopathy.    Interval History: She was nauseated this morning when I saw her.  No chest or epigastric pain.  Her shortness of breath is improved.  Unfortunately, her heart catheterization showed severe multivessel coronary artery disease.  She is going to need a CABG next week.    Vital Signs:  Temp:  [97.4 °F (36.3 °C)-98.7 °F (37.1 °C)] 97.8 °F (36.6 °C)  Heart Rate:  [] 89  Resp:  [12-25] 24  BP: ()/(61-78) 92/61  FiO2 (%):  [88 %] 88 %    Intake/Output Summary (Last 24 hours) at 12/28/2023 1811  Last data filed at 12/28/2023 1700  Gross per 24 hour   Intake 240 ml   Output --   Net 240 ml       Physical Exam:   General Appearance:    No acute distress, alert and oriented x4   Lungs:     Faint rales at the bases (much improved).    Heart:    Regular rhythm and normal rate.  No murmurs, gallops, or   rubs.   Abdomen:     Soft, nontender, nondistended.    Extremities:   No clubbing, cyanosis, or edema.      Results Review:    Results from last 7 days   Lab Units 12/28/23  0330   SODIUM mmol/L 137   POTASSIUM mmol/L 3.9   CHLORIDE mmol/L 102   CO2 mmol/L 24.9   BUN mg/dL 18   CREATININE mg/dL 0.69   GLUCOSE mg/dL 207*   CALCIUM mg/dL 9.1     Results from last 7 days   Lab Units 12/27/23  0922 12/27/23  0052   HSTROP T ng/L 229* 208*     Results from last 7 days   Lab Units 12/28/23  0330   WBC 10*3/mm3 11.86*   HEMOGLOBIN g/dL 13.4   HEMATOCRIT % 39.1   PLATELETS 10*3/mm3 289     Results from last 7 days   Lab Units 12/28/23  1134 12/28/23  0330 12/27/23  1725 12/27/23  0922 12/27/23  0052   INR   --   --   --   --  1.17*   APTT seconds 49.1* 61.4* 65.5*   < > 28.2    < > = values in this interval not displayed.     Results from last 7  days   Lab Units 12/27/23  0922   CHOLESTEROL mg/dL 199     Results from last 7 days   Lab Units 12/28/23  0330   MAGNESIUM mg/dL 1.8     Results from last 7 days   Lab Units 12/27/23  0922   CHOLESTEROL mg/dL 199   TRIGLYCERIDES mg/dL 207*   HDL CHOL mg/dL 31*   LDL CHOL mg/dL 131*       I reviewed the patient's new clinical results.        Assessment:  1.  Type I NSTEMI with severe multivessel coronary artery disease  2.  Acute systolic CHF secondary to ischemic cardiomyopathy and coronary artery disease  3.  Ischemic cardiomyopathy, new (EF 20% or less)  4.  Insulin-dependent diabetes since 2014, with neuropathy  5.  Mild to moderate mitral regurgitation  6.  Mild to moderate tricuspid regurgitation  7.  Tobacco use, quit on 12/18/2023  8.  Nonspecific right lower lobe groundglass opacity and left apical opacity on CT (follow-up CT of the chest recommended in 3 months)   9.  Right adrenal adenoma by CT of the abdomen  10.  Dyslipidemia   11.  Statin intolerance (myalgias)  12.  Gastroesophageal reflux disease  13.  Moderate right pleural effusion on admission  14.  Relative hypotension    Plan:  -Unfortunately, her heart catheterization earlier today showed severe multivessel coronary artery disease.  This included an 80% ostial and 90% mid LAD, 80% ostial OM1, and 95% proximal to mid RCA with left-to-right collateral filling.    -Spoke with Dr. Asif earlier today from cardiovascular surgery.  Her LVEDP was between 28 and 30.  Plans are to continue to diurese her over the weekend and hopefully get her in better shape prior to a CABG next week (tentatively set up for Wednesday).  She may also need a mitral valve repair or tricuspid valve repair (this will be decided in the OR).    -Given the 40 mg of IV Lasix today.  If her blood pressure permits, I will diurese her again tomorrow and over the weekend.    -Blood pressure has been low.  I have been unable to add guideline directed medical therapy because of  this.  She is on Toprol-XL 12.5 mg/day if she can tolerate it from a blood pressure standpoint.    -She cannot be on an ACE inhibitor, ARB, Entresto, spironolactone, or SGLT2 inhibitor for now given lower blood pressure.    -Continue aspirin.  She has had severe myalgias with statins in the past.  I started her on pravastatin 20 mg/day.  She has tolerated this so far.    -Appreciate Dr. Julien seeing the patient and assisting with glucose management.  Also appreciate Dr. Asif's assistance.    Billy Esquivel MD  12/28/23  18:11 EST

## 2023-12-28 NOTE — PAYOR COMM NOTE
"Jade Jaramillo (53 y.o. Female)                        ATTENTION INITIAL CLINICALS PENDING CASE REF [id number]                     REPLY TO [facility] DEPT  [phone] OR CALL  CHINO CALLE LPJOSHUA           Date of Birth   1970    Social Security Number       Address   [address] [id number]    Home Phone   [phone]    MRN   [id number]       Tenriism   [employer]    Marital Status   Single                            Admission Date   12/26/23    Admission Type   Urgent    Admitting Provider   Billy Esquivel MD    Attending Provider   Billy Esquivel MD    Department, Room/Bed   [facility], 3106/1       Discharge Date       Discharge Disposition       Discharge Destination                                 Attending Provider: Billy Esquivel MD    Allergies: Metformin, Ozempic [Semaglutide], Bydureon [Exenatide], Statins, Xigduo Xr [Dapagliflozin Pro-metformin Er]    Isolation: None   Infection: None   Code Status: Not on file    Ht: 162.6 cm (64\")   Wt: 70.5 kg (155 lb 6.8 oz)    Admission Cmt: None   Principal Problem: ACS (acute coronary syndrome) [I24.9]                   Active Insurance as of 12/26/2023       Primary Coverage       Payor Plan Insurance Group Employer/Plan Group    MISC COMMERCIAL MISC COMMERCIAL [id number]       Coverage Address Coverage Phone Number Coverage Fax Number Effective Dates    [address] [address] [phone]  4/1/2023 - None Entered    [place] [id number]         Subscriber Name Subscriber Birth Date Member ID       JADE JARAMILLO 1970 [id number]                     Emergency Contacts        (Rel.) Home Phone Work Phone Mobile Phone    Millicent Martin (Daughter) [phone] -- [phone]                 History & Physical        Billy Esquivel MD at 12/26/23 9442          Date of Admission: 12/26/23    Encounter Provider: Billy Esquivel MD    Group of Service: [facility] " "Group     Patient Name: Jade VALADEZ [facility]    :1970    Chief complaint: Shortness of breath.    History of Present Illness:      This is a very pleasant 53 year-old female with a history of insulin dependent diabetes, hypertension, GERD, and tobacco use (quit last week).  The patient has had diabetes since , and her father had a three-vessel CABG in his early 60s.  She has not had known prior cardiac history in the past.    The patient states that on 2023, she ate a bag of hot potato chips.  Shortly afterwards, she began to feel epigastric discomfort and indigestion type sensation, which she attributed to the potato chips.  She became short of breath over the course of several hours, and eventually developed significant diarrhea and vomiting for the next 2 days.  After the gastrointestinal symptoms subsided, she continued to have fairly severe dyspnea on exertion.  She also was having orthopnea, and often had to lean forward from a recumbent position to catch her breath.  She was diagnosed as having a potential peptic ulcer at her PCP.  However, her symptoms persisted over the weekend and through [place].  Today, she finally went to the emergency department because she states she could no longer walk several feet without having to stop and rest.  She also states that she had gained approximately 10 pounds over the last week for \"unclear reasons\".  She did continue to have intermittent epigastric pain and fullness as well, although not as severe as the initial episode.  She has not actually had upper chest pain.    She presented to the emergency department at [facility] in [place], [place].  Her initial EKG showed sinus tachycardia and a potential old anteroseptal infarct with Q waves noted in leads V1 through V3.  Her initial troponin was noted to be elevated in the setting of normal renal function at 748.  This later peaked at 843.  She was found to be in congestive heart failure, " and her proBNP was 11,256.  She did have a CT of the chest without contrast which showed cardiomegaly, pulmonary edema, moderate right pleural effusion, and small left pleural effusion.  She also had a right lower lobe groundglass opacity and smaller left apical opacity, and follow-up CT scan was recommended in 3 months.  Her pulmonary artery was also dilated, suggestive of pulmonary hypertension.  Her CT scan of the abdomen and pelvis showed nonspecific edema and a right adrenal adenoma.    She was treated with aspirin, Lasix 40 mg IV, and a cardiac heparin protocol drip.  She was then transferred to [facility] for further care.  She is still mildly tachycardic upon arrival, and still has evidence of congestive heart failure on exam.  She does feel much better since the Lasix earlier today.  She is not having any chest or epigastric pain currently.  She did tell me she quit smoking on 2023 when all of her symptoms started.  However, she did smoke about 1/2 pack of cigarettes per day for over 25 years.    Past Medical History:   Diagnosis Date    Adrenal adenoma     Anxiety     Diabetes mellitus     Diarrhea     GERD (gastroesophageal reflux disease)     Gestational diabetes     Head ache     Hyperlipidemia     Hypertension     Kidney stone     Nausea     Type 2 diabetes mellitus     Urinary tract infection     Frequently    Varicella Unknown    Vitamin D deficiency          Past Surgical History:   Procedure Laterality Date     SECTION       SECTION WITH TUBAL  2001    CHOLECYSTECTOMY      COLONOSCOPY  05/10/2019    Grzegorz ABEL    CYSTOSCOPY      ENDOSCOPY  2020    ENDOSCOPY N/A 2021    Procedure: ESOPHAGOGASTRODUODENOSCOPY WITH COLD BIOPSIES;  Surgeon: Jasson Nguyen MD;  Location: [facility] ENDOSCOPY;  Service: Gastroenterology;  Laterality: N/A;  PRE- ABD PAIN  POST- NORMAL    LAPAROSCOPIC CHOLECYSTECTOMY  2016    TUBAL ABDOMINAL LIGATION   01/29/2001    UPPER GASTROINTESTINAL ENDOSCOPY  06/25/2020    Grzegorz ABEL gastritis, ulcers    UPPER GASTROINTESTINAL ENDOSCOPY  12/04/2020    Andrew ABEL         Allergies   Allergen Reactions    Metformin Diarrhea    Ozempic [Semaglutide] GI Intolerance     Pt stopped due to stomach pain    Bydureon [Exenatide] Other (See Comments)     Site reaction    Statins Myalgia     Muscle aches    Xigduo Xr [Dapagliflozin Pro-Metformin Er] Diarrhea         No current facility-administered medications on file prior to encounter.     Current Outpatient Medications on File Prior to Encounter   Medication Sig Dispense Refill    cetirizine (ZyrTEC) 10 MG tablet Take 1 tablet by mouth Daily.      Cymbalta 60 MG capsule Take 1 capsule by mouth 2 (Two) Times a Day.      ergocalciferol (ERGOCALCIFEROL) 1.25 MG (22962 UT) capsule Take 1 capsule by mouth 1 (One) Time Per Week. 12 capsule 1    hydrochlorothiazide (HYDRODIURIL) 25 MG tablet Take 1 tablet by mouth Daily.  4    Insulin Degludec (Tresiba FlexTouch) 200 UNIT/ML solution pen-injector pen injection 36 units every evening.  Prime needle with 4 units before each use. 18 mL 2    Insulin Lispro, 1 Unit Dial, (HumaLOG KwikPen) 100 UNIT/ML solution pen-injector 12 units 3 times daily with each meal.  Prime needle with 2 units before each use. (Patient taking differently: 12 units 3 times daily with each meal.  Prime needle with 2 units before each use.    Patient currently not takign this, only does sliding scale) 38 mL 2    omeprazole (priLOSEC) 20 MG capsule Take 1 capsule by mouth Daily.      ondansetron ODT (ZOFRAN-ODT) 4 MG disintegrating tablet       sertraline (ZOLOFT) 50 MG tablet Take 0.5 tablets by mouth Daily.      aspirin-acetaminophen-caffeine (EXCEDRIN MIGRAINE) 250-250-65 MG per tablet Take 1 tablet by mouth Every 8 (Eight) Hours As Needed.      busPIRone (BUSPAR) 10 MG tablet Take 1 tablet by mouth Daily.      Continuous Blood Gluc Sensor (FreeStyle Balwinder 3 Sensor)  "misc 1 each Every 14 (Fourteen) Days. 6 each 1    Insulin Pen Needle (BD PEN NEEDLE PAUL U/F) 32G X 4 MM misc Use to inject insulin 2 times daily 300 each 1         Social History     Socioeconomic History    Marital status: Single   Tobacco Use    Smoking status: Former     Packs/day: 0.50     Years: 25.00     Additional pack years: 0.00     Total pack years: 12.50     Types: Cigarettes     Start date: 1/10/1985     Quit date: 2023     Years since quittin.0    Smokeless tobacco: Never   Vaping Use    Vaping Use: Never used   Substance and Sexual Activity    Alcohol use: Yes     Comment: Only drink about 2 times a year    Drug use: Yes     Types: Marijuana     Comment: daily    Sexual activity: Yes     Partners: Male     Birth control/protection: Surgical, Post-menopausal         Family History   Problem Relation Age of Onset    Thyroid disease Mother         goiter    Hyperlipidemia Mother     ALS Mother     Heart disease Father     Stroke Father     Hypertension Father     Drug abuse Brother     Diabetes Paternal Grandfather     Diabetes Paternal Aunt     Diabetes Paternal Uncle     Malig Hyperthermia Neg Hx        REVIEW OF SYSTEMS:   Pertinent positives are noted in HPI above.  Otherwise, all other systems were reviewed, and are negative.     Objective:     Vitals:    23   BP: 101/63   BP Location: Left arm   Patient Position: Lying   Pulse: 117   Resp: 20   SpO2: 97%   Weight: 71.8 kg (158 lb 3.2 oz)   Height: 162.6 cm (64\")     Body mass index is 27.15 kg/m².  Flowsheet Rows      Flowsheet Row First Filed Value   Admission Height 162.6 cm (64\") Documented at 2023   Admission Weight 71.8 kg (158 lb 3.2 oz) Documented at 2023             General:    No acute distress, alert and oriented x4, pleasant                   Head:    Normocephalic, atraumatic.   Eyes:          Conjunctivae and sclerae normal, no icterus.   Throat:   No oral lesions, no thrush, oral mucosa " moist.    Neck:   Supple, trachea midline.   Lungs:     Bilateral rales, decreased breath sounds at the right base.    Heart:    Regular rhythm and tachycardic rate.  No murmurs, gallops, or rubs noted.   Abdomen:     Soft, non-tender, non-distended, positive bowel sounds.    Extremities:   Trace to 1+ edema of the lower extremities.   Pulses:   Pulses palpable and equal bilaterally.    Skin:   No bleeding or rash.   Neuro:   Non-focal.  Moves all extremities well.    Psychiatric:   Normal mood and affect.         Lab Review:                                              EKG (reviewed by me personally): EKG at [facility] is pending.  Review from outside hospital EKG by me personally shows sinus tachycardia, likely old anteroseptal infarct, and right axis deviation.      Assessment:   1.  Type I NSTEMI versus late presentation anteroseptal infarct  2.  Acute CHF, suspected systolic  3.  Moderate size right pleural effusion and small left pleural effusion by CT of the chest  4.  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  Sinus tachycardia  6.  Tobacco use, quit on 12/18/2023  7.  Nonspecific right lower lobe groundglass opacity and left apical opacity on CT (follow-up CT of the chest recommended in 3 months)  8.  Right adrenal adenoma by CT of the abdomen  9.  Hypertension  10.  Hyperlipidemia  11.  Statin intolerance (myalgias)  12.  Gastroesophageal reflux disease    Plan:       At this point, it is not entirely clear whether the patient has a primary type I NSTEMI or a late presentation anteroseptal infarct.  Her EKG is strongly suggestive that there may have already been an anteroseptal transmural infarct with Q waves in V1 through V3.  However, I am going to treat her for ACS until proven otherwise.  I will keep her on a cardiac protocol heparin drip.  She will be continued on aspirin.  I am not going to use dual antiplatelet therapy at this juncture until she has her heart catheterization  (to ensure we do not delay a potential CABG).  She does have evidence of congestive heart failure, which I suspect is systolic in nature.  I am going to give her a small dose of a beta-blocker with Toprol-XL 12.5 mg/day to treat the coronary artery disease.  I do not feel this dose will harm her from a CHF perspective.  Her blood pressure is too low to tolerate an ACE inhibitor or ARB currently.  She has been highly intolerant to multiple statins with myalgias, although she did agree to try a low-dose of pravastatin.  This can be discontinued if she develops significant side effects.    She still needs diuresis.  I am going to give her another 40 mg of IV Lasix tonight and reevaluate her volume status in the morning.  She also did have a moderate-sized right pleural effusion which needs to be monitored with imaging while hospitalized.    An echocardiogram has been ordered for first thing tomorrow morning.  This should give us more information about the type of congestive heart failure, as well as her ejection fraction.  I did order an amylase and lipase to ensure that her epigastric symptoms are not from her pancreas, although I suspect that this may be an anginal equivalent (especially in the setting of insulin-dependent diabetes).    She ultimately will need a left heart catheterization, although the timing on this will depend on her clinical status.  This could be as early as tomorrow depending on how she diuresis overnight.  She is not having any unstable symptoms currently.     Of note, her CT scan at the outside facility did show a nonspecific opacity in the right lower lobe and left apex.  They recommended a repeat CT in 3 months.  Also, she had a benign appearing right adrenal adenoma incidentally noted.    I discussed all this in detail with the patient, and she was in agreement with the plan.    Chente Esquivel MD    Electronically signed by Billy Esquivel MD at 12/26/23 9574            Vital Signs  (last 2 days)       Date/Time Temp Temp src Pulse Resp BP Patient Position SpO2    12/28/23 0417 97.7 (36.5) Oral 108 -- 92/72 Lying --    12/27/23 2356 97.6 (36.4) -- -- -- -- Lying --    12/27/23 1958 97.4 (36.3) Oral -- -- -- Lying --    12/27/23 1947 97.4 (36.3) Oral -- 16 -- -- --    12/27/23 1540 97.5 (36.4) Oral 93 16 94/52 Lying --    12/27/23 1211 98 (36.7) Oral 107 16 96/74 Sitting --    12/27/23 0904 97.5 (36.4) Oral -- 16 100/75 Sitting --    12/27/23 0852 -- -- 105 -- 104/75 -- --    12/27/23 0331 97.7 (36.5) Oral 101 16 90/67 Sitting 94    12/27/23 0041 98.2 (36.8) Oral 113 20 102/75 Lying 99    12/26/23 2215 -- -- 117 20 101/63 Lying 97          Oxygen Therapy (last 2 days)       Date/Time SpO2 Device (Oxygen Therapy) Flow (L/min) Oxygen Concentration (%) ETCO2 (mmHg)    12/28/23 0410 -- room air -- -- --    12/28/23 0002 -- room air -- -- --    12/27/23 1947 -- room air -- -- --    12/27/23 1400 -- room air -- -- --    12/27/23 0934 -- room air -- -- --    12/27/23 0331 94 -- -- -- --    12/27/23 0041 99 room air -- -- --    12/26/23 2237 -- room air -- -- --    12/26/23 2215 97 room air -- -- --          Lines, Drains & Airways       Active LDAs       Name Placement date Placement time Site Days    Peripheral IV 12/26/23 2359 Left Antecubital 12/26/23 2359  Antecubital  1              Inactive LDAs       Name Placement date Placement time Removal date Removal time Site Days    [REMOVED] Peripheral IV 02/25/22 0900 Right Antecubital 02/25/22  0900  12/27/23  not present on admission  0120  Antecubital  669                  Facility-Administered Medications as of 12/28/2023   Medication Dose Route Frequency Provider Last Rate Last Admin    aspirin EC tablet 81 mg  81 mg Oral Daily Billy Esquivel MD   81 mg at 12/27/23 0938    busPIRone (BUSPAR) tablet 10 mg  10 mg Oral Daily Billy Esquivel MD        cetirizine (zyrTEC) tablet 10 mg  10 mg Oral Daily Billy Esquivel MD   10 mg at  12/27/23 0938    dextrose (D50W) (25 g/50 mL) IV injection 25 g  25 g Intravenous Q15 Min PRN Billy Esquivel MD        dextrose (GLUTOSE) oral gel 15 g  15 g Oral Q15 Min PRN Billy Esquivel MD        DULoxetine (CYMBALTA) DR capsule 60 mg  60 mg Oral BID Billy Esquivel MD   60 mg at 12/27/23 2133    [COMPLETED] furosemide (LASIX) injection 40 mg  40 mg Intravenous Once Billy Esquivel MD   40 mg at 12/27/23 0046    [COMPLETED] furosemide (LASIX) injection 80 mg  80 mg Intravenous Once Billy Esquivel MD   80 mg at 12/27/23 0938    glucagon (GLUCAGEN) injection 1 mg  1 mg Intramuscular Q15 Min PRN Billy Esquivel MD        heparin (porcine) 5000 UNIT/ML injection 2,200-4,000 Units  30-55.7 Units/kg Intravenous Q6H PRN Billy Esquivel MD   2,200 Units at 12/28/23 0516    heparin 55485 units/250 mL (100 units/mL) in 0.45 % NaCl infusion  12 Units/kg/hr Intravenous Titrated Billy Esquivel MD 11.48 mL/hr at 12/28/23 0515 16 Units/kg/hr at 12/28/23 0515    insulin glargine (LANTUS, SEMGLEE) injection 18 Units  18 Units Subcutaneous BID Jarrett Julien MD   18 Units at 12/27/23 2134    insulin lispro (HUMALOG/ADMELOG) injection 10 Units  10 Units Subcutaneous TID With Meals Jarrett Julien MD        insulin lispro (HUMALOG/ADMELOG) injection 2-9 Units  2-9 Units Subcutaneous 4x Daily AC & at Bedtime Jarrett Julien MD   6 Units at 12/27/23 2133    Magnesium Standard Dose Replacement - Follow Nurse / BPA Driven Protocol   Does not apply PRN Billy Esquivel MD        metoprolol succinate XL (TOPROL-XL) 24 hr tablet 12.5 mg  12.5 mg Oral Q24H Billy Esquivel MD   12.5 mg at 12/27/23 0131    pantoprazole (PROTONIX) EC tablet 40 mg  40 mg Oral Q AM Billy Esquivel MD   40 mg at 12/28/23 0639    [COMPLETED] perflutren (DEFINITY) 8.476 mg in Sodium Chloride (PF) 0.9 % 10 mL injection  2 mL Intravenous Once in imaging Billy Esquivel MD   2 mL at 12/27/23 0830     [COMPLETED] potassium chloride (K-DUR,KLOR-CON) ER tablet 40 mEq  40 mEq Oral Once Billy Esquivel MD   40 mEq at 12/27/23 2133    Potassium Replacement - Follow Nurse / BPA Driven Protocol   Does not apply PRN Billy Esquivel MD        pravastatin (PRAVACHOL) tablet 20 mg  20 mg Oral Nightly Billy Esquivel MD   20 mg at 12/27/23 2133    sertraline (ZOLOFT) tablet 25 mg  25 mg Oral Daily Billy Esquivel MD   25 mg at 12/27/23 0938    sodium chloride 0.9 % flush 10 mL  10 mL Intravenous Q12H Billy Esquivel MD   10 mL at 12/27/23 2134    sodium chloride 0.9 % flush 10 mL  10 mL Intravenous PRN Billy Esquivel MD        sodium chloride 0.9 % infusion 40 mL  40 mL Intravenous PRN Billy Esquivel MD         Orders (last 48 hrs)        Start     Ordered    12/28/23 1119  aPTT  Timed         12/28/23 0519    12/28/23 0600  Basic Metabolic Panel  Morning Draw         12/27/23 1711    12/28/23 0600  Magnesium  Morning Draw         12/27/23 1712    12/28/23 0600  CBC Auto Differential  PROCEDURE ONCE        Comments: Discontinue After Heparin Stopped      12/27/23 2201    12/28/23 0421  POC Glucose Once  PROCEDURE ONCE        Comments: Complete no more than 45 minutes prior to patient eating      12/28/23 0419    12/28/23 0001  NPO Diet NPO Type: Sips with Meds  Diet Effective Midnight         12/27/23 0916    12/27/23 2100  insulin glargine (LANTUS, SEMGLEE) injection 36 Units  Every Evening,   Status:  Discontinued         12/26/23 2309    12/27/23 2100  insulin glargine (LANTUS, SEMGLEE) injection 18 Units  2 Times Daily         12/27/23 1338    12/27/23 2003  POC Glucose Once  PROCEDURE ONCE        Comments: Complete no more than 45 minutes prior to patient eating      12/27/23 2000 12/27/23 1800  insulin lispro (HUMALOG/ADMELOG) injection 10 Units  3 Times Daily With Meals         12/27/23 1538    12/27/23 1800  potassium chloride (K-DUR,KLOR-CON) ER tablet 40 mEq  Once          12/27/23 1712    12/27/23 1730  aPTT  Timed         12/27/23 1028    12/27/23 1730  insulin lispro (HUMALOG/ADMELOG) injection 2-9 Units  4 Times Daily Before Meals & Nightly         12/27/23 1538    12/27/23 1712  Potassium Replacement - Follow Nurse / BPA Driven Protocol  As Needed         12/27/23 1712    12/27/23 1712  Magnesium Standard Dose Replacement - Follow Nurse / BPA Driven Protocol  As Needed         12/27/23 1712    12/27/23 1527  POC Glucose Once  PROCEDURE ONCE        Comments: Complete no more than 45 minutes prior to patient eating      12/27/23 1516    12/27/23 1430  insulin glargine (LANTUS, SEMGLEE) injection 10 Units  Once,   Status:  Discontinued         12/27/23 1338    12/27/23 1102  POC Glucose Once  PROCEDURE ONCE        Comments: Complete no more than 45 minutes prior to patient eating      12/27/23 1059    12/27/23 1000  furosemide (LASIX) injection 80 mg  Once         12/27/23 0911    12/27/23 0945  perflutren (DEFINITY) 8.476 mg in Sodium Chloride (PF) 0.9 % 10 mL injection  Once in Imaging         12/27/23 0853    12/27/23 0924  POC Glucose Once  PROCEDURE ONCE        Comments: Complete no more than 45 minutes prior to patient eating      12/27/23 0923    12/27/23 0916  Diet: Cardiac Diets; Healthy Heart (2-3 Na+); Texture: Regular Texture (IDDSI 7); Fluid Consistency: Thin (IDDSI 0)  Diet Effective Now,   Status:  Canceled         12/27/23 0916    12/27/23 0915  Inpatient Internal Medicine Consult  Once        Specialty:  Internal Medicine  Provider:  J Carlos Briggs MD    12/27/23 0915    12/27/23 0900  busPIRone (BUSPAR) tablet 10 mg  Daily         12/26/23 2309 12/27/23 0900  cetirizine (zyrTEC) tablet 10 mg  Daily         12/26/23 2309 12/27/23 0900  sertraline (ZOLOFT) tablet 25 mg  Daily         12/26/23 2309 12/27/23 0900  aspirin EC tablet 81 mg  Daily         12/26/23 2311    12/27/23 0845  Hemoglobin A1c  Once         12/27/23 0845    12/27/23 0800  insulin  lispro (HUMALOG/ADMELOG) injection 15 Units  3 Times Daily With Meals,   Status:  Discontinued         12/26/23 2309 12/27/23 0800  Oral Care  2 Times Daily       12/26/23 2316 12/27/23 0730  insulin lispro (HUMALOG/ADMELOG) injection 3-14 Units  4 Times Daily Before Meals & Nightly,   Status:  Discontinued         12/26/23 2310 12/27/23 0700  POC Glucose 4x Daily Before Meals & at Bedtime  4 Times Daily Before Meals & at Bedtime      Comments: Complete no more than 45 minutes prior to patient eating      12/26/23 2310 12/27/23 0700  aPTT  Timed,   Status:  Canceled         12/27/23 0430    12/27/23 0630  aPTT  Timed         12/27/23 0608    12/27/23 0615  POC Glucose Once  PROCEDURE ONCE        Comments: Complete no more than 45 minutes prior to patient eating      12/27/23 0542    12/27/23 0600  pantoprazole (PROTONIX) EC tablet 40 mg  Every Early Morning         12/26/23 2309 12/27/23 0600  CBC Auto Differential  PROCEDURE ONCE        Comments: Discontinue After Heparin Stopped      12/26/23 2311 12/27/23 0600  Comprehensive Metabolic Panel  Morning Draw         12/26/23 2314 12/27/23 0600  High Sensitivity Troponin T  Morning Draw,   Status:  Canceled         12/26/23 2314 12/27/23 0600  XR Chest PA & Lateral  1 Time Imaging         12/26/23 2314 12/27/23 0600  Lipid Panel  Morning Draw         12/26/23 2338    12/27/23 0500  ECG 12 Lead Dyspnea  Tomorrow AM         12/26/23 2317    12/27/23 0430  aPTT  Daily      Comments: Cancel If Patient Has Infusion Changes      12/26/23 2311 12/27/23 0430  CBC & Differential  Daily      Comments: Discontinue After Heparin Stopped      12/26/23 2311 12/27/23 0430  CBC Auto Differential  PROCEDURE ONCE,   Status:  Canceled        Comments: Discontinue After Heparin Stopped      12/26/23 2311 12/27/23 0350  Amylase  Once         12/26/23 2313    12/27/23 0350  Lipase  Once         12/26/23 2313    12/27/23 0350  TSH  Once         12/26/23  2314 12/27/23 0350  High Sensitivity Troponin T 2Hr  PROCEDURE ONCE         12/27/23 0126    12/27/23 0015  furosemide (LASIX) injection 40 mg  Once         12/26/23 2315 12/27/23 0015  sodium chloride 0.9 % flush 10 mL  Every 12 Hours Scheduled         12/26/23 2316 12/27/23 0001  NPO Diet NPO Type: Sips with Meds  Diet Effective Midnight,   Status:  Canceled         12/26/23 2316 12/27/23 0000  DULoxetine (CYMBALTA) DR capsule 60 mg  2 Times Daily         12/26/23 2309 12/27/23 0000  heparin 58733 units/250 mL (100 units/mL) in 0.45 % NaCl infusion  Titrated         12/26/23 2311 12/27/23 0000  metoprolol succinate XL (TOPROL-XL) 24 hr tablet 12.5 mg  Every 24 Hours Scheduled         12/26/23 2312 12/27/23 0000  pravastatin (PRAVACHOL) tablet 20 mg  Nightly         12/26/23 2312 12/27/23 0000  Vital Signs  Every 4 Hours       12/26/23 2316 12/27/23 0000  Strict Intake & Output  Every Hour       12/26/23 2316 12/27/23 0000  Adult Transthoracic Echo Complete W/ Cont if Necessary Per Protocol  Once        Comments: PLEASE MAKE THIS ECHO PRIORITY IN AM ON 12/27/2023 12/26/23 2318 12/26/23 2319  Cardiac Rehab Evaluation and Enrollment  Once        Provider:  (Not yet assigned)    12/26/23 2318 12/26/23 2317  Daily Weights  Daily       12/26/23 2316 12/26/23 2317  Diet: Cardiac Diets, Diabetic Diets; Healthy Heart (2-3 Na+); Consistent Carbohydrate; Texture: Regular Texture (IDDSI 7); Fluid Consistency: Thin (IDDSI 0)  Diet Effective Now,   Status:  Canceled         12/26/23 2316 12/26/23 2317  Bowel Regimen Not Indicated  Once         12/26/23 2316 12/26/23 2317  Cardiac Monitoring  Continuous        Comments: Follow Standing Orders As Outlined in Process Instructions (Open Order Report to View Full Instructions)    12/26/23 2316 12/26/23 2317  ECG 12 Lead Dyspnea  Once         12/26/23 2317 12/26/23 2316  Insert Peripheral IV  Once         12/26/23 2154    12/26/23  2316  Saline Lock & Maintain IV Access  Continuous         12/26/23 2316 12/26/23 2316  Inpatient Admission  Once         12/26/23 2316 12/26/23 2316  VTE Prophylaxis Not Indicated: Other: Patient Currently Anticoagulated / Receiving Prophylaxis  Once         12/26/23 2316 12/26/23 2316  Activity - Bed Rest With Exceptions (Specify)  Until Discontinued         12/26/23 2316 12/26/23 2315  sodium chloride 0.9 % flush 10 mL  As Needed         12/26/23 2316 12/26/23 2315  sodium chloride 0.9 % infusion 40 mL  As Needed         12/26/23 2316 12/26/23 2313  High Sensitivity Troponin T  STAT         12/26/23 2312 12/26/23 2311  heparin (porcine) 5000 UNIT/ML injection 2,200-4,000 Units  Every 6 Hours PRN         12/26/23 2311 12/26/23 2311  Initiate & Follow Heparin Protocol  Continuous         12/26/23 2311 12/26/23 2311  Adjust Heparin Rate Based on aPTT Using Nomogram  Continuous         12/26/23 2311 12/26/23 2311  Verify All Anticoagulant Orders Are Discontinued Upon Initiation of Heparin Protocol (eg Enoxaparin, Fondaparinux, Apixaban, Dabigatran, Edoxaban, or Rivaroxaban)  Once         12/26/23 2311 12/26/23 2311  Protime-INR  STAT        Comments: Prior to Initial Heparin Bolus      12/26/23 2311 12/26/23 2311  aPTT  STAT        Comments: Prior to Initial Heparin Bolus      12/26/23 2311 12/26/23 2311  CBC & Differential  STAT        Comments: Prior to Initial Heparin Bolus      12/26/23 2311 12/26/23 2311  CBC Auto Differential  PROCEDURE ONCE        Comments: Prior to Initial Heparin Bolus      12/26/23 2311 12/26/23 2310  dextrose (GLUTOSE) oral gel 15 g  Every 15 Minutes PRN         12/26/23 2310 12/26/23 2310  dextrose (D50W) (25 g/50 mL) IV injection 25 g  Every 15 Minutes PRN         12/26/23 2310 12/26/23 2310  glucagon (GLUCAGEN) injection 1 mg  Every 15 Minutes PRN         12/26/23 2310    Unscheduled  Follow Hypoglycemia Standing Orders For Blood  Glucose <70 & Notify Provider of Treatment  As Needed      Comments: Follow Hypoglycemia Orders As Outlined in Process Instructions (Open Order Report to View Full Instructions)  Notify Provider Any Time Hypoglycemia Treatment is Administered    12/26/23 2310    Unscheduled  aPTT  As Needed       12/26/23 2311    Unscheduled  RN To Release aPTT Order 6 Hours After Heparin Bolus & 6 Hours After Any Heparin Rate Change  As Needed       12/26/23 2311    --  sertraline (ZOLOFT) 50 MG tablet  Daily         12/26/23 2232    --  SCANNED - TELEMETRY           12/26/23 0000    --  SCANNED - TELEMETRY           12/26/23 0000    --  SCANNED - TELEMETRY           12/26/23 0000    --  SCANNED - TELEMETRY           12/26/23 0000    --  SCANNED - TELEMETRY           12/26/23 0000    --  SCANNED - TELEMETRY           12/26/23 0000                     Physician Progress Notes         Billy Esquivel MD at 12/27/23 1701           LOS: 1 day   Patient Care Team:  Heavenly Monsivais MD as PCP - General (Family Medicine)  Hesham Cheney MD as Consulting Physician (Endocrinology)    Chief Complaint: Follow-up NSTEMI versus late presentation anteroseptal infarct, acute systolic CHF, new cardiomyopathy.    Interval History: No further epigastric discomfort.  Her shortness of breath has improved, although she still has significant rales on exam.  Her ejection fraction was approximately 20% on her echocardiogram.    Vital Signs:  Temp:  [97.5 °F (36.4 °C)-98.2 °F (36.8 °C)] 97.5 °F (36.4 °C)  Heart Rate:  [] 93  Resp:  [16-20] 16  BP: ()/(52-75) 94/52    Intake/Output Summary (Last 24 hours) at 12/27/2023 1702  Last data filed at 12/27/2023 1519  Gross per 24 hour   Intake 720 ml   Output 1750 ml   Net -1030 ml       Physical Exam:   General Appearance:    No acute distress, alert and oriented x4   Lungs:     Prominent bilateral rales.    Heart:    Regular rhythm and normal rate.  No murmurs, gallops, or   rubs.    Abdomen:     Soft, nontender, nondistended.    Extremities:   Trace edema of the lower extremities.     Results Review:    Results from last 7 days   Lab Units 12/27/23 0922   SODIUM mmol/L 138   POTASSIUM mmol/L 3.3*   CHLORIDE mmol/L 101   CO2 mmol/L 26.0   BUN mg/dL 16   CREATININE mg/dL 0.69   GLUCOSE mg/dL 334*   CALCIUM mg/dL 9.3     Results from last 7 days   Lab Units 12/27/23 0922 12/27/23 0052   HSTROP T ng/L 229* 208*     Results from last 7 days   Lab Units 12/27/23 0922   WBC 10*3/mm3 9.64   HEMOGLOBIN g/dL 12.9   HEMATOCRIT % 38.9   PLATELETS 10*3/mm3 284     Results from last 7 days   Lab Units 12/27/23 0922 12/27/23 0052   INR   --  1.17*   APTT seconds 36.9* 28.2     Results from last 7 days   Lab Units 12/27/23 0922   CHOLESTEROL mg/dL 199         Results from last 7 days   Lab Units 12/27/23 0922   CHOLESTEROL mg/dL 199   TRIGLYCERIDES mg/dL 207*   HDL CHOL mg/dL 31*   LDL CHOL mg/dL 131*       I reviewed the patient's new clinical results.        Assessment:  1.  Type I NSTEMI versus late presentation LAD distribution infarct  2.  Acute systolic CHF  3.  New cardiomyopathy, likely ischemic - EF 20% (akinesis of the septum, apex, mid to distal anterior wall, and mid to distal lateral wall)   4.  Insulin-dependent diabetes since 2014, with neuropathy  5.  Moderate right pleural effusion a CT of the chest  6.  Sinus tachycardia  7.  Tobacco use, quit on 12/18/2023  8.  Nonspecific right lower lobe groundglass opacity and left apical opacity on CT (follow-up CT of the chest recommended in 3 months)   9.  Right adrenal adenoma by CT of the abdomen  10.  Dyslipidemia   11.  Statin intolerance (myalgias)  12.  Gastroesophageal reflux disease  13.  Hypertension    Plan:  -I had a long and detailed discussion with her earlier today.  Unfortunately, her echocardiogram showed multiple akinetic segments with an EF of 20%.  I suspect that this is going to be an ischemic cardiomyopathy.    -The  distribution of segments would point to an LAD infarct.  I am concerned based on her echo and EKG that she actually infarcted approximately 1 week ago when her symptoms first started, and now has significant congestive heart failure.    -I am going to hold on a heart catheterization today as she is not having unstable symptoms.  She still has prominent rales on exam, and I feel she needs more diuresis before the heart catheterization.  I am going to give her 80 mg of IV Lasix one-time today.    -Plan tentatively for left heart catheterization tomorrow morning if her renal function is stable and she is able to lie flat.  Continue heparin per ACS protocol until called to the cath lab.    -Continue Toprol-XL 12.5 mg/day as her blood pressure tolerates.  She cannot be on an ACE inhibitor, ARB, Entresto, spironolactone, or SGLT2 inhibitor for now given lower blood pressure.    -Continue aspirin.  She has had severe myalgias with statins in the past.  I started her on pravastatin 20 mg/day.  Hopefully, she can tolerate this.    -Appreciate Dr. Julien seeing the patient and assisting with glucose management.    Billy Esquivel MD  12/27/23  17:02 EST         Electronically signed by Billy Esquivel MD at 12/27/23 1711          Consult Notes (last 72 hours)        Jarrett Julien MD at 12/27/23 1226       Consult Orders    1. Inpatient Internal Medicine Consult [[id number]] ordered by Billy Esquivel MD                     Patient Name:  Jade Clement  YOB: 1970  Patient Care Team:  Heavenly Monsivais MD as PCP - General (Family Medicine)  Hesham Cheney MD as Consulting Physician (Endocrinology)    Date of Admission:  12/26/2023  Date of Consult:  12/27/2023    Inpatient Internal Medicine Consult  Consult performed by: Jarrett Julien MD  Consult ordered by: Billy Esquivel MD  Reason for consult: Evaluate status and make recommendations regarding treatment for  diabetes.          Subjective   History of Present Illness  Ms. Clement is a 53 y.o. female that has been admitted to [facility] due to cardiac issues.  She has been admitted by Billy Esquivel MD and we were asked to see and assist with her medical problems, specifically relating to her diabetes. She came in because of what she thought was a GI bug.  She has had nausea, vomiting and even diarrhea the last few days.  Decreased appetite.  On arrival was found to have elevated troponin and abnormal EKG and has been admitted by cardiology for further evaluation of acute MI.  We were asked to see and assist with her diabetes.  She is on insulin at home but states since she has not been eating very much she has not been taking her insulin as prescribed.  Generally she reports compliance and denies any complications related to her diabetes.  She follows Dr. phan with endocrinology.  She reports her last A1c was 6.8% just a few months ago.      Past Medical History:   Diagnosis Date    Adrenal adenoma     Anxiety     Diabetes mellitus     Diarrhea     GERD (gastroesophageal reflux disease)     Gestational diabetes     Head ache     Hyperlipidemia     Hypertension     Kidney stone     Nausea     Type 2 diabetes mellitus     Urinary tract infection     Frequently    Varicella Unknown    Vitamin D deficiency      Past Surgical History:   Procedure Laterality Date     SECTION       SECTION WITH TUBAL  2001    CHOLECYSTECTOMY      COLONOSCOPY  05/10/2019    Grzegorz ABEL    CYSTOSCOPY      ENDOSCOPY  2020    ENDOSCOPY N/A 2021    Procedure: ESOPHAGOGASTRODUODENOSCOPY WITH COLD BIOPSIES;  Surgeon: Jasson Nguyen MD;  Location: [facility] ENDOSCOPY;  Service: Gastroenterology;  Laterality: N/A;  PRE- ABD PAIN  POST- NORMAL    LAPAROSCOPIC CHOLECYSTECTOMY  2016    TUBAL ABDOMINAL LIGATION  2001    UPPER GASTROINTESTINAL ENDOSCOPY  2020    Grzegorz ABEL gastritis,  ulcers    UPPER GASTROINTESTINAL ENDOSCOPY  2020    Andrew ABEL     Family History   Problem Relation Age of Onset    Thyroid disease Mother         goiter    Hyperlipidemia Mother     ALS Mother     Heart disease Father     Stroke Father     Hypertension Father     Drug abuse Brother     Diabetes Paternal Grandfather     Diabetes Paternal Aunt     Diabetes Paternal Uncle     Malig Hyperthermia Neg Hx      Social History     Tobacco Use    Smoking status: Former     Packs/day: 0.50     Years: 25.00     Additional pack years: 0.00     Total pack years: 12.50     Types: Cigarettes     Start date: 1/10/1985     Quit date: 2023     Years since quittin.0    Smokeless tobacco: Never   Vaping Use    Vaping Use: Never used   Substance Use Topics    Alcohol use: Yes     Comment: Only drink about 2 times a year    Drug use: Yes     Types: Marijuana     Comment: daily     Facility-Administered Medications Prior to Admission   Medication Dose Route Frequency Provider Last Rate Last Admin    insulin lispro (humaLOG) injection 15 Units  15 Units Subcutaneous TID With Meals Jolene Martin APRN         Medications Prior to Admission   Medication Sig Dispense Refill Last Dose    cetirizine (ZyrTEC) 10 MG tablet Take 1 tablet by mouth Daily.   Past Week    Cymbalta 60 MG capsule Take 1 capsule by mouth 2 (Two) Times a Day.   Past Week    ergocalciferol (ERGOCALCIFEROL) 1.25 MG (93680 UT) capsule Take 1 capsule by mouth 1 (One) Time Per Week. 12 capsule 1 Past Week    hydrochlorothiazide (HYDRODIURIL) 25 MG tablet Take 1 tablet by mouth Daily.  4 Past Week    Insulin Degludec (Tresiba FlexTouch) 200 UNIT/ML solution pen-injector pen injection 36 units every evening.  Prime needle with 4 units before each use. 18 mL 2 Past Week    Insulin Lispro, 1 Unit Dial, (HumaLOG KwikPen) 100 UNIT/ML solution pen-injector 12 units 3 times daily with each meal.  Prime needle with 2 units before each use. (Patient taking differently:  12 units 3 times daily with each meal.  Prime needle with 2 units before each use.    Patient currently not takign this, only does sliding scale) 38 mL 2 Patient Taking Differently    omeprazole (priLOSEC) 20 MG capsule Take 1 capsule by mouth Daily.   Past Week    ondansetron ODT (ZOFRAN-ODT) 4 MG disintegrating tablet    12/25/2023    sertraline (ZOLOFT) 50 MG tablet Take 0.5 tablets by mouth Daily.   Past Week    aspirin-acetaminophen-caffeine (EXCEDRIN MIGRAINE) 250-250-65 MG per tablet Take 1 tablet by mouth Every 8 (Eight) Hours As Needed.   12/23/2023    busPIRone (BUSPAR) 10 MG tablet Take 1 tablet by mouth Daily.   More than a month    Continuous Blood Gluc Sensor (FreeStyle Balwinder 3 Sensor) misc 1 each Every 14 (Fourteen) Days. 6 each 1     Insulin Pen Needle (BD PEN NEEDLE PAUL U/F) 32G X 4 MM misc Use to inject insulin 2 times daily 300 each 1      Allergies:  Metformin, Ozempic [semaglutide], Bydureon [exenatide], Statins, and Xigduo xr [dapagliflozin pro-metformin er]    Review of Systems   Constitutional: Negative.    HENT: Negative.     Respiratory: Negative.     Cardiovascular: Negative.    Gastrointestinal: Negative.    Endocrine: Negative.    Genitourinary: Negative.    Musculoskeletal: Negative.    Skin: Negative.    Neurological: Negative.    Hematological: Negative.    Psychiatric/Behavioral: Negative.         Objective      Vital Signs  Temp:  [97.5 °F (36.4 °C)-98.2 °F (36.8 °C)] 98 °F (36.7 °C)  Heart Rate:  [101-117] 107  Resp:  [16-20] 16  BP: ()/(63-75) 96/74  Body mass index is 27.12 kg/m².    Physical Exam  Vitals and nursing note reviewed.   Eyes:      General: No scleral icterus.  Cardiovascular:      Rate and Rhythm: Normal rate and regular rhythm.   Pulmonary:      Effort: Pulmonary effort is normal.      Breath sounds: Normal breath sounds.   Abdominal:      General: Bowel sounds are normal.      Palpations: Abdomen is soft.      Tenderness: There is no abdominal tenderness.    Skin:     General: Skin is warm and dry.      Findings: No rash.   Neurological:      Mental Status: She is alert. Mental status is at baseline.         Results Review:   I reviewed the patient's new clinical results.  I reviewed the patient's new imaging results and agree with the interpretation.  I reviewed the patient's other test results and agree with the interpretation  I personally viewed and interpreted the patient's EKG/Telemetry data  Hemoglobin A1C   Date/Time Value Ref Range Status   12/27/2023 0052 8.10 (H) 4.80 - 5.60 % Final     Glucose   Date/Time Value Ref Range Status   12/27/2023 1059 361 (H) 70 - 130 mg/dL Final   12/27/2023 0923 317 (H) 70 - 130 mg/dL Final   12/27/2023 0542 351 (H) 70 - 130 mg/dL Final           Active Hospital Problems    Diagnosis  POA    **ACS (acute coronary syndrome) [I24.9]  Yes    Type 2 diabetes mellitus with complications [E11.8]  Yes    Type 2 diabetes mellitus with microalbuminuria, with long-term current use of insulin [E11.29, R80.9, Z79.4]  Not Applicable         She has been restarted on her home diabetic regimen.  Given there is no long-acting insulin ordered till this evening we will change her home basal unit from 36 units in the evening to 18 mg twice daily and give 10 units now.  She will be n.p.o. in the morning.  Blood sugars up now likely due to stress of illness along with poor recent compliance with her insulin.  Will provide correctional factor insulin.  Will make adjustments to this regimen as needed.  Note plans for heart catheterization tomorrow.      Thank you very much for asking [facility] to be involved in this patient's care. We will follow along with you.      Jarrett Julien MD  [facility]  12/27/23  13:35 EST    ADDENDUM  Subsequent blood sugar came back at 85.  Will hold on the one-time dose Lantus this afternoon.  Decrease mealtime and correctional factor insulin.    Electronically signed by Jarrett Julien MD, 12/27/23, 3:39  PM EST.      Electronically signed by Jarrett Julien MD at 12/27/23 5677

## 2023-12-28 NOTE — PROGRESS NOTES
Name: Jade Clement ADMIT: 2023   : 1970  PCP: Heavenly Monsivais MD    MRN: [id number] LOS: 2 days   AGE/SEX: 53 y.o. female  ROOM: [facility]     Subjective   Subjective   Doing okay.  No new complaints.  Not the best of appetite so far today.     Objective   Objective   Vital Signs  Temp:  [97.4 °F (36.3 °C)-98.7 °F (37.1 °C)] 97.8 °F (36.6 °C)  Heart Rate:  [] 89  Resp:  [12-25] 24  BP: ()/(61-78) 92/61  FiO2 (%):  [88 %] 88 %  SpO2:  [87 %-98 %] 96 %  on  Flow (L/min):  [3] 3;   Device (Oxygen Therapy): room air  Body mass index is 26.68 kg/m².  Physical Exam  Vitals and nursing note reviewed.   Constitutional:       Appearance: Normal appearance.   Pulmonary:      Effort: Pulmonary effort is normal.   Neurological:      Mental Status: She is alert. Mental status is at baseline.   Psychiatric:         Mood and Affect: Mood normal.         Results Review:       I reviewed the patient's new clinical results.  Results from last 7 days   Lab Units 23  03323  0052   WBC 10*3/mm3 11.86* 9.64 10.06   HEMOGLOBIN g/dL 13.4 12.9 12.8   PLATELETS 10*3/mm3 289 284 261     Results from last 7 days   Lab Units 23  0330 23  09   SODIUM mmol/L 137 138   POTASSIUM mmol/L 3.9 3.3*   CHLORIDE mmol/L 102 101   CO2 mmol/L 24.9 26.0   BUN mg/dL 18 16   CREATININE mg/dL 0.69 0.69   GLUCOSE mg/dL 207* 334*   EGFR mL/min/1.73 102.6 102.6     Results from last 7 days   Lab Units 23  09   ALBUMIN g/dL 3.8   BILIRUBIN mg/dL 0.4   ALK PHOS U/L 85   AST (SGOT) U/L 29   ALT (SGPT) U/L 55*     Results from last 7 days   Lab Units 23  0330 23  0922   CALCIUM mg/dL 9.1 9.3   ALBUMIN g/dL  --  3.8   MAGNESIUM mg/dL 1.8  --        Hemoglobin A1C   Date/Time Value Ref Range Status   2023 0052 8.10 (H) 4.80 - 5.60 % Final     Glucose   Date/Time Value Ref Range Status   2023 1538 176 (H) 70 - 130 mg/dL Final   2023 1213 136 (H) 70 - 130 mg/dL  Final   12/28/2023 0642 216 (H) 70 - 130 mg/dL Final   12/28/2023 0419 250 (H) 70 - 130 mg/dL Final   12/27/2023 2000 289 (H) 70 - 130 mg/dL Final   12/27/2023 1516 85 70 - 130 mg/dL Final   12/27/2023 1059 361 (H) 70 - 130 mg/dL Final       I have personally reviewed all medications:  Scheduled Medications  aspirin, 81 mg, Oral, Daily  busPIRone, 10 mg, Oral, Daily  [START ON 1/3/2024] ceFAZolin, 2,000 mg, Intravenous, Once  cetirizine, 10 mg, Oral, Daily  [START ON 1/3/2024] chlorhexidine, 15 mL, Mouth/Throat, Once  DULoxetine, 60 mg, Oral, BID  furosemide, 40 mg, Intravenous, Once  insulin glargine, 18 Units, Subcutaneous, BID  insulin lispro, 10 Units, Subcutaneous, TID With Meals  insulin lispro, 2-9 Units, Subcutaneous, 4x Daily AC & at Bedtime  metoprolol succinate XL, 12.5 mg, Oral, Q24H  [START ON 1/3/2024] metoprolol tartrate, 12.5 mg, Oral, On Call to OR  [START ON 1/2/2024] mupirocin, 1 application , Each Nare, Q12H  pantoprazole, 40 mg, Oral, Q AM  pravastatin, 20 mg, Oral, Nightly  sertraline, 25 mg, Oral, Daily  sodium chloride, 10 mL, Intravenous, Q12H  sodium chloride, 10 mL, Intravenous, Q12H    Infusions   Diet  Diet: Cardiac Diets, Diabetic Diets; Healthy Heart (2-3 Na+); Consistent Carbohydrate; Texture: Regular Texture (IDDSI 7); Fluid Consistency: Thin (IDDSI 0)  NPO Diet NPO Type: Sips with Meds    I have personally reviewed:  [x]  Laboratory   [x]  Microbiology   [x]  Radiology   []  EKG/Telemetry  []  Cardiology/Vascular   []  Pathology    []  Records       Assessment/Plan     Active Hospital Problems    Diagnosis  POA    **ACS (acute coronary syndrome) [I24.9]  Yes    Type 2 diabetes mellitus with complications [E11.8]  Yes    Type 2 diabetes mellitus with microalbuminuria, with long-term current use of insulin [E11.29, R80.9, Z79.4]  Not Applicable      Resolved Hospital Problems   No resolved problems to display.       53 y.o. female who is s/p Left Heart Cath, Coronary  angiography.    Blood sugars so far today acceptable. Morning dose of Lantus was held due to her n.p.o. status.  She is essentially on her home regimen except the Lantus is split to twice daily dosing.  Will see how her blood sugars do before deciding on making any further adjustments.  Note plans for CABG next week.      Jarrett Julien MD  [facility]  12/28/23  16:11 EST

## 2023-12-28 NOTE — CONSULTS
Patient Care Team:  Heavenly Monsivais MD as PCP - General (Family Medicine)  Hesham Cheney MD as Consulting Physician (Endocrinology)    Chief complaint: Dyspnea on exertion     Subjective     History of Present Illness:   Ms. Clement is a 53 year-old female with PMH of HTN, HLD, DM type 2, and GERD who presented to the ED at [facility] in [place], [place] on 12/26/23 with a chief complaint of dyspnea on exertion. Patient states a week ago she had an episode of epigastric pain, but attributed it to indigestion. Over the next several hours she became short of breath and then developed diarrhea and vomiting. The vomiting and diarrhea lasted for about 2 days and then resolved, but the dyspnea on exertion persisted. She was seen by her PCP and diagnosed with a potential peptic ulcer. Her dyspnea on exertion progressed and worsened to the point that she could barely walk more than a few feet before having to stop and rest. She also reports orthopnea and a 10 pound weight gain over the course of a week. In the ED her troponin and proBNP were found to be elevated and she was diagnosed with a NSTEMI and congestive heart failure. She was treated start on Aspirin, Lasix, and a Heparin drip and transferred to [facility] for further evaluation and treatment. Transthoracic echocardiogram on 12/27/23 revealed EF 20%. Cardiac catheterization performed today revealed severe 3 vessel CAD. Cardiac Surgery has been consulted for surgical evaluation.      Review of Systems   Constitutional:  Positive for activity change, appetite change and unexpected weight change.   Respiratory:  Positive for shortness of breath.    Cardiovascular:  Positive for chest pain (epigastric).        Orthopnea   Gastrointestinal:  Positive for diarrhea, nausea and vomiting.   All other systems reviewed and are negative.       Past Medical History:   Diagnosis Date    Adrenal adenoma     Anxiety     Diabetes mellitus      Diarrhea     GERD (gastroesophageal reflux disease)     Gestational diabetes     Head ache     Hyperlipidemia     Hypertension     Kidney stone     Nausea     Type 2 diabetes mellitus     Urinary tract infection     Frequently    Varicella Unknown    Vitamin D deficiency      Past Surgical History:   Procedure Laterality Date     SECTION       SECTION WITH TUBAL  2001    CHOLECYSTECTOMY      COLONOSCOPY  05/10/2019    Grzegorz ABEL    CYSTOSCOPY      ENDOSCOPY  2020    ENDOSCOPY N/A 2021    Procedure: ESOPHAGOGASTRODUODENOSCOPY WITH COLD BIOPSIES;  Surgeon: Jasson Nguyen MD;  Location: [facility] ENDOSCOPY;  Service: Gastroenterology;  Laterality: N/A;  PRE- ABD PAIN  POST- NORMAL    LAPAROSCOPIC CHOLECYSTECTOMY  2016    TUBAL ABDOMINAL LIGATION  2001    UPPER GASTROINTESTINAL ENDOSCOPY  2020    Grzegorz ABEL gastritis, ulcers    UPPER GASTROINTESTINAL ENDOSCOPY  2020    Andrew ABEL     Family History   Problem Relation Age of Onset    Thyroid disease Mother         goiter    Hyperlipidemia Mother     ALS Mother     Heart disease Father     Stroke Father     Hypertension Father     Drug abuse Brother     Diabetes Paternal Grandfather     Diabetes Paternal Aunt     Diabetes Paternal Uncle     Malig Hyperthermia Neg Hx      Social History     Tobacco Use    Smoking status: Former     Packs/day: 0.50     Years: 25.00     Additional pack years: 0.00     Total pack years: 12.50     Types: Cigarettes     Start date: 1/10/1985     Quit date: 2023     Years since quittin.0    Smokeless tobacco: Never   Vaping Use    Vaping Use: Never used   Substance Use Topics    Alcohol use: Yes     Comment: Only drink about 2 times a year    Drug use: Yes     Types: Marijuana     Comment: daily     Facility-Administered Medications Prior to Admission   Medication Dose Route Frequency Provider Last Rate Last Admin    insulin lispro (humaLOG) injection 15 Units  15 Units  Subcutaneous TID With Meals Bryanna Martina, KEVIN         Medications Prior to Admission   Medication Sig Dispense Refill Last Dose    cetirizine (ZyrTEC) 10 MG tablet Take 1 tablet by mouth Daily.   Past Week    Cymbalta 60 MG capsule Take 1 capsule by mouth 2 (Two) Times a Day.   Past Week    ergocalciferol (ERGOCALCIFEROL) 1.25 MG (18587 UT) capsule Take 1 capsule by mouth 1 (One) Time Per Week. 12 capsule 1 Past Week    hydrochlorothiazide (HYDRODIURIL) 25 MG tablet Take 1 tablet by mouth Daily.  4 Past Week    Insulin Degludec (Tresiba FlexTouch) 200 UNIT/ML solution pen-injector pen injection 36 units every evening.  Prime needle with 4 units before each use. 18 mL 2 Past Week    Insulin Lispro, 1 Unit Dial, (HumaLOG KwikPen) 100 UNIT/ML solution pen-injector 12 units 3 times daily with each meal.  Prime needle with 2 units before each use. (Patient taking differently: 12 units 3 times daily with each meal.  Prime needle with 2 units before each use.    Patient currently not takign this, only does sliding scale) 38 mL 2 Patient Taking Differently    omeprazole (priLOSEC) 20 MG capsule Take 1 capsule by mouth Daily.   Past Week    ondansetron ODT (ZOFRAN-ODT) 4 MG disintegrating tablet    12/25/2023    sertraline (ZOLOFT) 50 MG tablet Take 0.5 tablets by mouth Daily.   Past Week    aspirin-acetaminophen-caffeine (EXCEDRIN MIGRAINE) 250-250-65 MG per tablet Take 1 tablet by mouth Every 8 (Eight) Hours As Needed.   12/23/2023    busPIRone (BUSPAR) 10 MG tablet Take 1 tablet by mouth Daily.   More than a month    Continuous Blood Gluc Sensor (FreeStyle Balwinder 3 Sensor) misc 1 each Every 14 (Fourteen) Days. 6 each 1     Insulin Pen Needle (BD PEN NEEDLE PAUL U/F) 32G X 4 MM misc Use to inject insulin 2 times daily 300 each 1      [MAR Hold] aspirin, 81 mg, Oral, Daily  [MAR Hold] busPIRone, 10 mg, Oral, Daily  [MAR Hold] cetirizine, 10 mg, Oral, Daily  [MAR Hold] DULoxetine, 60 mg, Oral, BID  [MAR Hold] insulin  "glargine, 18 Units, Subcutaneous, BID  [MAR Hold] insulin lispro, 10 Units, Subcutaneous, TID With Meals  [MAR Hold] insulin lispro, 2-9 Units, Subcutaneous, 4x Daily AC & at Bedtime  metoprolol succinate XL, 12.5 mg, Oral, Q24H  [MAR Hold] pantoprazole, 40 mg, Oral, Q AM  [MAR Hold] pravastatin, 20 mg, Oral, Nightly  [MAR Hold] sertraline, 25 mg, Oral, Daily  [MAR Hold] sodium chloride, 10 mL, Intravenous, Q12H      Allergies:  Metformin, Ozempic [semaglutide], Bydureon [exenatide], Statins, and Xigduo xr [dapagliflozin pro-metformin er]    Objective      Vital Signs  Temp:  [97.4 °F (36.3 °C)-98.7 °F (37.1 °C)] 98.7 °F (37.1 °C)  Heart Rate:  [] 102  Resp:  [12-25] 24  BP: ()/(52-73) 100/73  FiO2 (%):  [88 %] 88 %    Flowsheet Rows      Flowsheet Row First Filed Value   Admission Height 162.6 cm (64\") Documented at 12/26/2023 2215   Admission Weight 71.8 kg (158 lb 3.2 oz) Documented at 12/26/2023 2215          162.6 cm (64\")    Physical Exam  Vitals and nursing note reviewed.   Constitutional:       General: She is not in acute distress.     Appearance: Normal appearance. She is normal weight. She is not ill-appearing, toxic-appearing or diaphoretic.   HENT:      Head: Normocephalic and atraumatic.      Nose: Nose normal.      Mouth/Throat:      Mouth: Mucous membranes are moist.      Pharynx: Oropharynx is clear.   Eyes:      Extraocular Movements: Extraocular movements intact.      Conjunctiva/sclera: Conjunctivae normal.      Pupils: Pupils are equal, round, and reactive to light.   Cardiovascular:      Rate and Rhythm: Normal rate and regular rhythm.      Heart sounds: Normal heart sounds. No murmur heard.  Pulmonary:      Effort: Pulmonary effort is normal. No respiratory distress.      Breath sounds: Rales present. No wheezing.   Abdominal:      General: Bowel sounds are normal.      Palpations: Abdomen is soft.   Musculoskeletal:         General: Normal range of motion.      Cervical back: " Normal range of motion and neck supple.      Right lower leg: No edema.      Left lower leg: No edema.   Skin:     General: Skin is warm and dry.   Neurological:      General: No focal deficit present.      Mental Status: She is alert and oriented to person, place, and time.   Psychiatric:         Mood and Affect: Mood normal.         Behavior: Behavior normal.         Thought Content: Thought content normal.         Judgment: Judgment normal.         Results Review:   Lab Results (last 24 hours)       Procedure Component Value Units Date/Time    POC Glucose Once [[id number]]  (Abnormal) Collected: 12/28/23 1213    Specimen: Blood Updated: 12/28/23 1215     Glucose 136 mg/dL     aPTT [[id number]]  (Abnormal) Collected: 12/28/23 1134    Specimen: Blood from Arm, Right Updated: 12/28/23 1209     PTT 49.1 seconds     POC Glucose Once [[id number]]  (Abnormal) Collected: 12/28/23 0642    Specimen: Blood Updated: 12/28/23 0643     Glucose 216 mg/dL     POC Glucose Once [[id number]]  (Abnormal) Collected: 12/28/23 0419    Specimen: Blood Updated: 12/28/23 0420     Glucose 250 mg/dL     aPTT [[id number]]  (Abnormal) Collected: 12/28/23 0330    Specimen: Blood Updated: 12/28/23 0413     PTT 61.4 seconds     Basic Metabolic Panel [[id number]]  (Abnormal) Collected: 12/28/23 0330    Specimen: Blood Updated: 12/28/23 0405     Glucose 207 mg/dL      BUN 18 mg/dL      Creatinine 0.69 mg/dL      Sodium 137 mmol/L      Potassium 3.9 mmol/L      Chloride 102 mmol/L      CO2 24.9 mmol/L      Calcium 9.1 mg/dL      BUN/Creatinine Ratio 26.1     Anion Gap 10.1 mmol/L      eGFR 102.6 mL/min/1.73     Narrative:      GFR Normal >60  Chronic Kidney Disease <60  Kidney Failure <15      Magnesium [[id number]]  (Normal) Collected: 12/28/23 0330    Specimen: Blood Updated: 12/28/23 0405     Magnesium 1.8 mg/dL     CBC & Differential [[id number]]  (Abnormal) Collected: 12/28/23 0330    Specimen: Blood Updated: 12/28/23 0350    Narrative:      The  following orders were created for panel order CBC & Differential.  Procedure                               Abnormality         Status                     ---------                               -----------         ------                     CBC Auto Differential[[id number]]        Abnormal            Final result                 Please view results for these tests on the individual orders.    CBC Auto Differential [[id number]]  (Abnormal) Collected: 12/28/23 0330    Specimen: Blood Updated: 12/28/23 0350     WBC 11.86 10*3/mm3      RBC 4.12 10*6/mm3      Hemoglobin 13.4 g/dL      Hematocrit 39.1 %      MCV 94.9 fL      MCH 32.5 pg      MCHC 34.3 g/dL      RDW 11.9 %      RDW-SD 41.0 fl      MPV 9.8 fL      Platelets 289 10*3/mm3      Neutrophil % 54.2 %      Lymphocyte % 37.6 %      Monocyte % 5.4 %      Eosinophil % 1.9 %      Basophil % 0.6 %      Immature Grans % 0.3 %      Neutrophils, Absolute 6.43 10*3/mm3      Lymphocytes, Absolute 4.46 10*3/mm3      Monocytes, Absolute 0.64 10*3/mm3      Eosinophils, Absolute 0.22 10*3/mm3      Basophils, Absolute 0.07 10*3/mm3      Immature Grans, Absolute 0.04 10*3/mm3      nRBC 0.0 /100 WBC     POC Glucose Once [[id number]]  (Abnormal) Collected: 12/27/23 2000    Specimen: Blood Updated: 12/27/23 2002     Glucose 289 mg/dL     aPTT [[id number]]  (Abnormal) Collected: 12/27/23 1725    Specimen: Blood Updated: 12/27/23 1909     PTT 65.5 seconds     POC Glucose Once [[id number]]  (Normal) Collected: 12/27/23 1516    Specimen: Blood Updated: 12/27/23 1526     Glucose 85 mg/dL           CARDIAC CATHETERIZATION 12/28/23:  1.  Severe multivessel coronary artery disease:  *80% ostial and 90% mid LAD stenosis  *80% ostial OM1 stenosis  *95% proximal mid RCA stenosis with left-to-right collateral filling  2.  Ischemic cardiomyopathy  3.  Elevated left ventricular filling pressures with an LVEDP of 28 to 30 mmHg    TRANSTHORACIC ECHOCARDIOGRAM 12/27/23:    The left ventricular cavity is  mildly dilated.    There is akinesis of the entire septum, the apex, mid to distal anterior wall, and mid to distal lateral wall    Left ventricular systolic function is severely decreased. Estimated left ventricular EF = 20%    There is a trivial pericardial effusion.    There is a small left pleural effusion.    Left ventricular diastolic function was indeterminate    Normal right ventricular cavity size and systolic function noted.    The left atrial cavity is mildly dilated.    Mild mitral valve regurgitation is present    Mild to moderate tricuspid valve regurgitation is present.    Calculated right ventricular systolic pressure from tricuspid regurgitation is 41 mmHg.    There is a trivial pericardial effusion. There is a small left pleural effusion.         Assessment & Plan    Severe 3 vessel CAD  NSTEMI  Ischemic cardiomyopathy---EF 20%  Mild MR  Mild to moderate TR  HTN  HLD  Insulin dependent DM type 2  GERD  Recent tobacco cessation---quit 12/18/23  Daily marijuana use  Right adrenal adenoma       Work-up ongoing for CABG/possible MVR/possible TVR. Plan for surgery next Wednesday to allow time to medically optimize. Will check carotid duplex and vein mapping.       JACLYN Escobedo  12/28/23  12:56 EST

## 2023-12-28 NOTE — PLAN OF CARE
Goal Outcome Evaluation:  Plan of Care Reviewed With: patient           Outcome Evaluation: Patient with no c/o pain this pm. Patient b/p 's/70's -110's remains SR/ST. Patient Heparin infusing at 16 units am PTT was 61.4 heparin increased and PTT scheduled for 1119 am. Will continue to monitor and await discharge plans.       Diuretic in Use: lasix 80mg one time dose   Response to Diuretics (Output greater than intake): yes  Daily Weight (up or down): down   O2 Requirements: room air   Functional Status (Activity level, tolerance and respiratory symptoms): no c/o shortness of air   Discharge Plans: TBD await discharge plans.

## 2023-12-29 ENCOUNTER — APPOINTMENT (OUTPATIENT)
Dept: CARDIOLOGY | Facility: HOSPITAL | Age: 53
End: 2023-12-29
Payer: COMMERCIAL

## 2023-12-29 LAB
ALBUMIN SERPL-MCNC: 3.9 G/DL (ref 3.5–5.2)
ALBUMIN/GLOB SERPL: 1.8 G/DL
ALP SERPL-CCNC: 90 U/L (ref 39–117)
ALT SERPL W P-5'-P-CCNC: 41 U/L (ref 1–33)
ANION GAP SERPL CALCULATED.3IONS-SCNC: 11 MMOL/L (ref 5–15)
APTT PPP: 25.6 SECONDS (ref 22.7–35.4)
AST SERPL-CCNC: 20 U/L (ref 1–32)
BACTERIA UR QL AUTO: ABNORMAL /HPF
BASOPHILS # BLD AUTO: 0.06 10*3/MM3 (ref 0–0.2)
BASOPHILS NFR BLD AUTO: 0.5 % (ref 0–1.5)
BH CV XLRA MEAS - DIST GSV CALF DIST LEFT: 0.07 CM
BH CV XLRA MEAS - DIST GSV CALF DIST RIGHT: 0.12 CM
BH CV XLRA MEAS - DIST GSV THIGH DIST LEFT: 0.21 CM
BH CV XLRA MEAS - DIST GSV THIGH DIST RIGHT: 0.28 CM
BH CV XLRA MEAS - DIST LSV CALF DIST LEFT: 0.11 CM
BH CV XLRA MEAS - DIST LSV CALF DIST RIGHT: 0.15 CM
BH CV XLRA MEAS - GSV ANKLE DIST LEFT: 0.1 CM
BH CV XLRA MEAS - GSV ANKLE DIST RIGHT: 0.11 CM
BH CV XLRA MEAS - GSV KNEE DIST LEFT: 0.2 CM
BH CV XLRA MEAS - GSV KNEE DIST RIGHT: 0.18 CM
BH CV XLRA MEAS - GSV ORIGIN DIST LEFT: 0.31 CM
BH CV XLRA MEAS - GSV ORIGIN DIST RIGHT: 0.38 CM
BH CV XLRA MEAS - MID GSV CALF LEFT: 0.14 CM
BH CV XLRA MEAS - MID GSV CALF RIGHT: 0.09 CM
BH CV XLRA MEAS - MID GSV THIGH  LEFT: 0.22 CM
BH CV XLRA MEAS - MID GSV THIGH  RIGHT: 0.28 CM
BH CV XLRA MEAS - MID LSV CALF DIST LEFT: 0.11 CM
BH CV XLRA MEAS - MID LSV CALF DIST RIGHT: 0.16 CM
BH CV XLRA MEAS - PROX GSV CALF DIST LEFT: 0.15 CM
BH CV XLRA MEAS - PROX GSV CALF DIST RIGHT: 0.23 CM
BH CV XLRA MEAS - PROX GSV THIGH  LEFT: 0.2 CM
BH CV XLRA MEAS - PROX GSV THIGH  RIGHT: 0.31 CM
BH CV XLRA MEAS - PROX LSV CALF DIST LEFT: 0.11 CM
BH CV XLRA MEAS - PROX LSV CALF DIST RIGHT: 0.19 CM
BH CV XLRA MEAS LEFT DIST CCA EDV: -26.7 CM/SEC
BH CV XLRA MEAS LEFT DIST CCA PSV: -80 CM/SEC
BH CV XLRA MEAS LEFT DIST ICA EDV: -33.4 CM/SEC
BH CV XLRA MEAS LEFT DIST ICA PSV: -70.7 CM/SEC
BH CV XLRA MEAS LEFT ICA/CCA RATIO: 0.95
BH CV XLRA MEAS LEFT MID ICA EDV: -22.4 CM/SEC
BH CV XLRA MEAS LEFT MID ICA PSV: -53.1 CM/SEC
BH CV XLRA MEAS LEFT PROX CCA EDV: -24.3 CM/SEC
BH CV XLRA MEAS LEFT PROX CCA PSV: -76.4 CM/SEC
BH CV XLRA MEAS LEFT PROX ECA EDV: -33.8 CM/SEC
BH CV XLRA MEAS LEFT PROX ECA PSV: -167.2 CM/SEC
BH CV XLRA MEAS LEFT PROX ICA EDV: 30.7 CM/SEC
BH CV XLRA MEAS LEFT PROX ICA PSV: 76 CM/SEC
BH CV XLRA MEAS LEFT PROX SCLA PSV: 64.5 CM/SEC
BH CV XLRA MEAS LEFT VERTEBRAL A EDV: -13.8 CM/SEC
BH CV XLRA MEAS LEFT VERTEBRAL A PSV: -33.7 CM/SEC
BH CV XLRA MEAS RIGHT DIST CCA EDV: -27.3 CM/SEC
BH CV XLRA MEAS RIGHT DIST CCA PSV: -75.7 CM/SEC
BH CV XLRA MEAS RIGHT DIST ICA EDV: -29 CM/SEC
BH CV XLRA MEAS RIGHT DIST ICA PSV: -72 CM/SEC
BH CV XLRA MEAS RIGHT ICA/CCA RATIO: 1.51
BH CV XLRA MEAS RIGHT MID ICA EDV: 40.6 CM/SEC
BH CV XLRA MEAS RIGHT MID ICA PSV: 114.2 CM/SEC
BH CV XLRA MEAS RIGHT PROX CCA EDV: 18.9 CM/SEC
BH CV XLRA MEAS RIGHT PROX CCA PSV: 55.3 CM/SEC
BH CV XLRA MEAS RIGHT PROX ECA EDV: 10.5 CM/SEC
BH CV XLRA MEAS RIGHT PROX ECA PSV: 50.4 CM/SEC
BH CV XLRA MEAS RIGHT PROX ICA EDV: -29.9 CM/SEC
BH CV XLRA MEAS RIGHT PROX ICA PSV: -86.9 CM/SEC
BH CV XLRA MEAS RIGHT PROX SCLA PSV: 86.9 CM/SEC
BH CV XLRA MEAS RIGHT VERTEBRAL A EDV: 12.6 CM/SEC
BH CV XLRA MEAS RIGHT VERTEBRAL A PSV: 34.7 CM/SEC
BILIRUB SERPL-MCNC: 0.3 MG/DL (ref 0–1.2)
BILIRUB UR QL STRIP: NEGATIVE
BUN SERPL-MCNC: 17 MG/DL (ref 6–20)
BUN/CREAT SERPL: 21 (ref 7–25)
CALCIUM SPEC-SCNC: 9 MG/DL (ref 8.6–10.5)
CHLORIDE SERPL-SCNC: 103 MMOL/L (ref 98–107)
CLARITY UR: ABNORMAL
CLOSE TME COLL+ADP + EPINEP PNL BLD: 41 % (ref 86–100)
CO2 SERPL-SCNC: 26 MMOL/L (ref 22–29)
COLOR UR: YELLOW
CREAT SERPL-MCNC: 0.81 MG/DL (ref 0.57–1)
DEPRECATED RDW RBC AUTO: 41.6 FL (ref 37–54)
EGFRCR SERPLBLD CKD-EPI 2021: 85.9 ML/MIN/1.73
EOSINOPHIL # BLD AUTO: 0.23 10*3/MM3 (ref 0–0.4)
EOSINOPHIL NFR BLD AUTO: 2 % (ref 0.3–6.2)
ERYTHROCYTE [DISTWIDTH] IN BLOOD BY AUTOMATED COUNT: 11.9 % (ref 12.3–15.4)
GLOBULIN UR ELPH-MCNC: 2.2 GM/DL
GLUCOSE BLDC GLUCOMTR-MCNC: 131 MG/DL (ref 70–130)
GLUCOSE BLDC GLUCOMTR-MCNC: 182 MG/DL (ref 70–130)
GLUCOSE BLDC GLUCOMTR-MCNC: 200 MG/DL (ref 70–130)
GLUCOSE BLDC GLUCOMTR-MCNC: 321 MG/DL (ref 70–130)
GLUCOSE SERPL-MCNC: 267 MG/DL (ref 65–99)
GLUCOSE UR STRIP-MCNC: NEGATIVE MG/DL
HCT VFR BLD AUTO: 39.5 % (ref 34–46.6)
HGB BLD-MCNC: 13.5 G/DL (ref 12–15.9)
HGB UR QL STRIP.AUTO: NEGATIVE
HYALINE CASTS UR QL AUTO: ABNORMAL /LPF
IMM GRANULOCYTES # BLD AUTO: 0.02 10*3/MM3 (ref 0–0.05)
IMM GRANULOCYTES NFR BLD AUTO: 0.2 % (ref 0–0.5)
INR PPP: 1.08 (ref 0.9–1.1)
KETONES UR QL STRIP: NEGATIVE
LEUKOCYTE ESTERASE UR QL STRIP.AUTO: ABNORMAL
LYMPHOCYTES # BLD AUTO: 3.85 10*3/MM3 (ref 0.7–3.1)
LYMPHOCYTES NFR BLD AUTO: 32.9 % (ref 19.6–45.3)
MAGNESIUM SERPL-MCNC: 1.4 MG/DL (ref 1.6–2.6)
MCH RBC QN AUTO: 32.9 PG (ref 26.6–33)
MCHC RBC AUTO-ENTMCNC: 34.2 G/DL (ref 31.5–35.7)
MCV RBC AUTO: 96.3 FL (ref 79–97)
MONOCYTES # BLD AUTO: 0.6 10*3/MM3 (ref 0.1–0.9)
MONOCYTES NFR BLD AUTO: 5.1 % (ref 5–12)
NEUTROPHILS NFR BLD AUTO: 59.3 % (ref 42.7–76)
NEUTROPHILS NFR BLD AUTO: 6.95 10*3/MM3 (ref 1.7–7)
NITRITE UR QL STRIP: NEGATIVE
NRBC BLD AUTO-RTO: 0 /100 WBC (ref 0–0.2)
NT-PROBNP SERPL-MCNC: 8025 PG/ML (ref 0–900)
PH UR STRIP.AUTO: 6 [PH] (ref 5–8)
PLATELET # BLD AUTO: 268 10*3/MM3 (ref 140–450)
PMV BLD AUTO: 9.8 FL (ref 6–12)
POTASSIUM SERPL-SCNC: 3.3 MMOL/L (ref 3.5–5.2)
POTASSIUM SERPL-SCNC: 4.1 MMOL/L (ref 3.5–5.2)
PROT SERPL-MCNC: 6.1 G/DL (ref 6–8.5)
PROT UR QL STRIP: NEGATIVE
PROTHROMBIN TIME: 14.2 SECONDS (ref 11.7–14.2)
RBC # BLD AUTO: 4.1 10*6/MM3 (ref 3.77–5.28)
RBC # UR STRIP: ABNORMAL /HPF
REF LAB TEST METHOD: ABNORMAL
SARS-COV-2 RNA RESP QL NAA+PROBE: NOT DETECTED
SODIUM SERPL-SCNC: 140 MMOL/L (ref 136–145)
SP GR UR STRIP: 1.02 (ref 1–1.03)
SQUAMOUS #/AREA URNS HPF: ABNORMAL /HPF
UROBILINOGEN UR QL STRIP: ABNORMAL
WBC # UR STRIP: ABNORMAL /HPF
WBC NRBC COR # BLD AUTO: 11.71 10*3/MM3 (ref 3.4–10.8)

## 2023-12-29 PROCEDURE — 99232 SBSQ HOSP IP/OBS MODERATE 35: CPT | Performed by: INTERNAL MEDICINE

## 2023-12-29 PROCEDURE — 83735 ASSAY OF MAGNESIUM: CPT

## 2023-12-29 PROCEDURE — 85730 THROMBOPLASTIN TIME PARTIAL: CPT

## 2023-12-29 PROCEDURE — 25010000002 FUROSEMIDE PER 20 MG: Performed by: INTERNAL MEDICINE

## 2023-12-29 PROCEDURE — 87635 SARS-COV-2 COVID-19 AMP PRB: CPT | Performed by: INTERNAL MEDICINE

## 2023-12-29 PROCEDURE — 63710000001 INSULIN GLARGINE PER 5 UNITS: Performed by: HOSPITALIST

## 2023-12-29 PROCEDURE — 85025 COMPLETE CBC W/AUTO DIFF WBC: CPT

## 2023-12-29 PROCEDURE — 82948 REAGENT STRIP/BLOOD GLUCOSE: CPT

## 2023-12-29 PROCEDURE — 84132 ASSAY OF SERUM POTASSIUM: CPT | Performed by: INTERNAL MEDICINE

## 2023-12-29 PROCEDURE — 63710000001 INSULIN LISPRO (HUMAN) PER 5 UNITS: Performed by: INTERNAL MEDICINE

## 2023-12-29 PROCEDURE — 81001 URINALYSIS AUTO W/SCOPE: CPT | Performed by: INTERNAL MEDICINE

## 2023-12-29 PROCEDURE — 93970 EXTREMITY STUDY: CPT

## 2023-12-29 PROCEDURE — 85576 BLOOD PLATELET AGGREGATION: CPT

## 2023-12-29 PROCEDURE — 63710000001 INSULIN GLARGINE PER 5 UNITS: Performed by: INTERNAL MEDICINE

## 2023-12-29 PROCEDURE — 25010000002 MAGNESIUM SULFATE 2 GM/50ML SOLUTION: Performed by: INTERNAL MEDICINE

## 2023-12-29 PROCEDURE — 87086 URINE CULTURE/COLONY COUNT: CPT | Performed by: INTERNAL MEDICINE

## 2023-12-29 PROCEDURE — 85610 PROTHROMBIN TIME: CPT

## 2023-12-29 PROCEDURE — 99232 SBSQ HOSP IP/OBS MODERATE 35: CPT | Performed by: NURSE PRACTITIONER

## 2023-12-29 PROCEDURE — 83880 ASSAY OF NATRIURETIC PEPTIDE: CPT

## 2023-12-29 PROCEDURE — 80053 COMPREHEN METABOLIC PANEL: CPT

## 2023-12-29 PROCEDURE — 93880 EXTRACRANIAL BILAT STUDY: CPT

## 2023-12-29 RX ORDER — INSULIN LISPRO 100 [IU]/ML
8 INJECTION, SOLUTION INTRAVENOUS; SUBCUTANEOUS
Status: DISCONTINUED | OUTPATIENT
Start: 2023-12-29 | End: 2024-01-09 | Stop reason: HOSPADM

## 2023-12-29 RX ORDER — POTASSIUM CHLORIDE 750 MG/1
40 TABLET, FILM COATED, EXTENDED RELEASE ORAL EVERY 4 HOURS
Status: COMPLETED | OUTPATIENT
Start: 2023-12-29 | End: 2023-12-29

## 2023-12-29 RX ORDER — FUROSEMIDE 10 MG/ML
40 INJECTION INTRAMUSCULAR; INTRAVENOUS 2 TIMES DAILY
Status: DISCONTINUED | OUTPATIENT
Start: 2023-12-29 | End: 2023-12-29

## 2023-12-29 RX ORDER — MAGNESIUM SULFATE HEPTAHYDRATE 40 MG/ML
2 INJECTION, SOLUTION INTRAVENOUS
Status: COMPLETED | OUTPATIENT
Start: 2023-12-29 | End: 2023-12-29

## 2023-12-29 RX ORDER — CHOLECALCIFEROL (VITAMIN D3) 125 MCG
5 CAPSULE ORAL NIGHTLY PRN
Status: DISCONTINUED | OUTPATIENT
Start: 2023-12-29 | End: 2024-01-09 | Stop reason: HOSPADM

## 2023-12-29 RX ADMIN — INSULIN GLARGINE 20 UNITS: 100 INJECTION, SOLUTION SUBCUTANEOUS at 21:03

## 2023-12-29 RX ADMIN — INSULIN LISPRO 2 UNITS: 100 INJECTION, SOLUTION INTRAVENOUS; SUBCUTANEOUS at 21:03

## 2023-12-29 RX ADMIN — DULOXETINE HYDROCHLORIDE 60 MG: 60 CAPSULE, DELAYED RELEASE ORAL at 21:03

## 2023-12-29 RX ADMIN — BUSPIRONE HYDROCHLORIDE 10 MG: 10 TABLET ORAL at 08:03

## 2023-12-29 RX ADMIN — Medication 10 ML: at 08:04

## 2023-12-29 RX ADMIN — INSULIN GLARGINE 18 UNITS: 100 INJECTION, SOLUTION SUBCUTANEOUS at 08:04

## 2023-12-29 RX ADMIN — DULOXETINE HYDROCHLORIDE 60 MG: 60 CAPSULE, DELAYED RELEASE ORAL at 08:03

## 2023-12-29 RX ADMIN — INSULIN LISPRO 4 UNITS: 100 INJECTION, SOLUTION INTRAVENOUS; SUBCUTANEOUS at 16:31

## 2023-12-29 RX ADMIN — ALPRAZOLAM 0.25 MG: 0.25 TABLET ORAL at 16:07

## 2023-12-29 RX ADMIN — INSULIN LISPRO 7 UNITS: 100 INJECTION, SOLUTION INTRAVENOUS; SUBCUTANEOUS at 06:32

## 2023-12-29 RX ADMIN — PANTOPRAZOLE SODIUM 40 MG: 40 TABLET, DELAYED RELEASE ORAL at 05:52

## 2023-12-29 RX ADMIN — MAGNESIUM SULFATE HEPTAHYDRATE 2 G: 40 INJECTION, SOLUTION INTRAVENOUS at 13:40

## 2023-12-29 RX ADMIN — FUROSEMIDE 40 MG: 40 INJECTION, SOLUTION INTRAMUSCULAR; INTRAVENOUS at 09:46

## 2023-12-29 RX ADMIN — FUROSEMIDE 40 MG: 40 INJECTION, SOLUTION INTRAMUSCULAR; INTRAVENOUS at 16:00

## 2023-12-29 RX ADMIN — CETIRIZINE HYDROCHLORIDE 10 MG: 10 TABLET ORAL at 08:03

## 2023-12-29 RX ADMIN — ASPIRIN 81 MG: 81 TABLET, COATED ORAL at 08:03

## 2023-12-29 RX ADMIN — ACETAMINOPHEN 650 MG: 325 TABLET, FILM COATED ORAL at 16:07

## 2023-12-29 RX ADMIN — Medication 10 ML: at 21:04

## 2023-12-29 RX ADMIN — POTASSIUM CHLORIDE 40 MEQ: 750 TABLET, EXTENDED RELEASE ORAL at 08:03

## 2023-12-29 RX ADMIN — MAGNESIUM SULFATE HEPTAHYDRATE 2 G: 40 INJECTION, SOLUTION INTRAVENOUS at 07:56

## 2023-12-29 RX ADMIN — PRAVASTATIN SODIUM 20 MG: 20 TABLET ORAL at 21:03

## 2023-12-29 RX ADMIN — Medication 5 MG: at 22:19

## 2023-12-29 RX ADMIN — POTASSIUM CHLORIDE 40 MEQ: 750 TABLET, EXTENDED RELEASE ORAL at 11:32

## 2023-12-29 RX ADMIN — SERTRALINE 25 MG: 25 TABLET, FILM COATED ORAL at 08:03

## 2023-12-29 RX ADMIN — MAGNESIUM SULFATE HEPTAHYDRATE 2 G: 40 INJECTION, SOLUTION INTRAVENOUS at 11:32

## 2023-12-29 NOTE — CASE MANAGEMENT/SOCIAL WORK
Continued Stay Note  [facility]     Patient Name: Jade Clement  MRN: [id number]  Today's Date: 12/29/2023    Admit Date: 12/26/2023    Plan: Home   Discharge Plan       Row Name 12/29/23 1436       Plan    Plan Home    Patient/Family in Agreement with Plan yes    Plan Comments Patients plan remains home. Patient is independent. Daughter can transport.                   Discharge Codes    No documentation.                 Expected Discharge Date and Time       Expected Discharge Date Expected Discharge Time    Jan 8, 2024

## 2023-12-29 NOTE — PROGRESS NOTES
Name: Jade Clement ADMIT: 2023   : 1970  PCP: Heavenly Monsivais MD    MRN: [id number] LOS: 3 days   AGE/SEX: 53 y.o. female  ROOM: [facility]     Subjective   Subjective   Feels okay but still not the greatest of appetite.  Not eating all of her meals.           Objective   Objective   Vital Signs  Temp:  [97.7 °F (36.5 °C)-98.7 °F (37.1 °C)] 98.5 °F (36.9 °C)  Heart Rate:  [] 95  Resp:  [12-25] 22  BP: ()/(61-78) 98/67  FiO2 (%):  [88 %] 88 %  SpO2:  [87 %-98 %] 97 %  on  Flow (L/min):  [3] 3;   Device (Oxygen Therapy): room air  Body mass index is 26.49 kg/m².  Physical Exam  Vitals and nursing note reviewed.   Constitutional:       Appearance: Normal appearance.   Pulmonary:      Effort: Pulmonary effort is normal.   Neurological:      Mental Status: She is alert. Mental status is at baseline.   Psychiatric:         Mood and Affect: Mood normal.         Results Review:       I reviewed the patient's new clinical results.  Results from last 7 days   Lab Units 23  0546 23  0052   WBC 10*3/mm3 11.71* 11.86* 9.64 10.06   HEMOGLOBIN g/dL 13.5 13.4 12.9 12.8   PLATELETS 10*3/mm3 268 289 284 261     Results from last 7 days   Lab Units 23  0546 230 23   SODIUM mmol/L 140 137 138   POTASSIUM mmol/L 3.3* 3.9 3.3*   CHLORIDE mmol/L 103 102 101   CO2 mmol/L 26.0 24.9 26.0   BUN mg/dL 17 18 16   CREATININE mg/dL 0.81 0.69 0.69   GLUCOSE mg/dL 267* 207* 334*   EGFR mL/min/1.73 85.9 102.6 102.6     Results from last 7 days   Lab Units 23  0546 23   ALBUMIN g/dL 3.9 3.8   BILIRUBIN mg/dL 0.3 0.4   ALK PHOS U/L 90 85   AST (SGOT) U/L 20 29   ALT (SGPT) U/L 41* 55*     Results from last 7 days   Lab Units 23  0546 23  0330 23  0922   CALCIUM mg/dL 9.0 9.1 9.3   ALBUMIN g/dL 3.9  --  3.8   MAGNESIUM mg/dL 1.4* 1.8  --        Hemoglobin A1C   Date/Time Value Ref Range Status   2023 0052 8.10  (H) 4.80 - 5.60 % Final     Glucose   Date/Time Value Ref Range Status   12/29/2023 0600 321 (H) 70 - 130 mg/dL Final   12/28/2023 2019 212 (H) 70 - 130 mg/dL Final   12/28/2023 1538 176 (H) 70 - 130 mg/dL Final   12/28/2023 1213 136 (H) 70 - 130 mg/dL Final   12/28/2023 0642 216 (H) 70 - 130 mg/dL Final   12/28/2023 0419 250 (H) 70 - 130 mg/dL Final   12/27/2023 2000 289 (H) 70 - 130 mg/dL Final       I have personally reviewed all medications:  Scheduled Medications  aspirin, 81 mg, Oral, Daily  busPIRone, 10 mg, Oral, Daily  [START ON 1/3/2024] ceFAZolin, 2,000 mg, Intravenous, Once  cetirizine, 10 mg, Oral, Daily  [START ON 1/3/2024] chlorhexidine, 15 mL, Mouth/Throat, Once  DULoxetine, 60 mg, Oral, BID  furosemide, 40 mg, Intravenous, BID  insulin glargine, 18 Units, Subcutaneous, BID  insulin lispro, 10 Units, Subcutaneous, TID With Meals  insulin lispro, 2-9 Units, Subcutaneous, 4x Daily AC & at Bedtime  magnesium sulfate, 2 g, Intravenous, Q2H  metoprolol succinate XL, 12.5 mg, Oral, Q24H  [START ON 1/3/2024] metoprolol tartrate, 12.5 mg, Oral, On Call to OR  [START ON 1/2/2024] mupirocin, 1 application , Each Nare, Q12H  pantoprazole, 40 mg, Oral, Q AM  potassium chloride ER, 40 mEq, Oral, Q4H  pravastatin, 20 mg, Oral, Nightly  sertraline, 25 mg, Oral, Daily  sodium chloride, 10 mL, Intravenous, Q12H  sodium chloride, 10 mL, Intravenous, Q12H    Infusions   Diet  Diet: Cardiac Diets, Diabetic Diets; Healthy Heart (2-3 Na+); Consistent Carbohydrate; Texture: Regular Texture (IDDSI 7); Fluid Consistency: Thin (IDDSI 0)  NPO Diet NPO Type: Sips with Meds    I have personally reviewed:  [x]  Laboratory   [x]  Microbiology   [x]  Radiology   []  EKG/Telemetry  []  Cardiology/Vascular   []  Pathology    []  Records       Assessment/Plan     Active Hospital Problems    Diagnosis  POA    **ACS (acute coronary syndrome) [I24.9]  Yes    Type 2 diabetes mellitus with complications [E11.8]  Yes    Type 2 diabetes  mellitus with microalbuminuria, with long-term current use of insulin [E11.29, R80.9, Z79.4]  Not Applicable      Resolved Hospital Problems   No resolved problems to display.       53 y.o. female who is s/p Left Heart Cath, Coronary angiography.    Blood sugar this morning was quite elevated.  Subsequently before lunch blood sugar well-controlled.  Will see how she does before dinner and continue make adjustments as needed.  She says she does not really take scheduled insulin with meals at home because previously when she did that she would bottom out.      Jarrett Julien MD  [facility]  12/29/23  09:53 EST

## 2023-12-29 NOTE — PROGRESS NOTES
" LOS: 3 days   Patient Care Team:  Heavenly Monsivais MD as PCP - General (Family Medicine)  Hesham Chneey MD as Consulting Physician (Endocrinology)    Chief Complaint:   Multivessel CAD, valvular heart disease    Subjective:  Symptoms:  No shortness of breath or chest pain.    Diet:  Adequate intake.  No nausea or vomiting.    Activity level: Normal.    Pain:  She reports no pain.        Resting in bed. Reports feeling much better today. Denies any chest pain or shortness of breath.    Vital Signs  Temp:  [97.7 °F (36.5 °C)-98.7 °F (37.1 °C)] 98.5 °F (36.9 °C)  Heart Rate:  [] 95  Resp:  [12-25] 22  BP: ()/(61-78) 98/67  FiO2 (%):  [88 %] 88 %      12/27/23  0852 12/28/23  0500 12/29/23  0518   Weight: 71.7 kg (158 lb) 70.5 kg (155 lb 6.8 oz) 70 kg (154 lb 5.2 oz)     Body mass index is 26.49 kg/m².    Intake/Output Summary (Last 24 hours) at 12/29/2023 0916  Last data filed at 12/29/2023 0805  Gross per 24 hour   Intake 240 ml   Output --   Net 240 ml     No intake/output data recorded.      Objective:  General Appearance:  Comfortable and in no acute distress.    Vital signs: (most recent): Blood pressure 98/67, pulse 95, temperature 98.5 °F (36.9 °C), temperature source Oral, resp. rate 22, height 162.6 cm (64\"), weight 70 kg (154 lb 5.2 oz), SpO2 97%.  Vital signs are normal.  No fever.    Output: Producing urine.    Lungs:  Normal effort and normal respiratory rate.  Breath sounds clear to auscultation.  She is not in respiratory distress.    Heart: Normal rate.  Regular rhythm.    Neurological: Patient is alert and oriented to person, place and time.    Skin:  Warm and dry.            Results Review:      WBC WBC   Date Value Ref Range Status   12/29/2023 11.71 (H) 3.40 - 10.80 10*3/mm3 Final   12/28/2023 11.86 (H) 3.40 - 10.80 10*3/mm3 Final   12/27/2023 9.64 3.40 - 10.80 10*3/mm3 Final   12/27/2023 10.06 3.40 - 10.80 10*3/mm3 Final      HGB Hemoglobin   Date Value Ref Range Status "   12/29/2023 13.5 12.0 - 15.9 g/dL Final   12/28/2023 13.4 12.0 - 15.9 g/dL Final   12/27/2023 12.9 12.0 - 15.9 g/dL Final   12/27/2023 12.8 12.0 - 15.9 g/dL Final      HCT Hematocrit   Date Value Ref Range Status   12/29/2023 39.5 34.0 - 46.6 % Final   12/28/2023 39.1 34.0 - 46.6 % Final   12/27/2023 38.9 34.0 - 46.6 % Final   12/27/2023 38.4 34.0 - 46.6 % Final      Platelets Platelets   Date Value Ref Range Status   12/29/2023 268 140 - 450 10*3/mm3 Final   12/28/2023 289 140 - 450 10*3/mm3 Final   12/27/2023 284 140 - 450 10*3/mm3 Final   12/27/2023 261 140 - 450 10*3/mm3 Final        PT/INR:    Protime   Date Value Ref Range Status   12/29/2023 14.2 11.7 - 14.2 Seconds Final   12/27/2023 15.0 (H) 11.7 - 14.2 Seconds Final   /  INR   Date Value Ref Range Status   12/29/2023 1.08 0.90 - 1.10 Final   12/27/2023 1.17 (H) 0.90 - 1.10 Final       Sodium Sodium   Date Value Ref Range Status   12/29/2023 140 136 - 145 mmol/L Final   12/28/2023 137 136 - 145 mmol/L Final   12/27/2023 138 136 - 145 mmol/L Final      Potassium Potassium   Date Value Ref Range Status   12/29/2023 3.3 (L) 3.5 - 5.2 mmol/L Final   12/28/2023 3.9 3.5 - 5.2 mmol/L Final   12/27/2023 3.3 (L) 3.5 - 5.2 mmol/L Final      Chloride Chloride   Date Value Ref Range Status   12/29/2023 103 98 - 107 mmol/L Final   12/28/2023 102 98 - 107 mmol/L Final   12/27/2023 101 98 - 107 mmol/L Final      Bicarbonate CO2   Date Value Ref Range Status   12/29/2023 26.0 22.0 - 29.0 mmol/L Final   12/28/2023 24.9 22.0 - 29.0 mmol/L Final   12/27/2023 26.0 22.0 - 29.0 mmol/L Final      BUN BUN   Date Value Ref Range Status   12/29/2023 17 6 - 20 mg/dL Final   12/28/2023 18 6 - 20 mg/dL Final   12/27/2023 16 6 - 20 mg/dL Final      Creatinine Creatinine   Date Value Ref Range Status   12/29/2023 0.81 0.57 - 1.00 mg/dL Final   12/28/2023 0.69 0.57 - 1.00 mg/dL Final   12/27/2023 0.69 0.57 - 1.00 mg/dL Final      Calcium Calcium   Date Value Ref Range Status   12/29/2023  9.0 8.6 - 10.5 mg/dL Final   12/28/2023 9.1 8.6 - 10.5 mg/dL Final   12/27/2023 9.3 8.6 - 10.5 mg/dL Final      Magnesium Magnesium   Date Value Ref Range Status   12/29/2023 1.4 (L) 1.6 - 2.6 mg/dL Final   12/28/2023 1.8 1.6 - 2.6 mg/dL Final        aspirin, 81 mg, Oral, Daily  busPIRone, 10 mg, Oral, Daily  [START ON 1/3/2024] ceFAZolin, 2,000 mg, Intravenous, Once  cetirizine, 10 mg, Oral, Daily  [START ON 1/3/2024] chlorhexidine, 15 mL, Mouth/Throat, Once  DULoxetine, 60 mg, Oral, BID  furosemide, 40 mg, Intravenous, BID  insulin glargine, 18 Units, Subcutaneous, BID  insulin lispro, 10 Units, Subcutaneous, TID With Meals  insulin lispro, 2-9 Units, Subcutaneous, 4x Daily AC & at Bedtime  magnesium sulfate, 2 g, Intravenous, Q2H  metoprolol succinate XL, 12.5 mg, Oral, Q24H  [START ON 1/3/2024] metoprolol tartrate, 12.5 mg, Oral, On Call to OR  [START ON 1/2/2024] mupirocin, 1 application , Each Nare, Q12H  pantoprazole, 40 mg, Oral, Q AM  potassium chloride ER, 40 mEq, Oral, Q4H  pravastatin, 20 mg, Oral, Nightly  sertraline, 25 mg, Oral, Daily  sodium chloride, 10 mL, Intravenous, Q12H  sodium chloride, 10 mL, Intravenous, Q12H             ACS (acute coronary syndrome)    Type 2 diabetes mellitus with microalbuminuria, with long-term current use of insulin    Type 2 diabetes mellitus with complications      Assessment & Plan  Severe 3 vessel CAD  NSTEMI  Ischemic cardiomyopathy---EF 20%  Mild MR  Mild to moderate TR  HTN  HLD  Insulin dependent DM type 2  GERD  Recent tobacco cessation---quit 12/18/23  Daily marijuana use  Right adrenal adenoma    Pre-op vascular studies pending. Tentatively plan for CABG/possible MVR/possible TVR on Wednesday with Dr. Asif.     Amina Nguyen, APRN  12/29/23  09:16 EST

## 2023-12-29 NOTE — CONSULTS
Met with patient to discuss the benefits of cardiac rehab. Provided phase II information along with the contact information for cardiac rehab here at [facility]. Pt reports that she is scheduled for surgery on Wednesday and plans to stay with her daughter for a few weeks after discharge from hospital. Explained if receiving home health would not be able to attend cardiac rehab until finished with home health. Pt will call when ready to schedule.

## 2023-12-29 NOTE — PAYOR COMM NOTE
"Jade Jaramillo (53 y.o. Female)                        ATTENTION CONTINUED CLINICALS CASE REF #[id number]                        REPLY TO [facility] DEPT  [phone] OR CALL   CHINO CALLE LPJOSHUA           Date of Birth   1970    Social Security Number       Address   [address] [id number]    Home Phone   [phone]    MRN   [id number]       Hoahaoism   Nondenominational    Marital Status   Single                            Admission Date   12/26/23    Admission Type   Urgent    Admitting Provider   Billy Esquivel MD    Attending Provider   Billy Esquivel MD    Department, Room/Bed   [facility], 3106/1       Discharge Date       Discharge Disposition       Discharge Destination                                 Attending Provider: Billy Esquivel MD    Allergies: Metformin, Ozempic [Semaglutide], Bydureon [Exenatide], Statins, Xigduo Xr [Dapagliflozin Pro-metformin Er]    Isolation: None   Infection: None   Code Status: CPR    Ht: 162.6 cm (64\")   Wt: 70 kg (154 lb 5.2 oz)    Admission Cmt: None   Principal Problem: ACS (acute coronary syndrome) [I24.9]                   Active Insurance as of 12/26/2023       Primary Coverage       Payor Plan Insurance Group Employer/Plan Group    MISC COMMERCIAL MISC COMMERCIAL [id number]       Coverage Address Coverage Phone Number Coverage Fax Number Effective Dates    [address] [address] [phone]  4/1/2023 - None Entered    [facility] [id number]         Subscriber Name Subscriber Birth Date Member ID       JADE JARAMILLO 1970 [id number]                     Emergency Contacts        (Rel.) Home Phone Work Phone Mobile Phone    Millicent Martin (Daughter) [phone] -- [phone]              Vital Signs (last day)       Date/Time Temp Temp src Pulse Resp BP Patient Position SpO2    12/29/23 1058 98.4 (36.9) Oral 105 22 97/70 Lying 84    12/29/23 0803 98.5 (36.9) Oral 95 22 98/67 Lying --    " 12/29/23 0556 -- -- 98 -- 81/61 -- --    12/29/23 0552 -- -- 98 -- 74/63 -- 97    12/29/23 0400 97.7 (36.5) Oral 104 22 87/62 Lying 96    12/28/23 2315 98 (36.7) Oral 101 22 102/67 Lying 96    12/28/23 1922 98 (36.7) Oral 96 24 99/73 Lying 96    12/28/23 1539 97.8 (36.6) Oral 89 24 92/61 Lying 96    12/28/23 1400 -- -- 100 -- 104/70 -- 96    12/28/23 1345 -- -- 101 -- 100/78 -- --    12/28/23 1330 -- -- 98 -- 93/68 -- 95    12/28/23 1315 -- -- 99 -- 97/72 -- 98    12/28/23 1309 98 (36.7) Oral 99 24 91/67 Lying --    12/28/23 1250 -- -- -- 24 -- -- --    12/28/23 12:45:14 -- -- -- 25 -- -- --    12/28/23 12:40:33 -- -- -- 20 -- -- --    12/28/23 12:36:07 -- -- -- 22 -- -- --    12/28/23 12:30:44 -- -- -- 20 -- -- --    12/28/23 12:25:42 -- -- -- -- -- -- 87    12/28/23 12:25:33 -- -- -- 12 -- -- --    12/28/23 1213 98.7 (37.1) Oral 102 16 100/73 Lying --    12/28/23 0750 98.4 (36.9) Oral 104 16 98/70 Lying --    12/28/23 0417 97.7 (36.5) Oral 108 -- 92/72 Lying --          Oxygen Therapy (last day)       Date/Time SpO2 Device (Oxygen Therapy) Flow (L/min) Oxygen Concentration (%) ETCO2 (mmHg)    12/29/23 1413 -- room air -- -- --    12/29/23 1058 84 -- -- -- --    12/29/23 0802 -- room air -- -- --    12/29/23 0552 97 -- -- -- --    12/29/23 0420 -- room air -- -- --    12/29/23 0400 96 room air -- -- --    12/29/23 0010 -- room air -- -- --    12/28/23 2315 96 room air -- -- --    12/28/23 2015 -- room air -- -- --    12/28/23 1922 96 room air -- -- --    12/28/23 1539 96 -- -- -- --    12/28/23 1430 -- room air -- -- --    12/28/23 1400 96 -- -- -- --    12/28/23 1330 95 -- -- -- --    12/28/23 1315 98 -- -- -- --    12/28/23 12:25:42 87 nasal cannula with ETCO2 3 -- --    12/28/23 0904 -- room air -- -- --    12/28/23 0410 -- room air -- -- --    12/28/23 0002 -- room air -- -- --          Lines, Drains & Airways       Active LDAs       Name Placement date Placement time Site Days    Peripheral IV 12/29/23 1020  Right Forearm 12/29/23  1020  Forearm  less than 1                  Current Facility-Administered Medications   Medication Dose Route Frequency Provider Last Rate Last Admin    acetaminophen (TYLENOL) tablet 650 mg  650 mg Oral Q4H PRN Erick Carson PA-C        ALPRAZolam (XANAX) tablet 0.25 mg  0.25 mg Oral 4x Daily PRN Billy Esquivel MD        aspirin EC tablet 81 mg  81 mg Oral Daily Lilli Greene MD   81 mg at 12/29/23 0803    busPIRone (BUSPAR) tablet 10 mg  10 mg Oral Daily Lilli Greene MD   10 mg at 12/29/23 0803    [START ON 1/3/2024] ceFAZolin in dextrose (ANCEF) IVPB solution 2,000 mg  2,000 mg Intravenous Once Erick Carson PA-C        cetirizine (zyrTEC) tablet 10 mg  10 mg Oral Daily Lilli Greene MD   10 mg at 12/29/23 0803    [START ON 1/3/2024] chlorhexidine (PERIDEX) 0.12 % solution 15 mL  15 mL Mouth/Throat Once Erick Carson PA-C        [START ON 1/2/2024] Chlorhexidine Gluconate Cloth 2 % pads 1 application   1 application  Topical Q12H PRN Erick Carson PA-C        dextrose (D50W) (25 g/50 mL) IV injection 25 g  25 g Intravenous Q15 Min PRN Lilli Greene MD        dextrose (GLUTOSE) oral gel 15 g  15 g Oral Q15 Min PRN Lilli Greene MD        DULoxetine (CYMBALTA) DR capsule 60 mg  60 mg Oral BID Lilli Greene MD   60 mg at 12/29/23 0803    furosemide (LASIX) injection 40 mg  40 mg Intravenous BID Billy Esquivel MD   40 mg at 12/29/23 0946    glucagon (GLUCAGEN) injection 1 mg  1 mg Intramuscular Q15 Min PRN Lilli Greene MD        insulin glargine (LANTUS, SEMGLEE) injection 20 Units  20 Units Subcutaneous BID Jarrett Julien MD        insulin lispro (HUMALOG/ADMELOG) injection 2-9 Units  2-9 Units Subcutaneous 4x Daily AC & at Bedtime Lilli Greene MD   7 Units at 12/29/23 0632    insulin lispro (HUMALOG/ADMELOG) injection 8 Units  8 Units Subcutaneous TID With Meals Jarrett Julien MD        Magnesium Standard Dose Replacement - Follow Nurse / BPA Driven  Protocol   Does not apply PRN Lilli Greene MD        magnesium sulfate 2g/50 mL (PREMIX) infusion  2 g Intravenous Q2H Billy Esquivel MD   2 g at 12/29/23 1340    metoprolol succinate XL (TOPROL-XL) 24 hr tablet 12.5 mg  12.5 mg Oral Q24H Lilli Greene MD   12.5 mg at 12/28/23 0904    [START ON 1/3/2024] metoprolol tartrate (LOPRESSOR) tablet 12.5 mg  12.5 mg Oral On Call to OR Erick Carson PA-C        [START ON 1/2/2024] mupirocin (BACTROBAN) 2 % nasal ointment 1 application   1 application  Each Nare Q12H Erick Carson PA-C        nitroglycerin (NITROSTAT) SL tablet 0.4 mg  0.4 mg Sublingual Q5 Min PRN Lilli Greene MD        ondansetron ODT (ZOFRAN-ODT) disintegrating tablet 4 mg  4 mg Oral Q6H PRN Lilli Greene MD        Or    ondansetron (ZOFRAN) injection 4 mg  4 mg Intravenous Q6H PRN Lilli Greene MD   4 mg at 12/28/23 0932    pantoprazole (PROTONIX) EC tablet 40 mg  40 mg Oral Q AM Lilli Greene MD   40 mg at 12/29/23 0552    Potassium Replacement - Follow Nurse / BPA Driven Protocol   Does not apply PRN Lilli Greene MD        pravastatin (PRAVACHOL) tablet 20 mg  20 mg Oral Nightly Lilli Greene MD   20 mg at 12/28/23 2140    sertraline (ZOLOFT) tablet 25 mg  25 mg Oral Daily Lilli Greene MD   25 mg at 12/29/23 0803    sodium chloride 0.9 % flush 10 mL  10 mL Intravenous Q12H Lilli Greene MD   10 mL at 12/29/23 0804    sodium chloride 0.9 % flush 10 mL  10 mL Intravenous PRN Lilli Greene MD        sodium chloride 0.9 % flush 10 mL  10 mL Intravenous Q12H Erick Carson PA-C   10 mL at 12/29/23 0804    sodium chloride 0.9 % infusion 40 mL  40 mL Intravenous PRN Erick Carson PA-C         Orders (last 24 hrs)        Start     Ordered    01/03/24 0600  metoprolol tartrate (LOPRESSOR) tablet 12.5 mg  On Call to O.R.         12/28/23 1503    01/03/24 0600  chlorhexidine (PERIDEX) 0.12 % solution 15 mL  Once         12/28/23 1503    01/03/24 0600  ceFAZolin in  dextrose (ANCEF) IVPB solution 2,000 mg  Once         12/28/23 1503    01/03/24 0001  NPO Diet NPO Type: Sips with Meds  Diet Effective Midnight         12/28/23 1503    01/02/24 1800  Chlorhexidine Gluconate Cloth 2 % pads 1 application   Every 12 Hours PRN         12/28/23 1503    01/02/24 1800  mupirocin (BACTROBAN) 2 % nasal ointment 1 application   Every 12 Hours         12/28/23 1503    01/02/24 1800  Weigh Patient Night Before Surgery  Once        Comments: Need Actual Weight, Not Stated Weight    12/28/23 1503    01/02/24 1800  Clip Hair Chin to Ankles  Once         12/28/23 1503    01/02/24 0600  Type & Screen  Once         12/28/23 1503    01/02/24 0600  COVID PRE-OP / PRE-PROCEDURE SCREENING ORDER (NO ISOLATION) - Swab, Nasopharynx  Once,   Status:  Canceled         12/28/23 1503    12/30/23 0600  Basic Metabolic Panel  Morning Draw         12/29/23 0850    12/30/23 0600  CBC (No Diff)  Morning Draw         12/29/23 0850    12/30/23 0000  Magnesium  Morning Draw         12/29/23 0728    12/29/23 2100  insulin glargine (LANTUS, SEMGLEE) injection 20 Units  2 Times Daily         12/29/23 1459    12/29/23 1800  insulin lispro (HUMALOG/ADMELOG) injection 8 Units  3 Times Daily With Meals         12/29/23 1459    12/29/23 1628  Potassium  Timed         12/29/23 0728    12/29/23 1158  Urine Culture - Urine, Urine, Clean Catch  Once         12/29/23 1157    12/29/23 1155  Urinalysis, Microscopic Only - Urine, Clean Catch  Once         12/29/23 1154    12/29/23 1142  Urinalysis With Culture If Indicated - Urine, Clean Catch  Once         12/29/23 1142    12/29/23 1126  Inpatient Diabetes Educator Consult  Once        Provider:  (Not yet assigned)    12/29/23 1125    12/29/23 1100  POC Glucose Once  PROCEDURE ONCE        Comments: Complete no more than 45 minutes prior to patient eating      12/29/23 1057    12/29/23 0959  COVID PRE-OP / PRE-PROCEDURE SCREENING ORDER (NO ISOLATION) - Swab, Nasopharynx  Once          12/29/23 0958    12/29/23 0959  COVID-19,[facility] IN-HOUSE CEPHEID/LANE NP SWAB IN TRANSPORT MEDIA 1 HR TAT - Swab, Nasopharynx  PROCEDURE ONCE         12/29/23 0958    12/29/23 0945  furosemide (LASIX) injection 40 mg  2 Times Daily         12/29/23 0850    12/29/23 0815  magnesium sulfate 2g/50 mL (PREMIX) infusion  Every 2 Hours         12/29/23 0728    12/29/23 0815  potassium chloride (K-DUR,KLOR-CON) ER tablet 40 mEq  Every 4 Hours         12/29/23 0728    12/29/23 0602  POC Glucose Once  PROCEDURE ONCE        Comments: Complete no more than 45 minutes prior to patient eating      12/29/23 0600    12/29/23 0600  CBC & Differential  Morning Draw         12/28/23 1503    12/29/23 0600  Comprehensive Metabolic Panel  Morning Draw         12/28/23 1503    12/29/23 0600  Magnesium  Morning Draw         12/28/23 1503    12/29/23 0600  BNP  Once         12/28/23 1503    12/29/23 0600  aPTT  Morning Draw         12/28/23 1503    12/29/23 0600  Protime-INR  Morning Draw         12/28/23 1503    12/29/23 0600  Platelet Function ADP  Morning Draw         12/28/23 1503    12/29/23 0600  Urinalysis With Culture If Indicated - Urine, Clean Catch  Once,   Status:  Canceled         12/28/23 1503    12/29/23 0600  Blood Gas, Arterial -  Morning Draw,   Status:  Canceled         12/28/23 1503    12/29/23 0600  CBC Auto Differential  PROCEDURE ONCE         12/28/23 2201    12/28/23 2100  sodium chloride 0.9 % flush 10 mL  Every 12 Hours Scheduled         12/28/23 1503    12/28/23 2021  POC Glucose Once  PROCEDURE ONCE        Comments: Complete no more than 45 minutes prior to patient eating      12/28/23 2019    12/28/23 1830  ALPRAZolam (XANAX) tablet 0.25 mg  4 Times Daily PRN         12/28/23 1830    12/28/23 1800  Instruct Patient on Coughing, Deep Breathing and Incentive Spirometry  Every 4 Hours While Awake       12/28/23 1503    12/28/23 1800  Instruct Patient on Coughing, Deep Breathing and Incentive Spirometry  Every 4  Hours While Awake       12/28/23 1503    12/28/23 1655  Blood Gas, Arterial -  PROCEDURE ONCE         12/28/23 1651    12/28/23 1623  Spirometry - Pre & Post Bronchodilator  Once        Comments: Pre op. 20 pack year history    12/28/23 1623    12/28/23 1600  Strict intake and output  Every 4 Hours       12/28/23 1302    12/28/23 1600  Strict Intake & Output  Every 8 Hours         12/28/23 1503    12/28/23 1600  Neurovascular Checks  Every 4 Hours       12/28/23 1503    12/28/23 1542  POC Glucose Once  PROCEDURE ONCE        Comments: Complete no more than 45 minutes prior to patient eating      12/28/23 1538    12/28/23 1441  Skin Assessment  Every Shift       12/28/23 1503    12/28/23 1440  sodium chloride 0.9 % flush 10 mL  As Needed,   Status:  Discontinued         12/28/23 1503    12/28/23 1440  sodium chloride 0.9 % infusion 40 mL  As Needed         12/28/23 1503    12/28/23 1440  acetaminophen (TYLENOL) tablet 650 mg  Every 4 Hours PRN         12/28/23 1503    12/28/23 1302  nitroglycerin (NITROSTAT) SL tablet 0.4 mg  Every 5 Minutes PRN         12/28/23 1302    12/28/23 1301  furosemide (LASIX) injection 40 mg  Once         12/28/23 1259    12/28/23 0928  ondansetron ODT (ZOFRAN-ODT) disintegrating tablet 4 mg  Every 6 Hours PRN        See Hyperspace for full Linked Orders Report.    12/28/23 0928    12/28/23 0928  ondansetron (ZOFRAN) injection 4 mg  Every 6 Hours PRN        See Hyperspace for full Linked Orders Report.    12/28/23 0928    12/27/23 2100  insulin glargine (LANTUS, SEMGLEE) injection 18 Units  2 Times Daily,   Status:  Discontinued         12/27/23 1338    12/27/23 1800  insulin lispro (HUMALOG/ADMELOG) injection 10 Units  3 Times Daily With Meals,   Status:  Discontinued         12/27/23 1538    12/27/23 1730  insulin lispro (HUMALOG/ADMELOG) injection 2-9 Units  4 Times Daily Before Meals & Nightly         12/27/23 1538    12/27/23 1712  Potassium Replacement - Follow Nurse / BPA Driven  Protocol  As Needed         12/27/23 1712 12/27/23 1712  Magnesium Standard Dose Replacement - Follow Nurse / BPA Driven Protocol  As Needed         12/27/23 1712 12/27/23 0900  busPIRone (BUSPAR) tablet 10 mg  Daily         12/26/23 2309 12/27/23 0900  cetirizine (zyrTEC) tablet 10 mg  Daily         12/26/23 2309 12/27/23 0900  sertraline (ZOLOFT) tablet 25 mg  Daily         12/26/23 2309 12/27/23 0900  aspirin EC tablet 81 mg  Daily         12/26/23 2311 12/27/23 0800  Oral Care  2 Times Daily       12/26/23 2316 12/27/23 0700  POC Glucose 4x Daily Before Meals & at Bedtime  4 Times Daily Before Meals & at Bedtime      Comments: Complete no more than 45 minutes prior to patient eating      12/26/23 2310 12/27/23 0600  pantoprazole (PROTONIX) EC tablet 40 mg  Every Early Morning         12/26/23 2309 12/27/23 0015  sodium chloride 0.9 % flush 10 mL  Every 12 Hours Scheduled         12/26/23 2316 12/27/23 0000  DULoxetine (CYMBALTA) DR capsule 60 mg  2 Times Daily         12/26/23 2309 12/27/23 0000  metoprolol succinate XL (TOPROL-XL) 24 hr tablet 12.5 mg  Every 24 Hours Scheduled         12/26/23 2312 12/27/23 0000  pravastatin (PRAVACHOL) tablet 20 mg  Nightly         12/26/23 2312 12/27/23 0000  Vital Signs  Every 4 Hours       12/26/23 2316 12/27/23 0000  Strict Intake & Output  Every Hour       12/26/23 2316 12/26/23 2317  Daily Weights  Daily       12/26/23 2316 12/26/23 2315  sodium chloride 0.9 % flush 10 mL  As Needed         12/26/23 2316 12/26/23 2315  sodium chloride 0.9 % infusion 40 mL  As Needed,   Status:  Discontinued         12/26/23 2316 12/26/23 2310  dextrose (GLUTOSE) oral gel 15 g  Every 15 Minutes PRN         12/26/23 2310 12/26/23 2310  dextrose (D50W) (25 g/50 mL) IV injection 25 g  Every 15 Minutes PRN         12/26/23 2310    12/26/23 2310  glucagon (GLUCAGEN) injection 1 mg  Every 15 Minutes PRN         12/26/23 2310     Unscheduled  Follow Hypoglycemia Standing Orders For Blood Glucose <70 & Notify Provider of Treatment  As Needed      Comments: Follow Hypoglycemia Orders As Outlined in Process Instructions (Open Order Report to View Full Instructions)  Notify Provider Any Time Hypoglycemia Treatment is Administered    23 2310    Unscheduled  Vital Signs  As Needed       23 1302    Unscheduled  Change site dressing  As Needed       23 1302    Unscheduled  Chlorhexidine Skin Prep - Chin to Toes 12 Hours Prior to Surgery & Morning of Surgery  As Needed      Comments: Chlorhexidine Skin wipes and instructions for all patients having a procedure requiring an outward incision if not allergic.  If allergic, give antibacterial skin wipes and instructions.  Do not use for facial cases or on any mucus membranes.    12 Hours Prior to Surgery & The Morning of Surgery    23 1503    --  sertraline (ZOLOFT) 50 MG tablet  Daily         23    --  SCANNED - TELEMETRY           23 0000    --  SCANNED - TELEMETRY           23 0000    --  SCANNED - TELEMETRY           23 0000    --  SCANNED - TELEMETRY           23 0000    --  SCANNED - TELEMETRY           23 0000    --  SCANNED - TELEMETRY           23 0000    --  SCANNED - TELEMETRY           23 0000    --  SCANNED - TELEMETRY           23 0000    --  SCANNED - TELEMETRY           23 0000    --  SCANNED - TELEMETRY           23 0000    --  SCANNED - TELEMETRY           23 0000                       Physician Progress Notes (last 24 hours)        Jarrett Julien MD at 23 0953              Name: Jade VALADEZ Urbano ADMIT: 2023   : 1970  PCP: Heavenly Monsivais MD    MRN: [id number] LOS: 3 days   AGE/SEX: 53 y.o. female  ROOM: [facility]/     Subjective   Subjective   Feels okay but still not the greatest of appetite.  Not eating all of her meals.          Objective   Objective   Vital  Signs  Temp:  [97.7 °F (36.5 °C)-98.7 °F (37.1 °C)] 98.5 °F (36.9 °C)  Heart Rate:  [] 95  Resp:  [12-25] 22  BP: ()/(61-78) 98/67  FiO2 (%):  [88 %] 88 %  SpO2:  [87 %-98 %] 97 %  on  Flow (L/min):  [3] 3;   Device (Oxygen Therapy): room air  Body mass index is 26.49 kg/m².  Physical Exam  Vitals and nursing note reviewed.   Constitutional:       Appearance: Normal appearance.   Pulmonary:      Effort: Pulmonary effort is normal.   Neurological:      Mental Status: She is alert. Mental status is at baseline.   Psychiatric:         Mood and Affect: Mood normal.         Results Review:       I reviewed the patient's new clinical results.  Results from last 7 days   Lab Units 12/29/23  0546 12/28/23  0330 12/27/23  0922 12/27/23  0052   WBC 10*3/mm3 11.71* 11.86* 9.64 10.06   HEMOGLOBIN g/dL 13.5 13.4 12.9 12.8   PLATELETS 10*3/mm3 268 289 284 261     Results from last 7 days   Lab Units 12/29/23  0546 12/28/23  0330 12/27/23  0922   SODIUM mmol/L 140 137 138   POTASSIUM mmol/L 3.3* 3.9 3.3*   CHLORIDE mmol/L 103 102 101   CO2 mmol/L 26.0 24.9 26.0   BUN mg/dL 17 18 16   CREATININE mg/dL 0.81 0.69 0.69   GLUCOSE mg/dL 267* 207* 334*   EGFR mL/min/1.73 85.9 102.6 102.6     Results from last 7 days   Lab Units 12/29/23  0546 12/27/23  0922   ALBUMIN g/dL 3.9 3.8   BILIRUBIN mg/dL 0.3 0.4   ALK PHOS U/L 90 85   AST (SGOT) U/L 20 29   ALT (SGPT) U/L 41* 55*     Results from last 7 days   Lab Units 12/29/23  0546 12/28/23  0330 12/27/23  0922   CALCIUM mg/dL 9.0 9.1 9.3   ALBUMIN g/dL 3.9  --  3.8   MAGNESIUM mg/dL 1.4* 1.8  --        Hemoglobin A1C   Date/Time Value Ref Range Status   12/27/2023 0052 8.10 (H) 4.80 - 5.60 % Final     Glucose   Date/Time Value Ref Range Status   12/29/2023 0600 321 (H) 70 - 130 mg/dL Final   12/28/2023 2019 212 (H) 70 - 130 mg/dL Final   12/28/2023 1538 176 (H) 70 - 130 mg/dL Final   12/28/2023 1213 136 (H) 70 - 130 mg/dL Final   12/28/2023 0642 216 (H) 70 - 130 mg/dL Final    12/28/2023 0419 250 (H) 70 - 130 mg/dL Final   12/27/2023 2000 289 (H) 70 - 130 mg/dL Final       I have personally reviewed all medications:  Scheduled Medications  aspirin, 81 mg, Oral, Daily  busPIRone, 10 mg, Oral, Daily  [START ON 1/3/2024] ceFAZolin, 2,000 mg, Intravenous, Once  cetirizine, 10 mg, Oral, Daily  [START ON 1/3/2024] chlorhexidine, 15 mL, Mouth/Throat, Once  DULoxetine, 60 mg, Oral, BID  furosemide, 40 mg, Intravenous, BID  insulin glargine, 18 Units, Subcutaneous, BID  insulin lispro, 10 Units, Subcutaneous, TID With Meals  insulin lispro, 2-9 Units, Subcutaneous, 4x Daily AC & at Bedtime  magnesium sulfate, 2 g, Intravenous, Q2H  metoprolol succinate XL, 12.5 mg, Oral, Q24H  [START ON 1/3/2024] metoprolol tartrate, 12.5 mg, Oral, On Call to OR  [START ON 1/2/2024] mupirocin, 1 application , Each Nare, Q12H  pantoprazole, 40 mg, Oral, Q AM  potassium chloride ER, 40 mEq, Oral, Q4H  pravastatin, 20 mg, Oral, Nightly  sertraline, 25 mg, Oral, Daily  sodium chloride, 10 mL, Intravenous, Q12H  sodium chloride, 10 mL, Intravenous, Q12H    Infusions   Diet  Diet: Cardiac Diets, Diabetic Diets; Healthy Heart (2-3 Na+); Consistent Carbohydrate; Texture: Regular Texture (IDDSI 7); Fluid Consistency: Thin (IDDSI 0)  NPO Diet NPO Type: Sips with Meds    I have personally reviewed:  [x]  Laboratory   [x]  Microbiology   [x]  Radiology   []  EKG/Telemetry  []  Cardiology/Vascular   []  Pathology    []  Records      Assessment/Plan     Active Hospital Problems    Diagnosis  POA    **ACS (acute coronary syndrome) [I24.9]  Yes    Type 2 diabetes mellitus with complications [E11.8]  Yes    Type 2 diabetes mellitus with microalbuminuria, with long-term current use of insulin [E11.29, R80.9, Z79.4]  Not Applicable      Resolved Hospital Problems   No resolved problems to display.       53 y.o. female who is s/p Left Heart Cath, Coronary angiography.    Blood sugar this morning was quite elevated.  Subsequently  "before lunch blood sugar well-controlled.  Will see how she does before dinner and continue make adjustments as needed.  She says she does not really take scheduled insulin with meals at home because previously when she did that she would bottom out.      Jarrett Julien MD  [facility]  12/29/23  09:53 EST      Electronically signed by Jarrett Julien MD at 12/29/23 1500       Amina Nguyen APRN at 12/29/23 0916       Attestation signed by Jr Etienne Asif MD at 12/29/23 1340    I have reviewed this documentation and agree.                   LOS: 3 days   Patient Care Team:  Heavenly Monsivais MD as PCP - General (Family Medicine)  Hesham Cheney MD as Consulting Physician (Endocrinology)    Chief Complaint:   Multivessel CAD, valvular heart disease    Subjective:  Symptoms:  No shortness of breath or chest pain.    Diet:  Adequate intake.  No nausea or vomiting.    Activity level: Normal.    Pain:  She reports no pain.        Resting in bed. Reports feeling much better today. Denies any chest pain or shortness of breath.    Vital Signs  Temp:  [97.7 °F (36.5 °C)-98.7 °F (37.1 °C)] 98.5 °F (36.9 °C)  Heart Rate:  [] 95  Resp:  [12-25] 22  BP: ()/(61-78) 98/67  FiO2 (%):  [88 %] 88 %      12/27/23  0852 12/28/23  0500 12/29/23  0518   Weight: 71.7 kg (158 lb) 70.5 kg (155 lb 6.8 oz) 70 kg (154 lb 5.2 oz)     Body mass index is 26.49 kg/m².    Intake/Output Summary (Last 24 hours) at 12/29/2023 0916  Last data filed at 12/29/2023 0805  Gross per 24 hour   Intake 240 ml   Output --   Net 240 ml     No intake/output data recorded.      Objective:  General Appearance:  Comfortable and in no acute distress.    Vital signs: (most recent): Blood pressure 98/67, pulse 95, temperature 98.5 °F (36.9 °C), temperature source Oral, resp. rate 22, height 162.6 cm (64\"), weight 70 kg (154 lb 5.2 oz), SpO2 97%.  Vital signs are normal.  No fever.    Output: Producing urine.  "   Lungs:  Normal effort and normal respiratory rate.  Breath sounds clear to auscultation.  She is not in respiratory distress.    Heart: Normal rate.  Regular rhythm.    Neurological: Patient is alert and oriented to person, place and time.    Skin:  Warm and dry.            Results Review:      WBC WBC   Date Value Ref Range Status   12/29/2023 11.71 (H) 3.40 - 10.80 10*3/mm3 Final   12/28/2023 11.86 (H) 3.40 - 10.80 10*3/mm3 Final   12/27/2023 9.64 3.40 - 10.80 10*3/mm3 Final   12/27/2023 10.06 3.40 - 10.80 10*3/mm3 Final      HGB Hemoglobin   Date Value Ref Range Status   12/29/2023 13.5 12.0 - 15.9 g/dL Final   12/28/2023 13.4 12.0 - 15.9 g/dL Final   12/27/2023 12.9 12.0 - 15.9 g/dL Final   12/27/2023 12.8 12.0 - 15.9 g/dL Final      HCT Hematocrit   Date Value Ref Range Status   12/29/2023 39.5 34.0 - 46.6 % Final   12/28/2023 39.1 34.0 - 46.6 % Final   12/27/2023 38.9 34.0 - 46.6 % Final   12/27/2023 38.4 34.0 - 46.6 % Final      Platelets Platelets   Date Value Ref Range Status   12/29/2023 268 140 - 450 10*3/mm3 Final   12/28/2023 289 140 - 450 10*3/mm3 Final   12/27/2023 284 140 - 450 10*3/mm3 Final   12/27/2023 261 140 - 450 10*3/mm3 Final        PT/INR:    Protime   Date Value Ref Range Status   12/29/2023 14.2 11.7 - 14.2 Seconds Final   12/27/2023 15.0 (H) 11.7 - 14.2 Seconds Final   /  INR   Date Value Ref Range Status   12/29/2023 1.08 0.90 - 1.10 Final   12/27/2023 1.17 (H) 0.90 - 1.10 Final       Sodium Sodium   Date Value Ref Range Status   12/29/2023 140 136 - 145 mmol/L Final   12/28/2023 137 136 - 145 mmol/L Final   12/27/2023 138 136 - 145 mmol/L Final      Potassium Potassium   Date Value Ref Range Status   12/29/2023 3.3 (L) 3.5 - 5.2 mmol/L Final   12/28/2023 3.9 3.5 - 5.2 mmol/L Final   12/27/2023 3.3 (L) 3.5 - 5.2 mmol/L Final      Chloride Chloride   Date Value Ref Range Status   12/29/2023 103 98 - 107 mmol/L Final   12/28/2023 102 98 - 107 mmol/L Final   12/27/2023 101 98 - 107 mmol/L  Final      Bicarbonate CO2   Date Value Ref Range Status   12/29/2023 26.0 22.0 - 29.0 mmol/L Final   12/28/2023 24.9 22.0 - 29.0 mmol/L Final   12/27/2023 26.0 22.0 - 29.0 mmol/L Final      BUN BUN   Date Value Ref Range Status   12/29/2023 17 6 - 20 mg/dL Final   12/28/2023 18 6 - 20 mg/dL Final   12/27/2023 16 6 - 20 mg/dL Final      Creatinine Creatinine   Date Value Ref Range Status   12/29/2023 0.81 0.57 - 1.00 mg/dL Final   12/28/2023 0.69 0.57 - 1.00 mg/dL Final   12/27/2023 0.69 0.57 - 1.00 mg/dL Final      Calcium Calcium   Date Value Ref Range Status   12/29/2023 9.0 8.6 - 10.5 mg/dL Final   12/28/2023 9.1 8.6 - 10.5 mg/dL Final   12/27/2023 9.3 8.6 - 10.5 mg/dL Final      Magnesium Magnesium   Date Value Ref Range Status   12/29/2023 1.4 (L) 1.6 - 2.6 mg/dL Final   12/28/2023 1.8 1.6 - 2.6 mg/dL Final        aspirin, 81 mg, Oral, Daily  busPIRone, 10 mg, Oral, Daily  [START ON 1/3/2024] ceFAZolin, 2,000 mg, Intravenous, Once  cetirizine, 10 mg, Oral, Daily  [START ON 1/3/2024] chlorhexidine, 15 mL, Mouth/Throat, Once  DULoxetine, 60 mg, Oral, BID  furosemide, 40 mg, Intravenous, BID  insulin glargine, 18 Units, Subcutaneous, BID  insulin lispro, 10 Units, Subcutaneous, TID With Meals  insulin lispro, 2-9 Units, Subcutaneous, 4x Daily AC & at Bedtime  magnesium sulfate, 2 g, Intravenous, Q2H  metoprolol succinate XL, 12.5 mg, Oral, Q24H  [START ON 1/3/2024] metoprolol tartrate, 12.5 mg, Oral, On Call to OR  [START ON 1/2/2024] mupirocin, 1 application , Each Nare, Q12H  pantoprazole, 40 mg, Oral, Q AM  potassium chloride ER, 40 mEq, Oral, Q4H  pravastatin, 20 mg, Oral, Nightly  sertraline, 25 mg, Oral, Daily  sodium chloride, 10 mL, Intravenous, Q12H  sodium chloride, 10 mL, Intravenous, Q12H             ACS (acute coronary syndrome)    Type 2 diabetes mellitus with microalbuminuria, with long-term current use of insulin    Type 2 diabetes mellitus with complications      Assessment & Plan  Severe 3  vessel CAD  NSTEMI  Ischemic cardiomyopathy---EF 20%  Mild MR  Mild to moderate TR  HTN  HLD  Insulin dependent DM type 2  GERD  Recent tobacco cessation---quit 12/18/23  Daily marijuana use  Right adrenal adenoma    Pre-op vascular studies pending. Tentatively plan for CABG/possible MVR/possible TVR on Wednesday with Dr. Asif.     Amina Nguyen, APRN  12/29/23  09:16 EST    Electronically signed by Jr Etienne Asif MD at 12/29/23 1340       Billy Esquivel MD at 12/28/23 1810           LOS: 2 days   Patient Care Team:  Heavenly Monsivais MD as PCP - General (Family Medicine)  Hesham Cheney MD as Consulting Physician (Endocrinology)    Chief Complaint: Follow-up NSTEMI versus late presentation anteroseptal infarct, acute systolic CHF, new cardiomyopathy.    Interval History: She was nauseated this morning when I saw her.  No chest or epigastric pain.  Her shortness of breath is improved.  Unfortunately, her heart catheterization showed severe multivessel coronary artery disease.  She is going to need a CABG next week.    Vital Signs:  Temp:  [97.4 °F (36.3 °C)-98.7 °F (37.1 °C)] 97.8 °F (36.6 °C)  Heart Rate:  [] 89  Resp:  [12-25] 24  BP: ()/(61-78) 92/61  FiO2 (%):  [88 %] 88 %    Intake/Output Summary (Last 24 hours) at 12/28/2023 1811  Last data filed at 12/28/2023 1700  Gross per 24 hour   Intake 240 ml   Output --   Net 240 ml       Physical Exam:   General Appearance:    No acute distress, alert and oriented x4   Lungs:     Faint rales at the bases (much improved).    Heart:    Regular rhythm and normal rate.  No murmurs, gallops, or   rubs.   Abdomen:     Soft, nontender, nondistended.    Extremities:   No clubbing, cyanosis, or edema.      Results Review:    Results from last 7 days   Lab Units 12/28/23  0330   SODIUM mmol/L 137   POTASSIUM mmol/L 3.9   CHLORIDE mmol/L 102   CO2 mmol/L 24.9   BUN mg/dL 18   CREATININE mg/dL 0.69   GLUCOSE mg/dL 207*   CALCIUM mg/dL 9.1      Results from last 7 days   Lab Units 12/27/23  0922 12/27/23  0052   HSTROP T ng/L 229* 208*     Results from last 7 days   Lab Units 12/28/23  0330   WBC 10*3/mm3 11.86*   HEMOGLOBIN g/dL 13.4   HEMATOCRIT % 39.1   PLATELETS 10*3/mm3 289     Results from last 7 days   Lab Units 12/28/23  1134 12/28/23  0330 12/27/23  1725 12/27/23  0922 12/27/23  0052   INR   --   --   --   --  1.17*   APTT seconds 49.1* 61.4* 65.5*   < > 28.2    < > = values in this interval not displayed.     Results from last 7 days   Lab Units 12/27/23  0922   CHOLESTEROL mg/dL 199     Results from last 7 days   Lab Units 12/28/23  0330   MAGNESIUM mg/dL 1.8     Results from last 7 days   Lab Units 12/27/23  0922   CHOLESTEROL mg/dL 199   TRIGLYCERIDES mg/dL 207*   HDL CHOL mg/dL 31*   LDL CHOL mg/dL 131*       I reviewed the patient's new clinical results.        Assessment:  1.  Type I NSTEMI with severe multivessel coronary artery disease  2.  Acute systolic CHF secondary to ischemic cardiomyopathy and coronary artery disease  3.  Ischemic cardiomyopathy, new (EF 20% or less)  4.  Insulin-dependent diabetes since 2014, with neuropathy  5.  Mild to moderate mitral regurgitation  6.  Mild to moderate tricuspid regurgitation  7.  Tobacco use, quit on 12/18/2023  8.  Nonspecific right lower lobe groundglass opacity and left apical opacity on CT (follow-up CT of the chest recommended in 3 months)   9.  Right adrenal adenoma by CT of the abdomen  10.  Dyslipidemia   11.  Statin intolerance (myalgias)  12.  Gastroesophageal reflux disease  13.  Moderate right pleural effusion on admission  14.  Relative hypotension    Plan:  -Unfortunately, her heart catheterization earlier today showed severe multivessel coronary artery disease.  This included an 80% ostial and 90% mid LAD, 80% ostial OM1, and 95% proximal to mid RCA with left-to-right collateral filling.    -Spoke with Dr. Asif earlier today from cardiovascular surgery.  Her LVEDP was  between 28 and 30.  Plans are to continue to diurese her over the weekend and hopefully get her in better shape prior to a CABG next week (tentatively set up for Wednesday).  She may also need a mitral valve repair or tricuspid valve repair (this will be decided in the OR).    -Given the 40 mg of IV Lasix today.  If her blood pressure permits, I will diurese her again tomorrow and over the weekend.    -Blood pressure has been low.  I have been unable to add guideline directed medical therapy because of this.  She is on Toprol-XL 12.5 mg/day if she can tolerate it from a blood pressure standpoint.    -She cannot be on an ACE inhibitor, ARB, Entresto, spironolactone, or SGLT2 inhibitor for now given lower blood pressure.    -Continue aspirin.  She has had severe myalgias with statins in the past.  I started her on pravastatin 20 mg/day.  She has tolerated this so far.    -Appreciate Dr. Julien seeing the patient and assisting with glucose management.  Also appreciate Dr. Asif's assistance.    Billy Esquivel MD  12/28/23  18:11 EST         Electronically signed by Billy Esquivel MD at 12/28/23 2023

## 2023-12-29 NOTE — PROGRESS NOTES
LOS: 3 days   Patient Care Team:  Heavenly Monsivais MD as PCP - General (Family Medicine)  Hesham Cheney MD as Consulting Physician (Endocrinology)    Chief Complaint: Follow-up NSTEMI versus late presentation anteroseptal infarct, acute systolic CHF, new cardiomyopathy.    Interval History: Complained of fatigue this morning.  No chest pain.  Shortness of breath has improved.    Vital Signs:  Temp:  [97.7 °F (36.5 °C)-98.5 °F (36.9 °C)] 98.1 °F (36.7 °C)  Heart Rate:  [] 114  Resp:  [22-24] 22  BP: ()/(61-84) 103/84    Intake/Output Summary (Last 24 hours) at 12/29/2023 1800  Last data filed at 12/29/2023 1757  Gross per 24 hour   Intake 480 ml   Output --   Net 480 ml       Physical Exam:   General Appearance:    No acute distress, alert and oriented x4   Lungs:     Clear to auscultation bilaterally.    Heart:    Regular rhythm and mildly tachycardic rate.  No murmurs, gallops, or   rubs.   Abdomen:     Soft, nontender, nondistended.    Extremities:   No clubbing, cyanosis, or edema.      Results Review:    Results from last 7 days   Lab Units 12/29/23  1631 12/29/23  0546   SODIUM mmol/L  --  140   POTASSIUM mmol/L 4.1 3.3*   CHLORIDE mmol/L  --  103   CO2 mmol/L  --  26.0   BUN mg/dL  --  17   CREATININE mg/dL  --  0.81   GLUCOSE mg/dL  --  267*   CALCIUM mg/dL  --  9.0     Results from last 7 days   Lab Units 12/27/23  0922 12/27/23  0052   HSTROP T ng/L 229* 208*     Results from last 7 days   Lab Units 12/29/23  0546   WBC 10*3/mm3 11.71*   HEMOGLOBIN g/dL 13.5   HEMATOCRIT % 39.5   PLATELETS 10*3/mm3 268     Results from last 7 days   Lab Units 12/29/23  0546 12/28/23  1134 12/28/23  0330 12/27/23  0922 12/27/23  0052   INR  1.08  --   --   --  1.17*   APTT seconds 25.6 49.1* 61.4*   < > 28.2    < > = values in this interval not displayed.     Results from last 7 days   Lab Units 12/27/23  0922   CHOLESTEROL mg/dL 199     Results from last 7 days   Lab Units 12/29/23  0546   MAGNESIUM  mg/dL 1.4*     Results from last 7 days   Lab Units 12/27/23  0922   CHOLESTEROL mg/dL 199   TRIGLYCERIDES mg/dL 207*   HDL CHOL mg/dL 31*   LDL CHOL mg/dL 131*       I reviewed the patient's new clinical results.        Assessment:  1.  Type I NSTEMI with severe multivessel coronary artery disease  2.  Acute systolic CHF secondary to ischemic cardiomyopathy and coronary artery disease  3.  Ischemic cardiomyopathy, new (EF 20% or less)  4.  Insulin-dependent diabetes since 2014, with neuropathy  5.  Mild to moderate mitral regurgitation  6.  Mild to moderate tricuspid regurgitation  7.  Tobacco use, quit on 12/18/2023  8.  Nonspecific right lower lobe groundglass opacity and left apical opacity on CT (follow-up CT of the chest recommended in 3 months)   9.  Right adrenal adenoma by CT of the abdomen  10.  Dyslipidemia   11.  Statin intolerance (myalgias)  12.  Gastroesophageal reflux disease  13.  Moderate right pleural effusion on admission  14.  Relative hypotension  15.  Sinus tachycardia    Plan:  -Heart catheterization showed severe multivessel coronary artery disease.  This included an 80% ostial and 90% mid LAD, 80% ostial OM1, and 95% proximal to mid RCA with left-to-right collateral filling.    -Plan for CABG +/- mitral/tricuspid repair next Wednesday with Dr. Asif.  Her LVEDP was significantly elevated on cath, and Dr. Asif would like her diuresed before surgery.    -Lasix 40 mg IV every 12 hours started this morning.  I will order this for 2 doses today.  Potentially start her on oral Lasix tomorrow if she looks good.    -Blood pressure has been low.  I have been unable to add guideline directed medical therapy because of this.  She is on Toprol-XL 12.5 mg/day if she can tolerate it from a blood pressure standpoint.    -She cannot be on an ACE inhibitor, ARB, Entresto, spironolactone, or SGLT2 inhibitor for now given lower blood pressure.    -Continue aspirin.  She has had severe myalgias with  multiple statins in the past.  I started her on pravastatin 20 mg/day.  She has tolerated this so far.    -Appreciate Dr. Julien seeing the patient and assisting with glucose management.  Also appreciate Dr. Asif's assistance.    Billy Esquivel MD  12/29/23  18:00 EST

## 2023-12-29 NOTE — PLAN OF CARE
Goal Outcome Evaluation:  Plan of Care Reviewed With: patient        Progress: no change  Outcome Evaluation: Patient with no c/o pain this pm. Patient b/p 's/60-70's HR 's remains SR/ST. Patient right radial site with no issues or c/o pain. Patient has MVD and plans for CT surgery on Wednesday. Will continue to monitor and await surgery.

## 2023-12-30 LAB
ANION GAP SERPL CALCULATED.3IONS-SCNC: 11.2 MMOL/L (ref 5–15)
BACTERIA SPEC AEROBE CULT: NORMAL
BUN SERPL-MCNC: 20 MG/DL (ref 6–20)
BUN/CREAT SERPL: 24.1 (ref 7–25)
CALCIUM SPEC-SCNC: 9 MG/DL (ref 8.6–10.5)
CHLORIDE SERPL-SCNC: 104 MMOL/L (ref 98–107)
CO2 SERPL-SCNC: 25.8 MMOL/L (ref 22–29)
CREAT SERPL-MCNC: 0.83 MG/DL (ref 0.57–1)
DEPRECATED RDW RBC AUTO: 42.5 FL (ref 37–54)
EGFRCR SERPLBLD CKD-EPI 2021: 83.4 ML/MIN/1.73
ERYTHROCYTE [DISTWIDTH] IN BLOOD BY AUTOMATED COUNT: 12 % (ref 12.3–15.4)
GLUCOSE BLDC GLUCOMTR-MCNC: 136 MG/DL (ref 70–130)
GLUCOSE BLDC GLUCOMTR-MCNC: 161 MG/DL (ref 70–130)
GLUCOSE BLDC GLUCOMTR-MCNC: 81 MG/DL (ref 70–130)
GLUCOSE SERPL-MCNC: 130 MG/DL (ref 65–99)
HCT VFR BLD AUTO: 39.4 % (ref 34–46.6)
HGB BLD-MCNC: 13.4 G/DL (ref 12–15.9)
MAGNESIUM SERPL-MCNC: 2.3 MG/DL (ref 1.6–2.6)
MCH RBC QN AUTO: 32.7 PG (ref 26.6–33)
MCHC RBC AUTO-ENTMCNC: 34 G/DL (ref 31.5–35.7)
MCV RBC AUTO: 96.1 FL (ref 79–97)
PLATELET # BLD AUTO: 269 10*3/MM3 (ref 140–450)
PMV BLD AUTO: 9.9 FL (ref 6–12)
POTASSIUM SERPL-SCNC: 3.9 MMOL/L (ref 3.5–5.2)
RBC # BLD AUTO: 4.1 10*6/MM3 (ref 3.77–5.28)
SODIUM SERPL-SCNC: 141 MMOL/L (ref 136–145)
WBC NRBC COR # BLD AUTO: 10.73 10*3/MM3 (ref 3.4–10.8)

## 2023-12-30 PROCEDURE — 25010000002 FUROSEMIDE PER 20 MG: Performed by: NURSE PRACTITIONER

## 2023-12-30 PROCEDURE — 82948 REAGENT STRIP/BLOOD GLUCOSE: CPT

## 2023-12-30 PROCEDURE — 63710000001 INSULIN LISPRO (HUMAN) PER 5 UNITS: Performed by: INTERNAL MEDICINE

## 2023-12-30 PROCEDURE — 85027 COMPLETE CBC AUTOMATED: CPT | Performed by: INTERNAL MEDICINE

## 2023-12-30 PROCEDURE — 99232 SBSQ HOSP IP/OBS MODERATE 35: CPT | Performed by: STUDENT IN AN ORGANIZED HEALTH CARE EDUCATION/TRAINING PROGRAM

## 2023-12-30 PROCEDURE — 80048 BASIC METABOLIC PNL TOTAL CA: CPT | Performed by: INTERNAL MEDICINE

## 2023-12-30 PROCEDURE — 83735 ASSAY OF MAGNESIUM: CPT | Performed by: INTERNAL MEDICINE

## 2023-12-30 PROCEDURE — 63710000001 INSULIN LISPRO (HUMAN) PER 5 UNITS: Performed by: HOSPITALIST

## 2023-12-30 PROCEDURE — 63710000001 INSULIN GLARGINE PER 5 UNITS: Performed by: HOSPITALIST

## 2023-12-30 RX ORDER — FUROSEMIDE 10 MG/ML
40 INJECTION INTRAMUSCULAR; INTRAVENOUS ONCE
Status: COMPLETED | OUTPATIENT
Start: 2023-12-30 | End: 2023-12-30

## 2023-12-30 RX ADMIN — Medication 10 ML: at 22:34

## 2023-12-30 RX ADMIN — PRAVASTATIN SODIUM 20 MG: 20 TABLET ORAL at 22:34

## 2023-12-30 RX ADMIN — Medication 10 ML: at 22:35

## 2023-12-30 RX ADMIN — INSULIN GLARGINE 20 UNITS: 100 INJECTION, SOLUTION SUBCUTANEOUS at 08:23

## 2023-12-30 RX ADMIN — Medication 10 ML: at 08:46

## 2023-12-30 RX ADMIN — INSULIN LISPRO 8 UNITS: 100 INJECTION, SOLUTION INTRAVENOUS; SUBCUTANEOUS at 11:28

## 2023-12-30 RX ADMIN — CETIRIZINE HYDROCHLORIDE 10 MG: 10 TABLET ORAL at 08:24

## 2023-12-30 RX ADMIN — INSULIN LISPRO 2 UNITS: 100 INJECTION, SOLUTION INTRAVENOUS; SUBCUTANEOUS at 16:37

## 2023-12-30 RX ADMIN — PANTOPRAZOLE SODIUM 40 MG: 40 TABLET, DELAYED RELEASE ORAL at 06:56

## 2023-12-30 RX ADMIN — BUSPIRONE HYDROCHLORIDE 10 MG: 10 TABLET ORAL at 08:24

## 2023-12-30 RX ADMIN — INSULIN LISPRO 2 UNITS: 100 INJECTION, SOLUTION INTRAVENOUS; SUBCUTANEOUS at 11:28

## 2023-12-30 RX ADMIN — Medication 5 MG: at 22:34

## 2023-12-30 RX ADMIN — METOPROLOL SUCCINATE 12.5 MG: 25 TABLET, EXTENDED RELEASE ORAL at 08:24

## 2023-12-30 RX ADMIN — ASPIRIN 81 MG: 81 TABLET, COATED ORAL at 08:25

## 2023-12-30 RX ADMIN — DULOXETINE HYDROCHLORIDE 60 MG: 60 CAPSULE, DELAYED RELEASE ORAL at 22:34

## 2023-12-30 RX ADMIN — SERTRALINE 25 MG: 25 TABLET, FILM COATED ORAL at 08:25

## 2023-12-30 RX ADMIN — INSULIN LISPRO 8 UNITS: 100 INJECTION, SOLUTION INTRAVENOUS; SUBCUTANEOUS at 16:37

## 2023-12-30 RX ADMIN — ALPRAZOLAM 0.25 MG: 0.25 TABLET ORAL at 22:34

## 2023-12-30 RX ADMIN — FUROSEMIDE 40 MG: 10 INJECTION, SOLUTION INTRAMUSCULAR; INTRAVENOUS at 18:06

## 2023-12-30 RX ADMIN — INSULIN LISPRO 8 UNITS: 100 INJECTION, SOLUTION INTRAVENOUS; SUBCUTANEOUS at 08:24

## 2023-12-30 RX ADMIN — DULOXETINE HYDROCHLORIDE 60 MG: 60 CAPSULE, DELAYED RELEASE ORAL at 08:24

## 2023-12-30 NOTE — PROGRESS NOTES
She does not feel as well today.  This is nonspecific.  She is not short of breath and not having chest pain.  Will plan surgery on Wednesday.  Medical treatment for now with goal-directed medical treatment to prepare her for a big operation.

## 2023-12-30 NOTE — PROGRESS NOTES
LOS: 4 days   Patient Care Team:  Heavenly Monsivais MD as PCP - General (Family Medicine)  Hesham Cheney MD as Consulting Physician (Endocrinology)    Chief Complaint:  Dyspnea, CHF, NSTEMI versus late presentation anteroseptal infarct, acute systolic CHF, new cardiomyopathy.     Interval History:     She is getting standing scale weights, her weight is down just a few pounds from admission but not much better.    Otherwise, she does have some intermittent anxiety due to laying flat and dyspnea associated with that.  Still complains of some fatigue but otherwise she is doing okay.    Objective   Vital Signs  Temp:  [97.4 °F (36.3 °C)-98.2 °F (36.8 °C)] 97.9 °F (36.6 °C)  Heart Rate:  [] 94  Resp:  [18-20] 18  BP: ()/(63-80) 86/65    Intake/Output Summary (Last 24 hours) at 12/30/2023 1537  Last data filed at 12/30/2023 1300  Gross per 24 hour   Intake 720 ml   Output 900 ml   Net -180 ml       Comfortable NAD, resting in bed  PERRL, conjunctivae clear  Neck supple, JVD to just above clavicle while sitting upright  S1/S2 RRR, no m/r/g  Crackles to mid lungs bilaterally, normal effort  Abdomen S/NT/ND (+) BS, no HSM appreciated  Extremities warm, no clubbing, cyanosis, 1+ BLE edema  Moves all 4 extremities without gross deficits   no visible or palpable skin lesions  A/Ox4, mood and affect appropriate    Results Review:      Results from last 7 days   Lab Units 12/30/23  0329 12/29/23  1631 12/29/23  0546 12/28/23  0330   SODIUM mmol/L 141  --  140 137   POTASSIUM mmol/L 3.9 4.1 3.3* 3.9   CHLORIDE mmol/L 104  --  103 102   CO2 mmol/L 25.8  --  26.0 24.9   BUN mg/dL 20  --  17 18   CREATININE mg/dL 0.83  --  0.81 0.69   GLUCOSE mg/dL 130*  --  267* 207*   CALCIUM mg/dL 9.0  --  9.0 9.1     Results from last 7 days   Lab Units 12/27/23  0922 12/27/23  0052   HSTROP T ng/L 229* 208*     Results from last 7 days   Lab Units 12/30/23  0328 12/29/23  0546 12/28/23  0330   WBC 10*3/mm3 10.73 11.71*  11.86*   HEMOGLOBIN g/dL 13.4 13.5 13.4   HEMATOCRIT % 39.4 39.5 39.1   PLATELETS 10*3/mm3 269 268 289     Results from last 7 days   Lab Units 12/29/23  0546 12/28/23  1134 12/28/23  0330 12/27/23  0922 12/27/23  0052   INR  1.08  --   --   --  1.17*   APTT seconds 25.6 49.1* 61.4*   < > 28.2    < > = values in this interval not displayed.     Results from last 7 days   Lab Units 12/27/23  0922   CHOLESTEROL mg/dL 199     Results from last 7 days   Lab Units 12/30/23  0329   MAGNESIUM mg/dL 2.3     Results from last 7 days   Lab Units 12/27/23  0922   CHOLESTEROL mg/dL 199   TRIGLYCERIDES mg/dL 207*   HDL CHOL mg/dL 31*   LDL CHOL mg/dL 131*       I reviewed the patient's new clinical results.  I personally viewed and interpreted the patient's EKG/Telemetry data        Medication Review:   aspirin, 81 mg, Oral, Daily  busPIRone, 10 mg, Oral, Daily  [START ON 1/3/2024] ceFAZolin, 2,000 mg, Intravenous, Once  cetirizine, 10 mg, Oral, Daily  [START ON 1/3/2024] chlorhexidine, 15 mL, Mouth/Throat, Once  DULoxetine, 60 mg, Oral, BID  insulin glargine, 20 Units, Subcutaneous, BID  insulin lispro, 2-9 Units, Subcutaneous, 4x Daily AC & at Bedtime  insulin lispro, 8 Units, Subcutaneous, TID With Meals  metoprolol succinate XL, 12.5 mg, Oral, Q24H  [START ON 1/3/2024] metoprolol tartrate, 12.5 mg, Oral, On Call to OR  [START ON 1/2/2024] mupirocin, 1 application , Each Nare, Q12H  pantoprazole, 40 mg, Oral, Q AM  pravastatin, 20 mg, Oral, Nightly  sertraline, 25 mg, Oral, Daily  sodium chloride, 10 mL, Intravenous, Q12H  sodium chloride, 10 mL, Intravenous, Q12H             Assessment & Plan       ACS (acute coronary syndrome)    Type 2 diabetes mellitus with microalbuminuria, with long-term current use of insulin    Type 2 diabetes mellitus with complications    1.  Type I NSTEMI with severe multivessel coronary artery disease  2.  Acute systolic CHF secondary to ischemic cardiomyopathy and coronary artery disease  3.   Ischemic cardiomyopathy, new (EF 20% or less)  4.  Insulin-dependent diabetes since 2014, with neuropathy  5.  Mild to moderate mitral regurgitation  6.  Mild to moderate tricuspid regurgitation  7.  Tobacco use, quit on 12/18/2023  8.  Nonspecific right lower lobe groundglass opacity and left apical opacity on CT (follow-up CT of the chest recommended in 3 months)   9.  Right adrenal adenoma by CT of the abdomen  10.  Dyslipidemia   11.  Statin intolerance (myalgias)  12.  Gastroesophageal reflux disease  13.  Moderate right pleural effusion on admission  14.  Relative hypotension  15.  Sinus tachycardia    Planning for CABG plus or minus mitral tricuspid repair next Wednesday with Dr. Asif.    Continue diuresis with additional IV Lasix 40 today.  She still some crackles on exam.  Thankfully she is still on room air.  I discussed with Dr. Petty, perhaps if we reached an impasse and she does not diurese well and blood pressure remains an issue we could use low-dose dobutamine, her coronary anatomy is obviously poor per above but probably tolerate low-dose dobutamine okay    Metoprolol 12.5 XL daily, her blood pressure is going to limit her from being on other GDMT.    Intolerant to multiple statins in the past.  Probably can need PCSK9 as outpatient    Continue aspirin 81    Alexis Bruce MD  12/30/23  15:37 EST      Part of this note may be an electronic transcription/translation of spoken language to printed text using the Dragon Dictation System.

## 2023-12-30 NOTE — PROGRESS NOTES
Blood glucose 182 at 2036, 130 at 0329, 136 bk and 161 lunch    Lantus 20, humalog 8 with meals, SSI    Blood glucoses controlled

## 2023-12-30 NOTE — PLAN OF CARE
Goal Outcome Evaluation:  Plan of Care Reviewed With: patient        Progress: no change  Outcome Evaluation: VSS, AOx4. No pain reported but SOA is an issue at this time. Lasix has been admin per orders and current O2 sat on R/A is 100%. Right radial site is c/d no pain reported at site. Pre-op for surgery scheduled for Wed. [facility].

## 2023-12-31 LAB
ANION GAP SERPL CALCULATED.3IONS-SCNC: 11.5 MMOL/L (ref 5–15)
BUN SERPL-MCNC: 22 MG/DL (ref 6–20)
BUN/CREAT SERPL: 29.3 (ref 7–25)
CALCIUM SPEC-SCNC: 9.2 MG/DL (ref 8.6–10.5)
CHLORIDE SERPL-SCNC: 101 MMOL/L (ref 98–107)
CO2 SERPL-SCNC: 25.5 MMOL/L (ref 22–29)
CREAT SERPL-MCNC: 0.75 MG/DL (ref 0.57–1)
EGFRCR SERPLBLD CKD-EPI 2021: 94.2 ML/MIN/1.73
GLUCOSE BLDC GLUCOMTR-MCNC: 103 MG/DL (ref 70–130)
GLUCOSE BLDC GLUCOMTR-MCNC: 173 MG/DL (ref 70–130)
GLUCOSE BLDC GLUCOMTR-MCNC: 178 MG/DL (ref 70–130)
GLUCOSE BLDC GLUCOMTR-MCNC: 198 MG/DL (ref 70–130)
GLUCOSE BLDC GLUCOMTR-MCNC: 81 MG/DL (ref 70–130)
GLUCOSE SERPL-MCNC: 97 MG/DL (ref 65–99)
MAGNESIUM SERPL-MCNC: 2.2 MG/DL (ref 1.6–2.6)
POTASSIUM SERPL-SCNC: 4.2 MMOL/L (ref 3.5–5.2)
SODIUM SERPL-SCNC: 138 MMOL/L (ref 136–145)

## 2023-12-31 PROCEDURE — 25010000002 ONDANSETRON PER 1 MG: Performed by: INTERNAL MEDICINE

## 2023-12-31 PROCEDURE — 25010000002 FUROSEMIDE PER 20 MG: Performed by: STUDENT IN AN ORGANIZED HEALTH CARE EDUCATION/TRAINING PROGRAM

## 2023-12-31 PROCEDURE — 99232 SBSQ HOSP IP/OBS MODERATE 35: CPT | Performed by: STUDENT IN AN ORGANIZED HEALTH CARE EDUCATION/TRAINING PROGRAM

## 2023-12-31 PROCEDURE — 63710000001 INSULIN GLARGINE PER 5 UNITS: Performed by: HOSPITALIST

## 2023-12-31 PROCEDURE — 63710000001 INSULIN LISPRO (HUMAN) PER 5 UNITS: Performed by: INTERNAL MEDICINE

## 2023-12-31 PROCEDURE — 80048 BASIC METABOLIC PNL TOTAL CA: CPT | Performed by: STUDENT IN AN ORGANIZED HEALTH CARE EDUCATION/TRAINING PROGRAM

## 2023-12-31 PROCEDURE — 25010000002 DOBUTAMINE PER 250 MG: Performed by: STUDENT IN AN ORGANIZED HEALTH CARE EDUCATION/TRAINING PROGRAM

## 2023-12-31 PROCEDURE — 83735 ASSAY OF MAGNESIUM: CPT | Performed by: STUDENT IN AN ORGANIZED HEALTH CARE EDUCATION/TRAINING PROGRAM

## 2023-12-31 PROCEDURE — 82948 REAGENT STRIP/BLOOD GLUCOSE: CPT

## 2023-12-31 PROCEDURE — 63710000001 INSULIN LISPRO (HUMAN) PER 5 UNITS: Performed by: HOSPITALIST

## 2023-12-31 RX ORDER — FUROSEMIDE 10 MG/ML
40 INJECTION INTRAMUSCULAR; INTRAVENOUS ONCE
Status: COMPLETED | OUTPATIENT
Start: 2023-12-31 | End: 2023-12-31

## 2023-12-31 RX ORDER — DOBUTAMINE HYDROCHLORIDE 100 MG/100ML
2 INJECTION INTRAVENOUS CONTINUOUS
Status: DISCONTINUED | OUTPATIENT
Start: 2023-12-31 | End: 2024-01-02

## 2023-12-31 RX ORDER — TRAZODONE HYDROCHLORIDE 50 MG/1
50 TABLET ORAL NIGHTLY PRN
Status: DISCONTINUED | OUTPATIENT
Start: 2023-12-31 | End: 2024-01-09 | Stop reason: HOSPADM

## 2023-12-31 RX ADMIN — INSULIN GLARGINE 20 UNITS: 100 INJECTION, SOLUTION SUBCUTANEOUS at 08:31

## 2023-12-31 RX ADMIN — INSULIN LISPRO 2 UNITS: 100 INJECTION, SOLUTION INTRAVENOUS; SUBCUTANEOUS at 22:16

## 2023-12-31 RX ADMIN — ASPIRIN 81 MG: 81 TABLET, COATED ORAL at 08:32

## 2023-12-31 RX ADMIN — PRAVASTATIN SODIUM 20 MG: 20 TABLET ORAL at 22:15

## 2023-12-31 RX ADMIN — Medication 10 ML: at 22:17

## 2023-12-31 RX ADMIN — TRAZODONE HYDROCHLORIDE 50 MG: 50 TABLET ORAL at 22:39

## 2023-12-31 RX ADMIN — DULOXETINE HYDROCHLORIDE 60 MG: 60 CAPSULE, DELAYED RELEASE ORAL at 08:32

## 2023-12-31 RX ADMIN — ONDANSETRON HYDROCHLORIDE 4 MG: 2 INJECTION, SOLUTION INTRAMUSCULAR; INTRAVENOUS at 07:54

## 2023-12-31 RX ADMIN — INSULIN LISPRO 2 UNITS: 100 INJECTION, SOLUTION INTRAVENOUS; SUBCUTANEOUS at 16:26

## 2023-12-31 RX ADMIN — DOBUTAMINE HYDROCHLORIDE 2 MCG/KG/MIN: 100 INJECTION INTRAVENOUS at 12:08

## 2023-12-31 RX ADMIN — INSULIN LISPRO 8 UNITS: 100 INJECTION, SOLUTION INTRAVENOUS; SUBCUTANEOUS at 16:25

## 2023-12-31 RX ADMIN — CETIRIZINE HYDROCHLORIDE 10 MG: 10 TABLET ORAL at 08:32

## 2023-12-31 RX ADMIN — SERTRALINE 25 MG: 25 TABLET, FILM COATED ORAL at 08:32

## 2023-12-31 RX ADMIN — INSULIN GLARGINE 20 UNITS: 100 INJECTION, SOLUTION SUBCUTANEOUS at 22:15

## 2023-12-31 RX ADMIN — INSULIN LISPRO 8 UNITS: 100 INJECTION, SOLUTION INTRAVENOUS; SUBCUTANEOUS at 11:31

## 2023-12-31 RX ADMIN — PANTOPRAZOLE SODIUM 40 MG: 40 TABLET, DELAYED RELEASE ORAL at 08:25

## 2023-12-31 RX ADMIN — DULOXETINE HYDROCHLORIDE 60 MG: 60 CAPSULE, DELAYED RELEASE ORAL at 22:15

## 2023-12-31 RX ADMIN — FUROSEMIDE 40 MG: 10 INJECTION, SOLUTION INTRAMUSCULAR; INTRAVENOUS at 15:45

## 2023-12-31 RX ADMIN — BUSPIRONE HYDROCHLORIDE 10 MG: 10 TABLET ORAL at 08:32

## 2023-12-31 RX ADMIN — Medication 10 ML: at 08:02

## 2023-12-31 RX ADMIN — INSULIN LISPRO 2 UNITS: 100 INJECTION, SOLUTION INTRAVENOUS; SUBCUTANEOUS at 11:31

## 2023-12-31 NOTE — PROGRESS NOTES
LOS: 5 days   Patient Care Team:  Heavenly Monsivais MD as PCP - General (Family Medicine)  Hesham Cheney MD as Consulting Physician (Endocrinology)    Chief Complaint:  Dyspnea, CHF, NSTEMI versus late presentation anteroseptal infarct, acute systolic CHF, new cardiomyopathy.     Interval History:     Weights are not downtrending.  Creatinine today shows stable, but waste products slightly up with lasix dose     Objective   Vital Signs  Temp:  [97.7 °F (36.5 °C)-97.9 °F (36.6 °C)] 97.8 °F (36.6 °C)  Heart Rate:  [86-96] 96  Resp:  [16-18] 16  BP: (77-89)/(57-66) 89/66    Intake/Output Summary (Last 24 hours) at 12/31/2023 1021  Last data filed at 12/31/2023 0700  Gross per 24 hour   Intake 600 ml   Output 1000 ml   Net -400 ml       Comfortable NAD, resting in bed  PERRL, conjunctivae clear  Neck supple, JVD to just above clavicle while sitting upright  S1/S2 RRR, no m/r/g  Crackles to mid lungs bilaterally, normal effort  Abdomen S/NT/ND (+) BS, no HSM appreciated  Extremities warm, no clubbing, mild toe cyanosis, fingertips slightly dusky, 1+ BLE edema  Moves all 4 extremities without gross deficits   no visible or palpable skin lesions  A/Ox4, mood and affect appropriate    Results Review:      Results from last 7 days   Lab Units 12/30/23  0329 12/29/23  1631 12/29/23  0546 12/28/23  0330   SODIUM mmol/L 141  --  140 137   POTASSIUM mmol/L 3.9 4.1 3.3* 3.9   CHLORIDE mmol/L 104  --  103 102   CO2 mmol/L 25.8  --  26.0 24.9   BUN mg/dL 20  --  17 18   CREATININE mg/dL 0.83  --  0.81 0.69   GLUCOSE mg/dL 130*  --  267* 207*   CALCIUM mg/dL 9.0  --  9.0 9.1     Results from last 7 days   Lab Units 12/27/23  0922 12/27/23  0052   HSTROP T ng/L 229* 208*     Results from last 7 days   Lab Units 12/30/23  0328 12/29/23  0546 12/28/23  0330   WBC 10*3/mm3 10.73 11.71* 11.86*   HEMOGLOBIN g/dL 13.4 13.5 13.4   HEMATOCRIT % 39.4 39.5 39.1   PLATELETS 10*3/mm3 269 268 289     Results from last 7 days   Lab  Units 12/29/23  0546 12/28/23  1134 12/28/23  0330 12/27/23  0922 12/27/23  0052   INR  1.08  --   --   --  1.17*   APTT seconds 25.6 49.1* 61.4*   < > 28.2    < > = values in this interval not displayed.     Results from last 7 days   Lab Units 12/27/23  0922   CHOLESTEROL mg/dL 199     Results from last 7 days   Lab Units 12/30/23  0329   MAGNESIUM mg/dL 2.3     Results from last 7 days   Lab Units 12/27/23  0922   CHOLESTEROL mg/dL 199   TRIGLYCERIDES mg/dL 207*   HDL CHOL mg/dL 31*   LDL CHOL mg/dL 131*       I reviewed the patient's new clinical results.  I personally viewed and interpreted the patient's EKG/Telemetry data        Medication Review:   aspirin, 81 mg, Oral, Daily  busPIRone, 10 mg, Oral, Daily  [START ON 1/3/2024] ceFAZolin, 2,000 mg, Intravenous, Once  cetirizine, 10 mg, Oral, Daily  [START ON 1/3/2024] chlorhexidine, 15 mL, Mouth/Throat, Once  DULoxetine, 60 mg, Oral, BID  insulin glargine, 20 Units, Subcutaneous, BID  insulin lispro, 2-9 Units, Subcutaneous, 4x Daily AC & at Bedtime  insulin lispro, 8 Units, Subcutaneous, TID With Meals  metoprolol succinate XL, 12.5 mg, Oral, Q24H  [START ON 1/3/2024] metoprolol tartrate, 12.5 mg, Oral, On Call to OR  [START ON 1/2/2024] mupirocin, 1 application , Each Nare, Q12H  pantoprazole, 40 mg, Oral, Q AM  pravastatin, 20 mg, Oral, Nightly  sertraline, 25 mg, Oral, Daily  sodium chloride, 10 mL, Intravenous, Q12H  sodium chloride, 10 mL, Intravenous, Q12H             Assessment & Plan       ACS (acute coronary syndrome)    Type 2 diabetes mellitus with microalbuminuria, with long-term current use of insulin    Type 2 diabetes mellitus with complications    1.  Type I NSTEMI with severe multivessel coronary artery disease  2.  Acute systolic CHF secondary to ischemic cardiomyopathy and coronary artery disease  3.  Ischemic cardiomyopathy, new (EF 20% or less)  4.  Insulin-dependent diabetes since 2014, with neuropathy  5.  Mild to moderate mitral  regurgitation  6.  Mild to moderate tricuspid regurgitation  7.  Tobacco use, quit on 12/18/2023  8.  Nonspecific right lower lobe groundglass opacity and left apical opacity on CT (follow-up CT of the chest recommended in 3 months)   9.  Right adrenal adenoma by CT of the abdomen  10.  Dyslipidemia   11.  Statin intolerance (myalgias)  12.  Gastroesophageal reflux disease  13.  Moderate right pleural effusion on admission  14.  Relative hypotension  15.  Sinus tachycardia    Planning for CABG plus or minus mitral tricuspid repair next Wednesday with Dr. Asif.    She received diuretics yesterday with Lasix, but it appears that she has not made much urine with this.  Her blood pressure remains borderline. Pulm edema still noted. Renal function with a slighy uptick in waste products. I will start her on low dose dobutamine 2mcg/kg/min to help with renal perfusion. Give additional lasix this PM. Her Bps remain borderline.     Will stop metoprolol while on dobutamine. BP limits all other GDMT     Intolerant to multiple statins in the past.  Probably can need PCSK9 as outpatient    Continue aspirin 81    Alexis Bruce MD  12/31/23  10:21 EST      Part of this note may be an electronic transcription/translation of spoken language to printed text using the Dragon Dictation System.

## 2023-12-31 NOTE — PROGRESS NOTES
[facility]     LOS: 5 days     Subjective:    CC:No chief complaint on file.    DIET:  Diet Order   Procedures    Diet: Cardiac Diets, Diabetic Diets; Healthy Heart (2-3 Na+); Consistent Carbohydrate; Texture: Regular Texture (IDDSI 7); Fluid Consistency: Thin (IDDSI 0)    NPO Diet NPO Type: Sips with Meds       Objective:    Vital Signs:  Temp:  [97.7 °F (36.5 °C)-98.3 °F (36.8 °C)] 98.3 °F (36.8 °C)  Heart Rate:  [86-96] 90  Resp:  [16] 16  BP: (77-89)/(57-67) 89/59    SpO2:  [93 %-100 %] 100 %  on   ;   Device (Oxygen Therapy): room air  Body mass index is 27.05 kg/m².    Physical Exam  Constitutional:       Appearance: Normal appearance.   HENT:      Head: Normocephalic and atraumatic.   Cardiovascular:      Rate and Rhythm: Normal rate and regular rhythm.      Heart sounds: No murmur heard.     No friction rub.   Pulmonary:      Effort: Pulmonary effort is normal.      Breath sounds: Normal breath sounds.   Abdominal:      General: Bowel sounds are normal. There is no distension.      Palpations: Abdomen is soft.      Tenderness: There is no abdominal tenderness.   Skin:     General: Skin is warm and dry.   Neurological:      General: No focal deficit present.      Mental Status: She is alert.   Psychiatric:         Mood and Affect: Mood normal.         Results Review:    Glucose   Date Value Ref Range Status   12/31/2023 97 65 - 99 mg/dL Final   12/30/2023 130 (H) 65 - 99 mg/dL Final   12/29/2023 267 (H) 65 - 99 mg/dL Final     Results from last 7 days   Lab Units 12/30/23  0328   WBC 10*3/mm3 10.73   HEMOGLOBIN g/dL 13.4   HEMATOCRIT % 39.4   PLATELETS 10*3/mm3 269     Results from last 7 days   Lab Units 12/31/23  1001 12/29/23  1631 12/29/23  0546   SODIUM mmol/L 138   < > 140   POTASSIUM mmol/L 4.2   < > 3.3*   CHLORIDE mmol/L 101   < > 103   CO2 mmol/L 25.5   < > 26.0   BUN mg/dL 22*   < > 17   CREATININE mg/dL 0.75   < > 0.81   CALCIUM mg/dL 9.2   < > 9.0   BILIRUBIN mg/dL   --   --  0.3   ALK PHOS U/L  --   --  90   ALT (SGPT) U/L  --   --  41*   AST (SGOT) U/L  --   --  20   GLUCOSE mg/dL 97   < > 267*    < > = values in this interval not displayed.     Results from last 7 days   Lab Units 12/29/23  0546   INR  1.08   APTT seconds 25.6     Results from last 7 days   Lab Units 12/31/23  1001   MAGNESIUM mg/dL 2.2     Results from last 7 days   Lab Units 12/27/23  0922 12/27/23  0052   HSTROP T ng/L 229* 208*     Cultures:  Urine Culture   Date Value Ref Range Status   12/29/2023 >100,000 CFU/mL Mixed Seema Isolated  Final       I have reviewed daily medications and changes in CPOE    Scheduled meds  aspirin, 81 mg, Oral, Daily  busPIRone, 10 mg, Oral, Daily  [START ON 1/3/2024] ceFAZolin, 2,000 mg, Intravenous, Once  cetirizine, 10 mg, Oral, Daily  [START ON 1/3/2024] chlorhexidine, 15 mL, Mouth/Throat, Once  DULoxetine, 60 mg, Oral, BID  furosemide, 40 mg, Intravenous, Once  insulin glargine, 20 Units, Subcutaneous, BID  insulin lispro, 2-9 Units, Subcutaneous, 4x Daily AC & at Bedtime  insulin lispro, 8 Units, Subcutaneous, TID With Meals  [START ON 1/3/2024] metoprolol tartrate, 12.5 mg, Oral, On Call to OR  [START ON 1/2/2024] mupirocin, 1 application , Each Nare, Q12H  pantoprazole, 40 mg, Oral, Q AM  pravastatin, 20 mg, Oral, Nightly  sertraline, 25 mg, Oral, Daily  sodium chloride, 10 mL, Intravenous, Q12H  sodium chloride, 10 mL, Intravenous, Q12H        DOBUTamine, 2 mcg/kg/min, Last Rate: 2 mcg/kg/min (12/31/23 1223)      PRN meds    acetaminophen    ALPRAZolam    [START ON 1/2/2024] Chlorhexidine Gluconate Cloth    dextrose    dextrose    glucagon (human recombinant)    Magnesium Standard Dose Replacement - Follow Nurse / BPA Driven Protocol    melatonin    nitroglycerin    ondansetron ODT **OR** ondansetron    Potassium Replacement - Follow Nurse / BPA Driven Protocol    sodium chloride    sodium chloride    traZODone        ACS (acute coronary syndrome)    Type 2 diabetes  mellitus with microalbuminuria, with long-term current use of insulin    Type 2 diabetes mellitus with complications        Assessment/Plan:    53 y.o. female who is s/p Left Heart Cath, Coronary angiography.     Blood sugar reasonable    Home meds tresiba and SSI    Lantus, novolog 8, ssi    Started on dobutamine gtt    J Carlos Briggs MD  12/31/23  14:47 EST

## 2024-01-01 PROBLEM — F41.1 GAD (GENERALIZED ANXIETY DISORDER): Status: ACTIVE | Noted: 2024-01-01

## 2024-01-01 PROBLEM — K21.9 GERD WITHOUT ESOPHAGITIS: Status: ACTIVE | Noted: 2024-01-01

## 2024-01-01 PROBLEM — K59.00 CONSTIPATION: Status: ACTIVE | Noted: 2024-01-01

## 2024-01-01 LAB
ANION GAP SERPL CALCULATED.3IONS-SCNC: 11.7 MMOL/L (ref 5–15)
BUN SERPL-MCNC: 19 MG/DL (ref 6–20)
BUN/CREAT SERPL: 22.9 (ref 7–25)
CALCIUM SPEC-SCNC: 8.9 MG/DL (ref 8.6–10.5)
CHLORIDE SERPL-SCNC: 101 MMOL/L (ref 98–107)
CO2 SERPL-SCNC: 25.3 MMOL/L (ref 22–29)
CREAT SERPL-MCNC: 0.83 MG/DL (ref 0.57–1)
EGFRCR SERPLBLD CKD-EPI 2021: 83.4 ML/MIN/1.73
GLUCOSE BLDC GLUCOMTR-MCNC: 139 MG/DL (ref 70–130)
GLUCOSE BLDC GLUCOMTR-MCNC: 139 MG/DL (ref 70–130)
GLUCOSE BLDC GLUCOMTR-MCNC: 166 MG/DL (ref 70–130)
GLUCOSE BLDC GLUCOMTR-MCNC: 98 MG/DL (ref 70–130)
GLUCOSE SERPL-MCNC: 140 MG/DL (ref 65–99)
MAGNESIUM SERPL-MCNC: 1.8 MG/DL (ref 1.6–2.6)
POTASSIUM SERPL-SCNC: 4 MMOL/L (ref 3.5–5.2)
QT INTERVAL: 368 MS
QTC INTERVAL: 468 MS
SODIUM SERPL-SCNC: 138 MMOL/L (ref 136–145)

## 2024-01-01 PROCEDURE — 63710000001 INSULIN LISPRO (HUMAN) PER 5 UNITS: Performed by: HOSPITALIST

## 2024-01-01 PROCEDURE — 25010000002 FUROSEMIDE PER 20 MG: Performed by: STUDENT IN AN ORGANIZED HEALTH CARE EDUCATION/TRAINING PROGRAM

## 2024-01-01 PROCEDURE — 63710000001 INSULIN LISPRO (HUMAN) PER 5 UNITS: Performed by: INTERNAL MEDICINE

## 2024-01-01 PROCEDURE — 80048 BASIC METABOLIC PNL TOTAL CA: CPT | Performed by: STUDENT IN AN ORGANIZED HEALTH CARE EDUCATION/TRAINING PROGRAM

## 2024-01-01 PROCEDURE — 93005 ELECTROCARDIOGRAM TRACING: CPT | Performed by: INTERNAL MEDICINE

## 2024-01-01 PROCEDURE — 93010 ELECTROCARDIOGRAM REPORT: CPT | Performed by: INTERNAL MEDICINE

## 2024-01-01 PROCEDURE — 25010000002 DOBUTAMINE PER 250 MG: Performed by: STUDENT IN AN ORGANIZED HEALTH CARE EDUCATION/TRAINING PROGRAM

## 2024-01-01 PROCEDURE — 99232 SBSQ HOSP IP/OBS MODERATE 35: CPT | Performed by: STUDENT IN AN ORGANIZED HEALTH CARE EDUCATION/TRAINING PROGRAM

## 2024-01-01 PROCEDURE — 63710000001 INSULIN GLARGINE PER 5 UNITS: Performed by: HOSPITALIST

## 2024-01-01 PROCEDURE — 83735 ASSAY OF MAGNESIUM: CPT | Performed by: STUDENT IN AN ORGANIZED HEALTH CARE EDUCATION/TRAINING PROGRAM

## 2024-01-01 PROCEDURE — 82948 REAGENT STRIP/BLOOD GLUCOSE: CPT

## 2024-01-01 RX ORDER — FUROSEMIDE 10 MG/ML
80 INJECTION INTRAMUSCULAR; INTRAVENOUS ONCE
Status: COMPLETED | OUTPATIENT
Start: 2024-01-01 | End: 2024-01-01

## 2024-01-01 RX ORDER — POLYETHYLENE GLYCOL 3350 17 G/17G
17 POWDER, FOR SOLUTION ORAL DAILY
Status: DISCONTINUED | OUTPATIENT
Start: 2024-01-01 | End: 2024-01-03

## 2024-01-01 RX ORDER — AMOXICILLIN 250 MG
2 CAPSULE ORAL 2 TIMES DAILY
Status: DISCONTINUED | OUTPATIENT
Start: 2024-01-01 | End: 2024-01-03

## 2024-01-01 RX ADMIN — CETIRIZINE HYDROCHLORIDE 10 MG: 10 TABLET ORAL at 08:11

## 2024-01-01 RX ADMIN — SENNOSIDES AND DOCUSATE SODIUM 2 TABLET: 50; 8.6 TABLET ORAL at 11:47

## 2024-01-01 RX ADMIN — PANTOPRAZOLE SODIUM 40 MG: 40 TABLET, DELAYED RELEASE ORAL at 08:04

## 2024-01-01 RX ADMIN — DOBUTAMINE HYDROCHLORIDE 2 MCG/KG/MIN: 100 INJECTION INTRAVENOUS at 16:02

## 2024-01-01 RX ADMIN — Medication 5 MG: at 00:29

## 2024-01-01 RX ADMIN — SERTRALINE 25 MG: 25 TABLET, FILM COATED ORAL at 08:11

## 2024-01-01 RX ADMIN — Medication 5 MG: at 20:13

## 2024-01-01 RX ADMIN — INSULIN GLARGINE 20 UNITS: 100 INJECTION, SOLUTION SUBCUTANEOUS at 08:09

## 2024-01-01 RX ADMIN — Medication 10 ML: at 08:11

## 2024-01-01 RX ADMIN — DULOXETINE HYDROCHLORIDE 60 MG: 60 CAPSULE, DELAYED RELEASE ORAL at 08:11

## 2024-01-01 RX ADMIN — ALPRAZOLAM 0.25 MG: 0.25 TABLET ORAL at 00:29

## 2024-01-01 RX ADMIN — SENNOSIDES AND DOCUSATE SODIUM 2 TABLET: 50; 8.6 TABLET ORAL at 20:13

## 2024-01-01 RX ADMIN — ASPIRIN 81 MG: 81 TABLET, COATED ORAL at 08:11

## 2024-01-01 RX ADMIN — ALPRAZOLAM 0.25 MG: 0.25 TABLET ORAL at 20:13

## 2024-01-01 RX ADMIN — PRAVASTATIN SODIUM 20 MG: 20 TABLET ORAL at 20:13

## 2024-01-01 RX ADMIN — INSULIN LISPRO 2 UNITS: 100 INJECTION, SOLUTION INTRAVENOUS; SUBCUTANEOUS at 11:47

## 2024-01-01 RX ADMIN — TRAZODONE HYDROCHLORIDE 50 MG: 50 TABLET ORAL at 21:39

## 2024-01-01 RX ADMIN — Medication 10 ML: at 20:14

## 2024-01-01 RX ADMIN — INSULIN LISPRO 8 UNITS: 100 INJECTION, SOLUTION INTRAVENOUS; SUBCUTANEOUS at 16:44

## 2024-01-01 RX ADMIN — INSULIN GLARGINE 20 UNITS: 100 INJECTION, SOLUTION SUBCUTANEOUS at 21:38

## 2024-01-01 RX ADMIN — POLYETHYLENE GLYCOL 3350 17 G: 17 POWDER, FOR SOLUTION ORAL at 11:47

## 2024-01-01 RX ADMIN — DULOXETINE HYDROCHLORIDE 60 MG: 60 CAPSULE, DELAYED RELEASE ORAL at 20:13

## 2024-01-01 RX ADMIN — FUROSEMIDE 80 MG: 10 INJECTION, SOLUTION INTRAMUSCULAR; INTRAVENOUS at 08:09

## 2024-01-01 RX ADMIN — BUSPIRONE HYDROCHLORIDE 10 MG: 10 TABLET ORAL at 08:11

## 2024-01-01 NOTE — PROGRESS NOTES
Name: Jade Clement ADMIT: 2023   : 1970  PCP: Heavenly Monsivais MD    MRN: [id number] LOS: 6 days   AGE/SEX: 53 y.o. female  ROOM: [facility]     Subjective   Subjective   Referring Provider: Billy Esquivel MD   Reason for Follow-up: Type 2 DM  No acute events. Patient denies new complaints. Taking PO. No CP/dyspnea/f/c/n/v/d. +constipation  No family at bedside.    Objective   Objective   Vital Signs  Temp:  [97.3 °F (36.3 °C)-98.3 °F (36.8 °C)] 97.9 °F (36.6 °C)  Heart Rate:  [89-94] 92  Resp:  [16] 16  BP: (84-92)/(59-70) 92/62  SpO2:  [90 %-100 %] 94 %  on   ;   Device (Oxygen Therapy): room air  Body mass index is 27.09 kg/m².  Physical Exam  Vitals and nursing note reviewed.   Constitutional:       General: She is not in acute distress.     Appearance: She is not toxic-appearing or diaphoretic.   HENT:      Head: Normocephalic and atraumatic.      Nose: Nose normal.      Mouth/Throat:      Mouth: Mucous membranes are moist.      Pharynx: Oropharynx is clear.   Eyes:      Conjunctiva/sclera: Conjunctivae normal.      Pupils: Pupils are equal, round, and reactive to light.   Cardiovascular:      Rate and Rhythm: Normal rate and regular rhythm.      Pulses: Normal pulses.   Pulmonary:      Effort: Pulmonary effort is normal.      Breath sounds: Normal breath sounds.   Abdominal:      General: Bowel sounds are normal.      Palpations: Abdomen is soft.      Tenderness: There is no abdominal tenderness.   Musculoskeletal:         General: No swelling or tenderness.      Cervical back: Neck supple.   Skin:     General: Skin is warm and dry.      Capillary Refill: Capillary refill takes less than 2 seconds.   Neurological:      General: No focal deficit present.      Mental Status: She is alert and oriented to person, place, and time.   Psychiatric:         Mood and Affect: Mood normal.         Behavior: Behavior normal.       Results Review     I reviewed the patient's new clinical  results.  Results from last 7 days   Lab Units 12/30/23  0328 12/29/23  0546 12/28/23  0330 12/27/23  0922   WBC 10*3/mm3 10.73 11.71* 11.86* 9.64   HEMOGLOBIN g/dL 13.4 13.5 13.4 12.9   PLATELETS 10*3/mm3 269 268 289 284     Results from last 7 days   Lab Units 01/01/24  0411 12/31/23  1001 12/30/23  0329 12/29/23  1631 12/29/23  0546   SODIUM mmol/L 138 138 141  --  140   POTASSIUM mmol/L 4.0 4.2 3.9 4.1 3.3*   CHLORIDE mmol/L 101 101 104  --  103   CO2 mmol/L 25.3 25.5 25.8  --  26.0   BUN mg/dL 19 22* 20  --  17   CREATININE mg/dL 0.83 0.75 0.83  --  0.81   GLUCOSE mg/dL 140* 97 130*  --  267*   EGFR mL/min/1.73 83.4 94.2 83.4  --  85.9     Results from last 7 days   Lab Units 12/29/23  0546 12/27/23  0922   ALBUMIN g/dL 3.9 3.8   BILIRUBIN mg/dL 0.3 0.4   ALK PHOS U/L 90 85   AST (SGOT) U/L 20 29   ALT (SGPT) U/L 41* 55*     Results from last 7 days   Lab Units 01/01/24  0411 12/31/23  1001 12/30/23  0329 12/29/23  0546 12/28/23  0330 12/27/23  0922   CALCIUM mg/dL 8.9 9.2 9.0 9.0   < > 9.3   ALBUMIN g/dL  --   --   --  3.9  --  3.8   MAGNESIUM mg/dL 1.8 2.2 2.3 1.4*   < >  --     < > = values in this interval not displayed.       Glucose   Date/Time Value Ref Range Status   01/01/2024 1048 166 (H) 70 - 130 mg/dL Final   01/01/2024 0611 139 (H) 70 - 130 mg/dL Final   12/31/2023 1936 178 (H) 70 - 130 mg/dL Final   12/31/2023 1610 173 (H) 70 - 130 mg/dL Final   12/31/2023 1118 198 (H) 70 - 130 mg/dL Final   12/31/2023 0743 103 70 - 130 mg/dL Final   12/31/2023 0337 81 70 - 130 mg/dL Final       No radiology results for the last day    I have personally reviewed all medications:  Scheduled Medications  aspirin, 81 mg, Oral, Daily  busPIRone, 10 mg, Oral, Daily  [START ON 1/3/2024] ceFAZolin, 2,000 mg, Intravenous, Once  cetirizine, 10 mg, Oral, Daily  [START ON 1/3/2024] chlorhexidine, 15 mL, Mouth/Throat, Once  DULoxetine, 60 mg, Oral, BID  insulin glargine, 20 Units, Subcutaneous, BID  insulin lispro, 2-9 Units,  Subcutaneous, 4x Daily AC & at Bedtime  insulin lispro, 8 Units, Subcutaneous, TID With Meals  [START ON 1/3/2024] metoprolol tartrate, 12.5 mg, Oral, On Call to OR  [START ON 1/2/2024] mupirocin, 1 application , Each Nare, Q12H  pantoprazole, 40 mg, Oral, Q AM  pravastatin, 20 mg, Oral, Nightly  sertraline, 25 mg, Oral, Daily  sodium chloride, 10 mL, Intravenous, Q12H  sodium chloride, 10 mL, Intravenous, Q12H    Infusions  DOBUTamine, 2 mcg/kg/min, Last Rate: 2 mcg/kg/min (12/31/23 1223)    Diet  Diet: Cardiac Diets, Diabetic Diets; Healthy Heart (2-3 Na+); Consistent Carbohydrate; Texture: Regular Texture (IDDSI 7); Fluid Consistency: Thin (IDDSI 0)  NPO Diet NPO Type: Sips with Meds    I have personally reviewed:  [x]  Laboratory   [x]  Microbiology   [x]  Radiology   [x]  EKG/Telemetry  [x]  Cardiology/Vascular   []  Pathology    []  Records       Assessment/Plan     Active Hospital Problems    Diagnosis  POA    **ACS (acute coronary syndrome) [I24.9]  Yes    Constipation [K59.00]  Yes    NOELLE (generalized anxiety disorder) [F41.1]  Yes    GERD without esophagitis [K21.9]  Yes    Type 2 diabetes mellitus with complications [E11.8]  Yes    Type 2 diabetes mellitus with microalbuminuria, with long-term current use of insulin [E11.29, R80.9, Z79.4]  Not Applicable      Resolved Hospital Problems   No resolved problems to display.   Type 2 DM  - complications include CAD and microalbuminuria  - BG acceptable  - continue on lantus 20 units BID  - continue on lispro 8 units TID with meals in addition to ssi/hypoglycemia protocol coverage    ACS/Severe Multivessel CAD  - on ASA, pravastatin, and metoprolol  - dobutamine drip started  - surgery planned on Wednesday  - management per CT surgery and cardiology     Constipation  - abdominal examination is benign  - will start on bowel regimen    Anxiety  - seems controlled  - continue on buspar and cymbalta    GERD  - controlled, has history of peptic ulcer disease as  well  - continue on ppi    Nam Fernando MD  [facility]  01/01/24  11:09 EST

## 2024-01-01 NOTE — PROGRESS NOTES
LOS: 6 days   Patient Care Team:  Heavenly Monsivais MD as PCP - General (Family Medicine)  Hesham Cheney MD as Consulting Physician (Endocrinology)    Chief Complaint:  Dyspnea, CHF, NSTEMI versus late presentation anteroseptal infarct, acute systolic CHF, new cardiomyopathy.     Interval History:     Weights still unchanged despite IV Lasix 40.  She reports her symptoms are the same.  Blood pressure improving on dobutamine    Objective   Vital Signs  Temp:  [97.3 °F (36.3 °C)-98.3 °F (36.8 °C)] 97.9 °F (36.6 °C)  Heart Rate:  [89-94] 92  Resp:  [16] 16  BP: (84-92)/(59-70) 92/62    Intake/Output Summary (Last 24 hours) at 1/1/2024 0925  Last data filed at 1/1/2024 0340  Gross per 24 hour   Intake 240 ml   Output 300 ml   Net -60 ml       Comfortable NAD, resting in bed  PERRL, conjunctivae clear  Neck supple, JVD to just above clavicle while sitting upright  S1/S2 RRR, no m/r/g  Crackles to mid lungs bilaterally, normal effort  Abdomen S/NT/ND (+) BS, no HSM appreciated  Extremities warm, no clubbing, peripheral perfusion appears better, 1+ BLE edema  Moves all 4 extremities without gross deficits   no visible or palpable skin lesions  A/Ox4, mood and affect appropriate    Results Review:      Results from last 7 days   Lab Units 01/01/24  0411 12/31/23  1001 12/30/23  0329   SODIUM mmol/L 138 138 141   POTASSIUM mmol/L 4.0 4.2 3.9   CHLORIDE mmol/L 101 101 104   CO2 mmol/L 25.3 25.5 25.8   BUN mg/dL 19 22* 20   CREATININE mg/dL 0.83 0.75 0.83   GLUCOSE mg/dL 140* 97 130*   CALCIUM mg/dL 8.9 9.2 9.0     Results from last 7 days   Lab Units 12/27/23  0922 12/27/23  0052   HSTROP T ng/L 229* 208*     Results from last 7 days   Lab Units 12/30/23  0328 12/29/23  0546 12/28/23  0330   WBC 10*3/mm3 10.73 11.71* 11.86*   HEMOGLOBIN g/dL 13.4 13.5 13.4   HEMATOCRIT % 39.4 39.5 39.1   PLATELETS 10*3/mm3 269 268 289     Results from last 7 days   Lab Units 12/29/23  0546 12/28/23  1134 12/28/23  0330  12/27/23 0922 12/27/23  0052   INR  1.08  --   --   --  1.17*   APTT seconds 25.6 49.1* 61.4*   < > 28.2    < > = values in this interval not displayed.     Results from last 7 days   Lab Units 12/27/23  0922   CHOLESTEROL mg/dL 199     Results from last 7 days   Lab Units 01/01/24  0411   MAGNESIUM mg/dL 1.8     Results from last 7 days   Lab Units 12/27/23  0922   CHOLESTEROL mg/dL 199   TRIGLYCERIDES mg/dL 207*   HDL CHOL mg/dL 31*   LDL CHOL mg/dL 131*       I reviewed the patient's new clinical results.  I personally viewed and interpreted the patient's EKG/Telemetry data        Medication Review:   aspirin, 81 mg, Oral, Daily  busPIRone, 10 mg, Oral, Daily  [START ON 1/3/2024] ceFAZolin, 2,000 mg, Intravenous, Once  cetirizine, 10 mg, Oral, Daily  [START ON 1/3/2024] chlorhexidine, 15 mL, Mouth/Throat, Once  DULoxetine, 60 mg, Oral, BID  insulin glargine, 20 Units, Subcutaneous, BID  insulin lispro, 2-9 Units, Subcutaneous, 4x Daily AC & at Bedtime  insulin lispro, 8 Units, Subcutaneous, TID With Meals  [START ON 1/3/2024] metoprolol tartrate, 12.5 mg, Oral, On Call to OR  [START ON 1/2/2024] mupirocin, 1 application , Each Nare, Q12H  pantoprazole, 40 mg, Oral, Q AM  pravastatin, 20 mg, Oral, Nightly  sertraline, 25 mg, Oral, Daily  sodium chloride, 10 mL, Intravenous, Q12H  sodium chloride, 10 mL, Intravenous, Q12H        DOBUTamine, 2 mcg/kg/min, Last Rate: 2 mcg/kg/min (12/31/23 1223)        Assessment & Plan       ACS (acute coronary syndrome)    Type 2 diabetes mellitus with microalbuminuria, with long-term current use of insulin    Type 2 diabetes mellitus with complications    1.  Type I NSTEMI with severe multivessel coronary artery disease  2.  Acute systolic CHF secondary to ischemic cardiomyopathy and coronary artery disease  3.  Ischemic cardiomyopathy, new (EF 20% or less)  4.  Insulin-dependent diabetes since 2014, with neuropathy  5.  Mild to moderate mitral regurgitation  6.  Mild to moderate  tricuspid regurgitation  7.  Tobacco use, quit on 12/18/2023  8.  Nonspecific right lower lobe groundglass opacity and left apical opacity on CT (follow-up CT of the chest recommended in 3 months)   9.  Right adrenal adenoma by CT of the abdomen  10.  Dyslipidemia   11.  Statin intolerance (myalgias)  12.  Gastroesophageal reflux disease  13.  Moderate right pleural effusion on admission  14.  Relative hypotension  15.  Sinus tachycardia    Planning for CABG +/- mitral tricuspid repair on Wednesday    Symptoms are the same, but her blood pressure is improved on dobutamine.  Avoiding milrinone due to her hypotension at baseline.  Hesitant to increase her dobutamine at this time given her known, multivessel ischemic disease.    Will give IV Lasix 80 today.  Her blood pressure is better now that she is on dobutamine.  Hopefully we can get her weight is down some.  EDP was markedly elevated on cath.  Discussed with Dr. Petty.  Thankfully she is not clinically in shock so no need for IABP at this time.  Obviously trying to avoid that if we can.    Will stop metoprolol while on dobutamine. BP limits all other GDMT     Intolerant to multiple statins in the past.  Probably can need PCSK9 as outpatient    Continue aspirin 81    Alexis Bruce MD  01/01/24  09:25 EST      Part of this note may be an electronic transcription/translation of spoken language to printed text using the Dragon Dictation System.

## 2024-01-01 NOTE — PROGRESS NOTES
Apparently on dopamine now.  This will increase cardiac output but really has no renal perfusion secondary to passed through the lungs.  Surgery on Wednesday.  Primacor may be better if that is what is needed.

## 2024-01-02 LAB
ABO GROUP BLD: NORMAL
ANION GAP SERPL CALCULATED.3IONS-SCNC: 10 MMOL/L (ref 5–15)
BLD GP AB SCN SERPL QL: NEGATIVE
BUN SERPL-MCNC: 22 MG/DL (ref 6–20)
BUN/CREAT SERPL: 25.3 (ref 7–25)
CALCIUM SPEC-SCNC: 9.2 MG/DL (ref 8.6–10.5)
CHLORIDE SERPL-SCNC: 101 MMOL/L (ref 98–107)
CO2 SERPL-SCNC: 28 MMOL/L (ref 22–29)
CREAT SERPL-MCNC: 0.87 MG/DL (ref 0.57–1)
EGFRCR SERPLBLD CKD-EPI 2021: 78.8 ML/MIN/1.73
GLUCOSE BLDC GLUCOMTR-MCNC: 131 MG/DL (ref 70–130)
GLUCOSE BLDC GLUCOMTR-MCNC: 136 MG/DL (ref 70–130)
GLUCOSE BLDC GLUCOMTR-MCNC: 189 MG/DL (ref 70–130)
GLUCOSE BLDC GLUCOMTR-MCNC: 208 MG/DL (ref 70–130)
GLUCOSE BLDC GLUCOMTR-MCNC: 221 MG/DL (ref 70–130)
GLUCOSE SERPL-MCNC: 121 MG/DL (ref 65–99)
MAGNESIUM SERPL-MCNC: 1.7 MG/DL (ref 1.6–2.6)
POTASSIUM SERPL-SCNC: 4.4 MMOL/L (ref 3.5–5.2)
RH BLD: POSITIVE
SODIUM SERPL-SCNC: 139 MMOL/L (ref 136–145)
T&S EXPIRATION DATE: NORMAL

## 2024-01-02 PROCEDURE — 86900 BLOOD TYPING SEROLOGIC ABO: CPT

## 2024-01-02 PROCEDURE — 80048 BASIC METABOLIC PNL TOTAL CA: CPT | Performed by: STUDENT IN AN ORGANIZED HEALTH CARE EDUCATION/TRAINING PROGRAM

## 2024-01-02 PROCEDURE — 63710000001 INSULIN LISPRO (HUMAN) PER 5 UNITS: Performed by: HOSPITALIST

## 2024-01-02 PROCEDURE — 86850 RBC ANTIBODY SCREEN: CPT

## 2024-01-02 PROCEDURE — 82948 REAGENT STRIP/BLOOD GLUCOSE: CPT

## 2024-01-02 PROCEDURE — 63710000001 INSULIN GLARGINE PER 5 UNITS: Performed by: HOSPITALIST

## 2024-01-02 PROCEDURE — 86901 BLOOD TYPING SEROLOGIC RH(D): CPT

## 2024-01-02 PROCEDURE — 63710000001 INSULIN LISPRO (HUMAN) PER 5 UNITS: Performed by: INTERNAL MEDICINE

## 2024-01-02 PROCEDURE — 99232 SBSQ HOSP IP/OBS MODERATE 35: CPT | Performed by: INTERNAL MEDICINE

## 2024-01-02 PROCEDURE — 86923 COMPATIBILITY TEST ELECTRIC: CPT

## 2024-01-02 PROCEDURE — 83735 ASSAY OF MAGNESIUM: CPT | Performed by: STUDENT IN AN ORGANIZED HEALTH CARE EDUCATION/TRAINING PROGRAM

## 2024-01-02 RX ADMIN — INSULIN LISPRO 8 UNITS: 100 INJECTION, SOLUTION INTRAVENOUS; SUBCUTANEOUS at 12:25

## 2024-01-02 RX ADMIN — INSULIN LISPRO 4 UNITS: 100 INJECTION, SOLUTION INTRAVENOUS; SUBCUTANEOUS at 12:24

## 2024-01-02 RX ADMIN — DULOXETINE HYDROCHLORIDE 60 MG: 60 CAPSULE, DELAYED RELEASE ORAL at 21:10

## 2024-01-02 RX ADMIN — ALPRAZOLAM 0.25 MG: 0.25 TABLET ORAL at 22:01

## 2024-01-02 RX ADMIN — INSULIN LISPRO 4 UNITS: 100 INJECTION, SOLUTION INTRAVENOUS; SUBCUTANEOUS at 22:01

## 2024-01-02 RX ADMIN — Medication 10 ML: at 21:10

## 2024-01-02 RX ADMIN — PRAVASTATIN SODIUM 20 MG: 20 TABLET ORAL at 21:10

## 2024-01-02 RX ADMIN — INSULIN GLARGINE 20 UNITS: 100 INJECTION, SOLUTION SUBCUTANEOUS at 22:02

## 2024-01-02 RX ADMIN — Medication 10 ML: at 08:45

## 2024-01-02 RX ADMIN — INSULIN GLARGINE 20 UNITS: 100 INJECTION, SOLUTION SUBCUTANEOUS at 08:41

## 2024-01-02 RX ADMIN — BUSPIRONE HYDROCHLORIDE 10 MG: 10 TABLET ORAL at 08:42

## 2024-01-02 RX ADMIN — ACETAMINOPHEN 650 MG: 325 TABLET, FILM COATED ORAL at 15:36

## 2024-01-02 RX ADMIN — POLYETHYLENE GLYCOL 3350 17 G: 17 POWDER, FOR SOLUTION ORAL at 08:41

## 2024-01-02 RX ADMIN — SENNOSIDES AND DOCUSATE SODIUM 2 TABLET: 50; 8.6 TABLET ORAL at 08:42

## 2024-01-02 RX ADMIN — ALPRAZOLAM 0.25 MG: 0.25 TABLET ORAL at 15:36

## 2024-01-02 RX ADMIN — PANTOPRAZOLE SODIUM 40 MG: 40 TABLET, DELAYED RELEASE ORAL at 06:10

## 2024-01-02 RX ADMIN — SERTRALINE 25 MG: 25 TABLET, FILM COATED ORAL at 08:42

## 2024-01-02 RX ADMIN — SENNOSIDES AND DOCUSATE SODIUM 2 TABLET: 50; 8.6 TABLET ORAL at 21:10

## 2024-01-02 RX ADMIN — CETIRIZINE HYDROCHLORIDE 10 MG: 10 TABLET ORAL at 08:42

## 2024-01-02 RX ADMIN — ASPIRIN 81 MG: 81 TABLET, COATED ORAL at 08:42

## 2024-01-02 RX ADMIN — Medication 5 MG: at 22:01

## 2024-01-02 RX ADMIN — DULOXETINE HYDROCHLORIDE 60 MG: 60 CAPSULE, DELAYED RELEASE ORAL at 08:42

## 2024-01-02 NOTE — PLAN OF CARE
Problem: Adult Inpatient Plan of Care  Goal: Plan of Care Review  Outcome: Ongoing, Progressing  Flowsheets (Taken 1/2/2024 0522)  Progress: improving  Plan of Care Reviewed With: patient  Outcome Evaluation: vss. no c/o pain. dobutamine gtt infusing. ambulated in asher. resting well throughout shift.  Goal: Patient-Specific Goal (Individualized)  Outcome: Ongoing, Progressing  Goal: Absence of Hospital-Acquired Illness or Injury  Outcome: Ongoing, Progressing  Intervention: Identify and Manage Fall Risk  Recent Flowsheet Documentation  Taken 1/2/2024 0410 by Roopa Villarreal, RN  Safety Promotion/Fall Prevention: safety round/check completed  Taken 1/2/2024 0213 by Roopa Villarreal, RN  Safety Promotion/Fall Prevention: safety round/check completed  Taken 1/2/2024 0022 by Roopa Villarreal, RN  Safety Promotion/Fall Prevention: safety round/check completed  Taken 1/1/2024 2215 by Roopa Villarreal, RN  Safety Promotion/Fall Prevention: safety round/check completed  Taken 1/1/2024 2014 by Roopa Villarreal, RN  Safety Promotion/Fall Prevention: safety round/check completed  Intervention: Prevent Skin Injury  Recent Flowsheet Documentation  Taken 1/2/2024 0022 by Roopa Villarreal, RN  Body Position:   left   side-lying  Intervention: Prevent and Manage VTE (Venous Thromboembolism) Risk  Recent Flowsheet Documentation  Taken 1/1/2024 2014 by Roopa Villarreal, RN  Activity Management:   ambulated outside room   up ad candida  Goal: Optimal Comfort and Wellbeing  Outcome: Ongoing, Progressing  Intervention: Provide Person-Centered Care  Recent Flowsheet Documentation  Taken 1/1/2024 2014 by Roopa Villarreal, RN  Trust Relationship/Rapport:   care explained   choices provided  Goal: Readiness for Transition of Care  Outcome: Ongoing, Progressing     Problem: Chest Pain  Goal: Resolution of Chest Pain Symptoms  Outcome: Ongoing, Progressing     Problem: Adjustment to Illness (Heart Failure)  Goal: Optimal  Coping  Outcome: Ongoing, Progressing     Problem: Cardiac Output Decreased (Heart Failure)  Goal: Optimal Cardiac Output  Outcome: Ongoing, Progressing     Problem: Dysrhythmia (Heart Failure)  Goal: Stable Heart Rate and Rhythm  Outcome: Ongoing, Progressing     Problem: Fluid Imbalance (Heart Failure)  Goal: Fluid Balance  Outcome: Ongoing, Progressing     Problem: Functional Ability Impaired (Heart Failure)  Goal: Optimal Functional Ability  Outcome: Ongoing, Progressing  Intervention: Optimize Functional Ability  Recent Flowsheet Documentation  Taken 1/1/2024 2014 by Roopa Villarreal, RN  Activity Management:   ambulated outside room   up ad candida     Problem: Oral Intake Inadequate (Heart Failure)  Goal: Optimal Nutrition Intake  Outcome: Ongoing, Progressing     Problem: Respiratory Compromise (Heart Failure)  Goal: Effective Oxygenation and Ventilation  Outcome: Ongoing, Progressing  Intervention: Promote Airway Secretion Clearance  Recent Flowsheet Documentation  Taken 1/1/2024 2014 by Roopa Villarreal, RN  Cough And Deep Breathing: done independently per patient     Problem: Sleep Disordered Breathing (Heart Failure)  Goal: Effective Breathing Pattern During Sleep  Outcome: Ongoing, Progressing     Problem: Diabetes Comorbidity  Goal: Blood Glucose Level Within Targeted Range  Outcome: Ongoing, Progressing     Problem: Hypertension Comorbidity  Goal: Blood Pressure in Desired Range  Outcome: Ongoing, Progressing  Intervention: Maintain Blood Pressure Management  Recent Flowsheet Documentation  Taken 1/2/2024 0022 by Roopa Villarreal, RN  Medication Review/Management: medications reviewed  Taken 1/1/2024 2014 by Roopa Villarreal, RN  Medication Review/Management: medications reviewed     Problem: Fall Injury Risk  Goal: Absence of Fall and Fall-Related Injury  Outcome: Ongoing, Progressing  Intervention: Identify and Manage Contributors  Recent Flowsheet Documentation  Taken 1/2/2024 0022 by Phil  Roopa BOSCH, RN  Medication Review/Management: medications reviewed  Taken 1/1/2024 2014 by Roopa Villarreal, RN  Medication Review/Management: medications reviewed  Intervention: Promote Injury-Free Environment  Recent Flowsheet Documentation  Taken 1/2/2024 0410 by Roopa Villarreal, RN  Safety Promotion/Fall Prevention: safety round/check completed  Taken 1/2/2024 0213 by Roopa Villarreal, RN  Safety Promotion/Fall Prevention: safety round/check completed  Taken 1/2/2024 0022 by Roopa Villarreal, RN  Safety Promotion/Fall Prevention: safety round/check completed  Taken 1/1/2024 2215 by Roopa Villarreal, RN  Safety Promotion/Fall Prevention: safety round/check completed  Taken 1/1/2024 2014 by Roopa Villarreal, RN  Safety Promotion/Fall Prevention: safety round/check completed   Goal Outcome Evaluation:  Plan of Care Reviewed With: patient        Progress: improving  Outcome Evaluation: vss. no c/o pain. dobutamine gtt infusing. ambulated in asher. resting well throughout shift.

## 2024-01-02 NOTE — PROGRESS NOTES
Name: Jade Clement ADMIT: 2023   : 1970  PCP: Heavenly Monsivais MD    MRN: [id number] LOS: 7 days   AGE/SEX: 53 y.o. female  ROOM: [facility]     Subjective   Subjective   Referring Provider: Billy Esquivel MD   Reason for Follow-up: Type 2 DM  No acute events. Patient denies new complaints. Taking PO. No CP/dyspnea/f/c/n/v/d. +constipation  Family at bedside.    Objective   Objective   Vital Signs  Temp:  [97.9 °F (36.6 °C)-98.5 °F (36.9 °C)] 98.1 °F (36.7 °C)  Heart Rate:  [] 109  Resp:  [16] 16  BP: ()/(52-70) 91/65  SpO2:  [92 %-99 %] 99 %  on   ;   Device (Oxygen Therapy): room air  Body mass index is 26.98 kg/m².  Physical Exam  Vitals and nursing note reviewed.   Constitutional:       General: She is not in acute distress.     Appearance: She is not toxic-appearing or diaphoretic.   HENT:      Head: Normocephalic and atraumatic.      Nose: Nose normal.      Mouth/Throat:      Mouth: Mucous membranes are moist.      Pharynx: Oropharynx is clear.   Eyes:      Conjunctiva/sclera: Conjunctivae normal.      Pupils: Pupils are equal, round, and reactive to light.   Cardiovascular:      Rate and Rhythm: Normal rate and regular rhythm.      Pulses: Normal pulses.   Pulmonary:      Effort: Pulmonary effort is normal.      Breath sounds: Normal breath sounds.   Abdominal:      General: Bowel sounds are normal.      Palpations: Abdomen is soft.      Tenderness: There is no abdominal tenderness.   Musculoskeletal:         General: No swelling or tenderness.      Cervical back: Neck supple.   Skin:     General: Skin is warm and dry.      Capillary Refill: Capillary refill takes less than 2 seconds.   Neurological:      General: No focal deficit present.      Mental Status: She is alert and oriented to person, place, and time.   Psychiatric:         Mood and Affect: Mood normal.         Behavior: Behavior normal.       Results Review     I reviewed the patient's new clinical  results.  Results from last 7 days   Lab Units 12/30/23  0328 12/29/23  0546 12/28/23  0330 12/27/23  0922   WBC 10*3/mm3 10.73 11.71* 11.86* 9.64   HEMOGLOBIN g/dL 13.4 13.5 13.4 12.9   PLATELETS 10*3/mm3 269 268 289 284     Results from last 7 days   Lab Units 01/02/24  0318 01/01/24  0411 12/31/23  1001 12/30/23  0329   SODIUM mmol/L 139 138 138 141   POTASSIUM mmol/L 4.4 4.0 4.2 3.9   CHLORIDE mmol/L 101 101 101 104   CO2 mmol/L 28.0 25.3 25.5 25.8   BUN mg/dL 22* 19 22* 20   CREATININE mg/dL 0.87 0.83 0.75 0.83   GLUCOSE mg/dL 121* 140* 97 130*   EGFR mL/min/1.73 78.8 83.4 94.2 83.4     Results from last 7 days   Lab Units 12/29/23  0546 12/27/23  0922   ALBUMIN g/dL 3.9 3.8   BILIRUBIN mg/dL 0.3 0.4   ALK PHOS U/L 90 85   AST (SGOT) U/L 20 29   ALT (SGPT) U/L 41* 55*     Results from last 7 days   Lab Units 01/02/24 0318 01/01/24 0411 12/31/23  1001 12/30/23  0329 12/29/23  0546 12/28/23  0330 12/27/23  0922   CALCIUM mg/dL 9.2 8.9 9.2 9.0 9.0   < > 9.3   ALBUMIN g/dL  --   --   --   --  3.9  --  3.8   MAGNESIUM mg/dL 1.7 1.8 2.2 2.3 1.4*   < >  --     < > = values in this interval not displayed.       Glucose   Date/Time Value Ref Range Status   01/02/2024 1541 131 (H) 70 - 130 mg/dL Final   01/02/2024 1047 208 (H) 70 - 130 mg/dL Final   01/02/2024 0623 136 (H) 70 - 130 mg/dL Final   01/01/2024 2129 98 70 - 130 mg/dL Final   01/01/2024 1544 139 (H) 70 - 130 mg/dL Final   01/01/2024 1048 166 (H) 70 - 130 mg/dL Final   01/01/2024 0611 139 (H) 70 - 130 mg/dL Final       No radiology results for the last day    I have personally reviewed all medications:  Scheduled Medications  aspirin, 81 mg, Oral, Daily  busPIRone, 10 mg, Oral, Daily  cetirizine, 10 mg, Oral, Daily  DULoxetine, 60 mg, Oral, BID  insulin glargine, 20 Units, Subcutaneous, BID  insulin lispro, 2-9 Units, Subcutaneous, 4x Daily AC & at Bedtime  insulin lispro, 8 Units, Subcutaneous, TID With Meals  pantoprazole, 40 mg, Oral, Q AM  polyethylene  glycol, 17 g, Oral, Daily  pravastatin, 20 mg, Oral, Nightly  senna-docusate sodium, 2 tablet, Oral, BID  sertraline, 25 mg, Oral, Daily  sodium chloride, 10 mL, Intravenous, Q12H  sodium chloride, 10 mL, Intravenous, Q12H    Infusions     Diet  Diet: Cardiac Diets, Diabetic Diets; Healthy Heart (2-3 Na+); Consistent Carbohydrate; Texture: Regular Texture (IDDSI 7); Fluid Consistency: Thin (IDDSI 0)  NPO Diet NPO Type: Sips with Meds    I have personally reviewed:  [x]  Laboratory   []  Microbiology   []  Radiology   [x]  EKG/Telemetry  [x]  Cardiology/Vascular   []  Pathology    []  Records    Assessment/Plan     Active Hospital Problems    Diagnosis  POA    **ACS (acute coronary syndrome) [I24.9]  Yes    Constipation [K59.00]  Yes    NOELLE (generalized anxiety disorder) [F41.1]  Yes    GERD without esophagitis [K21.9]  Yes    Type 2 diabetes mellitus with complications [E11.8]  Yes    Type 2 diabetes mellitus with microalbuminuria, with long-term current use of insulin [E11.29, R80.9, Z79.4]  Not Applicable      Resolved Hospital Problems   No resolved problems to display.   Type 2 DM  - complications include CAD and microalbuminuria  - BG on the high side  - increase lantus to 22 units BID  - continue on lispro 8 units TID with meals in addition to ssi/hypoglycemia protocol coverage    ACS/Severe Multivessel CAD/ICM  - LVEF 20%  - on ASA, pravastatin, and metoprolol  - staged PCI planned  - management per CT surgery and cardiology     Constipation  - abdominal examination is benign  - continue bowel regimen    Anxiety  - seems controlled  - continue on buspar and cymbalta    GERD  - controlled, has history of peptic ulcer disease as well  - continue on ppi    Nam Fernando MD  [facility]  01/02/24  16:28 EST

## 2024-01-02 NOTE — PROGRESS NOTES
I have thought about her all weekend.  She has been placed on dobutamine this weekend for blood pressure.  Dr. Jerez and I reviewed her cardiac cath and her echo.  He does not think she is operable and I tend to agree.  I was quite worried about her.  I spoke with Dr. Esquivel.  He is going to speak with interventional cardiology to see about stenting the LAD and the right.  She may however need an LVAD and treated at a higher level heart failure center.

## 2024-01-03 LAB
ANION GAP SERPL CALCULATED.3IONS-SCNC: 10.4 MMOL/L (ref 5–15)
BUN SERPL-MCNC: 17 MG/DL (ref 6–20)
BUN/CREAT SERPL: 24.3 (ref 7–25)
CALCIUM SPEC-SCNC: 9.3 MG/DL (ref 8.6–10.5)
CHLORIDE SERPL-SCNC: 106 MMOL/L (ref 98–107)
CO2 SERPL-SCNC: 23.6 MMOL/L (ref 22–29)
CREAT SERPL-MCNC: 0.7 MG/DL (ref 0.57–1)
DEPRECATED RDW RBC AUTO: 40.5 FL (ref 37–54)
EGFRCR SERPLBLD CKD-EPI 2021: 102.3 ML/MIN/1.73
ERYTHROCYTE [DISTWIDTH] IN BLOOD BY AUTOMATED COUNT: 11.7 % (ref 12.3–15.4)
GLUCOSE BLDC GLUCOMTR-MCNC: 100 MG/DL (ref 70–130)
GLUCOSE BLDC GLUCOMTR-MCNC: 74 MG/DL (ref 70–130)
GLUCOSE BLDC GLUCOMTR-MCNC: 96 MG/DL (ref 70–130)
GLUCOSE BLDC GLUCOMTR-MCNC: 97 MG/DL (ref 70–130)
GLUCOSE SERPL-MCNC: 116 MG/DL (ref 65–99)
HCT VFR BLD AUTO: 39.6 % (ref 34–46.6)
HGB BLD-MCNC: 12.7 G/DL (ref 12–15.9)
MAGNESIUM SERPL-MCNC: 2 MG/DL (ref 1.6–2.6)
MCH RBC QN AUTO: 30.8 PG (ref 26.6–33)
MCHC RBC AUTO-ENTMCNC: 32.1 G/DL (ref 31.5–35.7)
MCV RBC AUTO: 96.1 FL (ref 79–97)
PLATELET # BLD AUTO: 275 10*3/MM3 (ref 140–450)
PMV BLD AUTO: 9.5 FL (ref 6–12)
POTASSIUM SERPL-SCNC: 4.4 MMOL/L (ref 3.5–5.2)
RBC # BLD AUTO: 4.12 10*6/MM3 (ref 3.77–5.28)
SODIUM SERPL-SCNC: 140 MMOL/L (ref 136–145)
WBC NRBC COR # BLD AUTO: 10.34 10*3/MM3 (ref 3.4–10.8)

## 2024-01-03 PROCEDURE — B241ZZ3 ULTRASONOGRAPHY OF MULTIPLE CORONARY ARTERIES, INTRAVASCULAR: ICD-10-PCS | Performed by: INTERNAL MEDICINE

## 2024-01-03 PROCEDURE — C1760 CLOSURE DEV, VASC: HCPCS | Performed by: INTERNAL MEDICINE

## 2024-01-03 PROCEDURE — B2111ZZ FLUOROSCOPY OF MULTIPLE CORONARY ARTERIES USING LOW OSMOLAR CONTRAST: ICD-10-PCS | Performed by: INTERNAL MEDICINE

## 2024-01-03 PROCEDURE — C1725 CATH, TRANSLUMIN NON-LASER: HCPCS | Performed by: INTERNAL MEDICINE

## 2024-01-03 PROCEDURE — 25010000002 FENTANYL CITRATE (PF) 50 MCG/ML SOLUTION: Performed by: INTERNAL MEDICINE

## 2024-01-03 PROCEDURE — 92979 ENDOLUMINL IVUS OCT C EA: CPT | Performed by: INTERNAL MEDICINE

## 2024-01-03 PROCEDURE — 82948 REAGENT STRIP/BLOOD GLUCOSE: CPT

## 2024-01-03 PROCEDURE — 99232 SBSQ HOSP IP/OBS MODERATE 35: CPT | Performed by: INTERNAL MEDICINE

## 2024-01-03 PROCEDURE — C1769 GUIDE WIRE: HCPCS | Performed by: INTERNAL MEDICINE

## 2024-01-03 PROCEDURE — C1894 INTRO/SHEATH, NON-LASER: HCPCS | Performed by: INTERNAL MEDICINE

## 2024-01-03 PROCEDURE — 92978 ENDOLUMINL IVUS OCT C 1ST: CPT | Performed by: INTERNAL MEDICINE

## 2024-01-03 PROCEDURE — 4A023N7 MEASUREMENT OF CARDIAC SAMPLING AND PRESSURE, LEFT HEART, PERCUTANEOUS APPROACH: ICD-10-PCS | Performed by: INTERNAL MEDICINE

## 2024-01-03 PROCEDURE — 83735 ASSAY OF MAGNESIUM: CPT | Performed by: STUDENT IN AN ORGANIZED HEALTH CARE EDUCATION/TRAINING PROGRAM

## 2024-01-03 PROCEDURE — 85347 COAGULATION TIME ACTIVATED: CPT

## 2024-01-03 PROCEDURE — C1874 STENT, COATED/COV W/DEL SYS: HCPCS | Performed by: INTERNAL MEDICINE

## 2024-01-03 PROCEDURE — 25010000002 MIDAZOLAM PER 1 MG: Performed by: INTERNAL MEDICINE

## 2024-01-03 PROCEDURE — C1887 CATHETER, GUIDING: HCPCS | Performed by: INTERNAL MEDICINE

## 2024-01-03 PROCEDURE — 85018 HEMOGLOBIN: CPT

## 2024-01-03 PROCEDURE — 80048 BASIC METABOLIC PNL TOTAL CA: CPT | Performed by: STUDENT IN AN ORGANIZED HEALTH CARE EDUCATION/TRAINING PROGRAM

## 2024-01-03 PROCEDURE — C9600 PERC DRUG-EL COR STENT SING: HCPCS | Performed by: INTERNAL MEDICINE

## 2024-01-03 PROCEDURE — 99152 MOD SED SAME PHYS/QHP 5/>YRS: CPT | Performed by: INTERNAL MEDICINE

## 2024-01-03 PROCEDURE — 25010000002 ONDANSETRON PER 1 MG: Performed by: INTERNAL MEDICINE

## 2024-01-03 PROCEDURE — 5A0221D ASSISTANCE WITH CARDIAC OUTPUT USING IMPELLER PUMP, CONTINUOUS: ICD-10-PCS | Performed by: INTERNAL MEDICINE

## 2024-01-03 PROCEDURE — 92928 PRQ TCAT PLMT NTRAC ST 1 LES: CPT | Performed by: INTERNAL MEDICINE

## 2024-01-03 PROCEDURE — 25010000002 HEPARIN (PORCINE) PER 1000 UNITS: Performed by: INTERNAL MEDICINE

## 2024-01-03 PROCEDURE — 85014 HEMATOCRIT: CPT

## 2024-01-03 PROCEDURE — C1753 CATH, INTRAVAS ULTRASOUND: HCPCS | Performed by: INTERNAL MEDICINE

## 2024-01-03 PROCEDURE — 25510000001 IOPAMIDOL PER 1 ML: Performed by: INTERNAL MEDICINE

## 2024-01-03 PROCEDURE — 02HA3RJ INSERTION OF SHORT-TERM EXTERNAL HEART ASSIST SYSTEM INTO HEART, INTRAOPERATIVE, PERCUTANEOUS APPROACH: ICD-10-PCS | Performed by: INTERNAL MEDICINE

## 2024-01-03 PROCEDURE — 99153 MOD SED SAME PHYS/QHP EA: CPT | Performed by: INTERNAL MEDICINE

## 2024-01-03 PROCEDURE — 027136Z DILATION OF CORONARY ARTERY, TWO ARTERIES WITH THREE DRUG-ELUTING INTRALUMINAL DEVICES, PERCUTANEOUS APPROACH: ICD-10-PCS | Performed by: INTERNAL MEDICINE

## 2024-01-03 PROCEDURE — 85027 COMPLETE CBC AUTOMATED: CPT | Performed by: INTERNAL MEDICINE

## 2024-01-03 PROCEDURE — 33990 INSJ PERQ VAD L HRT ARTERIAL: CPT | Performed by: INTERNAL MEDICINE

## 2024-01-03 PROCEDURE — 82810 BLOOD GASES O2 SAT ONLY: CPT

## 2024-01-03 DEVICE — XIENCE SKYPOINT™ EVEROLIMUS ELUTING CORONARY STENT SYSTEM 3.50 MM X 28 MM / RAPID-EXCHANGE
Type: IMPLANTABLE DEVICE | Site: CORONARY | Status: FUNCTIONAL
Brand: XIENCE SKYPOINT™

## 2024-01-03 DEVICE — XIENCE SKYPOINT™ EVEROLIMUS ELUTING CORONARY STENT SYSTEM 2.50 MM X 28 MM / RAPID-EXCHANGE
Type: IMPLANTABLE DEVICE | Site: CORONARY | Status: FUNCTIONAL
Brand: XIENCE SKYPOINT™

## 2024-01-03 DEVICE — XIENCE SKYPOINT™ EVEROLIMUS ELUTING CORONARY STENT SYSTEM 3.00 MM X 28 MM / RAPID-EXCHANGE
Type: IMPLANTABLE DEVICE | Site: CORONARY | Status: FUNCTIONAL
Brand: XIENCE SKYPOINT™

## 2024-01-03 RX ORDER — CLOPIDOGREL BISULFATE 75 MG/1
75 TABLET ORAL DAILY
Status: DISCONTINUED | OUTPATIENT
Start: 2024-01-04 | End: 2024-01-09 | Stop reason: HOSPADM

## 2024-01-03 RX ORDER — CLOPIDOGREL BISULFATE 75 MG/1
TABLET ORAL
Status: DISCONTINUED | OUTPATIENT
Start: 2024-01-03 | End: 2024-01-03 | Stop reason: HOSPADM

## 2024-01-03 RX ORDER — POLYETHYLENE GLYCOL 3350 17 G/17G
17 POWDER, FOR SOLUTION ORAL DAILY PRN
Status: DISCONTINUED | OUTPATIENT
Start: 2024-01-03 | End: 2024-01-09 | Stop reason: HOSPADM

## 2024-01-03 RX ORDER — ASPIRIN 81 MG/1
TABLET, CHEWABLE ORAL
Status: DISCONTINUED | OUTPATIENT
Start: 2024-01-03 | End: 2024-01-03 | Stop reason: HOSPADM

## 2024-01-03 RX ORDER — MIDAZOLAM HYDROCHLORIDE 1 MG/ML
INJECTION INTRAMUSCULAR; INTRAVENOUS
Status: DISCONTINUED | OUTPATIENT
Start: 2024-01-03 | End: 2024-01-03 | Stop reason: HOSPADM

## 2024-01-03 RX ORDER — ACETAMINOPHEN 325 MG/1
650 TABLET ORAL EVERY 6 HOURS PRN
Status: DISCONTINUED | OUTPATIENT
Start: 2024-01-03 | End: 2024-01-09 | Stop reason: HOSPADM

## 2024-01-03 RX ORDER — LIDOCAINE HYDROCHLORIDE 20 MG/ML
INJECTION, SOLUTION INFILTRATION; PERINEURAL
Status: DISCONTINUED | OUTPATIENT
Start: 2024-01-03 | End: 2024-01-03 | Stop reason: HOSPADM

## 2024-01-03 RX ORDER — AMOXICILLIN 250 MG
2 CAPSULE ORAL 2 TIMES DAILY PRN
Status: DISCONTINUED | OUTPATIENT
Start: 2024-01-03 | End: 2024-01-09 | Stop reason: HOSPADM

## 2024-01-03 RX ORDER — LIDOCAINE HCL/EPINEPHRINE/PF 2%-1:200K
VIAL (ML) INJECTION
Status: DISCONTINUED | OUTPATIENT
Start: 2024-01-03 | End: 2024-01-03 | Stop reason: HOSPADM

## 2024-01-03 RX ORDER — HEPARIN SODIUM 1000 [USP'U]/ML
INJECTION, SOLUTION INTRAVENOUS; SUBCUTANEOUS
Status: DISCONTINUED | OUTPATIENT
Start: 2024-01-03 | End: 2024-01-03 | Stop reason: HOSPADM

## 2024-01-03 RX ORDER — SODIUM CHLORIDE 9 MG/ML
INJECTION, SOLUTION INTRAVENOUS
Status: COMPLETED | OUTPATIENT
Start: 2024-01-03 | End: 2024-01-03

## 2024-01-03 RX ORDER — FENTANYL CITRATE 50 UG/ML
INJECTION, SOLUTION INTRAMUSCULAR; INTRAVENOUS
Status: DISCONTINUED | OUTPATIENT
Start: 2024-01-03 | End: 2024-01-03 | Stop reason: HOSPADM

## 2024-01-03 RX ORDER — ASPIRIN 325 MG
TABLET ORAL
Status: DISCONTINUED | OUTPATIENT
Start: 2024-01-03 | End: 2024-01-03 | Stop reason: HOSPADM

## 2024-01-03 RX ADMIN — PRAVASTATIN SODIUM 20 MG: 20 TABLET ORAL at 21:13

## 2024-01-03 RX ADMIN — ONDANSETRON HYDROCHLORIDE 4 MG: 2 INJECTION, SOLUTION INTRAMUSCULAR; INTRAVENOUS at 17:36

## 2024-01-03 RX ADMIN — ACETAMINOPHEN 650 MG: 325 TABLET, FILM COATED ORAL at 18:11

## 2024-01-03 RX ADMIN — ASPIRIN 81 MG: 81 TABLET, COATED ORAL at 08:55

## 2024-01-03 RX ADMIN — DULOXETINE HYDROCHLORIDE 60 MG: 60 CAPSULE, DELAYED RELEASE ORAL at 21:13

## 2024-01-03 RX ADMIN — Medication 10 ML: at 10:35

## 2024-01-03 RX ADMIN — Medication 10 ML: at 21:13

## 2024-01-03 RX ADMIN — ONDANSETRON HYDROCHLORIDE 4 MG: 2 INJECTION, SOLUTION INTRAMUSCULAR; INTRAVENOUS at 11:56

## 2024-01-03 RX ADMIN — TRAZODONE HYDROCHLORIDE 50 MG: 50 TABLET ORAL at 00:04

## 2024-01-03 NOTE — PROGRESS NOTES
Name: Jade Clement ADMIT: 2023   : 1970  PCP: Heavenly Monsivais MD    MRN: [id number] LOS: 8 days   AGE/SEX: 53 y.o. female  ROOM: [facility]     Subjective   Subjective   Referring Provider: Billy Esquivel MD   Reason for Follow-up: Type 2 DM  No acute events. Patient had some nausea and a couple of loose stools this AM. She has been NPO for planned procedure today. No CP/dyspnea/f/c/v.  Family at bedside.    Objective   Objective   Vital Signs  Temp:  [97.6 °F (36.4 °C)-98.1 °F (36.7 °C)] 97.6 °F (36.4 °C)  Heart Rate:  [100-109] 103  Resp:  [16] 16  BP: ()/(65-77) 110/77  SpO2:  [96 %-99 %] 99 %  on   ;   Device (Oxygen Therapy): room air  Body mass index is 27.19 kg/m².  Physical Exam  Vitals and nursing note reviewed.   Constitutional:       General: She is not in acute distress.     Appearance: She is not toxic-appearing or diaphoretic.   HENT:      Head: Normocephalic and atraumatic.      Nose: Nose normal.      Mouth/Throat:      Mouth: Mucous membranes are moist.      Pharynx: Oropharynx is clear.   Eyes:      Conjunctiva/sclera: Conjunctivae normal.      Pupils: Pupils are equal, round, and reactive to light.   Cardiovascular:      Rate and Rhythm: Normal rate and regular rhythm.      Pulses: Normal pulses.   Pulmonary:      Effort: Pulmonary effort is normal.      Breath sounds: Normal breath sounds.   Abdominal:      General: Bowel sounds are normal.      Palpations: Abdomen is soft.      Tenderness: There is no abdominal tenderness.   Musculoskeletal:         General: No swelling or tenderness.      Cervical back: Neck supple.   Skin:     General: Skin is warm and dry.      Capillary Refill: Capillary refill takes less than 2 seconds.   Neurological:      General: No focal deficit present.      Mental Status: She is alert and oriented to person, place, and time.   Psychiatric:         Mood and Affect: Mood normal.         Behavior: Behavior normal.       Results Review     I  reviewed the patient's new clinical results.  Results from last 7 days   Lab Units 01/03/24  0331 12/30/23  0328 12/29/23  0546 12/28/23  0330   WBC 10*3/mm3 10.34 10.73 11.71* 11.86*   HEMOGLOBIN g/dL 12.7 13.4 13.5 13.4   PLATELETS 10*3/mm3 275 269 268 289     Results from last 7 days   Lab Units 01/03/24 0331 01/02/24 0318 01/01/24  0411 12/31/23  1001   SODIUM mmol/L 140 139 138 138   POTASSIUM mmol/L 4.4 4.4 4.0 4.2   CHLORIDE mmol/L 106 101 101 101   CO2 mmol/L 23.6 28.0 25.3 25.5   BUN mg/dL 17 22* 19 22*   CREATININE mg/dL 0.70 0.87 0.83 0.75   GLUCOSE mg/dL 116* 121* 140* 97   EGFR mL/min/1.73 102.3 78.8 83.4 94.2     Results from last 7 days   Lab Units 12/29/23  0546   ALBUMIN g/dL 3.9   BILIRUBIN mg/dL 0.3   ALK PHOS U/L 90   AST (SGOT) U/L 20   ALT (SGPT) U/L 41*     Results from last 7 days   Lab Units 01/03/24 0331 01/02/24 0318 01/01/24 0411 12/31/23  1001 12/30/23  0329 12/29/23  0546   CALCIUM mg/dL 9.3 9.2 8.9 9.2   < > 9.0   ALBUMIN g/dL  --   --   --   --   --  3.9   MAGNESIUM mg/dL 2.0 1.7 1.8 2.2   < > 1.4*    < > = values in this interval not displayed.       Glucose   Date/Time Value Ref Range Status   01/03/2024 1049 96 70 - 130 mg/dL Final   01/03/2024 0647 100 70 - 130 mg/dL Final   01/02/2024 2148 221 (H) 70 - 130 mg/dL Final   01/02/2024 2009 189 (H) 70 - 130 mg/dL Final   01/02/2024 1541 131 (H) 70 - 130 mg/dL Final   01/02/2024 1047 208 (H) 70 - 130 mg/dL Final   01/02/2024 0623 136 (H) 70 - 130 mg/dL Final       No radiology results for the last day    I have personally reviewed all medications:  Scheduled Medications  aspirin, 81 mg, Oral, Daily  busPIRone, 10 mg, Oral, Daily  cetirizine, 10 mg, Oral, Daily  DULoxetine, 60 mg, Oral, BID  insulin glargine, 22 Units, Subcutaneous, BID  insulin lispro, 2-9 Units, Subcutaneous, 4x Daily AC & at Bedtime  insulin lispro, 8 Units, Subcutaneous, TID With Meals  pantoprazole, 40 mg, Oral, Q AM  polyethylene glycol, 17 g, Oral,  Daily  pravastatin, 20 mg, Oral, Nightly  senna-docusate sodium, 2 tablet, Oral, BID  sertraline, 25 mg, Oral, Daily  sodium chloride, 10 mL, Intravenous, Q12H  sodium chloride, 10 mL, Intravenous, Q12H    Infusions     Diet  NPO Diet NPO Type: Sips with Meds    I have personally reviewed:  [x]  Laboratory   []  Microbiology   []  Radiology   [x]  EKG/Telemetry  [x]  Cardiology/Vascular   []  Pathology    []  Records    Assessment/Plan     Active Hospital Problems    Diagnosis  POA    **ACS (acute coronary syndrome) [I24.9]  Yes    Constipation [K59.00]  Yes    NOELLE (generalized anxiety disorder) [F41.1]  Yes    GERD without esophagitis [K21.9]  Yes    Type 2 diabetes mellitus with complications [E11.8]  Yes    Type 2 diabetes mellitus with microalbuminuria, with long-term current use of insulin [E11.29, R80.9, Z79.4]  Not Applicable      Resolved Hospital Problems   No resolved problems to display.   Type 2 DM  - complications include CAD and microalbuminuria  - BG on the high side  - continue lantus 22 units BID  - continue on lispro 8 units TID with meals in addition to ssi/hypoglycemia protocol coverage    ACS/Severe Multivessel CAD/ICM  - LVEF 20%  - on ASA, pravastatin, and metoprolol  - staged PCI planned  - management per CT surgery and cardiology     Constipation  - resolved, now having loose stools  - change bowel regimen to PRN    Anxiety  - seems controlled  - continue on buspar and cymbalta    GERD  - controlled, has history of peptic ulcer disease as well  - continue on ppi    Nam Fernando MD  [facility]  01/03/24  11:21 EST

## 2024-01-03 NOTE — CONSULTS
Visited with patient; family at bedside. Reviewed and completed Advance Directive with pt and family. Copy in chart. Pt expressed appreciation. Pt and family aware of continued  availability.

## 2024-01-03 NOTE — PAYOR COMM NOTE
"Jade Jaramillo (53 y.o. Female)                            ATTENTION; CONTINUED CLINICALS CASE REF [id number]                            STARTING 12/31/23   REPLY TO  [facility] DEPT  [phone]  OR CALL            Date of Birth   1970    Social Security Number       Address   [address] [id number]    Home Phone   [phone]    MRN   [id number]       Church   Religion    Marital Status   Single                            Admission Date   12/26/23    Admission Type   Urgent    Admitting Provider   Billy Esquivel MD    Attending Provider   Billy Esquivel MD    Department, Room/Bed   [facility], 3106/1       Discharge Date       Discharge Disposition       Discharge Destination                                 Attending Provider: Billy Esquivel MD    Allergies: Metformin, Ozempic [Semaglutide], Bydureon [Exenatide], Statins, Xigduo Xr [Dapagliflozin Pro-metformin Er]    Isolation: None   Infection: None   Code Status: CPR    Ht: 162.6 cm (64\")   Wt: 71.8 kg (158 lb 6.4 oz)    Admission Cmt: None   Principal Problem: ACS (acute coronary syndrome) [I24.9]                   Active Insurance as of 12/26/2023       Primary Coverage       Payor Plan Insurance Group Employer/Plan Group    MISC COMMERCIAL MISC COMMERCIAL [id number]       Coverage Address Coverage Phone Number Coverage Fax Number Effective Dates    [address] [address] [phone]  4/1/2023 - None Entered    [facility] [id number]         Subscriber Name Subscriber Birth Date Member ID       JADE JARAMILLO 1970 [id number]                     Emergency Contacts        (Rel.) Home Phone Work Phone Mobile Phone    Millicent Martin (Daughter) [phone] -- [phone]              Vital Signs (last 3 days)       Date/Time Temp Temp src Pulse Resp BP Patient Position SpO2    01/03/24 0735 97.8 (36.6) Oral 104 16 96/67 Lying 97    01/03/24 0412 97.9 (36.6) Oral 100 16 " 90/66 Lying 96    01/03/24 0022 98.1 (36.7) Oral 104 16 104/70 Lying 98    01/02/24 2008 98.1 (36.7) Oral 107 16 98/71 Lying 96    01/02/24 1541 98.1 (36.7) Oral 109 16 91/65 Lying 99    01/02/24 1047 98.3 (36.8) Oral 104 16 100/70 Lying 97    01/02/24 0739 98.5 (36.9) Oral 104 16 91/60 Lying 97    01/02/24 0435 -- -- 104 -- 93/62 -- 92    01/02/24 0427 98.5 (36.9) Oral -- 16 84/52 Lying --    01/02/24 0018 97.9 (36.6) Oral -- 16 90/59 Lying --    01/01/24 2106 98 (36.7) Oral 99 16 90/60 Lying 96    01/01/24 1546 98.1 (36.7) Oral 96 16 98/73 Lying 98    01/01/24 1049 98.4 (36.9) Oral 98 16 129/109 Lying 96    01/01/24 0750 97.9 (36.6) Oral 92 16 92/62 Lying 94    01/01/24 0520 -- -- 89 -- -- -- 96    01/01/24 0340 98 (36.7) Oral 92 16 91/65 Lying 90    01/01/24 0007 97.3 (36.3) Oral 89 16 84/70 Lying 98    12/31/23 1930 97.8 (36.6) Oral 91 16 88/62 Lying 100    12/31/23 1612 98.2 (36.8) Oral 94 16 87/65 Lying --    12/31/23 1208 -- -- 90 -- 89/59 -- --    12/31/23 1152 98.3 (36.8) Oral 92 16 85/67 Lying --    12/31/23 0700 -- -- 96 -- -- -- --    12/31/23 0324 97.8 (36.6) Oral 87 16 89/66 Lying 100          Oxygen Therapy (last 3 days)       Date/Time SpO2 Device (Oxygen Therapy) Flow (L/min) Oxygen Concentration (%) ETCO2 (mmHg)    01/03/24 0735 97 -- -- -- --    01/03/24 0412 96 -- -- -- --    01/03/24 0022 98 -- -- -- --    01/02/24 2008 96 -- -- -- --    01/02/24 1541 99 -- -- -- --    01/02/24 1408 -- room air -- -- --    01/02/24 1047 97 -- -- -- --    01/02/24 0739 97 -- -- -- --    01/02/24 0435 92 -- -- -- --    01/01/24 2106 96 -- -- -- --    01/01/24 1546 98 -- -- -- --    01/01/24 1418 -- room air -- -- --    01/01/24 1049 96 -- -- -- --    01/01/24 0808 -- room air -- -- --    01/01/24 0750 94 -- -- -- --    01/01/24 0520 96 -- -- -- --    01/01/24 0340 90 -- -- -- --    01/01/24 0007 98 -- -- -- --    12/31/23 1930 100 -- -- -- --    12/31/23 1355 -- room air -- -- --    12/31/23 0830 -- room air -- --  --    12/31/23 0324 100 -- -- -- --          Orders (last 72 hrs)        Start     Ordered    01/03/24 0650  POC Glucose Once  PROCEDURE ONCE        Comments: Complete no more than 45 minutes prior to patient eating      01/03/24 0647    01/03/24 0600  metoprolol tartrate (LOPRESSOR) tablet 12.5 mg  On Call to O.R.,   Status:  Discontinued         12/28/23 1503    01/03/24 0600  chlorhexidine (PERIDEX) 0.12 % solution 15 mL  Once,   Status:  Discontinued         12/28/23 1503    01/03/24 0600  ceFAZolin in dextrose (ANCEF) IVPB solution 2,000 mg  Once,   Status:  Discontinued         12/28/23 1503    01/03/24 0600  CBC (No Diff)  Morning Draw         01/02/24 1424    01/03/24 0001  NPO Diet NPO Type: Sips with Meds  Diet Effective Midnight         12/28/23 1503    01/02/24 2149  POC Glucose Once  PROCEDURE ONCE        Comments: Complete no more than 45 minutes prior to patient eating      01/02/24 2148    01/02/24 2012  POC Glucose Once  PROCEDURE ONCE        Comments: Complete no more than 45 minutes prior to patient eating      01/02/24 2009 01/02/24 1800  Chlorhexidine Gluconate Cloth 2 % pads 1 application   Every 12 Hours PRN         12/28/23 1503    01/02/24 1800  mupirocin (BACTROBAN) 2 % nasal ointment 1 application   Every 12 Hours,   Status:  Discontinued         12/28/23 1503    01/02/24 1800  Weigh Patient Night Before Surgery  Once        Comments: Need Actual Weight, Not Stated Weight    12/28/23 1503    01/02/24 1800  Chlorhexidine Skin Prep - Chin to Toes 12 Hours Prior to Surgery & Morning of Surgery  As Needed,   Status:  Canceled      Comments: Chlorhexidine Skin wipes and instructions for all patients having a procedure requiring an outward incision if not allergic.  If allergic, give antibacterial skin wipes and instructions.  Do not use for facial cases or on any mucus membranes.    12 Hours Prior to Surgery & The Morning of Surgery    12/28/23 1503    01/02/24 1800  Clip Hair Chin to Ankles   Once         12/28/23 1503    01/02/24 1657  Inpatient Advance Care Planning Consult  Once        Comments: High risk heart cath tomorrow   Provider:  (Not yet assigned)    01/02/24 1657    01/02/24 1545  POC Glucose Once  PROCEDURE ONCE        Comments: Complete no more than 45 minutes prior to patient eating      01/02/24 1541    01/02/24 1222  Cardiac Catheterization/Vascular Study  Once         01/02/24 1221    01/02/24 1219  Obtain Informed Consent  Once        Comments: tomorrow    01/02/24 1219    01/02/24 1218  Case Request Cath Lab: Percutaneous Coronary Intervention  Once         01/02/24 1219    01/02/24 1051  POC Glucose Once  PROCEDURE ONCE        Comments: Complete no more than 45 minutes prior to patient eating      01/02/24 1047    01/02/24 0803  Potassium Replacement - Follow Nurse / BPA Driven Protocol  As Needed         01/02/24 0803    01/02/24 0803  Phosphorus Replacement - Follow Nurse / BPA Driven Protocol  As Needed         01/02/24 0803    01/02/24 0803  Calcium Replacement - Follow Nurse / BPA Driven Protocol  As Needed         01/02/24 0803    01/02/24 0625  POC Glucose Once  PROCEDURE ONCE        Comments: Complete no more than 45 minutes prior to patient eating      01/02/24 0623    01/02/24 0600  Type & Screen  Once         12/28/23 1503    01/02/24 0600  COVID PRE-OP / PRE-PROCEDURE SCREENING ORDER (NO ISOLATION) - Swab, Nasopharynx  Once,   Status:  Canceled         12/28/23 1503    01/01/24 2132  POC Glucose Once  PROCEDURE ONCE        Comments: Complete no more than 45 minutes prior to patient eating      01/01/24 2129    01/01/24 1548  POC Glucose Once  PROCEDURE ONCE        Comments: Complete no more than 45 minutes prior to patient eating      01/01/24 1544    01/01/24 1320  ECG 12 Lead Pre-Op / Pre-Procedure  Once         01/01/24 1319    01/01/24 1215  sennosides-docusate (PERICOLACE) 8.6-50 MG per tablet 2 tablet  2 Times Daily         01/01/24 1117    01/01/24 1215   polyethylene glycol (MIRALAX) packet 17 g  Daily         01/01/24 1117    01/01/24 1053  POC Glucose Once  PROCEDURE ONCE        Comments: Complete no more than 45 minutes prior to patient eating      01/01/24 1048    01/01/24 0815  furosemide (LASIX) injection 80 mg  Once         01/01/24 0715    01/01/24 0622  POC Glucose Once  PROCEDURE ONCE        Comments: Complete no more than 45 minutes prior to patient eating      01/01/24 0611    12/31/23 1951  POC Glucose Once  PROCEDURE ONCE        Comments: Complete no more than 45 minutes prior to patient eating      12/31/23 1936    12/31/23 1612  POC Glucose Once  PROCEDURE ONCE        Comments: Complete no more than 45 minutes prior to patient eating      12/31/23 1610    12/31/23 1600  furosemide (LASIX) injection 40 mg  Once         12/31/23 1200    12/31/23 1245  DOBUTamine (DOBUTREX) 1 mg/mL infusion  Continuous,   Status:  Discontinued         12/31/23 1156    12/31/23 1201  traZODone (DESYREL) tablet 50 mg  Nightly PRN         12/31/23 1201    12/31/23 1122  POC Glucose Once  PROCEDURE ONCE        Comments: Complete no more than 45 minutes prior to patient eating      12/31/23 1118    12/31/23 0755  Basic Metabolic Panel  Daily       12/31/23 0754    12/31/23 0755  Magnesium  Daily       12/31/23 0754    12/29/23 2153  melatonin tablet 5 mg  Nightly PRN         12/29/23 2153    12/29/23 2138  Daily Weights  Daily       12/29/23 2137    12/29/23 2100  insulin glargine (LANTUS, SEMGLEE) injection 20 Units  2 Times Daily         12/29/23 1459    12/29/23 1800  insulin lispro (HUMALOG/ADMELOG) injection 8 Units  3 Times Daily With Meals         12/29/23 1459    12/28/23 2100  sodium chloride 0.9 % flush 10 mL  Every 12 Hours Scheduled         12/28/23 1503    12/28/23 1830  ALPRAZolam (XANAX) tablet 0.25 mg  4 Times Daily PRN         12/28/23 1830    12/28/23 1800  Instruct Patient on Coughing, Deep Breathing and Incentive Spirometry  Every 4 Hours While Awake        12/28/23 1503    12/28/23 1600  Strict intake and output  Every 4 Hours       12/28/23 1302    12/28/23 1600  Neurovascular Checks  Every 4 Hours       12/28/23 1503    12/28/23 1441  Skin Assessment  Every Shift       12/28/23 1503    12/28/23 1440  sodium chloride 0.9 % infusion 40 mL  As Needed         12/28/23 1503    12/28/23 1440  acetaminophen (TYLENOL) tablet 650 mg  Every 4 Hours PRN         12/28/23 1503    12/28/23 1302  nitroglycerin (NITROSTAT) SL tablet 0.4 mg  Every 5 Minutes PRN         12/28/23 1302    12/28/23 0928  ondansetron ODT (ZOFRAN-ODT) disintegrating tablet 4 mg  Every 6 Hours PRN        See Hyperspace for full Linked Orders Report.    12/28/23 0928    12/28/23 0928  ondansetron (ZOFRAN) injection 4 mg  Every 6 Hours PRN        See Hyperspace for full Linked Orders Report.    12/28/23 0928    12/27/23 1730  insulin lispro (HUMALOG/ADMELOG) injection 2-9 Units  4 Times Daily Before Meals & Nightly         12/27/23 1538    12/27/23 1712  Potassium Replacement - Follow Nurse / BPA Driven Protocol  As Needed         12/27/23 1712    12/27/23 1712  Magnesium Standard Dose Replacement - Follow Nurse / BPA Driven Protocol  As Needed         12/27/23 1712    12/27/23 0900  busPIRone (BUSPAR) tablet 10 mg  Daily         12/26/23 2309 12/27/23 0900  cetirizine (zyrTEC) tablet 10 mg  Daily         12/26/23 2309 12/27/23 0900  sertraline (ZOLOFT) tablet 25 mg  Daily         12/26/23 2309 12/27/23 0900  aspirin EC tablet 81 mg  Daily         12/26/23 2311 12/27/23 0800  Oral Care  2 Times Daily       12/26/23 2316 12/27/23 0700  POC Glucose 4x Daily Before Meals & at Bedtime  4 Times Daily Before Meals & at Bedtime      Comments: Complete no more than 45 minutes prior to patient eating      12/26/23 2310 12/27/23 0600  pantoprazole (PROTONIX) EC tablet 40 mg  Every Early Morning         12/26/23 2309    12/27/23 0015  sodium chloride 0.9 % flush 10 mL  Every 12 Hours Scheduled          12/26/23 2316 12/27/23 0000  DULoxetine (CYMBALTA) DR capsule 60 mg  2 Times Daily         12/26/23 2309 12/27/23 0000  metoprolol succinate XL (TOPROL-XL) 24 hr tablet 12.5 mg  Every 24 Hours Scheduled,   Status:  Discontinued         12/26/23 2312 12/27/23 0000  pravastatin (PRAVACHOL) tablet 20 mg  Nightly         12/26/23 2312 12/27/23 0000  Vital Signs  Every 4 Hours       12/26/23 2316 12/27/23 0000  Strict Intake & Output  Every Hour       12/26/23 2316 12/26/23 2317  Daily Weights  Daily       12/26/23 2316 12/26/23 2315  sodium chloride 0.9 % flush 10 mL  As Needed         12/26/23 2316 12/26/23 2310  dextrose (GLUTOSE) oral gel 15 g  Every 15 Minutes PRN         12/26/23 2310 12/26/23 2310  dextrose (D50W) (25 g/50 mL) IV injection 25 g  Every 15 Minutes PRN         12/26/23 2310 12/26/23 2310  glucagon (GLUCAGEN) injection 1 mg  Every 15 Minutes PRN         12/26/23 2310    Unscheduled  Follow Hypoglycemia Standing Orders For Blood Glucose <70 & Notify Provider of Treatment  As Needed      Comments: Follow Hypoglycemia Orders As Outlined in Process Instructions (Open Order Report to View Full Instructions)  Notify Provider Any Time Hypoglycemia Treatment is Administered    12/26/23 2310    Unscheduled  Vital Signs  As Needed       12/28/23 1302    Unscheduled  Change site dressing  As Needed       12/28/23 1302    --  sertraline (ZOLOFT) 50 MG tablet  Daily         12/26/23 2232    --  SCANNED - TELEMETRY           12/26/23 0000    --  SCANNED - TELEMETRY           12/26/23 0000    --  SCANNED - TELEMETRY           12/26/23 0000    --  SCANNED - TELEMETRY           12/26/23 0000    --  SCANNED - TELEMETRY           12/26/23 0000    --  SCANNED - TELEMETRY           12/26/23 0000    --  SCANNED - TELEMETRY           12/26/23 0000    --  SCANNED - TELEMETRY           12/26/23 0000    --  SCANNED - TELEMETRY           12/26/23 0000    --  SCANNED - TELEMETRY            23 0000    --  SCANNED - TELEMETRY           23 0000    --  SCANNED - TELEMETRY           23 0000    --  SCANNED - TELEMETRY           23 0000    --  SCANNED - TELEMETRY           23 0000    --  SCANNED - TELEMETRY           23 0000    --  SCANNED - TELEMETRY           23 0000    --  SCANNED - TELEMETRY           23 0000    --  SCANNED - TELEMETRY           23 0000    --  SCANNED - TELEMETRY           23 0000    --  SCANNED - TELEMETRY           23 0000    --  SCANNED - TELEMETRY           23 0000    --  SCANNED - TELEMETRY           23 0000    --  SCANNED - TELEMETRY           23 0000    --  SCANNED - TELEMETRY           23 0000    --  SCANNED - TELEMETRY           23 0000    --  SCANNED - TELEMETRY           23 0000    --  SCANNED - TELEMETRY           23 0000    --  SCANNED - TELEMETRY           23 0000    --  SCANNED - TELEMETRY           23 0000                     Physician Progress Notes (last 72 hours)        Nam Fernando MD at 24 1628              Name: Jade Clement ADMIT: 2023   : 1970  PCP: Heavenly Monsivais MD    MRN: [id number] LOS: 7 days   AGE/SEX: 53 y.o. female  ROOM: [facility]     Subjective   Subjective   Referring Provider: Billy Esquivel MD   Reason for Follow-up: Type 2 DM  No acute events. Patient denies new complaints. Taking PO. No CP/dyspnea/f/c/n/v/d. +constipation  Family at bedside.    Objective   Objective   Vital Signs  Temp:  [97.9 °F (36.6 °C)-98.5 °F (36.9 °C)] 98.1 °F (36.7 °C)  Heart Rate:  [] 109  Resp:  [16] 16  BP: ()/(52-70) 91/65  SpO2:  [92 %-99 %] 99 %  on   ;   Device (Oxygen Therapy): room air  Body mass index is 26.98 kg/m².  Physical Exam  Vitals and nursing note reviewed.   Constitutional:       General: She is not in acute distress.     Appearance: She is not toxic-appearing or diaphoretic.   HENT:       Head: Normocephalic and atraumatic.      Nose: Nose normal.      Mouth/Throat:      Mouth: Mucous membranes are moist.      Pharynx: Oropharynx is clear.   Eyes:      Conjunctiva/sclera: Conjunctivae normal.      Pupils: Pupils are equal, round, and reactive to light.   Cardiovascular:      Rate and Rhythm: Normal rate and regular rhythm.      Pulses: Normal pulses.   Pulmonary:      Effort: Pulmonary effort is normal.      Breath sounds: Normal breath sounds.   Abdominal:      General: Bowel sounds are normal.      Palpations: Abdomen is soft.      Tenderness: There is no abdominal tenderness.   Musculoskeletal:         General: No swelling or tenderness.      Cervical back: Neck supple.   Skin:     General: Skin is warm and dry.      Capillary Refill: Capillary refill takes less than 2 seconds.   Neurological:      General: No focal deficit present.      Mental Status: She is alert and oriented to person, place, and time.   Psychiatric:         Mood and Affect: Mood normal.         Behavior: Behavior normal.       Results Review     I reviewed the patient's new clinical results.  Results from last 7 days   Lab Units 12/30/23  0328 12/29/23  0546 12/28/23  0330 12/27/23  0922   WBC 10*3/mm3 10.73 11.71* 11.86* 9.64   HEMOGLOBIN g/dL 13.4 13.5 13.4 12.9   PLATELETS 10*3/mm3 269 268 289 284     Results from last 7 days   Lab Units 01/02/24  0318 01/01/24  0411 12/31/23  1001 12/30/23  0329   SODIUM mmol/L 139 138 138 141   POTASSIUM mmol/L 4.4 4.0 4.2 3.9   CHLORIDE mmol/L 101 101 101 104   CO2 mmol/L 28.0 25.3 25.5 25.8   BUN mg/dL 22* 19 22* 20   CREATININE mg/dL 0.87 0.83 0.75 0.83   GLUCOSE mg/dL 121* 140* 97 130*   EGFR mL/min/1.73 78.8 83.4 94.2 83.4     Results from last 7 days   Lab Units 12/29/23  0546 12/27/23  0922   ALBUMIN g/dL 3.9 3.8   BILIRUBIN mg/dL 0.3 0.4   ALK PHOS U/L 90 85   AST (SGOT) U/L 20 29   ALT (SGPT) U/L 41* 55*     Results from last 7 days   Lab Units 01/02/24  0318 01/01/24  0415  12/31/23  1001 12/30/23  0329 12/29/23  0546 12/28/23  0330 12/27/23  0922   CALCIUM mg/dL 9.2 8.9 9.2 9.0 9.0   < > 9.3   ALBUMIN g/dL  --   --   --   --  3.9  --  3.8   MAGNESIUM mg/dL 1.7 1.8 2.2 2.3 1.4*   < >  --     < > = values in this interval not displayed.       Glucose   Date/Time Value Ref Range Status   01/02/2024 1541 131 (H) 70 - 130 mg/dL Final   01/02/2024 1047 208 (H) 70 - 130 mg/dL Final   01/02/2024 0623 136 (H) 70 - 130 mg/dL Final   01/01/2024 2129 98 70 - 130 mg/dL Final   01/01/2024 1544 139 (H) 70 - 130 mg/dL Final   01/01/2024 1048 166 (H) 70 - 130 mg/dL Final   01/01/2024 0611 139 (H) 70 - 130 mg/dL Final       No radiology results for the last day    I have personally reviewed all medications:  Scheduled Medications  aspirin, 81 mg, Oral, Daily  busPIRone, 10 mg, Oral, Daily  cetirizine, 10 mg, Oral, Daily  DULoxetine, 60 mg, Oral, BID  insulin glargine, 20 Units, Subcutaneous, BID  insulin lispro, 2-9 Units, Subcutaneous, 4x Daily AC & at Bedtime  insulin lispro, 8 Units, Subcutaneous, TID With Meals  pantoprazole, 40 mg, Oral, Q AM  polyethylene glycol, 17 g, Oral, Daily  pravastatin, 20 mg, Oral, Nightly  senna-docusate sodium, 2 tablet, Oral, BID  sertraline, 25 mg, Oral, Daily  sodium chloride, 10 mL, Intravenous, Q12H  sodium chloride, 10 mL, Intravenous, Q12H    Infusions     Diet  Diet: Cardiac Diets, Diabetic Diets; Healthy Heart (2-3 Na+); Consistent Carbohydrate; Texture: Regular Texture (IDDSI 7); Fluid Consistency: Thin (IDDSI 0)  NPO Diet NPO Type: Sips with Meds    I have personally reviewed:  [x]  Laboratory   []  Microbiology   []  Radiology   [x]  EKG/Telemetry  [x]  Cardiology/Vascular   []  Pathology    []  Records    Assessment/Plan     Active Hospital Problems    Diagnosis  POA    **ACS (acute coronary syndrome) [I24.9]  Yes    Constipation [K59.00]  Yes    NOELLE (generalized anxiety disorder) [F41.1]  Yes    GERD without esophagitis [K21.9]  Yes    Type 2 diabetes  mellitus with complications [E11.8]  Yes    Type 2 diabetes mellitus with microalbuminuria, with long-term current use of insulin [E11.29, R80.9, Z79.4]  Not Applicable      Resolved Hospital Problems   No resolved problems to display.   Type 2 DM  - complications include CAD and microalbuminuria  - BG on the high side  - increase lantus to 22 units BID  - continue on lispro 8 units TID with meals in addition to ssi/hypoglycemia protocol coverage    ACS/Severe Multivessel CAD/ICM  - LVEF 20%  - on ASA, pravastatin, and metoprolol  - staged PCI planned  - management per CT surgery and cardiology     Constipation  - abdominal examination is benign  - continue bowel regimen    Anxiety  - seems controlled  - continue on buspar and cymbalta    GERD  - controlled, has history of peptic ulcer disease as well  - continue on ppi    Nam Fernando MD  [facility]  01/02/24  16:28 EST      Electronically signed by Nam Fernando MD at 01/02/24 1632       Billy Esquivel MD at 01/02/24 1419           LOS: 7 days   Patient Care Team:  Heavenly Monsivais MD as PCP - General (Family Medicine)  Hesham Cheney MD as Consulting Physician (Endocrinology)    Chief Complaint: Follow-up NSTEMI versus late presentation anteroseptal infarct, acute systolic CHF, new cardiomyopathy.    Interval History: Complained of fatigue this morning.  No chest pain or shortness of breath.  She did require low-dose dobutamine over the weekend for hypotension.  Blood pressure is marginally improved.    Vital Signs:  Temp:  [97.9 °F (36.6 °C)-98.5 °F (36.9 °C)] 98.3 °F (36.8 °C)  Heart Rate:  [] 104  Resp:  [16] 16  BP: ()/(52-73) 100/70    Intake/Output Summary (Last 24 hours) at 1/2/2024 1419  Last data filed at 1/2/2024 1211  Gross per 24 hour   Intake 720 ml   Output --   Net 720 ml       Physical Exam:   General Appearance:    No acute distress, alert and oriented x4   Lungs:     Faint rales at the  right base.    Heart:    Regular rhythm and mildly tachycardic rate.  No murmurs, gallops, or   rubs.   Abdomen:     Soft, nontender, nondistended.    Extremities:   No clubbing, cyanosis, or edema.      Results Review:    Results from last 7 days   Lab Units 01/02/24  0318   SODIUM mmol/L 139   POTASSIUM mmol/L 4.4   CHLORIDE mmol/L 101   CO2 mmol/L 28.0   BUN mg/dL 22*   CREATININE mg/dL 0.87   GLUCOSE mg/dL 121*   CALCIUM mg/dL 9.2     Results from last 7 days   Lab Units 12/27/23  0922 12/27/23  0052   HSTROP T ng/L 229* 208*     Results from last 7 days   Lab Units 12/30/23  0328   WBC 10*3/mm3 10.73   HEMOGLOBIN g/dL 13.4   HEMATOCRIT % 39.4   PLATELETS 10*3/mm3 269     Results from last 7 days   Lab Units 12/29/23  0546 12/28/23  1134 12/28/23  0330 12/27/23  0922 12/27/23  0052   INR  1.08  --   --   --  1.17*   APTT seconds 25.6 49.1* 61.4*   < > 28.2    < > = values in this interval not displayed.     Results from last 7 days   Lab Units 12/27/23  0922   CHOLESTEROL mg/dL 199     Results from last 7 days   Lab Units 01/02/24  0318   MAGNESIUM mg/dL 1.7     Results from last 7 days   Lab Units 12/27/23  0922   CHOLESTEROL mg/dL 199   TRIGLYCERIDES mg/dL 207*   HDL CHOL mg/dL 31*   LDL CHOL mg/dL 131*       I reviewed the patient's new clinical results.        Assessment:  1.  Type I NSTEMI with severe multivessel coronary artery disease  2.  Acute systolic CHF secondary to ischemic cardiomyopathy and coronary artery disease  3.  Ischemic cardiomyopathy, new (EF 20% or less)  4.  Insulin-dependent diabetes since 2014, with neuropathy  5.  Mild to moderate mitral regurgitation  6.  Mild to moderate tricuspid regurgitation  7.  Tobacco use, quit on 12/18/2023  8.  Nonspecific right lower lobe groundglass opacity and left apical opacity on CT (follow-up CT of the chest recommended in 3 months)   9.  Right adrenal adenoma by CT of the abdomen  10.  Dyslipidemia   11.  Statin intolerance (myalgias)  12.   Gastroesophageal reflux disease  13.  Moderate right pleural effusion on admission  14.  Relative hypotension  15.  Sinus tachycardia    Plan:  -Heart catheterization showed severe multivessel coronary artery disease.  This included an 80% ostial and 90% mid LAD, 80% ostial OM1, and 95% proximal to mid RCA with left-to-right collateral filling.    -Spoke with Dr. Asif this morning.  He is concerned about taking her to the OR as he feels that she would be at high risk for mortality currently.  This is mainly because of her reduced ejection fraction.  Additionally, she was requiring dobutamine over the weekend.    -Spoke with Dr. Greene from interventional cardiology.  She reviewed the cath films, and is planning on a high risk PCI with Impella support tomorrow for the LAD and OM lesion.  Depending on these results, likely a staged PCI to the RCA.  I spoke with the patient at length and explained that these are high risk procedures, although at this point without surgery, either a high risk PCI is indicated or I would need to transfer her to a higher level care center with a dedicated heart failure program.  At this juncture, I do not feel that she needs an LVAD.  She does not appear to be in cardiogenic shock, and I actually stopped her dobutamine this morning (we will see how she does without this).    -No diuretics today.  Toprol-XL was discontinued over the weekend because of hypotension.  Hypotension greatly limits her heart failure treatment.    -She cannot be on an ACE inhibitor, ARB, Entresto, spironolactone, or SGLT2 inhibitor for now given lower blood pressure.    -Continue aspirin.  She has had severe myalgias with multiple statins in the past.  I started her on pravastatin 20 mg/day.  She has tolerated this so far.    -Appreciate [facility] following for glucose management.    Billy Esquivel MD  01/02/24  14:19 EST         Electronically signed by Billy Esquivel MD at 01/02/24 7292       Jr Laya  Etienne LEACH MD at 24 [phone]          I have thought about her all weekend.  She has been placed on dobutamine this weekend for blood pressure.  Dr. Jerez and I reviewed her cardiac cath and her echo.  He does not think she is operable and I tend to agree.  I was quite worried about her.  I spoke with Dr. Esquivel.  He is going to speak with interventional cardiology to see about stenting the LAD and the right.  She may however need an LVAD and treated at a higher level heart failure center.    Electronically signed by Jr Etienne Aisf MD at 24 [phone]       Nam Fernando MD at 24 [phone]              Name: Jade VALADEZ Urbano ADMIT: 2023   : 1970  PCP: Heavenly Monsivais MD    MRN: [id number] LOS: 6 days   AGE/SEX: 53 y.o. female  ROOM: [facility]     Subjective   Subjective   Referring Provider: Billy Esquivel MD   Reason for Follow-up: Type 2 DM  No acute events. Patient denies new complaints. Taking PO. No CP/dyspnea/f/c/n/v/d. +constipation  No family at bedside.    Objective   Objective   Vital Signs  Temp:  [97.3 °F (36.3 °C)-98.3 °F (36.8 °C)] 97.9 °F (36.6 °C)  Heart Rate:  [89-94] 92  Resp:  [16] 16  BP: (84-92)/(59-70) 92/62  SpO2:  [90 %-100 %] 94 %  on   ;   Device (Oxygen Therapy): room air  Body mass index is 27.09 kg/m².  Physical Exam  Vitals and nursing note reviewed.   Constitutional:       General: She is not in acute distress.     Appearance: She is not toxic-appearing or diaphoretic.   HENT:      Head: Normocephalic and atraumatic.      Nose: Nose normal.      Mouth/Throat:      Mouth: Mucous membranes are moist.      Pharynx: Oropharynx is clear.   Eyes:      Conjunctiva/sclera: Conjunctivae normal.      Pupils: Pupils are equal, round, and reactive to light.   Cardiovascular:      Rate and Rhythm: Normal rate and regular rhythm.      Pulses: Normal pulses.   Pulmonary:      Effort: Pulmonary effort is normal.      Breath sounds: Normal breath sounds.   Abdominal:       General: Bowel sounds are normal.      Palpations: Abdomen is soft.      Tenderness: There is no abdominal tenderness.   Musculoskeletal:         General: No swelling or tenderness.      Cervical back: Neck supple.   Skin:     General: Skin is warm and dry.      Capillary Refill: Capillary refill takes less than 2 seconds.   Neurological:      General: No focal deficit present.      Mental Status: She is alert and oriented to person, place, and time.   Psychiatric:         Mood and Affect: Mood normal.         Behavior: Behavior normal.       Results Review     I reviewed the patient's new clinical results.  Results from last 7 days   Lab Units 12/30/23  0328 12/29/23  0546 12/28/23  0330 12/27/23  0922   WBC 10*3/mm3 10.73 11.71* 11.86* 9.64   HEMOGLOBIN g/dL 13.4 13.5 13.4 12.9   PLATELETS 10*3/mm3 269 268 289 284     Results from last 7 days   Lab Units 01/01/24  0411 12/31/23  1001 12/30/23  0329 12/29/23  1631 12/29/23  0546   SODIUM mmol/L 138 138 141  --  140   POTASSIUM mmol/L 4.0 4.2 3.9 4.1 3.3*   CHLORIDE mmol/L 101 101 104  --  103   CO2 mmol/L 25.3 25.5 25.8  --  26.0   BUN mg/dL 19 22* 20  --  17   CREATININE mg/dL 0.83 0.75 0.83  --  0.81   GLUCOSE mg/dL 140* 97 130*  --  267*   EGFR mL/min/1.73 83.4 94.2 83.4  --  85.9     Results from last 7 days   Lab Units 12/29/23  0546 12/27/23  0922   ALBUMIN g/dL 3.9 3.8   BILIRUBIN mg/dL 0.3 0.4   ALK PHOS U/L 90 85   AST (SGOT) U/L 20 29   ALT (SGPT) U/L 41* 55*     Results from last 7 days   Lab Units 01/01/24  0411 12/31/23  1001 12/30/23  0329 12/29/23  0546 12/28/23  0330 12/27/23  0922   CALCIUM mg/dL 8.9 9.2 9.0 9.0   < > 9.3   ALBUMIN g/dL  --   --   --  3.9  --  3.8   MAGNESIUM mg/dL 1.8 2.2 2.3 1.4*   < >  --     < > = values in this interval not displayed.       Glucose   Date/Time Value Ref Range Status   01/01/2024 1048 166 (H) 70 - 130 mg/dL Final   01/01/2024 0611 139 (H) 70 - 130 mg/dL Final   12/31/2023 1936 178 (H) 70 - 130 mg/dL Final    12/31/2023 1610 173 (H) 70 - 130 mg/dL Final   12/31/2023 1118 198 (H) 70 - 130 mg/dL Final   12/31/2023 0743 103 70 - 130 mg/dL Final   12/31/2023 0337 81 70 - 130 mg/dL Final       No radiology results for the last day    I have personally reviewed all medications:  Scheduled Medications  aspirin, 81 mg, Oral, Daily  busPIRone, 10 mg, Oral, Daily  [START ON 1/3/2024] ceFAZolin, 2,000 mg, Intravenous, Once  cetirizine, 10 mg, Oral, Daily  [START ON 1/3/2024] chlorhexidine, 15 mL, Mouth/Throat, Once  DULoxetine, 60 mg, Oral, BID  insulin glargine, 20 Units, Subcutaneous, BID  insulin lispro, 2-9 Units, Subcutaneous, 4x Daily AC & at Bedtime  insulin lispro, 8 Units, Subcutaneous, TID With Meals  [START ON 1/3/2024] metoprolol tartrate, 12.5 mg, Oral, On Call to OR  [START ON 1/2/2024] mupirocin, 1 application , Each Nare, Q12H  pantoprazole, 40 mg, Oral, Q AM  pravastatin, 20 mg, Oral, Nightly  sertraline, 25 mg, Oral, Daily  sodium chloride, 10 mL, Intravenous, Q12H  sodium chloride, 10 mL, Intravenous, Q12H    Infusions  DOBUTamine, 2 mcg/kg/min, Last Rate: 2 mcg/kg/min (12/31/23 1223)    Diet  Diet: Cardiac Diets, Diabetic Diets; Healthy Heart (2-3 Na+); Consistent Carbohydrate; Texture: Regular Texture (IDDSI 7); Fluid Consistency: Thin (IDDSI 0)  NPO Diet NPO Type: Sips with Meds    I have personally reviewed:  [x]  Laboratory   [x]  Microbiology   [x]  Radiology   [x]  EKG/Telemetry  [x]  Cardiology/Vascular   []  Pathology    []  Records      Assessment/Plan     Active Hospital Problems    Diagnosis  POA    **ACS (acute coronary syndrome) [I24.9]  Yes    Constipation [K59.00]  Yes    NOELLE (generalized anxiety disorder) [F41.1]  Yes    GERD without esophagitis [K21.9]  Yes    Type 2 diabetes mellitus with complications [E11.8]  Yes    Type 2 diabetes mellitus with microalbuminuria, with long-term current use of insulin [E11.29, R80.9, Z79.4]  Not Applicable      Resolved Hospital Problems   No resolved  problems to display.   Type 2 DM  - complications include CAD and microalbuminuria  - BG acceptable  - continue on lantus 20 units BID  - continue on lispro 8 units TID with meals in addition to ssi/hypoglycemia protocol coverage    ACS/Severe Multivessel CAD  - on ASA, pravastatin, and metoprolol  - dobutamine drip started  - surgery planned on Wednesday  - management per CT surgery and cardiology     Constipation  - abdominal examination is benign  - will start on bowel regimen    Anxiety  - seems controlled  - continue on buspar and cymbalta    GERD  - controlled, has history of peptic ulcer disease as well  - continue on ppi    Nam Fernando MD  [facility]  01/01/24  11:09 EST      Electronically signed by Nam Fernando MD at 01/01/24 1130       Alexis Bruce MD at 01/01/24 0925             LOS: 6 days   Patient Care Team:  Heavenly Monsivais MD as PCP - General (Family Medicine)  Hesham Cheney MD as Consulting Physician (Endocrinology)    Chief Complaint:  Dyspnea, CHF, NSTEMI versus late presentation anteroseptal infarct, acute systolic CHF, new cardiomyopathy.     Interval History:     Weights still unchanged despite IV Lasix 40.  She reports her symptoms are the same.  Blood pressure improving on dobutamine    Objective   Vital Signs  Temp:  [97.3 °F (36.3 °C)-98.3 °F (36.8 °C)] 97.9 °F (36.6 °C)  Heart Rate:  [89-94] 92  Resp:  [16] 16  BP: (84-92)/(59-70) 92/62    Intake/Output Summary (Last 24 hours) at 1/1/2024 0925  Last data filed at 1/1/2024 0340  Gross per 24 hour   Intake 240 ml   Output 300 ml   Net -60 ml       Comfortable NAD, resting in bed  PERRL, conjunctivae clear  Neck supple, JVD to just above clavicle while sitting upright  S1/S2 RRR, no m/r/g  Crackles to mid lungs bilaterally, normal effort  Abdomen S/NT/ND (+) BS, no HSM appreciated  Extremities warm, no clubbing, peripheral perfusion appears better, 1+ BLE edema  Moves all 4 extremities without  gross deficits   no visible or palpable skin lesions  A/Ox4, mood and affect appropriate    Results Review:      Results from last 7 days   Lab Units 01/01/24  0411 12/31/23  1001 12/30/23  0329   SODIUM mmol/L 138 138 141   POTASSIUM mmol/L 4.0 4.2 3.9   CHLORIDE mmol/L 101 101 104   CO2 mmol/L 25.3 25.5 25.8   BUN mg/dL 19 22* 20   CREATININE mg/dL 0.83 0.75 0.83   GLUCOSE mg/dL 140* 97 130*   CALCIUM mg/dL 8.9 9.2 9.0     Results from last 7 days   Lab Units 12/27/23  0922 12/27/23  0052   HSTROP T ng/L 229* 208*     Results from last 7 days   Lab Units 12/30/23  0328 12/29/23  0546 12/28/23  0330   WBC 10*3/mm3 10.73 11.71* 11.86*   HEMOGLOBIN g/dL 13.4 13.5 13.4   HEMATOCRIT % 39.4 39.5 39.1   PLATELETS 10*3/mm3 269 268 289     Results from last 7 days   Lab Units 12/29/23  0546 12/28/23  1134 12/28/23  0330 12/27/23  0922 12/27/23  0052   INR  1.08  --   --   --  1.17*   APTT seconds 25.6 49.1* 61.4*   < > 28.2    < > = values in this interval not displayed.     Results from last 7 days   Lab Units 12/27/23  0922   CHOLESTEROL mg/dL 199     Results from last 7 days   Lab Units 01/01/24  0411   MAGNESIUM mg/dL 1.8     Results from last 7 days   Lab Units 12/27/23  0922   CHOLESTEROL mg/dL 199   TRIGLYCERIDES mg/dL 207*   HDL CHOL mg/dL 31*   LDL CHOL mg/dL 131*       I reviewed the patient's new clinical results.  I personally viewed and interpreted the patient's EKG/Telemetry data        Medication Review:   aspirin, 81 mg, Oral, Daily  busPIRone, 10 mg, Oral, Daily  [START ON 1/3/2024] ceFAZolin, 2,000 mg, Intravenous, Once  cetirizine, 10 mg, Oral, Daily  [START ON 1/3/2024] chlorhexidine, 15 mL, Mouth/Throat, Once  DULoxetine, 60 mg, Oral, BID  insulin glargine, 20 Units, Subcutaneous, BID  insulin lispro, 2-9 Units, Subcutaneous, 4x Daily AC & at Bedtime  insulin lispro, 8 Units, Subcutaneous, TID With Meals  [START ON 1/3/2024] metoprolol tartrate, 12.5 mg, Oral, On Call to OR  [START ON 1/2/2024]  mupirocin, 1 application , Each Nare, Q12H  pantoprazole, 40 mg, Oral, Q AM  pravastatin, 20 mg, Oral, Nightly  sertraline, 25 mg, Oral, Daily  sodium chloride, 10 mL, Intravenous, Q12H  sodium chloride, 10 mL, Intravenous, Q12H        DOBUTamine, 2 mcg/kg/min, Last Rate: 2 mcg/kg/min (12/31/23 1223)        Assessment & Plan       ACS (acute coronary syndrome)    Type 2 diabetes mellitus with microalbuminuria, with long-term current use of insulin    Type 2 diabetes mellitus with complications    1.  Type I NSTEMI with severe multivessel coronary artery disease  2.  Acute systolic CHF secondary to ischemic cardiomyopathy and coronary artery disease  3.  Ischemic cardiomyopathy, new (EF 20% or less)  4.  Insulin-dependent diabetes since 2014, with neuropathy  5.  Mild to moderate mitral regurgitation  6.  Mild to moderate tricuspid regurgitation  7.  Tobacco use, quit on 12/18/2023  8.  Nonspecific right lower lobe groundglass opacity and left apical opacity on CT (follow-up CT of the chest recommended in 3 months)   9.  Right adrenal adenoma by CT of the abdomen  10.  Dyslipidemia   11.  Statin intolerance (myalgias)  12.  Gastroesophageal reflux disease  13.  Moderate right pleural effusion on admission  14.  Relative hypotension  15.  Sinus tachycardia    Planning for CABG +/- mitral tricuspid repair on Wednesday    Symptoms are the same, but her blood pressure is improved on dobutamine.  Avoiding milrinone due to her hypotension at baseline.  Hesitant to increase her dobutamine at this time given her known, multivessel ischemic disease.    Will give IV Lasix 80 today.  Her blood pressure is better now that she is on dobutamine.  Hopefully we can get her weight is down some.  EDP was markedly elevated on cath.  Discussed with Dr. Petty.  Thankfully she is not clinically in shock so no need for IABP at this time.  Obviously trying to avoid that if we can.    Will stop metoprolol while on dobutamine. BP limits all  other GDMT     Intolerant to multiple statins in the past.  Probably can need PCSK9 as outpatient    Continue aspirin 81    Alexis Bruce MD  01/01/24  09:25 EST      Part of this note may be an electronic transcription/translation of spoken language to printed text using the Dragon Dictation System.     Electronically signed by Alexis Bruce MD at 01/01/24 1546       Jr Etienne Asif MD at 01/01/24 0839          Apparently on dopamine now.  This will increase cardiac output but really has no renal perfusion secondary to passed through the lungs.  Surgery on Wednesday.  Primacor may be better if that is what is needed.    Electronically signed by Jr Etienne Asif MD at 01/01/24 0841       J Carlos Briggs MD at 12/31/23 5145              [facility]     LOS: 5 days     Subjective:    CC:No chief complaint on file.    DIET:  Diet Order   Procedures    Diet: Cardiac Diets, Diabetic Diets; Healthy Heart (2-3 Na+); Consistent Carbohydrate; Texture: Regular Texture (IDDSI 7); Fluid Consistency: Thin (IDDSI 0)    NPO Diet NPO Type: Sips with Meds       Objective:    Vital Signs:  Temp:  [97.7 °F (36.5 °C)-98.3 °F (36.8 °C)] 98.3 °F (36.8 °C)  Heart Rate:  [86-96] 90  Resp:  [16] 16  BP: (77-89)/(57-67) 89/59    SpO2:  [93 %-100 %] 100 %  on   ;   Device (Oxygen Therapy): room air  Body mass index is 27.05 kg/m².    Physical Exam  Constitutional:       Appearance: Normal appearance.   HENT:      Head: Normocephalic and atraumatic.   Cardiovascular:      Rate and Rhythm: Normal rate and regular rhythm.      Heart sounds: No murmur heard.     No friction rub.   Pulmonary:      Effort: Pulmonary effort is normal.      Breath sounds: Normal breath sounds.   Abdominal:      General: Bowel sounds are normal. There is no distension.      Palpations: Abdomen is soft.      Tenderness: There is no abdominal tenderness.   Skin:     General: Skin is warm and dry.   Neurological:      General: No  focal deficit present.      Mental Status: She is alert.   Psychiatric:         Mood and Affect: Mood normal.         Results Review:    Glucose   Date Value Ref Range Status   12/31/2023 97 65 - 99 mg/dL Final   12/30/2023 130 (H) 65 - 99 mg/dL Final   12/29/2023 267 (H) 65 - 99 mg/dL Final     Results from last 7 days   Lab Units 12/30/23  0328   WBC 10*3/mm3 10.73   HEMOGLOBIN g/dL 13.4   HEMATOCRIT % 39.4   PLATELETS 10*3/mm3 269     Results from last 7 days   Lab Units 12/31/23  1001 12/29/23  1631 12/29/23  0546   SODIUM mmol/L 138   < > 140   POTASSIUM mmol/L 4.2   < > 3.3*   CHLORIDE mmol/L 101   < > 103   CO2 mmol/L 25.5   < > 26.0   BUN mg/dL 22*   < > 17   CREATININE mg/dL 0.75   < > 0.81   CALCIUM mg/dL 9.2   < > 9.0   BILIRUBIN mg/dL  --   --  0.3   ALK PHOS U/L  --   --  90   ALT (SGPT) U/L  --   --  41*   AST (SGOT) U/L  --   --  20   GLUCOSE mg/dL 97   < > 267*    < > = values in this interval not displayed.     Results from last 7 days   Lab Units 12/29/23  0546   INR  1.08   APTT seconds 25.6     Results from last 7 days   Lab Units 12/31/23  1001   MAGNESIUM mg/dL 2.2     Results from last 7 days   Lab Units 12/27/23  0922 12/27/23  0052   HSTROP T ng/L 229* 208*     Cultures:  Urine Culture   Date Value Ref Range Status   12/29/2023 >100,000 CFU/mL Mixed Seema Isolated  Final       I have reviewed daily medications and changes in CPOE    Scheduled meds  aspirin, 81 mg, Oral, Daily  busPIRone, 10 mg, Oral, Daily  [START ON 1/3/2024] ceFAZolin, 2,000 mg, Intravenous, Once  cetirizine, 10 mg, Oral, Daily  [START ON 1/3/2024] chlorhexidine, 15 mL, Mouth/Throat, Once  DULoxetine, 60 mg, Oral, BID  furosemide, 40 mg, Intravenous, Once  insulin glargine, 20 Units, Subcutaneous, BID  insulin lispro, 2-9 Units, Subcutaneous, 4x Daily AC & at Bedtime  insulin lispro, 8 Units, Subcutaneous, TID With Meals  [START ON 1/3/2024] metoprolol tartrate, 12.5 mg, Oral, On Call to OR  [START ON 1/2/2024] mupirocin,  1 application , Each Nare, Q12H  pantoprazole, 40 mg, Oral, Q AM  pravastatin, 20 mg, Oral, Nightly  sertraline, 25 mg, Oral, Daily  sodium chloride, 10 mL, Intravenous, Q12H  sodium chloride, 10 mL, Intravenous, Q12H        DOBUTamine, 2 mcg/kg/min, Last Rate: 2 mcg/kg/min (12/31/23 1223)      PRN meds    acetaminophen    ALPRAZolam    [START ON 1/2/2024] Chlorhexidine Gluconate Cloth    dextrose    dextrose    glucagon (human recombinant)    Magnesium Standard Dose Replacement - Follow Nurse / BPA Driven Protocol    melatonin    nitroglycerin    ondansetron ODT **OR** ondansetron    Potassium Replacement - Follow Nurse / BPA Driven Protocol    sodium chloride    sodium chloride    traZODone        ACS (acute coronary syndrome)    Type 2 diabetes mellitus with microalbuminuria, with long-term current use of insulin    Type 2 diabetes mellitus with complications        Assessment/Plan:    53 y.o. female who is s/p Left Heart Cath, Coronary angiography.     Blood sugar reasonable    Home meds tresiba and SSI    Lantus, novolog 8, ssi    Started on dobutamine gtt    J Carlos Briggs MD  12/31/23  14:47 EST       Electronically signed by J Carlos Briggs MD at 12/31/23 1450       Alexis Bruce MD at 12/31/23 1021             LOS: 5 days   Patient Care Team:  Heavenly Monsivais MD as PCP - General (Family Medicine)  Hesham Cheney MD as Consulting Physician (Endocrinology)    Chief Complaint:  Dyspnea, CHF, NSTEMI versus late presentation anteroseptal infarct, acute systolic CHF, new cardiomyopathy.     Interval History:     Weights are not downtrending.  Creatinine today shows stable, but waste products slightly up with lasix dose     Objective   Vital Signs  Temp:  [97.7 °F (36.5 °C)-97.9 °F (36.6 °C)] 97.8 °F (36.6 °C)  Heart Rate:  [86-96] 96  Resp:  [16-18] 16  BP: (77-89)/(57-66) 89/66    Intake/Output Summary (Last 24 hours) at 12/31/2023 1021  Last data filed at 12/31/2023 0700  Gross per 24 hour    Intake 600 ml   Output 1000 ml   Net -400 ml       Comfortable NAD, resting in bed  PERRL, conjunctivae clear  Neck supple, JVD to just above clavicle while sitting upright  S1/S2 RRR, no m/r/g  Crackles to mid lungs bilaterally, normal effort  Abdomen S/NT/ND (+) BS, no HSM appreciated  Extremities warm, no clubbing, mild toe cyanosis, fingertips slightly dusky, 1+ BLE edema  Moves all 4 extremities without gross deficits   no visible or palpable skin lesions  A/Ox4, mood and affect appropriate    Results Review:      Results from last 7 days   Lab Units 12/30/23  0329 12/29/23  1631 12/29/23  0546 12/28/23  0330   SODIUM mmol/L 141  --  140 137   POTASSIUM mmol/L 3.9 4.1 3.3* 3.9   CHLORIDE mmol/L 104  --  103 102   CO2 mmol/L 25.8  --  26.0 24.9   BUN mg/dL 20  --  17 18   CREATININE mg/dL 0.83  --  0.81 0.69   GLUCOSE mg/dL 130*  --  267* 207*   CALCIUM mg/dL 9.0  --  9.0 9.1     Results from last 7 days   Lab Units 12/27/23  0922 12/27/23  0052   HSTROP T ng/L 229* 208*     Results from last 7 days   Lab Units 12/30/23  0328 12/29/23  0546 12/28/23  0330   WBC 10*3/mm3 10.73 11.71* 11.86*   HEMOGLOBIN g/dL 13.4 13.5 13.4   HEMATOCRIT % 39.4 39.5 39.1   PLATELETS 10*3/mm3 269 268 289     Results from last 7 days   Lab Units 12/29/23  0546 12/28/23  1134 12/28/23  0330 12/27/23  0922 12/27/23  0052   INR  1.08  --   --   --  1.17*   APTT seconds 25.6 49.1* 61.4*   < > 28.2    < > = values in this interval not displayed.     Results from last 7 days   Lab Units 12/27/23  0922   CHOLESTEROL mg/dL 199     Results from last 7 days   Lab Units 12/30/23  0329   MAGNESIUM mg/dL 2.3     Results from last 7 days   Lab Units 12/27/23  0922   CHOLESTEROL mg/dL 199   TRIGLYCERIDES mg/dL 207*   HDL CHOL mg/dL 31*   LDL CHOL mg/dL 131*       I reviewed the patient's new clinical results.  I personally viewed and interpreted the patient's EKG/Telemetry data        Medication Review:   aspirin, 81 mg, Oral, Daily  busPIRone,  10 mg, Oral, Daily  [START ON 1/3/2024] ceFAZolin, 2,000 mg, Intravenous, Once  cetirizine, 10 mg, Oral, Daily  [START ON 1/3/2024] chlorhexidine, 15 mL, Mouth/Throat, Once  DULoxetine, 60 mg, Oral, BID  insulin glargine, 20 Units, Subcutaneous, BID  insulin lispro, 2-9 Units, Subcutaneous, 4x Daily AC & at Bedtime  insulin lispro, 8 Units, Subcutaneous, TID With Meals  metoprolol succinate XL, 12.5 mg, Oral, Q24H  [START ON 1/3/2024] metoprolol tartrate, 12.5 mg, Oral, On Call to OR  [START ON 1/2/2024] mupirocin, 1 application , Each Nare, Q12H  pantoprazole, 40 mg, Oral, Q AM  pravastatin, 20 mg, Oral, Nightly  sertraline, 25 mg, Oral, Daily  sodium chloride, 10 mL, Intravenous, Q12H  sodium chloride, 10 mL, Intravenous, Q12H             Assessment & Plan       ACS (acute coronary syndrome)    Type 2 diabetes mellitus with microalbuminuria, with long-term current use of insulin    Type 2 diabetes mellitus with complications    1.  Type I NSTEMI with severe multivessel coronary artery disease  2.  Acute systolic CHF secondary to ischemic cardiomyopathy and coronary artery disease  3.  Ischemic cardiomyopathy, new (EF 20% or less)  4.  Insulin-dependent diabetes since 2014, with neuropathy  5.  Mild to moderate mitral regurgitation  6.  Mild to moderate tricuspid regurgitation  7.  Tobacco use, quit on 12/18/2023  8.  Nonspecific right lower lobe groundglass opacity and left apical opacity on CT (follow-up CT of the chest recommended in 3 months)   9.  Right adrenal adenoma by CT of the abdomen  10.  Dyslipidemia   11.  Statin intolerance (myalgias)  12.  Gastroesophageal reflux disease  13.  Moderate right pleural effusion on admission  14.  Relative hypotension  15.  Sinus tachycardia    Planning for CABG plus or minus mitral tricuspid repair next Wednesday with Dr. Asif.    She received diuretics yesterday with Lasix, but it appears that she has not made much urine with this.  Her blood pressure remains  borderline. Pulm edema still noted. Renal function with a slighy uptick in waste products. I will start her on low dose dobutamine 2mcg/kg/min to help with renal perfusion. Give additional lasix this PM. Her Bps remain borderline.     Will stop metoprolol while on dobutamine. BP limits all other GDMT     Intolerant to multiple statins in the past.  Probably can need PCSK9 as outpatient    Continue aspirin 81    Alexis Bruce MD  12/31/23  10:21 EST      Part of this note may be an electronic transcription/translation of spoken language to printed text using the Dragon Dictation System.     Electronically signed by Alexis Bruce MD at 12/31/23 1209

## 2024-01-03 NOTE — PLAN OF CARE
Problem: Adult Inpatient Plan of Care  Goal: Plan of Care Review  Outcome: Ongoing, Progressing  Flowsheets (Taken 1/3/2024 0512)  Progress: improving  Plan of Care Reviewed With: patient  Outcome Evaluation: vss. no c/o pain. npo for cath today. resting well throughout shift.  Goal: Patient-Specific Goal (Individualized)  Outcome: Ongoing, Progressing  Goal: Absence of Hospital-Acquired Illness or Injury  Outcome: Ongoing, Progressing  Intervention: Identify and Manage Fall Risk  Recent Flowsheet Documentation  Taken 1/3/2024 0411 by Roopa Villarreal RN  Safety Promotion/Fall Prevention: safety round/check completed  Taken 1/3/2024 0223 by Roopa Villarreal RN  Safety Promotion/Fall Prevention: safety round/check completed  Taken 1/3/2024 0017 by Roopa Villarreal RN  Safety Promotion/Fall Prevention: safety round/check completed  Taken 1/2/2024 2201 by Roopa Villarreal RN  Safety Promotion/Fall Prevention: safety round/check completed  Taken 1/2/2024 2110 by Roopa Villarreal RN  Safety Promotion/Fall Prevention: safety round/check completed  Intervention: Prevent Skin Injury  Recent Flowsheet Documentation  Taken 1/3/2024 0223 by Roopa Villarreal RN  Body Position:   left   side-lying  Taken 1/3/2024 0017 by Roopa Villarreal RN  Body Position:   right   side-lying  Goal: Optimal Comfort and Wellbeing  Outcome: Ongoing, Progressing  Intervention: Provide Person-Centered Care  Recent Flowsheet Documentation  Taken 1/2/2024 2110 by Roopa Villarreal RN  Trust Relationship/Rapport:   care explained   choices provided  Goal: Readiness for Transition of Care  Outcome: Ongoing, Progressing     Problem: Chest Pain  Goal: Resolution of Chest Pain Symptoms  Outcome: Ongoing, Progressing     Problem: Adjustment to Illness (Heart Failure)  Goal: Optimal Coping  Outcome: Ongoing, Progressing     Problem: Cardiac Output Decreased (Heart Failure)  Goal: Optimal Cardiac Output  Outcome: Ongoing, Progressing      Problem: Dysrhythmia (Heart Failure)  Goal: Stable Heart Rate and Rhythm  Outcome: Ongoing, Progressing     Problem: Fluid Imbalance (Heart Failure)  Goal: Fluid Balance  Outcome: Ongoing, Progressing     Problem: Functional Ability Impaired (Heart Failure)  Goal: Optimal Functional Ability  Outcome: Ongoing, Progressing     Problem: Oral Intake Inadequate (Heart Failure)  Goal: Optimal Nutrition Intake  Outcome: Ongoing, Progressing     Problem: Respiratory Compromise (Heart Failure)  Goal: Effective Oxygenation and Ventilation  Outcome: Ongoing, Progressing  Intervention: Promote Airway Secretion Clearance  Recent Flowsheet Documentation  Taken 1/2/2024 2110 by Roopa Villarreal RN  Cough And Deep Breathing: done independently per patient     Problem: Sleep Disordered Breathing (Heart Failure)  Goal: Effective Breathing Pattern During Sleep  Outcome: Ongoing, Progressing     Problem: Diabetes Comorbidity  Goal: Blood Glucose Level Within Targeted Range  Outcome: Ongoing, Progressing     Problem: Hypertension Comorbidity  Goal: Blood Pressure in Desired Range  Outcome: Ongoing, Progressing  Intervention: Maintain Blood Pressure Management  Recent Flowsheet Documentation  Taken 1/3/2024 0017 by Roopa Villarreal RN  Medication Review/Management: medications reviewed  Taken 1/2/2024 2201 by Roopa Villarreal RN  Medication Review/Management: medications reviewed  Taken 1/2/2024 2110 by Roopa Villarreal RN  Medication Review/Management: medications reviewed     Problem: Fall Injury Risk  Goal: Absence of Fall and Fall-Related Injury  Outcome: Ongoing, Progressing  Intervention: Identify and Manage Contributors  Recent Flowsheet Documentation  Taken 1/3/2024 0017 by Roopa Villarreal RN  Medication Review/Management: medications reviewed  Taken 1/2/2024 2201 by Roopa Villarreal RN  Medication Review/Management: medications reviewed  Taken 1/2/2024 2110 by Roopa Villarreal RN  Medication Review/Management:  medications reviewed  Intervention: Promote Injury-Free Environment  Recent Flowsheet Documentation  Taken 1/3/2024 0411 by Roopa Villarreal, RN  Safety Promotion/Fall Prevention: safety round/check completed  Taken 1/3/2024 0223 by Roopa Villarreal, RN  Safety Promotion/Fall Prevention: safety round/check completed  Taken 1/3/2024 0017 by Roopa Villarreal, RN  Safety Promotion/Fall Prevention: safety round/check completed  Taken 1/2/2024 2201 by Roopa Villarreal, RN  Safety Promotion/Fall Prevention: safety round/check completed  Taken 1/2/2024 2110 by Roopa Villarreal, RN  Safety Promotion/Fall Prevention: safety round/check completed   Goal Outcome Evaluation:  Plan of Care Reviewed With: patient        Progress: improving  Outcome Evaluation: vss. no c/o pain. npo for cath today. resting well throughout shift.

## 2024-01-03 NOTE — PROGRESS NOTES
LOS: 8 days   Patient Care Team:  Heavnely Monsivais MD as PCP - General (Family Medicine)  Hesham Cheney MD as Consulting Physician (Endocrinology)    Chief Complaint: Follow-up NSTEMI versus late presentation anteroseptal infarct, acute systolic CHF, new cardiomyopathy.    Interval History: No chest pain or shortness of breath.  She is stable off the dobutamine.  She is anxious about her procedure today.    Vital Signs:  Temp:  [97.8 °F (36.6 °C)-98.3 °F (36.8 °C)] 97.8 °F (36.6 °C)  Heart Rate:  [100-109] 104  Resp:  [16] 16  BP: ()/(65-71) 96/67    Intake/Output Summary (Last 24 hours) at 1/3/2024 1035  Last data filed at 1/3/2024 0824  Gross per 24 hour   Intake 480 ml   Output --   Net 480 ml       Physical Exam:   General Appearance:    No acute distress, alert and oriented x4   Lungs:     Faint rales at the right base.    Heart:    Regular rhythm and mildly tachycardic rate.  No murmurs, gallops, or   rubs.   Abdomen:     Soft, nontender, nondistended.    Extremities:   No clubbing, cyanosis, or edema.      Results Review:    Results from last 7 days   Lab Units 01/03/24  0331   SODIUM mmol/L 140   POTASSIUM mmol/L 4.4   CHLORIDE mmol/L 106   CO2 mmol/L 23.6   BUN mg/dL 17   CREATININE mg/dL 0.70   GLUCOSE mg/dL 116*   CALCIUM mg/dL 9.3           Results from last 7 days   Lab Units 01/03/24  0331   WBC 10*3/mm3 10.34   HEMOGLOBIN g/dL 12.7   HEMATOCRIT % 39.6   PLATELETS 10*3/mm3 275     Results from last 7 days   Lab Units 12/29/23  0546 12/28/23  1134 12/28/23  0330   INR  1.08  --   --    APTT seconds 25.6 49.1* 61.4*           Results from last 7 days   Lab Units 01/03/24  0331   MAGNESIUM mg/dL 2.0             I reviewed the patient's new clinical results.        Assessment:  1.  Type I NSTEMI with severe multivessel coronary artery disease  2.  Acute systolic CHF secondary to ischemic cardiomyopathy and coronary artery disease  3.  Ischemic cardiomyopathy, new (EF 20% or less)  4.   Insulin-dependent diabetes since 2014, with neuropathy  5.  Mild to moderate mitral regurgitation  6.  Mild to moderate tricuspid regurgitation  7.  Tobacco use, quit on 12/18/2023  8.  Nonspecific right lower lobe groundglass opacity and left apical opacity on CT (follow-up CT of the chest recommended in 3 months)   9.  Right adrenal adenoma by CT of the abdomen  10.  Dyslipidemia   11.  Statin intolerance (myalgias)  12.  Gastroesophageal reflux disease  13.  Moderate right pleural effusion on admission  14.  Relative hypotension  15.  Sinus tachycardia    Plan:  -Heart catheterization showed severe multivessel coronary artery disease.  This included an 80% ostial and 90% mid LAD, 80% ostial OM1, and 95% proximal to mid RCA with left-to-right collateral filling.    -High risk of mortality and morbidity with CABG per Dr. Asif.  This is mainly because of her severe ischemic cardiomyopathy.    -Plan for high risk PCI with Impella support today for the LAD and OM lesion (Dr. Greene).  Potential staged PCI to the RCA afterwards depending on the results.  She is aware that this is a high risk procedure.    -She is stable off of dobutamine since yesterday morning.    -No diuretics today.  Toprol-XL was discontinued because of hypotension.  Hypotension greatly limits her heart failure treatment.    -She cannot be on an ACE inhibitor, ARB, Entresto, spironolactone, or SGLT2 inhibitor for now given lower blood pressure.    -Continue aspirin.  She has had severe myalgias with multiple statins in the past.  I started her on pravastatin 20 mg/day.  She has tolerated this so far.  Plavix or other antiplatelet to be added if stenting successful.    -Appreciate [facility] following for glucose management.    Billy Esquivel MD  01/03/24  10:35 EST

## 2024-01-03 NOTE — PLAN OF CARE
Goal Outcome Evaluation:   Pt alert and oriented and follows commands, S/P PCI with empella today. Rt femoral site perclose gauze and tagaderm with old blood stain. Site has no bleeding or hematoma noted. Vitals stable, HOB to 30degress since 1730 and can be OOB at 1930. Pt c/o headache and backache from staying flat on bed, po tylenol given plan of care in progress

## 2024-01-04 LAB
ACT BLD: 228 SECONDS (ref 82–152)
ACT BLD: 271 SECONDS (ref 82–152)
ACT BLD: 276 SECONDS (ref 82–152)
ACT BLD: 287 SECONDS (ref 82–152)
ACT BLD: 358 SECONDS (ref 82–152)
ANION GAP SERPL CALCULATED.3IONS-SCNC: 9 MMOL/L (ref 5–15)
BUN SERPL-MCNC: 13 MG/DL (ref 6–20)
BUN/CREAT SERPL: 13.5 (ref 7–25)
CALCIUM SPEC-SCNC: 8.6 MG/DL (ref 8.6–10.5)
CHLORIDE SERPL-SCNC: 105 MMOL/L (ref 98–107)
CHOLEST SERPL-MCNC: 136 MG/DL (ref 0–200)
CO2 SERPL-SCNC: 23 MMOL/L (ref 22–29)
CREAT SERPL-MCNC: 0.96 MG/DL (ref 0.57–1)
DEPRECATED RDW RBC AUTO: 39.5 FL (ref 37–54)
EGFRCR SERPLBLD CKD-EPI 2021: 70 ML/MIN/1.73
ERYTHROCYTE [DISTWIDTH] IN BLOOD BY AUTOMATED COUNT: 11.4 % (ref 12.3–15.4)
GEN 5 2HR TROPONIN T REFLEX: 263 NG/L
GLUCOSE BLDC GLUCOMTR-MCNC: 110 MG/DL (ref 70–130)
GLUCOSE BLDC GLUCOMTR-MCNC: 125 MG/DL (ref 70–130)
GLUCOSE BLDC GLUCOMTR-MCNC: 129 MG/DL (ref 70–130)
GLUCOSE BLDC GLUCOMTR-MCNC: 91 MG/DL (ref 70–130)
GLUCOSE SERPL-MCNC: 85 MG/DL (ref 65–99)
HBA1C MFR BLD: 7.7 % (ref 4.8–5.6)
HCT VFR BLD AUTO: 35.7 % (ref 34–46.6)
HCT VFR BLDA CALC: 38 % (ref 38–51)
HDLC SERPL-MCNC: 36 MG/DL (ref 40–60)
HGB BLD-MCNC: 12.1 G/DL (ref 12–15.9)
HGB BLDA-MCNC: 12.9 G/DL (ref 12–17)
LDLC SERPL CALC-MCNC: 80 MG/DL (ref 0–100)
LDLC/HDLC SERPL: 2.18 {RATIO}
MAGNESIUM SERPL-MCNC: 1.7 MG/DL (ref 1.6–2.6)
MCH RBC QN AUTO: 32.4 PG (ref 26.6–33)
MCHC RBC AUTO-ENTMCNC: 33.9 G/DL (ref 31.5–35.7)
MCV RBC AUTO: 95.5 FL (ref 79–97)
PLATELET # BLD AUTO: 285 10*3/MM3 (ref 140–450)
PMV BLD AUTO: 9.5 FL (ref 6–12)
POTASSIUM SERPL-SCNC: 4.2 MMOL/L (ref 3.5–5.2)
QT INTERVAL: 356 MS
QTC INTERVAL: 471 MS
RBC # BLD AUTO: 3.74 10*6/MM3 (ref 3.77–5.28)
SAO2 % BLDA: 95 % (ref 95–98)
SODIUM SERPL-SCNC: 137 MMOL/L (ref 136–145)
TRIGL SERPL-MCNC: 107 MG/DL (ref 0–150)
TROPONIN T DELTA: 36 NG/L
TROPONIN T SERPL HS-MCNC: 227 NG/L
VLDLC SERPL-MCNC: 20 MG/DL (ref 5–40)
WBC NRBC COR # BLD AUTO: 13.81 10*3/MM3 (ref 3.4–10.8)

## 2024-01-04 PROCEDURE — 25010000002 ONDANSETRON PER 1 MG: Performed by: INTERNAL MEDICINE

## 2024-01-04 PROCEDURE — 80061 LIPID PANEL: CPT | Performed by: INTERNAL MEDICINE

## 2024-01-04 PROCEDURE — 25010000002 PROCHLORPERAZINE 10 MG/2ML SOLUTION: Performed by: INTERNAL MEDICINE

## 2024-01-04 PROCEDURE — 83036 HEMOGLOBIN GLYCOSYLATED A1C: CPT | Performed by: INTERNAL MEDICINE

## 2024-01-04 PROCEDURE — 93010 ELECTROCARDIOGRAM REPORT: CPT | Performed by: INTERNAL MEDICINE

## 2024-01-04 PROCEDURE — 84484 ASSAY OF TROPONIN QUANT: CPT | Performed by: INTERNAL MEDICINE

## 2024-01-04 PROCEDURE — 93005 ELECTROCARDIOGRAM TRACING: CPT | Performed by: INTERNAL MEDICINE

## 2024-01-04 PROCEDURE — 85027 COMPLETE CBC AUTOMATED: CPT | Performed by: INTERNAL MEDICINE

## 2024-01-04 PROCEDURE — 83735 ASSAY OF MAGNESIUM: CPT | Performed by: INTERNAL MEDICINE

## 2024-01-04 PROCEDURE — 82948 REAGENT STRIP/BLOOD GLUCOSE: CPT

## 2024-01-04 PROCEDURE — 80048 BASIC METABOLIC PNL TOTAL CA: CPT | Performed by: INTERNAL MEDICINE

## 2024-01-04 PROCEDURE — 99232 SBSQ HOSP IP/OBS MODERATE 35: CPT | Performed by: INTERNAL MEDICINE

## 2024-01-04 RX ORDER — PROCHLORPERAZINE EDISYLATE 5 MG/ML
5 INJECTION INTRAMUSCULAR; INTRAVENOUS EVERY 6 HOURS PRN
Status: DISCONTINUED | OUTPATIENT
Start: 2024-01-04 | End: 2024-01-09 | Stop reason: HOSPADM

## 2024-01-04 RX ORDER — PROCHLORPERAZINE 25 MG
25 SUPPOSITORY, RECTAL RECTAL EVERY 12 HOURS PRN
Status: DISCONTINUED | OUTPATIENT
Start: 2024-01-04 | End: 2024-01-09 | Stop reason: HOSPADM

## 2024-01-04 RX ORDER — PROCHLORPERAZINE MALEATE 5 MG/1
5 TABLET ORAL EVERY 6 HOURS PRN
Status: DISCONTINUED | OUTPATIENT
Start: 2024-01-04 | End: 2024-01-09 | Stop reason: HOSPADM

## 2024-01-04 RX ADMIN — DULOXETINE HYDROCHLORIDE 60 MG: 60 CAPSULE, DELAYED RELEASE ORAL at 21:30

## 2024-01-04 RX ADMIN — DULOXETINE HYDROCHLORIDE 60 MG: 60 CAPSULE, DELAYED RELEASE ORAL at 08:25

## 2024-01-04 RX ADMIN — ONDANSETRON HYDROCHLORIDE 4 MG: 2 INJECTION, SOLUTION INTRAMUSCULAR; INTRAVENOUS at 16:07

## 2024-01-04 RX ADMIN — BUSPIRONE HYDROCHLORIDE 10 MG: 10 TABLET ORAL at 08:25

## 2024-01-04 RX ADMIN — PRAVASTATIN SODIUM 20 MG: 20 TABLET ORAL at 21:30

## 2024-01-04 RX ADMIN — CETIRIZINE HYDROCHLORIDE 10 MG: 10 TABLET ORAL at 08:25

## 2024-01-04 RX ADMIN — SERTRALINE 25 MG: 25 TABLET, FILM COATED ORAL at 08:25

## 2024-01-04 RX ADMIN — CLOPIDOGREL BISULFATE 75 MG: 75 TABLET, FILM COATED ORAL at 08:25

## 2024-01-04 RX ADMIN — ONDANSETRON HYDROCHLORIDE 4 MG: 2 INJECTION, SOLUTION INTRAMUSCULAR; INTRAVENOUS at 07:45

## 2024-01-04 RX ADMIN — Medication 10 ML: at 21:30

## 2024-01-04 RX ADMIN — PANTOPRAZOLE SODIUM 40 MG: 40 TABLET, DELAYED RELEASE ORAL at 07:45

## 2024-01-04 RX ADMIN — ASPIRIN 81 MG: 81 TABLET, COATED ORAL at 08:25

## 2024-01-04 RX ADMIN — Medication 10 ML: at 08:30

## 2024-01-04 RX ADMIN — PROCHLORPERAZINE EDISYLATE 5 MG: 5 INJECTION INTRAMUSCULAR; INTRAVENOUS at 20:21

## 2024-01-04 NOTE — PROGRESS NOTES
Name: Jade Clement ADMIT: 2023   : 1970  PCP: Heavenly Monsivais MD    MRN: [id number] LOS: 9 days   AGE/SEX: 53 y.o. female  ROOM: [facility]     Subjective   Subjective   Referring Provider: Billy Esquivel MD   Reason for Follow-up: Type 2 DM  No acute events. Patient went to cath lab yesterday and tolerated well. She has some nausea today which zofran has helped. No CP/dyspnea/f/c/v.  No family at bedside.    Objective   Objective   Vital Signs  Temp:  [97.9 °F (36.6 °C)-99.1 °F (37.3 °C)] 99.1 °F (37.3 °C)  Heart Rate:  [104-112] 108  Resp:  [16-17] 16  BP: ()/(66-76) 106/69  SpO2:  [93 %-99 %] 98 %  on  Flow (L/min):  [3] 3;   Device (Oxygen Therapy): room air  Body mass index is 27.28 kg/m².  Physical Exam  Vitals and nursing note reviewed.   Constitutional:       General: She is not in acute distress.     Appearance: She is not toxic-appearing or diaphoretic.   HENT:      Head: Normocephalic and atraumatic.      Nose: Nose normal.      Mouth/Throat:      Mouth: Mucous membranes are moist.      Pharynx: Oropharynx is clear.   Eyes:      Conjunctiva/sclera: Conjunctivae normal.      Pupils: Pupils are equal, round, and reactive to light.   Cardiovascular:      Rate and Rhythm: Normal rate and regular rhythm.      Pulses: Normal pulses.   Pulmonary:      Effort: Pulmonary effort is normal.      Breath sounds: Normal breath sounds.   Abdominal:      General: Bowel sounds are normal.      Palpations: Abdomen is soft.      Tenderness: There is no abdominal tenderness.   Musculoskeletal:         General: No swelling or tenderness.      Cervical back: Neck supple.   Skin:     General: Skin is warm and dry.      Capillary Refill: Capillary refill takes less than 2 seconds.   Neurological:      General: No focal deficit present.      Mental Status: She is alert and oriented to person, place, and time.   Psychiatric:         Mood and Affect: Mood normal.         Behavior: Behavior normal.        Results Review     I reviewed the patient's new clinical results.  Results from last 7 days   Lab Units 01/04/24  0508 01/03/24  0331 12/30/23 0328 12/29/23  0546   WBC 10*3/mm3 13.81* 10.34 10.73 11.71*   HEMOGLOBIN g/dL 12.1 12.7 13.4 13.5   PLATELETS 10*3/mm3 285 275 269 268     Results from last 7 days   Lab Units 01/04/24  0508 01/03/24 0331 01/02/24 0318 01/01/24  0411   SODIUM mmol/L 137 140 139 138   POTASSIUM mmol/L 4.2 4.4 4.4 4.0   CHLORIDE mmol/L 105 106 101 101   CO2 mmol/L 23.0 23.6 28.0 25.3   BUN mg/dL 13 17 22* 19   CREATININE mg/dL 0.96 0.70 0.87 0.83   GLUCOSE mg/dL 85 116* 121* 140*   EGFR mL/min/1.73 70.0 102.3 78.8 83.4     Results from last 7 days   Lab Units 12/29/23  0546   ALBUMIN g/dL 3.9   BILIRUBIN mg/dL 0.3   ALK PHOS U/L 90   AST (SGOT) U/L 20   ALT (SGPT) U/L 41*     Results from last 7 days   Lab Units 01/04/24  0508 01/03/24 0331 01/02/24 0318 01/01/24 0411 12/30/23 0329 12/29/23  0546   CALCIUM mg/dL 8.6 9.3 9.2 8.9   < > 9.0   ALBUMIN g/dL  --   --   --   --   --  3.9   MAGNESIUM mg/dL 1.7 2.0 1.7 1.8   < > 1.4*    < > = values in this interval not displayed.       Hemoglobin A1C   Date/Time Value Ref Range Status   01/04/2024 0508 7.70 (H) 4.80 - 5.60 % Final     Glucose   Date/Time Value Ref Range Status   01/04/2024 1538 129 70 - 130 mg/dL Final   01/04/2024 1038 110 70 - 130 mg/dL Final   01/04/2024 0626 91 70 - 130 mg/dL Final   01/03/2024 2107 97 70 - 130 mg/dL Final   01/03/2024 1808 74 70 - 130 mg/dL Final   01/03/2024 1049 96 70 - 130 mg/dL Final   01/03/2024 0647 100 70 - 130 mg/dL Final       No radiology results for the last day    I have personally reviewed all medications:  Scheduled Medications  aspirin, 81 mg, Oral, Daily  busPIRone, 10 mg, Oral, Daily  cetirizine, 10 mg, Oral, Daily  clopidogrel, 75 mg, Oral, Daily  DULoxetine, 60 mg, Oral, BID  insulin glargine, 22 Units, Subcutaneous, BID  insulin lispro, 2-9 Units, Subcutaneous, 4x Daily AC & at  Bedtime  insulin lispro, 8 Units, Subcutaneous, TID With Meals  pantoprazole, 40 mg, Oral, Q AM  pravastatin, 20 mg, Oral, Nightly  sertraline, 25 mg, Oral, Daily  sodium chloride, 10 mL, Intravenous, Q12H    Infusions     Diet  NPO Diet NPO Type: Sips with Meds  Diet: Cardiac Diets, Diabetic Diets; Healthy Heart (2-3 Na+); Consistent Carbohydrate; Texture: Regular Texture (IDDSI 7); Fluid Consistency: Thin (IDDSI 0)    I have personally reviewed:  [x]  Laboratory   []  Microbiology   []  Radiology   [x]  EKG/Telemetry  [x]  Cardiology/Vascular   []  Pathology    []  Records    Assessment/Plan     Active Hospital Problems    Diagnosis  POA    **ACS (acute coronary syndrome) [I24.9]  Yes    Constipation [K59.00]  Yes    NOELLE (generalized anxiety disorder) [F41.1]  Yes    GERD without esophagitis [K21.9]  Yes    Type 2 diabetes mellitus with complications [E11.8]  Yes    Type 2 diabetes mellitus with microalbuminuria, with long-term current use of insulin [E11.29, R80.9, Z79.4]  Not Applicable      Resolved Hospital Problems   No resolved problems to display.   Type 2 DM  - complications include CAD and microalbuminuria  - BG are normal, patient is not eating much   - hold lantus and scheduled lispro for now  - continue ssi/hypoglycemia protocol coverage    ACS/Severe Multivessel CAD/ICM  - LVEF 20%  - on ASA, plavix, pravastatin, and metoprolol  - s/p left circumflex stent and LAD stent x2 1/3/24  - appreciate cardiology and CT surgery recs    Constipation  - resolved    Nausea  - continue PRN zofran  - add PRN compazine    Anxiety  - seems controlled  - continue on buspar and cymbalta    GERD  - controlled, has history of peptic ulcer disease as well  - continue on ppi    Nam Fernando MD  [facility]  01/04/24  18:16 EST

## 2024-01-04 NOTE — PLAN OF CARE
Goal Outcome Evaluation:  Plan of Care Reviewed With: patient        Progress: no change  Outcome Evaluation: Patient with c/o nausea and decreased appetite this pm. Patient BS low 90's this pm and patient declined to take her insulin due to decreased appetite. Patient b/p 100-103/70's -112 remains ST. Patient right groin site dry and intact. New dressing placed this am. No hematoma or bleeding noted. Will continue to monitor and await discharge plans.       Diuretic in Use: no ordered at this time   Response to Diuretics (Output greater than intake): n/a   Daily Weight (up or down): slightly up   O2 Requirements: room air   Functional Status (Activity level, tolerance and respiratory symptoms): no c/o shortness of air   Discharge Plans:  TBD- await discharge plans.

## 2024-01-04 NOTE — PROGRESS NOTES
"Nutrition Services    Patient Name:  Jade Clement  YOB: 1970  MRN: [id number]  Admit Date:  12/26/2023    Assessment Date:  01/04/24    NUTRITION SCREENING      Reason for Encounter LOS   Diagnosis/Problem Acute coronary syndrome   S/p PCI with empella today        PO Diet No diet orders on file   Supplements N/A   PO Intake % 0% x 2 meals        Medications MAR reviewed by RD   Labs  Listed below, reviewed   Physical Findings Oriented, overweight, on oxygen therapy,    GI Function Nondistended, diarrhea, nausea, last BM 1/3   Skin Status 1+ (trace) LE edema, pale, bruising, puncture        Height  Weight  BMI  Weight Trend     Height: 162.6 cm (64\")  Weight: 72.1 kg (158 lb 14.4 oz) (01/04/24 0533)  Body mass index is 27.28 kg/m².  Gain       Nutrition Problem (PES) Inadequate oral intake related to nausea, diarrhea as evidenced by report of minimal PO intake.       Intervention/Plan Boost GC BID B/D for additional calories/protein and to support adequate PO intake  Will continue to encourage and monitor PO/ONS intake     RD to follow up per protocol.     Results from last 7 days   Lab Units 01/04/24  0508 01/03/24  0331 01/02/24 0318 12/29/23  1631 12/29/23  0546   SODIUM mmol/L 137 140 139   < > 140   POTASSIUM mmol/L 4.2 4.4 4.4   < > 3.3*   CHLORIDE mmol/L 105 106 101   < > 103   CO2 mmol/L 23.0 23.6 28.0   < > 26.0   BUN mg/dL 13 17 22*   < > 17   CREATININE mg/dL 0.96 0.70 0.87   < > 0.81   CALCIUM mg/dL 8.6 9.3 9.2   < > 9.0   BILIRUBIN mg/dL  --   --   --   --  0.3   ALK PHOS U/L  --   --   --   --  90   ALT (SGPT) U/L  --   --   --   --  41*   AST (SGOT) U/L  --   --   --   --  20   GLUCOSE mg/dL 85 116* 121*   < > 267*    < > = values in this interval not displayed.     Results from last 7 days   Lab Units 01/04/24  0508 01/03/24  0331 01/02/24 0318   MAGNESIUM mg/dL 1.7 2.0 1.7   HEMOGLOBIN g/dL 12.1 12.7  --    HEMATOCRIT % 35.7 39.6  --    TRIGLYCERIDES mg/dL 107  --   --      Lab " Results   Component Value Date    [facility] 7.70 (H) 01/04/2024         Electronically signed by:  Erendira Colvin RDN, WILDER  01/04/24 07:59 EST

## 2024-01-05 LAB
ANION GAP SERPL CALCULATED.3IONS-SCNC: 9.9 MMOL/L (ref 5–15)
BUN SERPL-MCNC: 10 MG/DL (ref 6–20)
BUN/CREAT SERPL: 13.2 (ref 7–25)
CALCIUM SPEC-SCNC: 8.8 MG/DL (ref 8.6–10.5)
CHLORIDE SERPL-SCNC: 106 MMOL/L (ref 98–107)
CO2 SERPL-SCNC: 23.1 MMOL/L (ref 22–29)
CREAT SERPL-MCNC: 0.76 MG/DL (ref 0.57–1)
EGFRCR SERPLBLD CKD-EPI 2021: 92.7 ML/MIN/1.73
GLUCOSE BLDC GLUCOMTR-MCNC: 159 MG/DL (ref 70–130)
GLUCOSE BLDC GLUCOMTR-MCNC: 167 MG/DL (ref 70–130)
GLUCOSE BLDC GLUCOMTR-MCNC: 231 MG/DL (ref 70–130)
GLUCOSE BLDC GLUCOMTR-MCNC: 239 MG/DL (ref 70–130)
GLUCOSE SERPL-MCNC: 144 MG/DL (ref 65–99)
MAGNESIUM SERPL-MCNC: 1.8 MG/DL (ref 1.6–2.6)
POTASSIUM SERPL-SCNC: 4.4 MMOL/L (ref 3.5–5.2)
SODIUM SERPL-SCNC: 139 MMOL/L (ref 136–145)

## 2024-01-05 PROCEDURE — 63710000001 INSULIN LISPRO (HUMAN) PER 5 UNITS: Performed by: INTERNAL MEDICINE

## 2024-01-05 PROCEDURE — 83735 ASSAY OF MAGNESIUM: CPT | Performed by: INTERNAL MEDICINE

## 2024-01-05 PROCEDURE — 80048 BASIC METABOLIC PNL TOTAL CA: CPT | Performed by: INTERNAL MEDICINE

## 2024-01-05 PROCEDURE — 99232 SBSQ HOSP IP/OBS MODERATE 35: CPT | Performed by: NURSE PRACTITIONER

## 2024-01-05 PROCEDURE — 82948 REAGENT STRIP/BLOOD GLUCOSE: CPT

## 2024-01-05 PROCEDURE — 63710000001 INSULIN GLARGINE PER 5 UNITS: Performed by: INTERNAL MEDICINE

## 2024-01-05 RX ADMIN — ALPRAZOLAM 0.25 MG: 0.25 TABLET ORAL at 20:22

## 2024-01-05 RX ADMIN — INSULIN LISPRO 4 UNITS: 100 INJECTION, SOLUTION INTRAVENOUS; SUBCUTANEOUS at 21:34

## 2024-01-05 RX ADMIN — INSULIN LISPRO 8 UNITS: 100 INJECTION, SOLUTION INTRAVENOUS; SUBCUTANEOUS at 11:59

## 2024-01-05 RX ADMIN — CLOPIDOGREL BISULFATE 75 MG: 75 TABLET, FILM COATED ORAL at 08:32

## 2024-01-05 RX ADMIN — INSULIN LISPRO 2 UNITS: 100 INJECTION, SOLUTION INTRAVENOUS; SUBCUTANEOUS at 07:48

## 2024-01-05 RX ADMIN — Medication 5 MG: at 20:22

## 2024-01-05 RX ADMIN — Medication 10 ML: at 08:36

## 2024-01-05 RX ADMIN — DULOXETINE HYDROCHLORIDE 60 MG: 60 CAPSULE, DELAYED RELEASE ORAL at 20:22

## 2024-01-05 RX ADMIN — SERTRALINE 25 MG: 25 TABLET, FILM COATED ORAL at 08:32

## 2024-01-05 RX ADMIN — ASPIRIN 81 MG: 81 TABLET, COATED ORAL at 08:32

## 2024-01-05 RX ADMIN — PANTOPRAZOLE SODIUM 40 MG: 40 TABLET, DELAYED RELEASE ORAL at 06:17

## 2024-01-05 RX ADMIN — CETIRIZINE HYDROCHLORIDE 10 MG: 10 TABLET ORAL at 08:32

## 2024-01-05 RX ADMIN — INSULIN LISPRO 2 UNITS: 100 INJECTION, SOLUTION INTRAVENOUS; SUBCUTANEOUS at 11:59

## 2024-01-05 RX ADMIN — Medication 10 ML: at 20:22

## 2024-01-05 RX ADMIN — PRAVASTATIN SODIUM 20 MG: 20 TABLET ORAL at 20:22

## 2024-01-05 RX ADMIN — TRAZODONE HYDROCHLORIDE 50 MG: 50 TABLET ORAL at 21:37

## 2024-01-05 RX ADMIN — INSULIN GLARGINE 20 UNITS: 100 INJECTION, SOLUTION SUBCUTANEOUS at 21:34

## 2024-01-05 RX ADMIN — DULOXETINE HYDROCHLORIDE 60 MG: 60 CAPSULE, DELAYED RELEASE ORAL at 08:32

## 2024-01-05 RX ADMIN — BUSPIRONE HYDROCHLORIDE 10 MG: 10 TABLET ORAL at 08:32

## 2024-01-05 NOTE — PROGRESS NOTES
"CC: Follow-up NSTEMI versus late presentation anteroseptal infarct, acute systolic CHF, new cardiomyopathy.     Interval History: no chest pain. Nauseated yesterday but none so far today. Tender right groin but stable      Vital Signs  Temp:  [97.8 °F (36.6 °C)-99.1 °F (37.3 °C)] 97.9 °F (36.6 °C)  Heart Rate:  [104-109] 104  Resp:  [16] 16  BP: ()/(62-76) 91/62    Intake/Output Summary (Last 24 hours) at 1/5/2024 0907  Last data filed at 1/5/2024 0824  Gross per 24 hour   Intake 240 ml   Output 400 ml   Net -160 ml     Flowsheet Rows      Flowsheet Row First Filed Value   Admission Height 162.6 cm (64\") Documented at 12/26/2023 2215   Admission Weight 71.8 kg (158 lb 3.2 oz) Documented at 12/26/2023 2215            PHYSICAL EXAM:  General: No acute distress  Resp:NL Rate, unlabored, clear  CV:tachycardic rate and rhythm, NL PMI, Nl S1 and S2, no Murmur, no gallop, no rub, No JVD. Normal pedal pulses  ABD:Nl sounds, no masses or tenderness, nondistended, no guarding or rebound  Neuro: alert,cooperative and oriented  Extr: No edema or cyanosis, moves all extremities      Results Review:    Results from last 7 days   Lab Units 01/05/24  0254   SODIUM mmol/L 139   POTASSIUM mmol/L 4.4   CHLORIDE mmol/L 106   CO2 mmol/L 23.1   BUN mg/dL 10   CREATININE mg/dL 0.76   GLUCOSE mg/dL 144*   CALCIUM mg/dL 8.8     Results from last 7 days   Lab Units 01/04/24  0740 01/04/24  0508   HSTROP T ng/L 263* 227*     Results from last 7 days   Lab Units 01/04/24  0508   WBC 10*3/mm3 13.81*   HEMOGLOBIN g/dL 12.1   HEMATOCRIT % 35.7   PLATELETS 10*3/mm3 285         Results from last 7 days   Lab Units 01/04/24  0508   CHOLESTEROL mg/dL 136     Results from last 7 days   Lab Units 01/05/24  0254   MAGNESIUM mg/dL 1.8     Results from last 7 days   Lab Units 01/04/24  0508   CHOLESTEROL mg/dL 136   TRIGLYCERIDES mg/dL 107   HDL CHOL mg/dL 36*   LDL CHOL mg/dL 80     I reviewed the patient's new clinical results.  I personally " viewed and interpreted the patient's EKG/Telemetry data- sinus tachycardia        Medication Review:   Meds reviewed         Assessment/Plan    1.  Type I NSTEMI with severe multivessel coronary artery disease. Too high risk for CABG.   s/p Impella supported high risk PCI on 1/3/2024 by Dr. Greene with MERRITT of the left main into the proximal LAD,  mid LAD, MERRITT to the ostial and proximal OM1 branch.  -awaiting RCA intervention 1/8/24  2.  Acute systolic CHF secondary to ischemic cardiomyopathy and coronary artery disease  3.  Ischemic cardiomyopathy, new (EF 20% or less)- off dobutamine  -Toprol Dc'd due to hypotension and not on GDMT such as ACE inhibitor, ARB, Entresto, spironolactone, or SGLT2 inhibitor for now  for hypotension as well  4.  Insulin-dependent diabetes since 2014, with neuropathy  5.  Mild to moderate mitral regurgitation  6.  Mild to moderate tricuspid regurgitation  7.  Tobacco use, quit on 12/18/2023  8.  Nonspecific right lower lobe groundglass opacity and left apical opacity on CT (follow-up CT of the chest recommended in 3 months)   9.  Right adrenal adenoma by CT of the abdomen  10.  Dyslipidemia - tolerating pravastatin 20 mg/day  11.  Statin intolerance (myalgias)  12.  Gastroesophageal reflux disease  13.  Moderate right pleural effusion on admission  14.  Relative hypotension  15.  Sinus tachycardia- stable   16. Nausea- continue PRN zofran and compazine for relief   17. Bilateral carotid stenosis less than 50 % 12/29/23    -Scheduled for for RCA intervention 1/8/24 with Dr. Greene and NPO orders are in.   - I did not change any medications  - vitals stable,mildly tachycardic.  -call with pulse > 120  -potential DC 1/9/24 if stable post staged intervention  - She prefers to follow up outpatient with Dr. Greene     01/05/24  09:07 EST

## 2024-01-05 NOTE — PROGRESS NOTES
Name: Jade Clement ADMIT: 2023   : 1970  PCP: Heavenly Monsivais MD    MRN: [id number] LOS: 10 days   AGE/SEX: 53 y.o. female  ROOM: [facility]     Subjective   Subjective   Referring Provider: Billy Esquivel MD   Reason for Follow-up: Type 2 DM  No acute events. Patient denies new complaints. She feels much better today. No more nausea. Taking PO. No CP/dyspnea/f/c/v.  No family at bedside.    Objective   Objective   Vital Signs  Temp:  [97.8 °F (36.6 °C)-99.1 °F (37.3 °C)] 97.9 °F (36.6 °C)  Heart Rate:  [104-109] 104  Resp:  [16] 16  BP: ()/(62-70) 91/62  SpO2:  [97 %-98 %] 98 %  on   ;   Device (Oxygen Therapy): room air  Body mass index is 26.71 kg/m².  Physical Exam  Vitals and nursing note reviewed.   Constitutional:       General: She is not in acute distress.     Appearance: She is not toxic-appearing or diaphoretic.   HENT:      Head: Normocephalic and atraumatic.      Nose: Nose normal.      Mouth/Throat:      Mouth: Mucous membranes are moist.      Pharynx: Oropharynx is clear.   Eyes:      Conjunctiva/sclera: Conjunctivae normal.      Pupils: Pupils are equal, round, and reactive to light.   Cardiovascular:      Rate and Rhythm: Normal rate and regular rhythm.      Pulses: Normal pulses.   Pulmonary:      Effort: Pulmonary effort is normal.      Breath sounds: Normal breath sounds.   Abdominal:      General: Bowel sounds are normal.      Palpations: Abdomen is soft.      Tenderness: There is no abdominal tenderness.   Musculoskeletal:         General: No swelling or tenderness.      Cervical back: Neck supple.   Skin:     General: Skin is warm and dry.      Capillary Refill: Capillary refill takes less than 2 seconds.   Neurological:      General: No focal deficit present.      Mental Status: She is alert and oriented to person, place, and time.   Psychiatric:         Mood and Affect: Mood normal.         Behavior: Behavior normal.       Results Review     I reviewed the  patient's new clinical results.  Results from last 7 days   Lab Units 01/04/24  0508 01/03/24  1322 01/03/24  0331 12/30/23  0328   WBC 10*3/mm3 13.81*  --  10.34 10.73   HEMOGLOBIN g/dL 12.1  --  12.7 13.4   HEMOGLOBIN, POC g/dL  --  12.9  --   --    PLATELETS 10*3/mm3 285  --  275 269     Results from last 7 days   Lab Units 01/05/24  0254 01/04/24  0508 01/03/24  0331 01/02/24  0318   SODIUM mmol/L 139 137 140 139   POTASSIUM mmol/L 4.4 4.2 4.4 4.4   CHLORIDE mmol/L 106 105 106 101   CO2 mmol/L 23.1 23.0 23.6 28.0   BUN mg/dL 10 13 17 22*   CREATININE mg/dL 0.76 0.96 0.70 0.87   GLUCOSE mg/dL 144* 85 116* 121*   EGFR mL/min/1.73 92.7 70.0 102.3 78.8           Results from last 7 days   Lab Units 01/05/24  0254 01/04/24  0508 01/03/24  0331 01/02/24  0318   CALCIUM mg/dL 8.8 8.6 9.3 9.2   MAGNESIUM mg/dL 1.8 1.7 2.0 1.7       Hemoglobin A1C   Date/Time Value Ref Range Status   01/04/2024 0508 7.70 (H) 4.80 - 5.60 % Final     Glucose   Date/Time Value Ref Range Status   01/05/2024 1032 239 (H) 70 - 130 mg/dL Final   01/05/2024 0617 167 (H) 70 - 130 mg/dL Final   01/04/2024 1951 125 70 - 130 mg/dL Final   01/04/2024 1538 129 70 - 130 mg/dL Final   01/04/2024 1038 110 70 - 130 mg/dL Final   01/04/2024 0626 91 70 - 130 mg/dL Final   01/03/2024 2107 97 70 - 130 mg/dL Final       No radiology results for the last day    I have personally reviewed all medications:  Scheduled Medications  aspirin, 81 mg, Oral, Daily  busPIRone, 10 mg, Oral, Daily  cetirizine, 10 mg, Oral, Daily  clopidogrel, 75 mg, Oral, Daily  DULoxetine, 60 mg, Oral, BID  [Held by provider] insulin glargine, 22 Units, Subcutaneous, BID  insulin lispro, 2-9 Units, Subcutaneous, 4x Daily AC & at Bedtime  [Held by provider] insulin lispro, 8 Units, Subcutaneous, TID With Meals  pantoprazole, 40 mg, Oral, Q AM  pravastatin, 20 mg, Oral, Nightly  sertraline, 25 mg, Oral, Daily  sodium chloride, 10 mL, Intravenous, Q12H    Infusions     Diet  NPO Diet NPO  Type: Sips with Meds  Diet: Cardiac Diets, Diabetic Diets; Healthy Heart (2-3 Na+); Consistent Carbohydrate; Texture: Regular Texture (IDDSI 7); Fluid Consistency: Thin (IDDSI 0)    I have personally reviewed:  [x]  Laboratory   []  Microbiology   []  Radiology   [x]  EKG/Telemetry  []  Cardiology/Vascular   []  Pathology    []  Records    Assessment/Plan     Active Hospital Problems    Diagnosis  POA    **ACS (acute coronary syndrome) [I24.9]  Yes    Constipation [K59.00]  Yes    NOELLE (generalized anxiety disorder) [F41.1]  Yes    GERD without esophagitis [K21.9]  Yes    Type 2 diabetes mellitus with complications [E11.8]  Yes    Type 2 diabetes mellitus with microalbuminuria, with long-term current use of insulin [E11.29, R80.9, Z79.4]  Not Applicable      Resolved Hospital Problems   No resolved problems to display.   Type 2 DM  - complications include CAD and microalbuminuria  - BG are increasing-restart lantus at 20 units Q12H and lispro 8 units TID with meals  - continue ssi/hypoglycemia protocol coverage    ACS/Severe Multivessel CAD/ICM  - LVEF 20%  - on ASA, plavix, pravastatin, and metoprolol  - s/p left circumflex stent and LAD stent x2 1/3/24  - cardiology and CT surgery managing    Constipation  - resolved    Nausea  - continue PRN zofran and compazine    Anxiety  - seems controlled  - continue on buspar and cymbalta    GERD  - controlled, has history of peptic ulcer disease as well  - continue on ppi    Nam Fernando MD  [facility]  01/05/24  10:58 EST

## 2024-01-05 NOTE — PROGRESS NOTES
LOS: 9 days   Patient Care Team:  Heavenly Monsivais MD as PCP - General (Family Medicine)  Hesham Cheney MD as Consulting Physician (Endocrinology)    Chief Complaint: Follow-up NSTEMI versus late presentation anteroseptal infarct, acute systolic CHF, new cardiomyopathy.    Interval History: Underwent successful stenting to the LAD and left circumflex yesterday.  Slightly tender at the right femoral artery access site, but no hematoma.  No chest pain.  No shortness of breath today.    Vital Signs:  Temp:  [97.9 °F (36.6 °C)-99.1 °F (37.3 °C)] 99.1 °F (37.3 °C)  Heart Rate:  [104-108] 108  Resp:  [16] 16  BP: ()/(66-76) 106/69    Intake/Output Summary (Last 24 hours) at 1/4/2024 1946  Last data filed at 1/4/2024 1626  Gross per 24 hour   Intake 120 ml   Output 400 ml   Net -280 ml       Physical Exam:   General Appearance:    No acute distress, alert and oriented x4   Lungs:     Clear to auscultation bilaterally.    Heart:    Regular rhythm and mildly tachycardic rate.  No murmurs, gallops, or rubs.   Abdomen:     Soft, nontender, nondistended.    Extremities:   Right femoral artery access site without hematoma.  No clubbing, cyanosis, or edema.      Results Review:    Results from last 7 days   Lab Units 01/04/24  0508   SODIUM mmol/L 137   POTASSIUM mmol/L 4.2   CHLORIDE mmol/L 105   CO2 mmol/L 23.0   BUN mg/dL 13   CREATININE mg/dL 0.96   GLUCOSE mg/dL 85   CALCIUM mg/dL 8.6     Results from last 7 days   Lab Units 01/04/24  0740 01/04/24  0508   HSTROP T ng/L 263* 227*       Results from last 7 days   Lab Units 01/04/24  0508   WBC 10*3/mm3 13.81*   HEMOGLOBIN g/dL 12.1   HEMATOCRIT % 35.7   PLATELETS 10*3/mm3 285     Results from last 7 days   Lab Units 12/29/23  0546   INR  1.08   APTT seconds 25.6     Results from last 7 days   Lab Units 01/04/24  0508   CHOLESTEROL mg/dL 136       Results from last 7 days   Lab Units 01/04/24  0508   MAGNESIUM mg/dL 1.7     Results from last 7 days   Lab Units  01/04/24  0508   CHOLESTEROL mg/dL 136   TRIGLYCERIDES mg/dL 107   HDL CHOL mg/dL 36*   LDL CHOL mg/dL 80         I reviewed the patient's new clinical results.        Assessment:  1.  Type I NSTEMI with severe multivessel coronary artery disease.  Status post Impella supported high risk PCI on 1/3/2024 by Dr. Greene (status post 3.5 x 28 mm Xience [place] MERRITT of the left main into the proximal LAD, 2.5 x 28 mm Xience [place] MERRITT to the mid LAD, and 3.0 x 28 mm Xience [place] MERRITT to the ostial and proximal OM1 branch).  2.  Acute systolic CHF secondary to ischemic cardiomyopathy and coronary artery disease  3.  Ischemic cardiomyopathy, new (EF 20% or less)  4.  Insulin-dependent diabetes since 2014, with neuropathy  5.  Mild to moderate mitral regurgitation  6.  Mild to moderate tricuspid regurgitation  7.  Tobacco use, quit on 12/18/2023  8.  Nonspecific right lower lobe groundglass opacity and left apical opacity on CT (follow-up CT of the chest recommended in 3 months)   9.  Right adrenal adenoma by CT of the abdomen  10.  Dyslipidemia   11.  Statin intolerance (myalgias)  12.  Gastroesophageal reflux disease  13.  Moderate right pleural effusion on admission  14.  Relative hypotension  15.  Sinus tachycardia    Plan:  -She was deemed to be high risk of mortality with CABG by Dr. Asif.  Again, high risk Impella supported PCI yesterday went well.  Stents to the left main extending into the LAD, mid LAD, and OM1 branch were placed as described above.    -She still needs the RCA fixed as there is a high-grade proximal lesion (although she has collateralization from the left).  We are going to proceed with this on Monday, 1/8/2024 with Dr. Greene.  Hopefully, she may be able to go home on 1/9/2024 if all goes well.    -Stable off of dobutamine for several days.    -Continue aspirin and Plavix.  The importance of dual antiplatelet therapy was emphasized to her.  She is not to stop the Plavix for any reason unless  life-threatening bleeding is involved.    -No diuretics today.  Toprol-XL was discontinued because of hypotension.  Hypotension greatly limits her heart failure treatment.    -She cannot be on an ACE inhibitor, ARB, Entresto, spironolactone, or SGLT2 inhibitor for now given lower blood pressure.    -She has had severe myalgias with multiple statins in the past.  She has tolerated pravastatin 20 mg/day thus far.    -Appreciate [facility] recs and glucose management.    Billy Esquivel MD  01/04/24  19:46 EST

## 2024-01-05 NOTE — PLAN OF CARE
Goal Outcome Evaluation:  Plan of Care Reviewed With: patient        Progress: no change  Outcome Evaluation: Patient with c/o nausea which improved with comapazine. Patient b/p 's/60-70's -110's remains ST. Patient reports decreased appetite and has not been able to eat much for the last couple days. Right groin with no issues or bleeding. A1c is down from to 7 from 10. Will continue to monitor and await PCI later next week on 1/8.       Diuretic in Use: none ordered at this time   Response to Diuretics (Output greater than intake): n/a   Daily Weight (up or down): down to 155 lbs   O2 Requirements: room air   Functional Status (Activity level, tolerance and respiratory symptoms): no c/o shortness of air   Discharge Plans: TBD- await discharge plans.

## 2024-01-06 LAB
ANION GAP SERPL CALCULATED.3IONS-SCNC: 7.8 MMOL/L (ref 5–15)
BH BB BLOOD EXPIRATION DATE: NORMAL
BH BB BLOOD EXPIRATION DATE: NORMAL
BH BB BLOOD TYPE BARCODE: 7300
BH BB BLOOD TYPE BARCODE: 7300
BH BB DISPENSE STATUS: NORMAL
BH BB DISPENSE STATUS: NORMAL
BH BB PRODUCT CODE: NORMAL
BH BB PRODUCT CODE: NORMAL
BH BB UNIT NUMBER: NORMAL
BH BB UNIT NUMBER: NORMAL
BUN SERPL-MCNC: 14 MG/DL (ref 6–20)
BUN/CREAT SERPL: 20.6 (ref 7–25)
CALCIUM SPEC-SCNC: 8.6 MG/DL (ref 8.6–10.5)
CHLORIDE SERPL-SCNC: 108 MMOL/L (ref 98–107)
CO2 SERPL-SCNC: 23.2 MMOL/L (ref 22–29)
CREAT SERPL-MCNC: 0.68 MG/DL (ref 0.57–1)
CROSSMATCH INTERPRETATION: NORMAL
CROSSMATCH INTERPRETATION: NORMAL
EGFRCR SERPLBLD CKD-EPI 2021: 103 ML/MIN/1.73
GLUCOSE BLDC GLUCOMTR-MCNC: 187 MG/DL (ref 70–130)
GLUCOSE BLDC GLUCOMTR-MCNC: 235 MG/DL (ref 70–130)
GLUCOSE BLDC GLUCOMTR-MCNC: 96 MG/DL (ref 70–130)
GLUCOSE BLDC GLUCOMTR-MCNC: 99 MG/DL (ref 70–130)
GLUCOSE SERPL-MCNC: 118 MG/DL (ref 65–99)
MAGNESIUM SERPL-MCNC: 1.7 MG/DL (ref 1.6–2.6)
POTASSIUM SERPL-SCNC: 4.1 MMOL/L (ref 3.5–5.2)
SODIUM SERPL-SCNC: 139 MMOL/L (ref 136–145)
UNIT  ABO: NORMAL
UNIT  ABO: NORMAL
UNIT  RH: NORMAL
UNIT  RH: NORMAL

## 2024-01-06 PROCEDURE — 99232 SBSQ HOSP IP/OBS MODERATE 35: CPT | Performed by: NURSE PRACTITIONER

## 2024-01-06 PROCEDURE — 63710000001 INSULIN GLARGINE PER 5 UNITS: Performed by: INTERNAL MEDICINE

## 2024-01-06 PROCEDURE — 83735 ASSAY OF MAGNESIUM: CPT | Performed by: INTERNAL MEDICINE

## 2024-01-06 PROCEDURE — 82948 REAGENT STRIP/BLOOD GLUCOSE: CPT

## 2024-01-06 PROCEDURE — 63710000001 INSULIN LISPRO (HUMAN) PER 5 UNITS: Performed by: INTERNAL MEDICINE

## 2024-01-06 PROCEDURE — 80048 BASIC METABOLIC PNL TOTAL CA: CPT | Performed by: INTERNAL MEDICINE

## 2024-01-06 RX ADMIN — ALPRAZOLAM 0.25 MG: 0.25 TABLET ORAL at 16:42

## 2024-01-06 RX ADMIN — INSULIN GLARGINE 20 UNITS: 100 INJECTION, SOLUTION SUBCUTANEOUS at 20:21

## 2024-01-06 RX ADMIN — Medication 10 ML: at 08:08

## 2024-01-06 RX ADMIN — SERTRALINE 25 MG: 25 TABLET, FILM COATED ORAL at 08:08

## 2024-01-06 RX ADMIN — INSULIN LISPRO 4 UNITS: 100 INJECTION, SOLUTION INTRAVENOUS; SUBCUTANEOUS at 16:41

## 2024-01-06 RX ADMIN — ALPRAZOLAM 0.25 MG: 0.25 TABLET ORAL at 20:12

## 2024-01-06 RX ADMIN — PRAVASTATIN SODIUM 20 MG: 20 TABLET ORAL at 20:13

## 2024-01-06 RX ADMIN — BUSPIRONE HYDROCHLORIDE 10 MG: 10 TABLET ORAL at 08:09

## 2024-01-06 RX ADMIN — DULOXETINE HYDROCHLORIDE 60 MG: 60 CAPSULE, DELAYED RELEASE ORAL at 20:13

## 2024-01-06 RX ADMIN — CLOPIDOGREL BISULFATE 75 MG: 75 TABLET, FILM COATED ORAL at 08:08

## 2024-01-06 RX ADMIN — INSULIN LISPRO 6 UNITS: 100 INJECTION, SOLUTION INTRAVENOUS; SUBCUTANEOUS at 16:42

## 2024-01-06 RX ADMIN — DULOXETINE HYDROCHLORIDE 60 MG: 60 CAPSULE, DELAYED RELEASE ORAL at 08:08

## 2024-01-06 RX ADMIN — ASPIRIN 81 MG: 81 TABLET, COATED ORAL at 08:08

## 2024-01-06 RX ADMIN — PANTOPRAZOLE SODIUM 40 MG: 40 TABLET, DELAYED RELEASE ORAL at 06:15

## 2024-01-06 RX ADMIN — CETIRIZINE HYDROCHLORIDE 10 MG: 10 TABLET ORAL at 08:08

## 2024-01-06 RX ADMIN — Medication 5 MG: at 20:13

## 2024-01-06 NOTE — PROGRESS NOTES
"CC: Follow-up NSTEMI versus late presentation anteroseptal infarct, acute systolic CHF, new cardiomyopathy.      Interval History: Less nausea in the last 24 hours.  No chest pain.  Right groin stable and reportedly feels the best she has in several weeks      Vital Signs  Temp:  [97.6 °F (36.4 °C)-98.1 °F (36.7 °C)] 98 °F (36.7 °C)  Heart Rate:  [101-107] 101  Resp:  [16] 16  BP: ()/(60-68) 92/66    Intake/Output Summary (Last 24 hours) at 1/6/2024 1153  Last data filed at 1/6/2024 0736  Gross per 24 hour   Intake 600 ml   Output --   Net 600 ml     Flowsheet Rows      Flowsheet Row First Filed Value   Admission Height 162.6 cm (64\") Documented at 12/26/2023 2215   Admission Weight 71.8 kg (158 lb 3.2 oz) Documented at 12/26/2023 2215            PHYSICAL EXAM:  General: No acute distress  Resp:NL Rate, unlabored, clear  CV: Tachycardic rate and rhythm, NL PMI, Nl S1 and S2, no Murmur, no gallop, no rub, No JVD. Normal pedal pulses  ABD:Nl sounds, no masses or tenderness, nondistended, no guarding or rebound  Neuro: alert,cooperative and oriented  Extr: No edema or cyanosis, moves all extremities      Results Review:    Results from last 7 days   Lab Units 01/06/24  0348   SODIUM mmol/L 139   POTASSIUM mmol/L 4.1   CHLORIDE mmol/L 108*   CO2 mmol/L 23.2   BUN mg/dL 14   CREATININE mg/dL 0.68   GLUCOSE mg/dL 118*   CALCIUM mg/dL 8.6     Results from last 7 days   Lab Units 01/04/24  0740 01/04/24  0508   HSTROP T ng/L 263* 227*     Results from last 7 days   Lab Units 01/04/24  0508   WBC 10*3/mm3 13.81*   HEMOGLOBIN g/dL 12.1   HEMATOCRIT % 35.7   PLATELETS 10*3/mm3 285         Results from last 7 days   Lab Units 01/04/24  0508   CHOLESTEROL mg/dL 136     Results from last 7 days   Lab Units 01/06/24  0348   MAGNESIUM mg/dL 1.7     Results from last 7 days   Lab Units 01/04/24  0508   CHOLESTEROL mg/dL 136   TRIGLYCERIDES mg/dL 107   HDL CHOL mg/dL 36*   LDL CHOL mg/dL 80     I reviewed the patient's new " clinical results.  I personally viewed and interpreted the patient's EKG/Telemetry data-sinus tach        Medication Review:   Meds reviewed         Assessment/Plan    1.  Type I NSTEMI with severe multivessel coronary artery disease. Too high risk for CABG.   s/p Impella supported high risk PCI on 1/3/2024 by Dr. Greene with MERRITT of the left main into the proximal LAD,  mid LAD, MERRITT to the ostial and proximal OM1 branch.  -awaiting RCA intervention 1/8/24  2.  Acute systolic CHF secondary to ischemic cardiomyopathy and coronary artery disease  3.  Ischemic cardiomyopathy, new (EF 20% or less)- off dobutamine  -Toprol Dc'd due to hypotension and not on GDMT such as ACE inhibitor, ARB, Entresto, spironolactone, or SGLT2 inhibitor for now  for hypotension as well  4.  Insulin-dependent diabetes since 2014, with neuropathy  5.  Mild to moderate mitral regurgitation  6.  Mild to moderate tricuspid regurgitation  7.  Tobacco use, quit on 12/18/2023  8.  Nonspecific right lower lobe groundglass opacity and left apical opacity on CT (follow-up CT of the chest recommended in 3 months)   9.  Right adrenal adenoma by CT of the abdomen  10.  Dyslipidemia - tolerating pravastatin 20 mg/day  11.  Statin intolerance (myalgias)  12.  Gastroesophageal reflux disease  13.  Moderate right pleural effusion on admission  14.  Relative hypotension  15.  Sinus tachycardia- stable   16. Nausea- continue PRN zofran and compazine for relief   17. Bilateral carotid stenosis less than 50 % 12/29/23      -Vital signs remain stable despite mild tachycardia.  Please call cardiology if pulse sustains greater than 120 bpm.  -Scheduled for right coronary artery intervention 1/8/2024 with Dr. Greene at 10 AM.  N.p.o. orders are already in  - She prefers to follow up outpatient with Dr. Greene    -I did not make any changes      KEVIN Sevilla  01/06/24  11:53 EST

## 2024-01-06 NOTE — PROGRESS NOTES
Name: Jade Clement ADMIT: 2023   : 1970  PCP: Heavenly Monsivais MD    MRN: [id number] LOS: 11 days   AGE/SEX: 53 y.o. female  ROOM: [facility]     Subjective   Subjective   Referring Provider: Billy Esquivel MD   Reason for Follow-up: Type 2 DM  No acute events. Patient denies new complaints. No more nausea. Taking PO. No CP/dyspnea/f/c/v.  No family at bedside.    Objective   Objective   Vital Signs  Temp:  [97.6 °F (36.4 °C)-98.1 °F (36.7 °C)] 98 °F (36.7 °C)  Heart Rate:  [101-107] 101  Resp:  [16] 16  BP: ()/(60-68) 92/66  SpO2:  [95 %-99 %] 96 %  on   ;   Device (Oxygen Therapy): room air  Body mass index is 26.67 kg/m².  Physical Exam  Vitals and nursing note reviewed.   Constitutional:       General: She is not in acute distress.     Appearance: She is not toxic-appearing or diaphoretic.   HENT:      Head: Normocephalic and atraumatic.      Nose: Nose normal.      Mouth/Throat:      Mouth: Mucous membranes are moist.      Pharynx: Oropharynx is clear.   Eyes:      Conjunctiva/sclera: Conjunctivae normal.      Pupils: Pupils are equal, round, and reactive to light.   Cardiovascular:      Rate and Rhythm: Normal rate and regular rhythm.      Pulses: Normal pulses.   Pulmonary:      Effort: Pulmonary effort is normal.      Breath sounds: Normal breath sounds.   Abdominal:      General: Bowel sounds are normal.      Palpations: Abdomen is soft.      Tenderness: There is no abdominal tenderness.   Musculoskeletal:         General: No swelling or tenderness.      Cervical back: Neck supple.   Skin:     General: Skin is warm and dry.      Capillary Refill: Capillary refill takes less than 2 seconds.   Neurological:      General: No focal deficit present.      Mental Status: She is alert and oriented to person, place, and time.   Psychiatric:         Mood and Affect: Mood normal.         Behavior: Behavior normal.       Results Review     I reviewed the patient's new clinical  results.  Results from last 7 days   Lab Units 01/04/24  0508 01/03/24  1322 01/03/24  0331   WBC 10*3/mm3 13.81*  --  10.34   HEMOGLOBIN g/dL 12.1  --  12.7   HEMOGLOBIN, POC g/dL  --  12.9  --    PLATELETS 10*3/mm3 285  --  275     Results from last 7 days   Lab Units 01/06/24  0348 01/05/24  0254 01/04/24  0508 01/03/24  0331   SODIUM mmol/L 139 139 137 140   POTASSIUM mmol/L 4.1 4.4 4.2 4.4   CHLORIDE mmol/L 108* 106 105 106   CO2 mmol/L 23.2 23.1 23.0 23.6   BUN mg/dL 14 10 13 17   CREATININE mg/dL 0.68 0.76 0.96 0.70   GLUCOSE mg/dL 118* 144* 85 116*   EGFR mL/min/1.73 103.0 92.7 70.0 102.3           Results from last 7 days   Lab Units 01/06/24  0348 01/05/24  0254 01/04/24  0508 01/03/24  0331   CALCIUM mg/dL 8.6 8.8 8.6 9.3   MAGNESIUM mg/dL 1.7 1.8 1.7 2.0       Hemoglobin A1C   Date/Time Value Ref Range Status   01/04/2024 0508 7.70 (H) 4.80 - 5.60 % Final     Glucose   Date/Time Value Ref Range Status   01/06/2024 1051 187 (H) 70 - 130 mg/dL Final   01/06/2024 0709 96 70 - 130 mg/dL Final   01/05/2024 2045 231 (H) 70 - 130 mg/dL Final   01/05/2024 1536 159 (H) 70 - 130 mg/dL Final   01/05/2024 1032 239 (H) 70 - 130 mg/dL Final   01/05/2024 0617 167 (H) 70 - 130 mg/dL Final   01/04/2024 1951 125 70 - 130 mg/dL Final       No radiology results for the last day    I have personally reviewed all medications:  Scheduled Medications  aspirin, 81 mg, Oral, Daily  busPIRone, 10 mg, Oral, Daily  cetirizine, 10 mg, Oral, Daily  clopidogrel, 75 mg, Oral, Daily  DULoxetine, 60 mg, Oral, BID  insulin glargine, 20 Units, Subcutaneous, BID  insulin lispro, 2-9 Units, Subcutaneous, 4x Daily AC & at Bedtime  insulin lispro, 8 Units, Subcutaneous, TID With Meals  pantoprazole, 40 mg, Oral, Q AM  pravastatin, 20 mg, Oral, Nightly  sertraline, 25 mg, Oral, Daily  sodium chloride, 10 mL, Intravenous, Q12H    Infusions     Diet  NPO Diet NPO Type: Sips with Meds  Diet: Cardiac Diets, Diabetic Diets; Healthy Heart (2-3 Na+);  Consistent Carbohydrate; Texture: Regular Texture (IDDSI 7); Fluid Consistency: Thin (IDDSI 0)    I have personally reviewed:  [x]  Laboratory   []  Microbiology   []  Radiology   [x]  EKG/Telemetry  []  Cardiology/Vascular   []  Pathology    []  Records    Assessment/Plan     Active Hospital Problems    Diagnosis  POA    **ACS (acute coronary syndrome) [I24.9]  Yes    Constipation [K59.00]  Yes    NOELLE (generalized anxiety disorder) [F41.1]  Yes    GERD without esophagitis [K21.9]  Yes    Type 2 diabetes mellitus with complications [E11.8]  Yes    Type 2 diabetes mellitus with microalbuminuria, with long-term current use of insulin [E11.29, R80.9, Z79.4]  Not Applicable      Resolved Hospital Problems   No resolved problems to display.   Type 2 DM  - complications include CAD and microalbuminuria  - BGs are acceptable  - continue lantus 20 units Q12H and lispro 8 units TID with meals  - continue ssi/hypoglycemia protocol coverage    ACS/Severe Multivessel CAD/ICM  - LVEF 20%  - on ASA, plavix, pravastatin, and metoprolol  - s/p left circumflex stent and LAD stent x2 1/3/24  - cardiology and CT surgery managing    Constipation  - resolved    Nausea  - continue PRN zofran and compazine    Anxiety  - seems controlled  - continue on buspar and cymbalta    GERD  - controlled, has history of peptic ulcer disease as well  - continue on ppi      Portions of this text have been copied and I have reviewed these. They are accurate as of 1/6/2024      Nam Fernando MD  [facility]  01/06/24  11:34 EST

## 2024-01-07 LAB
ANION GAP SERPL CALCULATED.3IONS-SCNC: 10 MMOL/L (ref 5–15)
BUN SERPL-MCNC: 12 MG/DL (ref 6–20)
BUN/CREAT SERPL: 17.4 (ref 7–25)
CALCIUM SPEC-SCNC: 8.7 MG/DL (ref 8.6–10.5)
CHLORIDE SERPL-SCNC: 106 MMOL/L (ref 98–107)
CO2 SERPL-SCNC: 24 MMOL/L (ref 22–29)
CREAT SERPL-MCNC: 0.69 MG/DL (ref 0.57–1)
EGFRCR SERPLBLD CKD-EPI 2021: 102.6 ML/MIN/1.73
GLUCOSE BLDC GLUCOMTR-MCNC: 116 MG/DL (ref 70–130)
GLUCOSE BLDC GLUCOMTR-MCNC: 149 MG/DL (ref 70–130)
GLUCOSE BLDC GLUCOMTR-MCNC: 157 MG/DL (ref 70–130)
GLUCOSE BLDC GLUCOMTR-MCNC: 170 MG/DL (ref 70–130)
GLUCOSE SERPL-MCNC: 156 MG/DL (ref 65–99)
MAGNESIUM SERPL-MCNC: 1.7 MG/DL (ref 1.6–2.6)
POTASSIUM SERPL-SCNC: 3.9 MMOL/L (ref 3.5–5.2)
SODIUM SERPL-SCNC: 140 MMOL/L (ref 136–145)

## 2024-01-07 PROCEDURE — 83735 ASSAY OF MAGNESIUM: CPT | Performed by: INTERNAL MEDICINE

## 2024-01-07 PROCEDURE — 80048 BASIC METABOLIC PNL TOTAL CA: CPT | Performed by: INTERNAL MEDICINE

## 2024-01-07 PROCEDURE — 63710000001 INSULIN GLARGINE PER 5 UNITS: Performed by: INTERNAL MEDICINE

## 2024-01-07 PROCEDURE — 63710000001 INSULIN LISPRO (HUMAN) PER 5 UNITS: Performed by: INTERNAL MEDICINE

## 2024-01-07 PROCEDURE — 99232 SBSQ HOSP IP/OBS MODERATE 35: CPT | Performed by: INTERNAL MEDICINE

## 2024-01-07 PROCEDURE — 82948 REAGENT STRIP/BLOOD GLUCOSE: CPT

## 2024-01-07 RX ORDER — ALPRAZOLAM 0.25 MG/1
0.25 TABLET ORAL 4 TIMES DAILY PRN
Status: DISCONTINUED | OUTPATIENT
Start: 2024-01-07 | End: 2024-01-09 | Stop reason: HOSPADM

## 2024-01-07 RX ADMIN — INSULIN GLARGINE 20 UNITS: 100 INJECTION, SOLUTION SUBCUTANEOUS at 20:42

## 2024-01-07 RX ADMIN — DULOXETINE HYDROCHLORIDE 60 MG: 60 CAPSULE, DELAYED RELEASE ORAL at 08:12

## 2024-01-07 RX ADMIN — CLOPIDOGREL BISULFATE 75 MG: 75 TABLET, FILM COATED ORAL at 08:12

## 2024-01-07 RX ADMIN — PANTOPRAZOLE SODIUM 40 MG: 40 TABLET, DELAYED RELEASE ORAL at 06:32

## 2024-01-07 RX ADMIN — Medication 5 MG: at 20:43

## 2024-01-07 RX ADMIN — ASPIRIN 81 MG: 81 TABLET, COATED ORAL at 08:12

## 2024-01-07 RX ADMIN — INSULIN LISPRO 4 UNITS: 100 INJECTION, SOLUTION INTRAVENOUS; SUBCUTANEOUS at 08:14

## 2024-01-07 RX ADMIN — ALPRAZOLAM 0.25 MG: 0.25 TABLET ORAL at 14:43

## 2024-01-07 RX ADMIN — PRAVASTATIN SODIUM 20 MG: 20 TABLET ORAL at 20:43

## 2024-01-07 RX ADMIN — Medication 10 ML: at 08:14

## 2024-01-07 RX ADMIN — SERTRALINE 25 MG: 25 TABLET, FILM COATED ORAL at 08:12

## 2024-01-07 RX ADMIN — DULOXETINE HYDROCHLORIDE 60 MG: 60 CAPSULE, DELAYED RELEASE ORAL at 20:43

## 2024-01-07 RX ADMIN — INSULIN LISPRO 5 UNITS: 100 INJECTION, SOLUTION INTRAVENOUS; SUBCUTANEOUS at 16:27

## 2024-01-07 RX ADMIN — BUSPIRONE HYDROCHLORIDE 10 MG: 10 TABLET ORAL at 08:12

## 2024-01-07 RX ADMIN — INSULIN GLARGINE 20 UNITS: 100 INJECTION, SOLUTION SUBCUTANEOUS at 08:14

## 2024-01-07 RX ADMIN — ACETAMINOPHEN 650 MG: 325 TABLET, FILM COATED ORAL at 14:42

## 2024-01-07 RX ADMIN — Medication 10 ML: at 20:43

## 2024-01-07 RX ADMIN — TRAZODONE HYDROCHLORIDE 50 MG: 50 TABLET ORAL at 01:19

## 2024-01-07 RX ADMIN — TRAZODONE HYDROCHLORIDE 50 MG: 50 TABLET ORAL at 20:43

## 2024-01-07 RX ADMIN — CETIRIZINE HYDROCHLORIDE 10 MG: 10 TABLET ORAL at 08:17

## 2024-01-07 NOTE — PROGRESS NOTES
[facility] Follow Up    Chief Complaint: Follow up CAD, ICMP    Interval History: No chest pain or shortness of breath.  Continues to have some tenderness of her right groin.    Objective:     Objective:  Temp:  [97.5 °F (36.4 °C)-98.2 °F (36.8 °C)] 98.1 °F (36.7 °C)  Heart Rate:  [103-108] 107  Resp:  [16] 16  BP: ()/(60-79) 110/79     Intake/Output Summary (Last 24 hours) at 1/7/2024 0751  Last data filed at 1/6/2024 1604  Gross per 24 hour   Intake 340 ml   Output --   Net 340 ml     Body mass index is 26.88 kg/m².      01/05/24  0525 01/06/24  0500 01/07/24  0559   Weight: 70.6 kg (155 lb 9.6 oz) 70.5 kg (155 lb 6.4 oz) 71 kg (156 lb 9.6 oz)     Weight change: 0.544 kg (1 lb 3.2 oz)      Physical Exam:   General : Alert, cooperative, in no acute distress.  Neuro: Alert,cooperative and oriented.  Lungs: CTAB. Normal respiratory effort and rate.  CV: Regular rate and rhythm, normal S1 and S2, no murmurs, gallops or rubs.  ABD: Soft, nontender, nondistended. Positive bowel sounds.  Extr: No edema or cyanosis, moves all extremities.    Lab Review:   Results from last 7 days   Lab Units 01/07/24  0438 01/06/24  0348   SODIUM mmol/L 140 139   POTASSIUM mmol/L 3.9 4.1   CHLORIDE mmol/L 106 108*   CO2 mmol/L 24.0 23.2   BUN mg/dL 12 14   CREATININE mg/dL 0.69 0.68   GLUCOSE mg/dL 156* 118*   CALCIUM mg/dL 8.7 8.6     Results from last 7 days   Lab Units 01/04/24  0740 01/04/24  0508   HSTROP T ng/L 263* 227*     Results from last 7 days   Lab Units 01/04/24  0508 01/03/24  1322 01/03/24  0331   WBC 10*3/mm3 13.81*  --  10.34   HEMOGLOBIN g/dL 12.1  --  12.7   HEMOGLOBIN, POC g/dL  --  12.9  --    HEMATOCRIT % 35.7  --  39.6   HEMATOCRIT POC %  --  38  --    PLATELETS 10*3/mm3 285  --  275         Results from last 7 days   Lab Units 01/07/24  0438 01/06/24  0348   MAGNESIUM mg/dL 1.7 1.7     Results from last 7 days   Lab Units 01/04/24  0508   CHOLESTEROL mg/dL 136   TRIGLYCERIDES mg/dL 107    HDL CHOL mg/dL 36*             I reviewed the patient's new clinical results.  I personally viewed and interpreted the patient's EKG  Current Medications:   Scheduled Meds:aspirin, 81 mg, Oral, Daily  busPIRone, 10 mg, Oral, Daily  cetirizine, 10 mg, Oral, Daily  clopidogrel, 75 mg, Oral, Daily  DULoxetine, 60 mg, Oral, BID  insulin glargine, 20 Units, Subcutaneous, BID  insulin lispro, 2-9 Units, Subcutaneous, 4x Daily AC & at Bedtime  insulin lispro, 8 Units, Subcutaneous, TID With Meals  pantoprazole, 40 mg, Oral, Q AM  pravastatin, 20 mg, Oral, Nightly  sertraline, 25 mg, Oral, Daily  sodium chloride, 10 mL, Intravenous, Q12H      Continuous Infusions:     Allergies:  Allergies   Allergen Reactions    Metformin Diarrhea    Ozempic [Semaglutide] GI Intolerance     Pt stopped due to stomach pain    Bydureon [Exenatide] Other (See Comments)     Site reaction    Statins Myalgia     Muscle aches    Xigduo Xr [Dapagliflozin Pro-Metformin Er] Diarrhea       Assessment/Plan:     1.  Multivessel coronary artery disease.  Presented with a non-STEMI on admission.  [place] to be too high risk for CABG.  She is now status post Impella supported high risk PCI with drug-eluting stent placement of the left main to proximal to mid LAD and mid left circumflex to proximal obtuse marginal branch on 1/3/2024.  She has residual severe stenosis of the right coronary artery with left-to-right collateral filling.  She is on aspirin and clopidogrel.  2.  Acute systolic congestive heart failure.  Volume status appears to be stable off of any diuretic therapy currently.    3.  Ischemic cardiomyopathy.  EF of less than 20%.  Blood pressures have been too low for guideline directed management.  4.  Insulin-dependent diabetes mellitus type II.  Since 2014, with neuropathy  5.  Mild to moderate mitral regurgitation  6.  Mild to moderate tricuspid regurgitation  7.  Tobacco use.  Quit on 12/18/2023  8.  Nonspecific right lower lobe groundglass  opacity and left apical opacity on CT (follow-up CT of the chest recommended in 3 months)   9.  Right adrenal adenoma by CT of the abdomen  10.  Dyslipidemia.  Statin intolerant but currently tolerating a low-dose of pravastatin.  12.  Gastroesophageal reflux disease  13.  Sinus tachycardia.  Largely stable.  Unable to treat with beta-blockers.  14. Nausea.  Improved.   15. Bilateral carotid stenosis.  Less than 50 % 12/29/23.    -Plan for PCI of the RCA tomorrow.  Discussed at length with the patient.  - Continue current cardiac management in the meanwhile.    Lilli Greene MD  01/07/24  07:51 EST

## 2024-01-07 NOTE — PROGRESS NOTES
Name: Jade Clement ADMIT: 2023   : 1970  PCP: Heavenly Monsivais MD    MRN: [id number] LOS: 12 days   AGE/SEX: 53 y.o. female  ROOM: [facility]/     Subjective   Subjective     She denies any chest pain, SOA, nausea, vomiting or diarrhea.        Objective   Objective   Vital Signs  Temp:  [97.5 °F (36.4 °C)-98.2 °F (36.8 °C)] 97.8 °F (36.6 °C)  Heart Rate:  [103-108] 106  Resp:  [16] 16  BP: ()/(60-79) 105/74  SpO2:  [91 %-98 %] 91 %  on   ;   Device (Oxygen Therapy): room air  Body mass index is 26.88 kg/m².  Physical Exam  Vitals and nursing note reviewed.   Constitutional:       Appearance: Normal appearance.   Pulmonary:      Effort: Pulmonary effort is normal.   Neurological:      Mental Status: She is alert. Mental status is at baseline.   Psychiatric:         Mood and Affect: Mood normal.         Results Review:       I reviewed the patient's new clinical results.  Results from last 7 days   Lab Units 24  0508 24  1322 24  0331   WBC 10*3/mm3 13.81*  --  10.34   HEMOGLOBIN g/dL 12.1  --  12.7   HEMOGLOBIN, POC g/dL  --  12.9  --    PLATELETS 10*3/mm3 285  --  275     Results from last 7 days   Lab Units 24  0438 24  0348 24  0254 24  0508   SODIUM mmol/L 140 139 139 137   POTASSIUM mmol/L 3.9 4.1 4.4 4.2   CHLORIDE mmol/L 106 108* 106 105   CO2 mmol/L 24.0 23.2 23.1 23.0   BUN mg/dL 12 14 10 13   CREATININE mg/dL 0.69 0.68 0.76 0.96   GLUCOSE mg/dL 156* 118* 144* 85   EGFR mL/min/1.73 102.6 103.0 92.7 70.0       Results from last 7 days   Lab Units 24  0438 24  0348 24  0254 24  0508   CALCIUM mg/dL 8.7 8.6 8.8 8.6   MAGNESIUM mg/dL 1.7 1.7 1.8 1.7       Glucose   Date/Time Value Ref Range Status   2024 1106 116 70 - 130 mg/dL Final   2024 0557 149 (H) 70 - 130 mg/dL Final   2024 2018 99 70 - 130 mg/dL Final   2024 1602 235 (H) 70 - 130 mg/dL Final   2024 1051 187 (H) 70 - 130 mg/dL Final    01/06/2024 0709 96 70 - 130 mg/dL Final   01/05/2024 2045 231 (H) 70 - 130 mg/dL Final       I have personally reviewed all medications:  Scheduled Medications  aspirin, 81 mg, Oral, Daily  busPIRone, 10 mg, Oral, Daily  cetirizine, 10 mg, Oral, Daily  clopidogrel, 75 mg, Oral, Daily  DULoxetine, 60 mg, Oral, BID  insulin glargine, 20 Units, Subcutaneous, BID  insulin lispro, 2-9 Units, Subcutaneous, 4x Daily AC & at Bedtime  insulin lispro, 8 Units, Subcutaneous, TID With Meals  pantoprazole, 40 mg, Oral, Q AM  pravastatin, 20 mg, Oral, Nightly  sertraline, 25 mg, Oral, Daily  sodium chloride, 10 mL, Intravenous, Q12H    Infusions   Diet  NPO Diet NPO Type: Sips with Meds  Diet: Cardiac Diets, Diabetic Diets; Healthy Heart (2-3 Na+); Consistent Carbohydrate; Texture: Regular Texture (IDDSI 7); Fluid Consistency: Thin (IDDSI 0)  NPO Diet NPO Type: Strict NPO, Sips with Meds    I have personally reviewed:  [x]  Laboratory   [x]  Microbiology   [x]  Radiology   []  EKG/Telemetry  []  Cardiology/Vascular   []  Pathology    []  Records       Assessment/Plan     Active Hospital Problems    Diagnosis  POA    **ACS (acute coronary syndrome) [I24.9]  Yes    Constipation [K59.00]  Yes    NOELLE (generalized anxiety disorder) [F41.1]  Yes    GERD without esophagitis [K21.9]  Yes    Type 2 diabetes mellitus with complications [E11.8]  Yes    Type 2 diabetes mellitus with microalbuminuria, with long-term current use of insulin [E11.29, R80.9, Z79.4]  Not Applicable      Resolved Hospital Problems   No resolved problems to display.       53 y.o. female who is s/p Percutaneous Coronary Intervention, Impella Insertion, Intravascular Ultrasound, Stent MERRITT coronary.    Type 2 DM  - complications include CAD and microalbuminuria  - BGs are acceptable  - continue lantus 20 units Q12H and lispro 8 units TID with meals  - continue ssi/hypoglycemia protocol coverage      Jarrett Julien MD  [facility]  01/07/24  11:38  EST

## 2024-01-08 LAB
ANION GAP SERPL CALCULATED.3IONS-SCNC: 9 MMOL/L (ref 5–15)
BUN SERPL-MCNC: 15 MG/DL (ref 6–20)
BUN/CREAT SERPL: 19.2 (ref 7–25)
CALCIUM SPEC-SCNC: 8.9 MG/DL (ref 8.6–10.5)
CHLORIDE SERPL-SCNC: 107 MMOL/L (ref 98–107)
CO2 SERPL-SCNC: 25 MMOL/L (ref 22–29)
CREAT SERPL-MCNC: 0.78 MG/DL (ref 0.57–1)
EGFRCR SERPLBLD CKD-EPI 2021: 89.8 ML/MIN/1.73
GLUCOSE BLDC GLUCOMTR-MCNC: 112 MG/DL (ref 70–130)
GLUCOSE BLDC GLUCOMTR-MCNC: 131 MG/DL (ref 70–130)
GLUCOSE BLDC GLUCOMTR-MCNC: 187 MG/DL (ref 70–130)
GLUCOSE SERPL-MCNC: 186 MG/DL (ref 65–99)
MAGNESIUM SERPL-MCNC: 1.8 MG/DL (ref 1.6–2.6)
POTASSIUM SERPL-SCNC: 4.6 MMOL/L (ref 3.5–5.2)
SODIUM SERPL-SCNC: 141 MMOL/L (ref 136–145)

## 2024-01-08 PROCEDURE — 92978 ENDOLUMINL IVUS OCT C 1ST: CPT | Performed by: INTERNAL MEDICINE

## 2024-01-08 PROCEDURE — 93010 ELECTROCARDIOGRAM REPORT: CPT | Performed by: INTERNAL MEDICINE

## 2024-01-08 PROCEDURE — 63710000001 INSULIN GLARGINE PER 5 UNITS: Performed by: INTERNAL MEDICINE

## 2024-01-08 PROCEDURE — 80048 BASIC METABOLIC PNL TOTAL CA: CPT | Performed by: INTERNAL MEDICINE

## 2024-01-08 PROCEDURE — 82948 REAGENT STRIP/BLOOD GLUCOSE: CPT

## 2024-01-08 PROCEDURE — C1894 INTRO/SHEATH, NON-LASER: HCPCS | Performed by: INTERNAL MEDICINE

## 2024-01-08 PROCEDURE — C1769 GUIDE WIRE: HCPCS | Performed by: INTERNAL MEDICINE

## 2024-01-08 PROCEDURE — 25010000002 FENTANYL CITRATE (PF) 50 MCG/ML SOLUTION: Performed by: INTERNAL MEDICINE

## 2024-01-08 PROCEDURE — C1874 STENT, COATED/COV W/DEL SYS: HCPCS | Performed by: INTERNAL MEDICINE

## 2024-01-08 PROCEDURE — 85347 COAGULATION TIME ACTIVATED: CPT

## 2024-01-08 PROCEDURE — C1887 CATHETER, GUIDING: HCPCS | Performed by: INTERNAL MEDICINE

## 2024-01-08 PROCEDURE — 25510000001 IOPAMIDOL PER 1 ML: Performed by: INTERNAL MEDICINE

## 2024-01-08 PROCEDURE — 25010000002 MIDAZOLAM PER 1 MG: Performed by: INTERNAL MEDICINE

## 2024-01-08 PROCEDURE — C1753 CATH, INTRAVAS ULTRASOUND: HCPCS | Performed by: INTERNAL MEDICINE

## 2024-01-08 PROCEDURE — B221Z2Z COMPUTERIZED TOMOGRAPHY (CT SCAN) OF MULTIPLE CORONARY ARTERIES USING INTRAVASCULAR OPTICAL COHERENCE: ICD-10-PCS | Performed by: INTERNAL MEDICINE

## 2024-01-08 PROCEDURE — 25010000002 HEPARIN (PORCINE) PER 1000 UNITS: Performed by: INTERNAL MEDICINE

## 2024-01-08 PROCEDURE — 99152 MOD SED SAME PHYS/QHP 5/>YRS: CPT | Performed by: INTERNAL MEDICINE

## 2024-01-08 PROCEDURE — 92928 PRQ TCAT PLMT NTRAC ST 1 LES: CPT | Performed by: INTERNAL MEDICINE

## 2024-01-08 PROCEDURE — 93005 ELECTROCARDIOGRAM TRACING: CPT | Performed by: INTERNAL MEDICINE

## 2024-01-08 PROCEDURE — 83735 ASSAY OF MAGNESIUM: CPT | Performed by: INTERNAL MEDICINE

## 2024-01-08 PROCEDURE — C9600 PERC DRUG-EL COR STENT SING: HCPCS | Performed by: INTERNAL MEDICINE

## 2024-01-08 PROCEDURE — 99232 SBSQ HOSP IP/OBS MODERATE 35: CPT | Performed by: INTERNAL MEDICINE

## 2024-01-08 PROCEDURE — C1725 CATH, TRANSLUMIN NON-LASER: HCPCS | Performed by: INTERNAL MEDICINE

## 2024-01-08 PROCEDURE — 027035Z DILATION OF CORONARY ARTERY, ONE ARTERY WITH TWO DRUG-ELUTING INTRALUMINAL DEVICES, PERCUTANEOUS APPROACH: ICD-10-PCS | Performed by: INTERNAL MEDICINE

## 2024-01-08 PROCEDURE — C1760 CLOSURE DEV, VASC: HCPCS | Performed by: INTERNAL MEDICINE

## 2024-01-08 PROCEDURE — 99153 MOD SED SAME PHYS/QHP EA: CPT | Performed by: INTERNAL MEDICINE

## 2024-01-08 DEVICE — XIENCE SKYPOINT™ EVEROLIMUS ELUTING CORONARY STENT SYSTEM 3.00 MM X 48 MM / RAPID-EXCHANGE
Type: IMPLANTABLE DEVICE | Site: CORONARY | Status: FUNCTIONAL
Brand: XIENCE SKYPOINT™

## 2024-01-08 DEVICE — XIENCE SKYPOINT™ EVEROLIMUS ELUTING CORONARY STENT SYSTEM 3.00 MM X 15 MM / RAPID-EXCHANGE
Type: IMPLANTABLE DEVICE | Site: CORONARY | Status: FUNCTIONAL
Brand: XIENCE SKYPOINT™

## 2024-01-08 RX ORDER — SODIUM CHLORIDE 9 MG/ML
INJECTION, SOLUTION INTRAVENOUS
Status: DISCONTINUED | OUTPATIENT
Start: 2024-01-08 | End: 2024-01-08

## 2024-01-08 RX ORDER — SODIUM CHLORIDE 9 MG/ML
50 INJECTION, SOLUTION INTRAVENOUS CONTINUOUS
Status: ACTIVE | OUTPATIENT
Start: 2024-01-08 | End: 2024-01-08

## 2024-01-08 RX ORDER — LIDOCAINE HYDROCHLORIDE 20 MG/ML
INJECTION, SOLUTION INFILTRATION; PERINEURAL
Status: DISCONTINUED | OUTPATIENT
Start: 2024-01-08 | End: 2024-01-08 | Stop reason: HOSPADM

## 2024-01-08 RX ORDER — MIDAZOLAM HYDROCHLORIDE 1 MG/ML
INJECTION INTRAMUSCULAR; INTRAVENOUS
Status: DISCONTINUED | OUTPATIENT
Start: 2024-01-08 | End: 2024-01-08 | Stop reason: HOSPADM

## 2024-01-08 RX ORDER — HEPARIN SODIUM 1000 [USP'U]/ML
INJECTION, SOLUTION INTRAVENOUS; SUBCUTANEOUS
Status: DISCONTINUED | OUTPATIENT
Start: 2024-01-08 | End: 2024-01-08 | Stop reason: HOSPADM

## 2024-01-08 RX ORDER — FENTANYL CITRATE 50 UG/ML
INJECTION, SOLUTION INTRAMUSCULAR; INTRAVENOUS
Status: DISCONTINUED | OUTPATIENT
Start: 2024-01-08 | End: 2024-01-08 | Stop reason: HOSPADM

## 2024-01-08 RX ORDER — CLOPIDOGREL BISULFATE 75 MG/1
TABLET ORAL
Status: DISCONTINUED | OUTPATIENT
Start: 2024-01-08 | End: 2024-01-08 | Stop reason: HOSPADM

## 2024-01-08 RX ADMIN — PRAVASTATIN SODIUM 20 MG: 20 TABLET ORAL at 21:11

## 2024-01-08 RX ADMIN — BUSPIRONE HYDROCHLORIDE 10 MG: 10 TABLET ORAL at 07:58

## 2024-01-08 RX ADMIN — INSULIN GLARGINE 20 UNITS: 100 INJECTION, SOLUTION SUBCUTANEOUS at 21:11

## 2024-01-08 RX ADMIN — ACETAMINOPHEN 650 MG: 325 TABLET, FILM COATED ORAL at 21:11

## 2024-01-08 RX ADMIN — Medication 5 MG: at 21:11

## 2024-01-08 RX ADMIN — Medication 10 ML: at 07:58

## 2024-01-08 RX ADMIN — ALPRAZOLAM 0.25 MG: 0.25 TABLET ORAL at 21:21

## 2024-01-08 RX ADMIN — DULOXETINE HYDROCHLORIDE 60 MG: 60 CAPSULE, DELAYED RELEASE ORAL at 21:11

## 2024-01-08 RX ADMIN — INSULIN GLARGINE 20 UNITS: 100 INJECTION, SOLUTION SUBCUTANEOUS at 07:57

## 2024-01-08 RX ADMIN — PANTOPRAZOLE SODIUM 40 MG: 40 TABLET, DELAYED RELEASE ORAL at 06:33

## 2024-01-08 RX ADMIN — ASPIRIN 81 MG: 81 TABLET, COATED ORAL at 07:56

## 2024-01-08 RX ADMIN — DULOXETINE HYDROCHLORIDE 60 MG: 60 CAPSULE, DELAYED RELEASE ORAL at 07:57

## 2024-01-08 RX ADMIN — SERTRALINE 25 MG: 25 TABLET, FILM COATED ORAL at 07:57

## 2024-01-08 RX ADMIN — CETIRIZINE HYDROCHLORIDE 10 MG: 10 TABLET ORAL at 07:57

## 2024-01-08 NOTE — PROGRESS NOTES
[facility] Follow Up    Chief Complaint: Follow up CAD    Interval History: No chest pain or dyspnea.      Objective:     Objective:  Temp:  [97.7 °F (36.5 °C)-98.3 °F (36.8 °C)] 97.7 °F (36.5 °C)  Heart Rate:  [105-111] 107  Resp:  [16-18] 18  BP: (103-111)/(61-77) 106/75     Intake/Output Summary (Last 24 hours) at 1/8/2024 0738  Last data filed at 1/7/2024 1159  Gross per 24 hour   Intake 720 ml   Output --   Net 720 ml     Body mass index is 27.24 kg/m².      01/06/24  0500 01/07/24  0559 01/08/24  0616   Weight: 70.5 kg (155 lb 6.4 oz) 71 kg (156 lb 9.6 oz) 72 kg (158 lb 11.2 oz)     Weight change: 0.953 kg (2 lb 1.6 oz)      Physical Exam:   General : Alert, cooperative, in no acute distress.  Neuro: Alert,cooperative and oriented.  Lungs: CTAB. Normal respiratory effort and rate.  CV: Regular rate and rhythm, normal S1 and S2, no murmurs, gallops or rubs.  ABD: Soft, nontender, nondistended. Positive bowel sounds.  Extr: No edema or cyanosis, moves all extremities.    Lab Review:   Results from last 7 days   Lab Units 01/08/24  0332 01/07/24  0438   SODIUM mmol/L 141 140   POTASSIUM mmol/L 4.6 3.9   CHLORIDE mmol/L 107 106   CO2 mmol/L 25.0 24.0   BUN mg/dL 15 12   CREATININE mg/dL 0.78 0.69   GLUCOSE mg/dL 186* 156*   CALCIUM mg/dL 8.9 8.7     Results from last 7 days   Lab Units 01/04/24  0740 01/04/24  0508   HSTROP T ng/L 263* 227*     Results from last 7 days   Lab Units 01/04/24  0508 01/03/24  1322 01/03/24  0331   WBC 10*3/mm3 13.81*  --  10.34   HEMOGLOBIN g/dL 12.1  --  12.7   HEMOGLOBIN, POC g/dL  --  12.9  --    HEMATOCRIT % 35.7  --  39.6   HEMATOCRIT POC %  --  38  --    PLATELETS 10*3/mm3 285  --  275         Results from last 7 days   Lab Units 01/08/24  0332 01/07/24  0438   MAGNESIUM mg/dL 1.8 1.7     Results from last 7 days   Lab Units 01/04/24  0508   CHOLESTEROL mg/dL 136   TRIGLYCERIDES mg/dL 107   HDL CHOL mg/dL 36*             I reviewed the patient's new  clinical results.  I personally viewed and interpreted the patient's EKG  Current Medications:   Scheduled Meds:aspirin, 81 mg, Oral, Daily  busPIRone, 10 mg, Oral, Daily  cetirizine, 10 mg, Oral, Daily  clopidogrel, 75 mg, Oral, Daily  DULoxetine, 60 mg, Oral, BID  insulin glargine, 20 Units, Subcutaneous, BID  insulin lispro, 2-9 Units, Subcutaneous, 4x Daily AC & at Bedtime  insulin lispro, 8 Units, Subcutaneous, TID With Meals  pantoprazole, 40 mg, Oral, Q AM  pravastatin, 20 mg, Oral, Nightly  sertraline, 25 mg, Oral, Daily  sodium chloride, 10 mL, Intravenous, Q12H      Continuous Infusions:     Allergies:  Allergies   Allergen Reactions    Metformin Diarrhea    Ozempic [Semaglutide] GI Intolerance     Pt stopped due to stomach pain    Bydureon [Exenatide] Other (See Comments)     Site reaction    Statins Myalgia     Muscle aches    Xigduo Xr [Dapagliflozin Pro-Metformin Er] Diarrhea       Assessment/Plan:      1.  Multivessel coronary artery disease.  Presented with a non-STEMI on admission.  [place] to be too high risk for CABG.  She is now status post Impella supported high risk PCI with drug-eluting stent placement of the left main to proximal to mid LAD and mid left circumflex to proximal obtuse marginal branch on 1/3/2024.  She has residual severe stenosis of the right coronary artery with left-to-right collateral filling.  She is on aspirin and clopidogrel.  2.  Acute systolic congestive heart failure.  Volume status appears to be stable off of any diuretic therapy currently.    3.  Ischemic cardiomyopathy.  EF of less than 20%.  Blood pressures have been too low for guideline directed management.  4.  Insulin-dependent diabetes mellitus type II.  Since 2014, with neuropathy  5.  Mild to moderate mitral regurgitation  6.  Mild to moderate tricuspid regurgitation  7.  Tobacco use.  Quit on 12/18/2023  8.  Nonspecific right lower lobe groundglass opacity and left apical opacity on CT (follow-up CT of the  chest recommended in 3 months)   9.  Right adrenal adenoma by CT of the abdomen  10.  Dyslipidemia.  Statin intolerant but currently tolerating a low-dose of pravastatin.  12.  Gastroesophageal reflux disease  13.  Sinus tachycardia.  Largely stable.  Unable to treat with beta-blockers.  14. Nausea.  Improved.   15. Bilateral carotid stenosis.  Less than 50 % 12/29/23.    -For staged PCI of the RCA today.    Lilli Greene MD  01/08/24  07:38 EST

## 2024-01-08 NOTE — PLAN OF CARE
Goal Outcome Evaluation:      Pt alert and oriented and oriented and follows commands. No c/o chest pain. Pt is NPO after MN for a scheduled PCI in am . Vitals stable, plan of care in progress

## 2024-01-09 ENCOUNTER — READMISSION MANAGEMENT (OUTPATIENT)
Dept: CALL CENTER | Facility: HOSPITAL | Age: 54
End: 2024-01-09
Payer: COMMERCIAL

## 2024-01-09 VITALS
HEART RATE: 110 BPM | RESPIRATION RATE: 16 BRPM | BODY MASS INDEX: 27.06 KG/M2 | WEIGHT: 158.5 LBS | TEMPERATURE: 98 F | DIASTOLIC BLOOD PRESSURE: 70 MMHG | OXYGEN SATURATION: 99 % | SYSTOLIC BLOOD PRESSURE: 101 MMHG | HEIGHT: 64 IN

## 2024-01-09 LAB
ANION GAP SERPL CALCULATED.3IONS-SCNC: 9 MMOL/L (ref 5–15)
BUN SERPL-MCNC: 12 MG/DL (ref 6–20)
BUN/CREAT SERPL: 15 (ref 7–25)
CALCIUM SPEC-SCNC: 8.3 MG/DL (ref 8.6–10.5)
CHLORIDE SERPL-SCNC: 108 MMOL/L (ref 98–107)
CHOLEST SERPL-MCNC: 109 MG/DL (ref 0–200)
CO2 SERPL-SCNC: 21 MMOL/L (ref 22–29)
CREAT SERPL-MCNC: 0.8 MG/DL (ref 0.57–1)
DEPRECATED RDW RBC AUTO: 38.8 FL (ref 37–54)
EGFRCR SERPLBLD CKD-EPI 2021: 87.1 ML/MIN/1.73
ERYTHROCYTE [DISTWIDTH] IN BLOOD BY AUTOMATED COUNT: 11.4 % (ref 12.3–15.4)
GLUCOSE BLDC GLUCOMTR-MCNC: 193 MG/DL (ref 70–130)
GLUCOSE BLDC GLUCOMTR-MCNC: 65 MG/DL (ref 70–130)
GLUCOSE BLDC GLUCOMTR-MCNC: 86 MG/DL (ref 70–130)
GLUCOSE SERPL-MCNC: 61 MG/DL (ref 65–99)
HCT VFR BLD AUTO: 31.1 % (ref 34–46.6)
HDLC SERPL-MCNC: 30 MG/DL (ref 40–60)
HGB BLD-MCNC: 10.1 G/DL (ref 12–15.9)
LDLC SERPL CALC-MCNC: 61 MG/DL (ref 0–100)
LDLC/HDLC SERPL: 2.01 {RATIO}
MAGNESIUM SERPL-MCNC: 1.6 MG/DL (ref 1.6–2.6)
MCH RBC QN AUTO: 30.3 PG (ref 26.6–33)
MCHC RBC AUTO-ENTMCNC: 32.5 G/DL (ref 31.5–35.7)
MCV RBC AUTO: 93.4 FL (ref 79–97)
PLATELET # BLD AUTO: 271 10*3/MM3 (ref 140–450)
PMV BLD AUTO: 9.2 FL (ref 6–12)
POTASSIUM SERPL-SCNC: 3.5 MMOL/L (ref 3.5–5.2)
QT INTERVAL: 333 MS
QT INTERVAL: 358 MS
QTC INTERVAL: 445 MS
QTC INTERVAL: 458 MS
RBC # BLD AUTO: 3.33 10*6/MM3 (ref 3.77–5.28)
SODIUM SERPL-SCNC: 138 MMOL/L (ref 136–145)
TRIGL SERPL-MCNC: 93 MG/DL (ref 0–150)
VLDLC SERPL-MCNC: 18 MG/DL (ref 5–40)
WBC NRBC COR # BLD AUTO: 10.03 10*3/MM3 (ref 3.4–10.8)

## 2024-01-09 PROCEDURE — 85027 COMPLETE CBC AUTOMATED: CPT | Performed by: INTERNAL MEDICINE

## 2024-01-09 PROCEDURE — 93005 ELECTROCARDIOGRAM TRACING: CPT | Performed by: INTERNAL MEDICINE

## 2024-01-09 PROCEDURE — 80061 LIPID PANEL: CPT | Performed by: INTERNAL MEDICINE

## 2024-01-09 PROCEDURE — 82948 REAGENT STRIP/BLOOD GLUCOSE: CPT

## 2024-01-09 PROCEDURE — 93010 ELECTROCARDIOGRAM REPORT: CPT | Performed by: INTERNAL MEDICINE

## 2024-01-09 PROCEDURE — 99239 HOSP IP/OBS DSCHRG MGMT >30: CPT | Performed by: NURSE PRACTITIONER

## 2024-01-09 PROCEDURE — 83735 ASSAY OF MAGNESIUM: CPT | Performed by: INTERNAL MEDICINE

## 2024-01-09 PROCEDURE — 80048 BASIC METABOLIC PNL TOTAL CA: CPT | Performed by: INTERNAL MEDICINE

## 2024-01-09 RX ORDER — CLOPIDOGREL BISULFATE 75 MG/1
75 TABLET ORAL DAILY
Qty: 30 TABLET | Refills: 0 | Status: SHIPPED | OUTPATIENT
Start: 2024-01-10

## 2024-01-09 RX ORDER — ASPIRIN 81 MG/1
81 TABLET ORAL DAILY
Qty: 90 TABLET | Refills: 3 | Status: SHIPPED | OUTPATIENT
Start: 2024-01-10

## 2024-01-09 RX ORDER — PRAVASTATIN SODIUM 20 MG
20 TABLET ORAL NIGHTLY
Qty: 90 TABLET | Refills: 3 | Status: SHIPPED | OUTPATIENT
Start: 2024-01-09

## 2024-01-09 RX ORDER — POTASSIUM CHLORIDE 750 MG/1
40 TABLET, FILM COATED, EXTENDED RELEASE ORAL EVERY 4 HOURS
Status: DISPENSED | OUTPATIENT
Start: 2024-01-09 | End: 2024-01-09

## 2024-01-09 RX ORDER — NITROGLYCERIN 0.4 MG/1
0.4 TABLET SUBLINGUAL
Qty: 100 TABLET | Refills: 0 | Status: SHIPPED | OUTPATIENT
Start: 2024-01-09

## 2024-01-09 RX ADMIN — ASPIRIN 81 MG: 81 TABLET, COATED ORAL at 08:25

## 2024-01-09 RX ADMIN — PANTOPRAZOLE SODIUM 40 MG: 40 TABLET, DELAYED RELEASE ORAL at 06:17

## 2024-01-09 RX ADMIN — POTASSIUM CHLORIDE 40 MEQ: 750 TABLET, EXTENDED RELEASE ORAL at 06:17

## 2024-01-09 RX ADMIN — SERTRALINE 25 MG: 25 TABLET, FILM COATED ORAL at 08:25

## 2024-01-09 RX ADMIN — Medication 10 ML: at 08:27

## 2024-01-09 RX ADMIN — TRAZODONE HYDROCHLORIDE 50 MG: 50 TABLET ORAL at 00:39

## 2024-01-09 RX ADMIN — BUSPIRONE HYDROCHLORIDE 10 MG: 10 TABLET ORAL at 08:25

## 2024-01-09 RX ADMIN — CETIRIZINE HYDROCHLORIDE 10 MG: 10 TABLET ORAL at 08:25

## 2024-01-09 RX ADMIN — DULOXETINE HYDROCHLORIDE 60 MG: 60 CAPSULE, DELAYED RELEASE ORAL at 08:25

## 2024-01-09 RX ADMIN — CLOPIDOGREL BISULFATE 75 MG: 75 TABLET, FILM COATED ORAL at 08:25

## 2024-01-09 NOTE — DISCHARGE SUMMARY
Patient Name: Jade Clement  :1970  53 y.o.    Date of Admit: 2023  Date of Discharge:  2024    Discharge Diagnosis:  Problems Addressed this Visit       * (Principal) ACS (acute coronary syndrome) - Primary    Relevant Medications    nitroglycerin (NITROSTAT) SL tablet 0.4 mg    clopidogrel (PLAVIX) tablet 75 mg    clopidogrel (PLAVIX) 75 MG tablet (Start on 1/10/2024)    nitroglycerin (NITROSTAT) 0.4 MG SL tablet    Other Relevant Orders    Case Request (Completed)     Other Visit Diagnoses       Controlled type 2 diabetes mellitus without complication, with long-term current use of insulin        Relevant Medications    dextrose (GLUTOSE) oral gel 15 g    glucagon (GLUCAGEN) injection 1 mg    insulin lispro (HUMALOG/ADMELOG) injection 2-9 Units    insulin lispro (HUMALOG/ADMELOG) injection 8 Units    insulin glargine (LANTUS, SEMGLEE) injection 20 Units    Coronary artery disease involving coronary bypass graft of native heart with unstable angina pectoris        Relevant Medications    nitroglycerin (NITROSTAT) SL tablet 0.4 mg    clopidogrel (PLAVIX) tablet 75 mg    clopidogrel (PLAVIX) 75 MG tablet (Start on 1/10/2024)    nitroglycerin (NITROSTAT) 0.4 MG SL tablet    Other Relevant Orders    Ambulatory Referral to Cardiac Rehab          Diagnoses         Codes Comments    ACS (acute coronary syndrome)    -  Primary ICD-10-CM: I24.9  ICD-9-CM: 411.1     Controlled type 2 diabetes mellitus without complication, with long-term current use of insulin     ICD-10-CM: E11.9, Z79.4  ICD-9-CM: 250.00, V58.67     Coronary artery disease involving coronary bypass graft of native heart with unstable angina pectoris     ICD-10-CM: I25.700  ICD-9-CM: 414.05, 411.1             Hospital Course:   This is a 54 y/o female with a pmhx of diabetes, hypertension, tobacco abuse and family history of coronary artery disease. She presented to the ED on 23 with worsening shortness of breath and orthopnea. She  reported that on 12/18/23 that she ate a bag of hot potato chips. Shortly after, she developed some epigastric discomfort and indigestion. Over the next several hours, she became short of breath and developed nausea and diarrhea. Her GI symptoms lasted a couple of days but her dyspnea continued. She went to her PCP who diagnosed her with a potential peptic ulcer and prescribed Carafate for treatment. Her symptoms continued through [place] and she eventually went to the ED due to the severity of her dyspnea. She also reported about a 10 lb weight gain over the course of about 1 week.     She presented to the ED at [facility] in [place]. EKG showed sinus tachycardia and a potential old anteroseptal infarct with Q waves in leads V1 through V3. Troponin was elevated at 748 and peaked at 843. ProBNP was 11,256. CT of the chest showed cardiomegaly, pulmonary edema, moderate right pleural effusion, and small left pleural effusion. She also had a right lower lobe groundglass opacity and smaller left apical opacity. Follow up CT in 3 months was recommended.  Her pulmonary artery was also dilated, suggestive of pulmonary hypertension.  Her CT scan of the abdomen and pelvis showed nonspecific edema and a right adrenal adenoma. Se was started on a heparin gtt and transferred to [facility].     She received IV diuresis and was started on a very small dose of beta blocker. An echocardiogram was obtained that showed multiple akinetic segments with an EF of 20% which was felt to be likely ischemic. It was also felt that she likely infarcted about a week prior, when her symptoms initially started. She was diuresed prior to undergoing a cardiac catheterization as she was not have unstable symptoms. On 12/28/23, she underwent a cardiac catheterization taht showed severe multivessel coronary artery disease with 80% ostial and 90% mid LAD stenosis, 80% ostial OM1 stenosis, and 95% proximal mid RCA stenosis  with left to right collateral filling. LVEDP 28-30 mmHg. She was referred to cardiac surgery for CABG along with possible mitral and tricuspid valve repair. Initially, the plan was for surgery following diuresis. However, Dr. Asif and Dr. Jerez reviewed her cath and echo again and did not feel she was operable. It was felt she would need either high risk PCI or transfer to a higher level of care facility with a dedicated heart failure program for possible LVAD.    On 1/3/24, she underwent high risk PCI with Impella support and stenting of the left main extending into the LAD, mid LAD and OM1 branch. She did well with the procedure. On 1/8/24, she underwent staged PCI and stent of the proximal RCA. She was started on clopidogrel and aspirin. She has been intolerant of statins in the past but was started on pravastatin during her stay and tolerated this ok. Unfortunately, GDMT for her ischemic cardiomyopathy has been severely limited by her hypotension. She was unable to tolerate even a low dose beta blocker. For this reason, she cannot be on an ACE inhibitor, ARB, Entresto, spironolactone or SGLT2 inhibitor.     She tells me this morning that she feels better than she has in weeks. She still has relatively low BP but has no complaints of dizziness or lightheadedness when ambulating. She denies any chest pain or exertional dyspnea as well. She is stable from a cardiac standpoint for discharge home and will follow up with me in 1 week. She will follow up with Dr. Greene in 6 weeks as well. We will reassess optimization of GDMT at her follow up visits and plan to repeat an echocardiogram in 3 months.       Procedures Performed  Procedure(s):  Percutaneous Coronary Intervention  Stent MERRITT coronary  Optical Coherence Tomography       Consults       Date and Time Order Name Status Description    12/27/2023  9:15 AM Inpatient Internal Medicine Consult Completed             Pertinent Test Results:   Results from last 7  days   Lab Units 01/09/24  0310   SODIUM mmol/L 138   POTASSIUM mmol/L 3.5   CHLORIDE mmol/L 108*   CO2 mmol/L 21.0*   BUN mg/dL 12   CREATININE mg/dL 0.80   CALCIUM mg/dL 8.3*   GLUCOSE mg/dL 61*     Results from last 7 days   Lab Units 01/04/24  0740 01/04/24  0508   HSTROP T ng/L 263* 227*     @LABRCNT(bnp)@  Results from last 7 days   Lab Units 01/09/24  0310   WBC 10*3/mm3 10.03   HEMOGLOBIN g/dL 10.1*   HEMATOCRIT % 31.1*   PLATELETS 10*3/mm3 271         Results from last 7 days   Lab Units 01/09/24  0310   MAGNESIUM mg/dL 1.6     Results from last 7 days   Lab Units 01/09/24  0310   CHOLESTEROL mg/dL 109   TRIGLYCERIDES mg/dL 93   HDL CHOL mg/dL 30*   LDL CHOL mg/dL 61       Condition on Discharge: stable    Discharge Medications     Discharge Medications        New Medications        Instructions Start Date   aspirin 81 MG EC tablet   81 mg, Oral, Daily   Start Date: January 10, 2024     clopidogrel 75 MG tablet  Commonly known as: PLAVIX   75 mg, Oral, Daily   Start Date: January 10, 2024     nitroglycerin 0.4 MG SL tablet  Commonly known as: NITROSTAT   0.4 mg, Sublingual, Every 5 Minutes PRN, Take no more than 3 doses in 15 minutes.      pravastatin 20 MG tablet  Commonly known as: PRAVACHOL   20 mg, Oral, Nightly             Changes to Medications        Instructions Start Date   Insulin Lispro (1 Unit Dial) 100 UNIT/ML solution pen-injector  Commonly known as: HumaLOG KwikPen  What changed: additional instructions   12 units 3 times daily with each meal.  Prime needle with 2 units before each use.             Continue These Medications        Instructions Start Date   aspirin-acetaminophen-caffeine 250-250-65 MG per tablet  Commonly known as: EXCEDRIN MIGRAINE   1 tablet, Oral, Every 8 Hours PRN      busPIRone 10 MG tablet  Commonly known as: BUSPAR   10 mg, Oral, Daily      cetirizine 10 MG tablet  Commonly known as: zyrTEC   1 tablet, Oral, Daily      Cymbalta 60 MG capsule  Generic drug: DULoxetine    60 mg, Oral, 2 Times Daily      ergocalciferol 1.25 MG (58851 UT) capsule  Commonly known as: ERGOCALCIFEROL   50,000 Units, Oral, Weekly      FreeStyle Balwinder 3 Sensor misc   1 each, Does not apply, Every 14 Days      Insulin Pen Needle 32G X 4 MM misc  Commonly known as: BD Pen Needle Ashwini U/F   Use to inject insulin 2 times daily      ondansetron ODT 4 MG disintegrating tablet  Commonly known as: ZOFRAN-ODT       sertraline 50 MG tablet  Commonly known as: ZOLOFT   25 mg, Oral, Daily      Tresiba FlexTouch 200 UNIT/ML solution pen-injector pen injection  Generic drug: Insulin Degludec   36 units every evening.  Prime needle with 4 units before each use.             Stop These Medications      hydroCHLOROthiazide 25 MG tablet  Commonly known as: HYDRODIURIL     omeprazole 20 MG capsule  Commonly known as: priLOSEC              Discharge Diet: Cardiac    Activity at Discharge: post angioplasty restrictions discussed    Discharge disposition: home    Follow-up Appointments  Future Appointments   Date Time Provider Department Center   1/16/2024  1:15 PM Lilli Greene MD [id number] None   1/25/2024  3:00 PM Radha eVla APRN [facility]   1/29/2024  3:00 PM Hesham Cheney MD [facility]   6/25/2024  3:00 PM Hesham Cheney MD [facility]     Additional Instructions for the Follow-ups that You Need to Schedule       Ambulatory Referral to Cardiac Rehab   As directed              Test Results Pending at Discharge       KEVIN Chopra, [facility]  01/09/24  09:11 EST    Time: Discharge 35 min  Electronically signed by KEVIN Chopra, 01/09/24, 9:11 AM EST.

## 2024-01-09 NOTE — PLAN OF CARE
Goal Outcome Evaluation:  Plan of Care Reviewed With: patient        Progress: improving  Outcome Evaluation: patient being d/c

## 2024-01-09 NOTE — PROGRESS NOTES
Name: Jade Clement ADMIT: 2023   : 1970  PCP: Heavenly Monsivais MD    MRN: [id number] LOS: 14 days   AGE/SEX: 53 y.o. female  ROOM: [facility]/     Subjective   Subjective     She denies any chest pain, SOA, nausea, vomiting or diarrhea. Relatively hypoglycemic this morning but didn't have symptoms and improved with some OJ.        Objective   Objective   Vital Signs  Temp:  [97.5 °F (36.4 °C)-98.3 °F (36.8 °C)] 97.5 °F (36.4 °C)  Heart Rate:  [105-112] 105  Resp:  [16] 16  BP: ()/(56-77) 95/63  SpO2:  [94 %-98 %] 98 %  on   ;   Device (Oxygen Therapy): room air  Body mass index is 27.21 kg/m².  Physical Exam  Vitals and nursing note reviewed.   Constitutional:       Appearance: Normal appearance.   Pulmonary:      Effort: Pulmonary effort is normal.   Neurological:      Mental Status: She is alert. Mental status is at baseline.   Psychiatric:         Mood and Affect: Mood normal.         Results Review:       I reviewed the patient's new clinical results.  Results from last 7 days   Lab Units 24  0310 24  0508 24  1322 24  0331   WBC 10*3/mm3 10.03 13.81*  --  10.34   HEMOGLOBIN g/dL 10.1* 12.1  --  12.7   HEMOGLOBIN, POC g/dL  --   --  12.9  --    PLATELETS 10*3/mm3 271 285  --  275     Results from last 7 days   Lab Units 24  0310 24  0332 24  0438 24  0348   SODIUM mmol/L 138 141 140 139   POTASSIUM mmol/L 3.5 4.6 3.9 4.1   CHLORIDE mmol/L 108* 107 106 108*   CO2 mmol/L 21.0* 25.0 24.0 23.2   BUN mg/dL 12 15 12 14   CREATININE mg/dL 0.80 0.78 0.69 0.68   GLUCOSE mg/dL 61* 186* 156* 118*   EGFR mL/min/1.73 87.1 89.8 102.6 103.0       Results from last 7 days   Lab Units 24  0310 24  0332 24  0438 24  0348   CALCIUM mg/dL 8.3* 8.9 8.7 8.6   MAGNESIUM mg/dL 1.6 1.8 1.7 1.7       Glucose   Date/Time Value Ref Range Status   2024 1037 193 (H) 70 - 130 mg/dL Final   2024 0641 86 70 - 130 mg/dL Final   2024  0611 65 (L) 70 - 130 mg/dL Final   01/08/2024 2026 131 (H) 70 - 130 mg/dL Final   01/08/2024 1547 112 70 - 130 mg/dL Final   01/08/2024 0603 187 (H) 70 - 130 mg/dL Final   01/07/2024 2007 170 (H) 70 - 130 mg/dL Final       I have personally reviewed all medications:  Scheduled Medications  aspirin, 81 mg, Oral, Daily  busPIRone, 10 mg, Oral, Daily  cetirizine, 10 mg, Oral, Daily  clopidogrel, 75 mg, Oral, Daily  DULoxetine, 60 mg, Oral, BID  insulin glargine, 20 Units, Subcutaneous, BID  insulin lispro, 2-9 Units, Subcutaneous, 4x Daily AC & at Bedtime  insulin lispro, 8 Units, Subcutaneous, TID With Meals  pantoprazole, 40 mg, Oral, Q AM  potassium chloride ER, 40 mEq, Oral, Q4H  pravastatin, 20 mg, Oral, Nightly  sertraline, 25 mg, Oral, Daily  sodium chloride, 10 mL, Intravenous, Q12H    Infusions   Diet  Diet: Cardiac Diets, Diabetic Diets; Healthy Heart (2-3 Na+); Consistent Carbohydrate; Texture: Regular Texture (IDDSI 7); Fluid Consistency: Thin (IDDSI 0)    I have personally reviewed:  [x]  Laboratory   [x]  Microbiology   [x]  Radiology   []  EKG/Telemetry  [x]  Cardiology/Vascular   []  Pathology    []  Records       Assessment/Plan     Active Hospital Problems    Diagnosis  POA    **ACS (acute coronary syndrome) [I24.9]  Yes    Constipation [K59.00]  Yes    NOELLE (generalized anxiety disorder) [F41.1]  Yes    GERD without esophagitis [K21.9]  Yes    Type 2 diabetes mellitus with complications [E11.8]  Yes    Type 2 diabetes mellitus with microalbuminuria, with long-term current use of insulin [E11.29, R80.9, Z79.4]  Not Applicable      Resolved Hospital Problems   No resolved problems to display.       53 y.o. female who is s/p Percutaneous Coronary Intervention, Impella Insertion, Intravascular Ultrasound, Stent MERRITT coronary.    Type 2 DM  - complications include CAD and microalbuminuria  - BGs are acceptable  - continue lantus 20 units Q12H and lispro 8 units TID with meals  - continue ssi/hypoglycemia  protocol coverage    No objection to discharge. A1c 7.7%- agree with dc on previous regimen.       Niya Vitale MD  [facility]  01/09/24  10:59 EST

## 2024-01-10 LAB
ACT BLD: 244 SECONDS (ref 82–152)
ACT BLD: 260 SECONDS (ref 82–152)

## 2024-01-10 NOTE — OUTREACH NOTE
Prep Survey      Flowsheet Row Responses   [facility] facility patient discharged from? [facility]   Is LACE score < 7 ? No   Eligibility Readm Mgmt   Discharge diagnosis Acute coronary syndrome-left heart cath this visit   Does the patient have one of the following disease processes/diagnoses(primary or secondary)? Other   Does the patient have Home health ordered? No   Is there a DME ordered? No   Prep survey completed? Yes            ABDON DOMINIQUE - Registered Nurse

## 2024-01-11 DIAGNOSIS — R80.9 TYPE 2 DIABETES MELLITUS WITH MICROALBUMINURIA, WITH LONG-TERM CURRENT USE OF INSULIN: ICD-10-CM

## 2024-01-11 DIAGNOSIS — E11.29 TYPE 2 DIABETES MELLITUS WITH MICROALBUMINURIA, WITH LONG-TERM CURRENT USE OF INSULIN: ICD-10-CM

## 2024-01-11 DIAGNOSIS — Z79.4 TYPE 2 DIABETES MELLITUS WITH MICROALBUMINURIA, WITH LONG-TERM CURRENT USE OF INSULIN: ICD-10-CM

## 2024-01-11 RX ORDER — BLOOD-GLUCOSE SENSOR
1 EACH MISCELLANEOUS
Qty: 6 EACH | Refills: 3 | Status: SHIPPED | OUTPATIENT
Start: 2024-01-11

## 2024-01-13 ENCOUNTER — HOSPITAL ENCOUNTER (OUTPATIENT)
Facility: HOSPITAL | Age: 54
Discharge: HOME | End: 2024-01-14
Attending: HOSPITALIST | Admitting: NURSE PRACTITIONER
Payer: MEDICAID

## 2024-01-13 PROBLEM — J81.0 ACUTE PULMONARY EDEMA: Status: ACTIVE | Noted: 2024-01-13

## 2024-01-13 PROBLEM — J90 PLEURAL EFFUSION: Status: ACTIVE | Noted: 2024-01-13

## 2024-01-13 PROBLEM — I50.43 ACUTE ON CHRONIC COMBINED SYSTOLIC (CONGESTIVE) AND DIASTOLIC (CONGESTIVE) HEART FAILURE: Status: ACTIVE | Noted: 2024-01-13

## 2024-01-13 LAB
ANION GAP SERPL CALCULATED.3IONS-SCNC: 13.8 MMOL/L (ref 5–15)
BASOPHILS # BLD AUTO: 0.12 10*3/MM3 (ref 0–0.2)
BASOPHILS NFR BLD AUTO: 0.9 % (ref 0–1.5)
BUN SERPL-MCNC: 16 MG/DL (ref 6–20)
BUN/CREAT SERPL: 20.8 (ref 7–25)
CALCIUM SPEC-SCNC: 9.2 MG/DL (ref 8.6–10.5)
CHLORIDE SERPL-SCNC: 102 MMOL/L (ref 98–107)
CO2 SERPL-SCNC: 21.2 MMOL/L (ref 22–29)
CREAT SERPL-MCNC: 0.77 MG/DL (ref 0.57–1)
DEPRECATED RDW RBC AUTO: 40.7 FL (ref 37–54)
EGFRCR SERPLBLD CKD-EPI 2021: 90.7 ML/MIN/1.73
EOSINOPHIL # BLD AUTO: 0.45 10*3/MM3 (ref 0–0.4)
EOSINOPHIL NFR BLD AUTO: 3.4 % (ref 0.3–6.2)
ERYTHROCYTE [DISTWIDTH] IN BLOOD BY AUTOMATED COUNT: 11.4 % (ref 12.3–15.4)
GEN 5 2HR TROPONIN T REFLEX: 50 NG/L
GLUCOSE BLDC GLUCOMTR-MCNC: 192 MG/DL (ref 70–130)
GLUCOSE BLDC GLUCOMTR-MCNC: 205 MG/DL (ref 70–130)
GLUCOSE BLDC GLUCOMTR-MCNC: 212 MG/DL (ref 70–130)
GLUCOSE BLDC GLUCOMTR-MCNC: 226 MG/DL (ref 70–130)
GLUCOSE SERPL-MCNC: 227 MG/DL (ref 65–99)
HCT VFR BLD AUTO: 34.9 % (ref 34–46.6)
HGB BLD-MCNC: 11.5 G/DL (ref 12–15.9)
IMM GRANULOCYTES # BLD AUTO: 0.04 10*3/MM3 (ref 0–0.05)
IMM GRANULOCYTES NFR BLD AUTO: 0.3 % (ref 0–0.5)
LYMPHOCYTES # BLD AUTO: 4.15 10*3/MM3 (ref 0.7–3.1)
LYMPHOCYTES NFR BLD AUTO: 31.5 % (ref 19.6–45.3)
MAGNESIUM SERPL-MCNC: 1.8 MG/DL (ref 1.6–2.6)
MCH RBC QN AUTO: 32 PG (ref 26.6–33)
MCHC RBC AUTO-ENTMCNC: 33 G/DL (ref 31.5–35.7)
MCV RBC AUTO: 97.2 FL (ref 79–97)
MONOCYTES # BLD AUTO: 0.78 10*3/MM3 (ref 0.1–0.9)
MONOCYTES NFR BLD AUTO: 5.9 % (ref 5–12)
NEUTROPHILS NFR BLD AUTO: 58 % (ref 42.7–76)
NEUTROPHILS NFR BLD AUTO: 7.64 10*3/MM3 (ref 1.7–7)
NRBC BLD AUTO-RTO: 0 /100 WBC (ref 0–0.2)
PLATELET # BLD AUTO: 341 10*3/MM3 (ref 140–450)
PMV BLD AUTO: 9.2 FL (ref 6–12)
POTASSIUM SERPL-SCNC: 4.1 MMOL/L (ref 3.5–5.2)
QT INTERVAL: 327 MS
QTC INTERVAL: 447 MS
RBC # BLD AUTO: 3.59 10*6/MM3 (ref 3.77–5.28)
SODIUM SERPL-SCNC: 137 MMOL/L (ref 136–145)
TROPONIN T DELTA: 1 NG/L
TROPONIN T SERPL HS-MCNC: 49 NG/L
WBC NRBC COR # BLD AUTO: 13.18 10*3/MM3 (ref 3.4–10.8)

## 2024-01-13 PROCEDURE — 25010000002 FUROSEMIDE PER 20 MG: Performed by: NURSE PRACTITIONER

## 2024-01-13 PROCEDURE — 63710000001 INSULIN GLARGINE PER 5 UNITS: Performed by: STUDENT IN AN ORGANIZED HEALTH CARE EDUCATION/TRAINING PROGRAM

## 2024-01-13 PROCEDURE — 96376 TX/PRO/DX INJ SAME DRUG ADON: CPT

## 2024-01-13 PROCEDURE — 85025 COMPLETE CBC W/AUTO DIFF WBC: CPT | Performed by: NURSE PRACTITIONER

## 2024-01-13 PROCEDURE — 82948 REAGENT STRIP/BLOOD GLUCOSE: CPT

## 2024-01-13 PROCEDURE — 96374 THER/PROPH/DIAG INJ IV PUSH: CPT

## 2024-01-13 PROCEDURE — 83735 ASSAY OF MAGNESIUM: CPT | Performed by: NURSE PRACTITIONER

## 2024-01-13 PROCEDURE — 93005 ELECTROCARDIOGRAM TRACING: CPT | Performed by: INTERNAL MEDICINE

## 2024-01-13 PROCEDURE — 80048 BASIC METABOLIC PNL TOTAL CA: CPT | Performed by: NURSE PRACTITIONER

## 2024-01-13 PROCEDURE — 63710000001 INSULIN LISPRO (HUMAN) PER 5 UNITS: Performed by: NURSE PRACTITIONER

## 2024-01-13 PROCEDURE — 25010000002 FUROSEMIDE PER 20 MG: Performed by: STUDENT IN AN ORGANIZED HEALTH CARE EDUCATION/TRAINING PROGRAM

## 2024-01-13 PROCEDURE — 84484 ASSAY OF TROPONIN QUANT: CPT | Performed by: NURSE PRACTITIONER

## 2024-01-13 RX ORDER — ACETAMINOPHEN 650 MG/1
650 SUPPOSITORY RECTAL EVERY 4 HOURS PRN
Status: DISCONTINUED | OUTPATIENT
Start: 2024-01-13 | End: 2024-01-14 | Stop reason: HOSPADM

## 2024-01-13 RX ORDER — INSULIN LISPRO 100 [IU]/ML
2-7 INJECTION, SOLUTION INTRAVENOUS; SUBCUTANEOUS
Status: DISCONTINUED | OUTPATIENT
Start: 2024-01-13 | End: 2024-01-14 | Stop reason: HOSPADM

## 2024-01-13 RX ORDER — ACETAMINOPHEN 325 MG/1
650 TABLET ORAL EVERY 4 HOURS PRN
Status: DISCONTINUED | OUTPATIENT
Start: 2024-01-13 | End: 2024-01-14 | Stop reason: HOSPADM

## 2024-01-13 RX ORDER — CLOPIDOGREL BISULFATE 75 MG/1
75 TABLET ORAL DAILY
Status: DISCONTINUED | OUTPATIENT
Start: 2024-01-13 | End: 2024-01-14 | Stop reason: HOSPADM

## 2024-01-13 RX ORDER — SODIUM CHLORIDE 0.9 % (FLUSH) 0.9 %
10 SYRINGE (ML) INJECTION AS NEEDED
Status: DISCONTINUED | OUTPATIENT
Start: 2024-01-13 | End: 2024-01-14 | Stop reason: HOSPADM

## 2024-01-13 RX ORDER — BISACODYL 10 MG
10 SUPPOSITORY, RECTAL RECTAL DAILY PRN
Status: DISCONTINUED | OUTPATIENT
Start: 2024-01-13 | End: 2024-01-14 | Stop reason: HOSPADM

## 2024-01-13 RX ORDER — IBUPROFEN 600 MG/1
1 TABLET ORAL
Status: DISCONTINUED | OUTPATIENT
Start: 2024-01-13 | End: 2024-01-14 | Stop reason: HOSPADM

## 2024-01-13 RX ORDER — NITROGLYCERIN 0.4 MG/1
0.4 TABLET SUBLINGUAL
Status: DISCONTINUED | OUTPATIENT
Start: 2024-01-13 | End: 2024-01-14 | Stop reason: HOSPADM

## 2024-01-13 RX ORDER — FUROSEMIDE 10 MG/ML
40 INJECTION INTRAMUSCULAR; INTRAVENOUS EVERY 12 HOURS
Status: DISCONTINUED | OUTPATIENT
Start: 2024-01-13 | End: 2024-01-13

## 2024-01-13 RX ORDER — AMOXICILLIN 250 MG
2 CAPSULE ORAL 2 TIMES DAILY
Status: DISCONTINUED | OUTPATIENT
Start: 2024-01-13 | End: 2024-01-14 | Stop reason: HOSPADM

## 2024-01-13 RX ORDER — BUSPIRONE HYDROCHLORIDE 10 MG/1
10 TABLET ORAL DAILY
Status: DISCONTINUED | OUTPATIENT
Start: 2024-01-13 | End: 2024-01-14 | Stop reason: HOSPADM

## 2024-01-13 RX ORDER — POLYETHYLENE GLYCOL 3350 17 G/17G
17 POWDER, FOR SOLUTION ORAL DAILY PRN
Status: DISCONTINUED | OUTPATIENT
Start: 2024-01-13 | End: 2024-01-14 | Stop reason: HOSPADM

## 2024-01-13 RX ORDER — DEXTROSE MONOHYDRATE 25 G/50ML
25 INJECTION, SOLUTION INTRAVENOUS
Status: DISCONTINUED | OUTPATIENT
Start: 2024-01-13 | End: 2024-01-14 | Stop reason: HOSPADM

## 2024-01-13 RX ORDER — ONDANSETRON 2 MG/ML
4 INJECTION INTRAMUSCULAR; INTRAVENOUS EVERY 6 HOURS PRN
Status: DISCONTINUED | OUTPATIENT
Start: 2024-01-13 | End: 2024-01-14 | Stop reason: HOSPADM

## 2024-01-13 RX ORDER — FUROSEMIDE 10 MG/ML
40 INJECTION INTRAMUSCULAR; INTRAVENOUS ONCE
Status: COMPLETED | OUTPATIENT
Start: 2024-01-13 | End: 2024-01-13

## 2024-01-13 RX ORDER — COVID-19 ANTIGEN TEST
220 KIT MISCELLANEOUS DAILY PRN
COMMUNITY

## 2024-01-13 RX ORDER — SODIUM CHLORIDE 9 MG/ML
40 INJECTION, SOLUTION INTRAVENOUS AS NEEDED
Status: DISCONTINUED | OUTPATIENT
Start: 2024-01-13 | End: 2024-01-14 | Stop reason: HOSPADM

## 2024-01-13 RX ORDER — ASPIRIN 81 MG/1
81 TABLET ORAL DAILY
Status: DISCONTINUED | OUTPATIENT
Start: 2024-01-13 | End: 2024-01-14 | Stop reason: HOSPADM

## 2024-01-13 RX ORDER — NICOTINE POLACRILEX 4 MG
15 LOZENGE BUCCAL
Status: DISCONTINUED | OUTPATIENT
Start: 2024-01-13 | End: 2024-01-14 | Stop reason: HOSPADM

## 2024-01-13 RX ORDER — ACETAMINOPHEN 160 MG/5ML
650 SOLUTION ORAL EVERY 4 HOURS PRN
Status: DISCONTINUED | OUTPATIENT
Start: 2024-01-13 | End: 2024-01-14 | Stop reason: HOSPADM

## 2024-01-13 RX ORDER — SODIUM CHLORIDE 0.9 % (FLUSH) 0.9 %
10 SYRINGE (ML) INJECTION EVERY 12 HOURS SCHEDULED
Status: DISCONTINUED | OUTPATIENT
Start: 2024-01-13 | End: 2024-01-14 | Stop reason: HOSPADM

## 2024-01-13 RX ORDER — DULOXETIN HYDROCHLORIDE 60 MG/1
60 CAPSULE, DELAYED RELEASE ORAL 2 TIMES DAILY
Status: DISCONTINUED | OUTPATIENT
Start: 2024-01-13 | End: 2024-01-14 | Stop reason: HOSPADM

## 2024-01-13 RX ORDER — SERTRALINE HYDROCHLORIDE 25 MG/1
25 TABLET, FILM COATED ORAL DAILY
Status: DISCONTINUED | OUTPATIENT
Start: 2024-01-13 | End: 2024-01-14 | Stop reason: HOSPADM

## 2024-01-13 RX ORDER — BISACODYL 5 MG/1
5 TABLET, DELAYED RELEASE ORAL DAILY PRN
Status: DISCONTINUED | OUTPATIENT
Start: 2024-01-13 | End: 2024-01-14 | Stop reason: HOSPADM

## 2024-01-13 RX ORDER — CETIRIZINE HYDROCHLORIDE 10 MG/1
10 TABLET ORAL DAILY
Status: DISCONTINUED | OUTPATIENT
Start: 2024-01-13 | End: 2024-01-14 | Stop reason: HOSPADM

## 2024-01-13 RX ORDER — PRAVASTATIN SODIUM 20 MG
20 TABLET ORAL NIGHTLY
Status: DISCONTINUED | OUTPATIENT
Start: 2024-01-13 | End: 2024-01-14 | Stop reason: HOSPADM

## 2024-01-13 RX ADMIN — PRAVASTATIN SODIUM 20 MG: 20 TABLET ORAL at 21:28

## 2024-01-13 RX ADMIN — INSULIN GLARGINE 20 UNITS: 100 INJECTION, SOLUTION SUBCUTANEOUS at 21:25

## 2024-01-13 RX ADMIN — SERTRALINE 25 MG: 25 TABLET, FILM COATED ORAL at 10:37

## 2024-01-13 RX ADMIN — BUSPIRONE HYDROCHLORIDE 10 MG: 10 TABLET ORAL at 10:37

## 2024-01-13 RX ADMIN — ASPIRIN 81 MG: 81 TABLET, COATED ORAL at 10:37

## 2024-01-13 RX ADMIN — FUROSEMIDE 40 MG: 10 INJECTION, SOLUTION INTRAMUSCULAR; INTRAVENOUS at 17:56

## 2024-01-13 RX ADMIN — INSULIN LISPRO 2 UNITS: 100 INJECTION, SOLUTION INTRAVENOUS; SUBCUTANEOUS at 17:56

## 2024-01-13 RX ADMIN — INSULIN LISPRO 3 UNITS: 100 INJECTION, SOLUTION INTRAVENOUS; SUBCUTANEOUS at 21:28

## 2024-01-13 RX ADMIN — Medication 10 ML: at 10:39

## 2024-01-13 RX ADMIN — CLOPIDOGREL BISULFATE 75 MG: 75 TABLET, FILM COATED ORAL at 10:38

## 2024-01-13 RX ADMIN — INSULIN LISPRO 3 UNITS: 100 INJECTION, SOLUTION INTRAVENOUS; SUBCUTANEOUS at 06:54

## 2024-01-13 RX ADMIN — Medication 10 ML: at 21:29

## 2024-01-13 RX ADMIN — ACETAMINOPHEN 650 MG: 325 TABLET, FILM COATED ORAL at 21:34

## 2024-01-13 RX ADMIN — CETIRIZINE HYDROCHLORIDE 10 MG: 10 TABLET ORAL at 10:37

## 2024-01-13 RX ADMIN — INSULIN LISPRO 3 UNITS: 100 INJECTION, SOLUTION INTRAVENOUS; SUBCUTANEOUS at 12:38

## 2024-01-13 RX ADMIN — DULOXETINE HYDROCHLORIDE 60 MG: 60 CAPSULE, DELAYED RELEASE ORAL at 10:37

## 2024-01-13 RX ADMIN — Medication 12.5 MG: at 21:33

## 2024-01-13 RX ADMIN — DULOXETINE HYDROCHLORIDE 60 MG: 60 CAPSULE, DELAYED RELEASE ORAL at 21:29

## 2024-01-13 RX ADMIN — FUROSEMIDE 40 MG: 40 INJECTION, SOLUTION INTRAMUSCULAR; INTRAVENOUS at 06:54

## 2024-01-13 NOTE — CONSULTS
Date of Consultation: 24    Referral Provider: Dr. Vitale     Reason for Consultation: CHF exacerbation.    Encounter Provider: Billy Esquivel MD    Group of Service: [facility]     Patient Name: Jade Clement    :1970    Chief complaint: Shortness of breath.    History of Present Illness:      This is a very pleasant 53 year-old female with a history of insulin dependent diabetes, prior tobacco use, coronary artery disease, and CHF.    She presented to the emergency department at [facility] in [place], [place] on 2023.  Her initial EKG showed sinus tachycardia and a potential old anteroseptal infarct with Q waves noted in leads V1 through V3.  Her initial troponin was noted to be elevated in the setting of normal renal function at 748.  This later peaked at 843.  She was found to be in congestive heart failure, and her proBNP was 11,256.  She did have a CT of the chest without contrast which showed cardiomegaly, pulmonary edema, moderate right pleural effusion, and small left pleural effusion.  She also had a right lower lobe groundglass opacity and smaller left apical opacity, and follow-up CT scan was recommended in 3 months.  Her pulmonary artery was also dilated, suggestive of pulmonary hypertension.  Her CT scan of the abdomen and pelvis showed nonspecific edema and a right adrenal adenoma.     She was treated with aspirin, Lasix 40 mg IV, and a cardiac heparin protocol drip.  She was then transferred to [facility] for further care.  Echocardiogram revealed multiple akinetic segments with an EF of 20%.      On , he underwent cardiac catheterization that revealed multi-vessel disease including 80% ostial and 90% mid LAD, 80% ostial OM1 and 95% proximal and mid RCA stenosis.  She was referred to surgery for CABG, mitral and tricuspid valve repair but was felt to be inoperable by Dr. Asif and Dr. Jerez.      On  January 3, she underwent successful PCI with Impella suipport and stenting of the left main into the LAD, mid LAD and OM1.    On January 8, she  underwent successful PCI and stent to the proximal RCA.  She was discharged on January 9 on clopidogrel, aspirin and pravastatin.  GDMT was limited due to hypotension.  She was unable to tolerate low dose beta blocker.      She was transferred from an outlying hospital early this morning after presenting with 2 days of shortness of breath, orthopnea and mild chest pain.  CXR revealed vascular congestion and small bilateral effusions.  She has received IV Lasix and is already doing much better.  She has not had any significant chest pain.  She was on room air when I saw her.        ECHO 12-27-23    The left ventricular cavity is mildly dilated.    There is akinesis of the entire septum, the apex, mid to distal anterior wall, and mid to distal lateral wall    Left ventricular systolic function is severely decreased. Estimated left ventricular EF = 20%    There is a trivial pericardial effusion.    There is a small left pleural effusion.    Left ventricular diastolic function was indeterminate    Normal right ventricular cavity size and systolic function noted.    The left atrial cavity is mildly dilated.    Mild mitral valve regurgitation is present    Mild to moderate tricuspid valve regurgitation is present.    Calculated right ventricular systolic pressure from tricuspid regurgitation is 41 mmHg.    There is a trivial pericardial effusion. There is a small left pleural effusio      Past Medical History:   Diagnosis Date    Adrenal adenoma     Anxiety     Diabetes mellitus     Diarrhea     GERD (gastroesophageal reflux disease)     Gestational diabetes 2000    Head ache     Hyperlipidemia     Hypertension     Kidney stone 1985    Nausea     Type 2 diabetes mellitus     Urinary tract infection 1986    Frequently    Varicella Unknown    Vitamin D deficiency          Past  Surgical History:   Procedure Laterality Date    CARDIAC CATHETERIZATION N/A 2023    Procedure: Left Heart Cath;  Surgeon: Lilli Greene MD;  Location:  [facility];  Service: Cardiovascular;  Laterality: N/A;    CARDIAC CATHETERIZATION N/A 2023    Procedure: Coronary angiography;  Surgeon: Lilli Greene MD;  Location:  [facility];  Service: Cardiovascular;  Laterality: N/A;    CARDIAC CATHETERIZATION N/A 1/3/2024    Procedure: Percutaneous Coronary Intervention;  Surgeon: Lilli Greene MD;  Location:  [facility];  Service: Cardiology;  Laterality: N/A;    CARDIAC CATHETERIZATION N/A 1/3/2024    Procedure: Stent MERRITT coronary;  Surgeon: Lilli Greene MD;  Location:  [facility];  Service: Cardiology;  Laterality: N/A;    CARDIAC CATHETERIZATION N/A 2024    Procedure: Percutaneous Coronary Intervention;  Surgeon: Lilli Greene MD;  [facility];  Service: Cardiology;  Laterality: N/A;    CARDIAC CATHETERIZATION N/A 2024    Procedure: Stent MERRITT coronary;  Surgeon: Lilli Greene MD;  Location:  [facility];  Service: Cardiology;  Laterality: N/A;    CARDIAC CATHETERIZATION N/A 2024    Procedure: Optical Coherence Tomography;  Surgeon: Lilli Greene MD;  Location:  [facility];  Service: Cardiology;  Laterality: N/A;    CARDIAC ELECTROPHYSIOLOGY PROCEDURE  1/3/2024    Procedure: Impella Insertion;  Surgeon: Lilli Greene MD;  [facility];  Service: Cardiology;;     SECTION       SECTION WITH TUBAL  2001    CHOLECYSTECTOMY      COLONOSCOPY  05/10/2019    Grzegorz ABEL    CYSTOSCOPY      ENDOSCOPY  2020    ENDOSCOPY N/A 2021    Procedure: ESOPHAGOGASTRODUODENOSCOPY WITH COLD BIOPSIES;  Surgeon: Jasson Nguyen MD;  Location[facility] ENDOSCOPY;  Service: Gastroenterology;  Laterality: N/A;   PRE- ABD PAIN  POST- NORMAL    INTERVENTIONAL RADIOLOGY PROCEDURE N/A 1/3/2024    Procedure: Intravascular Ultrasound;  Surgeon: Lilli Greene MD;  Location: [facility] INVASIVE LOCATION;  Service: Cardiology;  Laterality: N/A;    LAPAROSCOPIC CHOLECYSTECTOMY  2016    TUBAL ABDOMINAL LIGATION  01/29/2001    UPPER GASTROINTESTINAL ENDOSCOPY  06/25/2020    Grzegorz ABEL gastritis, ulcers    UPPER GASTROINTESTINAL ENDOSCOPY  12/04/2020    Andrew ABEL         Allergies   Allergen Reactions    Metformin Diarrhea    Ozempic [Semaglutide] GI Intolerance     Pt stopped due to stomach pain    Bydureon [Exenatide] Other (See Comments)     Site reaction    Statins Myalgia     Muscle aches    Xigduo Xr [Dapagliflozin Pro-Metformin Er] Diarrhea         No current facility-administered medications on file prior to encounter.     Current Outpatient Medications on File Prior to Encounter   Medication Sig Dispense Refill    aspirin 81 MG EC tablet Take 1 tablet by mouth Daily. 90 tablet 3    aspirin-acetaminophen-caffeine (EXCEDRIN MIGRAINE) 250-250-65 MG per tablet Take 1 tablet by mouth Every 8 (Eight) Hours As Needed.      busPIRone (BUSPAR) 10 MG tablet Take 1 tablet by mouth Daily.      cetirizine (ZyrTEC) 10 MG tablet Take 1 tablet by mouth Daily.      clopidogrel (PLAVIX) 75 MG tablet Take 1 tablet by mouth Daily. 30 tablet 0    Continuous Blood Gluc Sensor (FreeStyle Balwinder 3 Sensor) misc Use 1 each Every 14 (Fourteen) Days. 6 each 3    Cymbalta 60 MG capsule Take 1 capsule by mouth 2 (Two) Times a Day.      Insulin Degludec (Tresiba FlexTouch) 200 UNIT/ML solution pen-injector pen injection 36 units every evening.  Prime needle with 4 units before each use. 18 mL 2    Insulin Pen Needle (BD PEN NEEDLE PAUL U/F) 32G X 4 MM misc Use to inject insulin 2 times daily 300 each 1    Methylcobalamin 1 MG chewable tablet Chew 1 mg Daily.      Naproxen Sodium 220 MG capsule Take 220 mg by mouth Daily As Needed.      nitroglycerin  (NITROSTAT) 0.4 MG SL tablet Place 1 tablet under the tongue Every 5 (Five) Minutes As Needed for Chest Pain (Systolic BP Greater Than 100). Take no more than 3 doses in 15 minutes. 100 tablet 0    ondansetron ODT (ZOFRAN-ODT) 4 MG disintegrating tablet       pravastatin (PRAVACHOL) 20 MG tablet Take 1 tablet by mouth Every Night. 90 tablet 3    sertraline (ZOLOFT) 50 MG tablet Take 0.5 tablets by mouth Daily.      ergocalciferol (ERGOCALCIFEROL) 1.25 MG (23995 UT) capsule Take 1 capsule by mouth 1 (One) Time Per Week. 12 capsule 1    Insulin Lispro, 1 Unit Dial, (HumaLOG KwikPen) 100 UNIT/ML solution pen-injector 12 units 3 times daily with each meal.  Prime needle with 2 units before each use. (Patient taking differently: 12 units 3 times daily with each meal.  Prime needle with 2 units before each use.    Patient currently not takign this, only does sliding scale) 38 mL 2         Social History     Socioeconomic History    Marital status: Single   Tobacco Use    Smoking status: Former     Packs/day: 0.50     Years: 25.00     Additional pack years: 0.00     Total pack years: 12.50     Types: Cigarettes     Start date: 1/10/1985     Quit date: 2023     Years since quittin.0    Smokeless tobacco: Never   Vaping Use    Vaping Use: Never used   Substance and Sexual Activity    Alcohol use: Yes     Comment: Only drink about 2 times a year    Drug use: Yes     Types: Marijuana     Comment: daily    Sexual activity: Yes     Partners: Male     Birth control/protection: Surgical, Post-menopausal         Family History   Problem Relation Age of Onset    Thyroid disease Mother         goiter    Hyperlipidemia Mother     ALS Mother     Heart disease Father     Stroke Father     Hypertension Father     Drug abuse Brother     Diabetes Paternal Grandfather     Diabetes Paternal Aunt     Diabetes Paternal Uncle     Malig Hyperthermia Neg Hx        REVIEW OF SYSTEMS:   Pertinent positives are noted in HPI above.   "Otherwise, all other systems were reviewed, and are negative.     Objective:     Vitals:    01/13/24 0532 01/13/24 0838 01/13/24 1206 01/13/24 1548   BP: 98/67 105/73 107/71 111/79   BP Location: Right arm Right arm Right arm Right arm   Patient Position: Lying Lying Lying Lying   Pulse: 102 109 115 114   Resp:  16 16 16   Temp: 97.9 °F (36.6 °C) 97.9 °F (36.6 °C) 98.7 °F (37.1 °C) 98.9 °F (37.2 °C)   TempSrc: Oral Oral Oral Oral   SpO2: 99% 99% 100% 99%   Weight:       Height: 162.6 cm (64\")        Body mass index is 26.45 kg/m².  Flowsheet Rows      Flowsheet Row First Filed Value   Admission Height 162.6 cm (64\") Documented at 01/13/2024 0532   Admission Weight 69.9 kg (154 lb 1.6 oz) Documented at 01/13/2024 0500             General:    No acute distress, alert and oriented x4, pleasant                   Head:    Normocephalic, atraumatic.   Eyes:          Conjunctivae and sclerae normal, no icterus.   Throat:   No oral lesions, no thrush, oral mucosa moist.    Neck:   Supple, trachea midline.   Lungs:     Faint rales at the bases bilaterally.    Heart:    Regular rhythm and mildly tachycardic rate.  No murmurs, gallops, or rubs.    Abdomen:     Soft, non-tender, non-distended, positive bowel sounds.    Extremities:   No clubbing, cyanosis, or edema.     Pulses:   Pulses palpable and equal bilaterally.    Skin:   No bleeding or rash.   Neuro:   Non-focal.  Moves all extremities well.    Psychiatric:   Normal mood and affect.                 Lab Review:                Results from last 7 days   Lab Units 01/13/24  0647   SODIUM mmol/L 137   POTASSIUM mmol/L 4.1   CHLORIDE mmol/L 102   CO2 mmol/L 21.2*   BUN mg/dL 16   CREATININE mg/dL 0.77   GLUCOSE mg/dL 227*   CALCIUM mg/dL 9.2     Results from last 7 days   Lab Units 01/13/24  0859 01/13/24  0647   HSTROP T ng/L 50* 49*     Results from last 7 days   Lab Units 01/13/24  0647   WBC 10*3/mm3 13.18*   HEMOGLOBIN g/dL 11.5*   HEMATOCRIT % 34.9   PLATELETS 10*3/mm3 " 341         Results from last 7 days   Lab Units 01/09/24  0310   CHOLESTEROL mg/dL 109     Results from last 7 days   Lab Units 01/13/24  0647   MAGNESIUM mg/dL 1.8     Results from last 7 days   Lab Units 01/09/24  0310   CHOLESTEROL mg/dL 109   TRIGLYCERIDES mg/dL 93   HDL CHOL mg/dL 30*   LDL CHOL mg/dL 61                 1-9-24        EKG (reviewed by me personally):      Assessment:   1.  Acute on chronic systolic CHF secondary to ischemic cardiomyopathy and coronary artery disease  2.  Ischemic cardiomyopathy, EF 20% or less by echo on 12/27/2023  3.  Severe coronary artery disease, status post Impella supported high risk PCI on 1/3/2024 by Dr. Greene (status post 3.5 x 28 mm Xience [place] MERRITT of the left main into the proximal LAD, 2.5 x 28 mm Xience [place] MERRITT to the mid LAD, and 3.0 x 28 mm Xience Skypoint MERRITT to the ostial and proximal OM1 branch).  This was followed by staged PCI to the proximal to mid RCA with 2 Xience Skypoint MERRITT on 1/8/2024.  4.  Sinus tachycardia  5.  Insulin-dependent diabetes since 2014, with neuropathy  6.  Tobacco use, quit on 12/18/2023  7.  Statin intolerance with myalgias  8.  Dyslipidemia  9.  Hypotension    Plan:       Again, her ejection fraction is very low.  She could not tolerate guideline directed medical therapy when she was in the hospital previously.  She is currently being diuresed with IV Lasix, which I agree with completely.  She is going to need Lasix when she goes home.  I may also try to get her on either Jardiance or spironolactone as an adjunct.      She did not tolerate a low-dose beta-blocker previously, although I may also try her again on a minuscule dose before discharge.  This would also likely help with the sinus tachycardia.  I am hopeful that revascularization with her staged PCI will improve her ejection fraction over time.  However, this is too soon to tell.  Definitely continue dual antiplatelet therapy.  She has not tolerated multiple  statins, but is tolerating pravastatin 20 mg/day.    Discussed with .      Thank you very much for this consult.    Chente Esquivel MD

## 2024-01-13 NOTE — H&P
Patient Name:  Jade Clement  YOB: 1970  MRN:  [id number]  Admit Date:  1/13/2024  Patient Care Team:  Provider, No Known as PCP - Hesham aRy MD as Consulting Physician (Endocrinology)      Subjective   History Present Illness     No chief complaint on file.      Ms. Clement is a 54 y.o. former smoker with a history of chronic combined systolic/diastolic heart failure, CAD with recent PCI on 1/8/2024, ischemic cardiomyopathy, type 2 diabetes, anxiety, GERD that presents to [facility] complaining of shortness of breath.    History of Present Illness  Pleasant patient with history as above who presents with a 2-day history of shortness of breath.  Patient noted on Thursday evening when she would lay completely flat she would become short of breath.  Sitting up/walking around improved her shortness of breath.  Symptoms progressed to constant shortness of breath/she was unable to get any relief in any position.  She also complained of nausea without vomiting and a dry cough.  Patient did have some mild chest pain.  Patient reports that her legs did not swell but she did feel like her feet felt funny/maybe swollen.    Pertinent recent history:  Patient recently admitted to this facility from 12/26/2023 - 1/9/2024-she underwent catheterization with severe multivessel CAD, she was evaluated by cardiothoracic surgery however did not feel she was an operative candidate.  She underwent high risk staged PCI on 1/3/2024 with Impella support and stenting of the left main extending into the LAD, mid-LAD and OM1 branch and on 1/8/2024 with stent of the proximal RCA.  She was initiated on DAPT, statin.  Post procedurally goal-directed medical therapy for ischemic cardiomyopathy severely limited due to hypotension, she was unable to tolerate even a low-dose beta-blocker let alone a diuretic at discharge.    Review of Systems   Constitutional:  Positive for fatigue. Negative for fever.    Respiratory:  Positive for cough and shortness of breath.         Orthopnea   Cardiovascular:  Positive for chest pain. Negative for palpitations and leg swelling.        Feet swelling   Gastrointestinal:  Positive for abdominal pain and nausea. Negative for vomiting.   Neurological:  Negative for weakness.        Personal History     Past Medical History:   Diagnosis Date    Adrenal adenoma     Anxiety     Diabetes mellitus     Diarrhea     GERD (gastroesophageal reflux disease)     Gestational diabetes 2000    Head ache     Hyperlipidemia     Hypertension     Kidney stone 1985    Nausea     Type 2 diabetes mellitus     Urinary tract infection 1986    Frequently    Varicella Unknown    Vitamin D deficiency      Past Surgical History:   Procedure Laterality Date    CARDIAC CATHETERIZATION N/A 12/28/2023    Procedure: Left Heart Cath;  Surgeon: Lilli Greene MD;  Location: [facility] CATH INVASIVE LOCATION;  Service: Cardiovascular;  Laterality: N/A;    CARDIAC CATHETERIZATION N/A 12/28/2023    Procedure: Coronary angiography;  Surgeon: Lilli Greene MD;  Location: [facility] CATH INVASIVE LOCATION;  Service: Cardiovascular;  Laterality: N/A;    CARDIAC CATHETERIZATION N/A 1/3/2024    Procedure: Percutaneous Coronary Intervention;  Surgeon: Lilli Greene MD;  Location: [facility] CATH INVASIVE LOCATION;  Service: Cardiology;  Laterality: N/A;    CARDIAC CATHETERIZATION N/A 1/3/2024    Procedure: Stent MERRITT coronary;  Surgeon: Lilli Greene MD;  Location: [facility] CATH INVASIVE LOCATION;  Service: Cardiology;  Laterality: N/A;    CARDIAC CATHETERIZATION N/A 1/8/2024    Procedure: Percutaneous Coronary Intervention;  Surgeon: Lilli Greene MD;  Location: [facility] CATH INVASIVE LOCATION;  Service: Cardiology;  Laterality: N/A;    CARDIAC CATHETERIZATION N/A 1/8/2024    Procedure: Stent MERRITT coronary;  Surgeon: Lilli Greene MD;  Location: [facility] CATH INVASIVE LOCATION;  Service: Cardiology;  Laterality: N/A;     CARDIAC CATHETERIZATION N/A 2024    Procedure: Optical Coherence Tomography;  Surgeon: Lilli Greene MD;  Location:  [facility] CATH INVASIVE LOCATION;  Service: Cardiology;  Laterality: N/A;    CARDIAC ELECTROPHYSIOLOGY PROCEDURE  1/3/2024    Procedure: Impella Insertion;  Surgeon: Lilli Greene MD;  Location:  [facility] CATH INVASIVE LOCATION;  Service: Cardiology;;     SECTION       SECTION WITH TUBAL  2001    CHOLECYSTECTOMY      COLONOSCOPY  05/10/2019    Grzegorz ABEL    CYSTOSCOPY      ENDOSCOPY  2020    ENDOSCOPY N/A 2021    Procedure: ESOPHAGOGASTRODUODENOSCOPY WITH COLD BIOPSIES;  Surgeon: Jasson Nguyen MD;  Location:  [facility] ENDOSCOPY;  Service: Gastroenterology;  Laterality: N/A;  PRE- ABD PAIN  POST- NORMAL    INTERVENTIONAL RADIOLOGY PROCEDURE N/A 1/3/2024    Procedure: Intravascular Ultrasound;  Surgeon: Lilli Greene MD;  Location:  [facility] CATH INVASIVE LOCATION;  Service: Cardiology;  Laterality: N/A;    LAPAROSCOPIC CHOLECYSTECTOMY  2016    TUBAL ABDOMINAL LIGATION  2001    UPPER GASTROINTESTINAL ENDOSCOPY  2020    Grzegorz ABEL gastritis, ulcers    UPPER GASTROINTESTINAL ENDOSCOPY  2020    Andrew ABEL     Family History   Problem Relation Age of Onset    Thyroid disease Mother         goiter    Hyperlipidemia Mother     ALS Mother     Heart disease Father     Stroke Father     Hypertension Father     Drug abuse Brother     Diabetes Paternal Grandfather     Diabetes Paternal Aunt     Diabetes Paternal Uncle     Malig Hyperthermia Neg Hx      Social History     Tobacco Use    Smoking status: Former     Packs/day: 0.50     Years: 25.00     Additional pack years: 0.00     Total pack years: 12.50     Types: Cigarettes     Start date: 1/10/1985     Quit date: 2023     Years since quittin.0    Smokeless tobacco: Never   Vaping Use    Vaping Use: Never used   Substance Use Topics    Alcohol use: Yes     Comment: Only drink about 2 times a year     Drug use: Yes     Types: Marijuana     Comment: daily     No current facility-administered medications on file prior to encounter.     Current Outpatient Medications on File Prior to Encounter   Medication Sig Dispense Refill    aspirin 81 MG EC tablet Take 1 tablet by mouth Daily. 90 tablet 3    aspirin-acetaminophen-caffeine (EXCEDRIN MIGRAINE) 250-250-65 MG per tablet Take 1 tablet by mouth Every 8 (Eight) Hours As Needed.      busPIRone (BUSPAR) 10 MG tablet Take 1 tablet by mouth Daily.      cetirizine (ZyrTEC) 10 MG tablet Take 1 tablet by mouth Daily.      clopidogrel (PLAVIX) 75 MG tablet Take 1 tablet by mouth Daily. 30 tablet 0    Continuous Blood Gluc Sensor (FreeStyle Balwinder 3 Sensor) misc Use 1 each Every 14 (Fourteen) Days. 6 each 3    Cymbalta 60 MG capsule Take 1 capsule by mouth 2 (Two) Times a Day.      Insulin Degludec (Tresiba FlexTouch) 200 UNIT/ML solution pen-injector pen injection 36 units every evening.  Prime needle with 4 units before each use. 18 mL 2    Insulin Pen Needle (BD PEN NEEDLE PAUL U/F) 32G X 4 MM misc Use to inject insulin 2 times daily 300 each 1    Methylcobalamin 1 MG chewable tablet Chew 1 mg Daily.      Naproxen Sodium 220 MG capsule Take 220 mg by mouth Daily As Needed.      nitroglycerin (NITROSTAT) 0.4 MG SL tablet Place 1 tablet under the tongue Every 5 (Five) Minutes As Needed for Chest Pain (Systolic BP Greater Than 100). Take no more than 3 doses in 15 minutes. 100 tablet 0    ondansetron ODT (ZOFRAN-ODT) 4 MG disintegrating tablet       pravastatin (PRAVACHOL) 20 MG tablet Take 1 tablet by mouth Every Night. 90 tablet 3    sertraline (ZOLOFT) 50 MG tablet Take 0.5 tablets by mouth Daily.      ergocalciferol (ERGOCALCIFEROL) 1.25 MG (20706 UT) capsule Take 1 capsule by mouth 1 (One) Time Per Week. 12 capsule 1    Insulin Lispro, 1 Unit Dial, (HumaLOG KwikPen) 100 UNIT/ML solution pen-injector 12 units 3 times daily with each meal.  Prime needle with 2 units  before each use. (Patient taking differently: 12 units 3 times daily with each meal.  Prime needle with 2 units before each use.    Patient currently not takign this, only does sliding scale) 38 mL 2     Allergies   Allergen Reactions    Metformin Diarrhea    Ozempic [Semaglutide] GI Intolerance     Pt stopped due to stomach pain    Bydureon [Exenatide] Other (See Comments)     Site reaction    Statins Myalgia     Muscle aches    Xigduo Xr [Dapagliflozin Pro-Metformin Er] Diarrhea       Objective    Objective     Vital Signs  Temp:  [97.9 °F (36.6 °C)] 97.9 °F (36.6 °C)  Heart Rate:  [102-109] 109  Resp:  [16] 16  BP: ()/(67-73) 105/73  SpO2:  [99 %] 99 %  on   ;   Device (Oxygen Therapy): room air  Body mass index is 26.45 kg/m².    Physical Exam  Constitutional:       General: She is not in acute distress.     Appearance: Normal appearance. She is not ill-appearing.   HENT:      Head: Normocephalic and atraumatic.      Mouth/Throat:      Mouth: Mucous membranes are moist.      Pharynx: Oropharynx is clear.   Eyes:      Extraocular Movements: Extraocular movements intact.      Pupils: Pupils are equal, round, and reactive to light.   Cardiovascular:      Rate and Rhythm: Regular rhythm. Tachycardia present.      Pulses: Normal pulses.   Pulmonary:      Effort: Pulmonary effort is normal. No respiratory distress.      Breath sounds: Normal breath sounds. No wheezing or rhonchi.   Abdominal:      General: Abdomen is flat.      Palpations: Abdomen is soft.      Tenderness: There is no abdominal tenderness.   Musculoskeletal:         General: No swelling or tenderness. Normal range of motion.      Cervical back: Normal range of motion and neck supple.      Right lower leg: No edema.      Left lower leg: No edema.   Skin:     General: Skin is warm and dry.   Neurological:      General: No focal deficit present.      Mental Status: She is alert and oriented to person, place, and time. Mental status is at baseline.    Psychiatric:         Mood and Affect: Mood normal.         Behavior: Behavior normal.         Thought Content: Thought content normal.         Judgment: Judgment normal.         Results Review:  I reviewed the patient's new clinical results.  I reviewed the patient's new imaging results and agree with the interpretation.  I reviewed the patient's other test results and agree with the interpretation  I personally viewed and interpreted the patient's EKG/Telemetry data    Lab Results (last 24 hours)       Procedure Component Value Units Date/Time    POC Glucose Once [[id number]]  (Abnormal) Collected: 01/13/24 0645    Specimen: Blood Updated: 01/13/24 0656     Glucose 205 mg/dL     High Sensitivity Troponin T [[id number]]  (Abnormal) Collected: 01/13/24 0647    Specimen: Blood Updated: 01/13/24 0738     HS Troponin T 49 ng/L     Narrative:      High Sensitive Troponin T Reference Range:  <14.0 ng/L- Negative Female for AMI  <22.0 ng/L- Negative Male for AMI  >=14 - Abnormal Female indicating possible myocardial injury.  >=22 - Abnormal Male indicating possible myocardial injury.   Clinicians would have to utilize clinical acumen, EKG, Troponin, and serial changes to determine if it is an Acute Myocardial Infarction or myocardial injury due to an underlying chronic condition.         Basic Metabolic Panel [[id number]]  (Abnormal) Collected: 01/13/24 0647    Specimen: Blood Updated: 01/13/24 0741     Glucose 227 mg/dL      BUN 16 mg/dL      Creatinine 0.77 mg/dL      Sodium 137 mmol/L      Potassium 4.1 mmol/L      Chloride 102 mmol/L      CO2 21.2 mmol/L      Calcium 9.2 mg/dL      BUN/Creatinine Ratio 20.8     Anion Gap 13.8 mmol/L      eGFR 90.7 mL/min/1.73     Narrative:      GFR Normal >60  Chronic Kidney Disease <60  Kidney Failure <15      CBC Auto Differential [[id number]]  (Abnormal) Collected: 01/13/24 0647    Specimen: Blood Updated: 01/13/24 0720     WBC 13.18 10*3/mm3      RBC 3.59 10*6/mm3      Hemoglobin  11.5 g/dL      Hematocrit 34.9 %      MCV 97.2 fL      MCH 32.0 pg      MCHC 33.0 g/dL      RDW 11.4 %      RDW-SD 40.7 fl      MPV 9.2 fL      Platelets 341 10*3/mm3      Neutrophil % 58.0 %      Lymphocyte % 31.5 %      Monocyte % 5.9 %      Eosinophil % 3.4 %      Basophil % 0.9 %      Immature Grans % 0.3 %      Neutrophils, Absolute 7.64 10*3/mm3      Lymphocytes, Absolute 4.15 10*3/mm3      Monocytes, Absolute 0.78 10*3/mm3      Eosinophils, Absolute 0.45 10*3/mm3      Basophils, Absolute 0.12 10*3/mm3      Immature Grans, Absolute 0.04 10*3/mm3      nRBC 0.0 /100 WBC     Magnesium [[id number]]  (Normal) Collected: 01/13/24 0647    Specimen: Blood Updated: 01/13/24 0741     Magnesium 1.8 mg/dL     High Sensitivity Troponin T 2Hr [[id number]]  (Abnormal) Collected: 01/13/24 0859    Specimen: Blood Updated: 01/13/24 0928     HS Troponin T 50 ng/L      Troponin T Delta 1 ng/L     Narrative:      High Sensitive Troponin T Reference Range:  <14.0 ng/L- Negative Female for AMI  <22.0 ng/L- Negative Male for AMI  >=14 - Abnormal Female indicating possible myocardial injury.  >=22 - Abnormal Male indicating possible myocardial injury.   Clinicians would have to utilize clinical acumen, EKG, Troponin, and serial changes to determine if it is an Acute Myocardial Infarction or myocardial injury due to an underlying chronic condition.                 Imaging Results (Last 24 Hours)       ** No results found for the last 24 hours. **            Results for orders placed during the hospital encounter of 12/26/23    Adult Transthoracic Echo Complete W/ Cont if Necessary Per Protocol    Interpretation Summary    The left ventricular cavity is mildly dilated.    There is akinesis of the entire septum, the apex, mid to distal anterior wall, and mid to distal lateral wall    Left ventricular systolic function is severely decreased. Estimated left ventricular EF = 20%    There is a trivial pericardial effusion.    There is a small  left pleural effusion.    Left ventricular diastolic function was indeterminate    Normal right ventricular cavity size and systolic function noted.    The left atrial cavity is mildly dilated.    Mild mitral valve regurgitation is present    Mild to moderate tricuspid valve regurgitation is present.    Calculated right ventricular systolic pressure from tricuspid regurgitation is 41 mmHg.    There is a trivial pericardial effusion. There is a small left pleural effusion.      ECG 12 Lead Other; admission   Preliminary Result   HEART RATE= 112  bpm   RR Interval= 536  ms   IL Interval= 149  ms   P Horizontal Axis= -9  deg   P Front Axis= 77  deg   QRSD Interval= 94  ms   QT Interval= 327  ms   QTcB= 447  ms   QRS Axis= 114  deg   T Wave Axis= 86  deg   - ABNORMAL ECG -   Sinus tachycardia   Probable lateral infarct, age indeterminate   Probable anteroseptal infarct, recent   Electronically Signed By:    Date and Time of Study: 2024-01-13 10:51:51      SCANNED - TELEMETRY     Final Result           Assessment/Plan     Active Hospital Problems    Diagnosis  POA    **Acute on chronic combined systolic (congestive) and diastolic (congestive) heart failure [I50.43]  Yes    Pleural effusion [J90]  Yes    Acute pulmonary edema [J81.0]  Yes    GERD without esophagitis [K21.9]  Yes    ACS (acute coronary syndrome) [I24.9]  Yes    Type 2 diabetes mellitus with microalbuminuria, with long-term current use of insulin [E11.29, R80.9, Z79.4]  Not Applicable    Fatigue [R53.83]  Yes    Hypertension [I10]  Yes      Resolved Hospital Problems   No resolved problems to display.       Ms. Clement is a 54 y.o. former smoker with a history of chronic combined systolic/diastolic heart failure, CAD with recent PCI on 1/8/2024, ischemic cardiomyopathy, type 2 diabetes, anxiety, GERD who presents from outside facility with shortness of breath for 2 days found to have acute on chronic CHF exacerbation.    Acute on chronic combined  systolic/diastolic CHF  Severe multivessel CAD w/ recent staged PCI on 1/3 and 1/8  ICM  -Cardiology consulted  -Notes reviewed from transferring facility-patient with small to moderate right pleural effusion and small left pleural effusion with mild bibasilar pulmonary edema on chest x-ray, she received 40 mg of IV Lasix around 18:00 on 1/12  -Patient received additional spot dose of Lasix on 1/13 around 7 AM, she reports that she already feels significantly better, she is on room air and no longer feels short of breath  -1 additional dose of IV Lasix is scheduled for 1600 today, I have discussed with Dr. Esquivel  - BP is borderline low/normal still; likely will limit GDMT  - continue DAPT/statin    Type 2 diabetes mellitus  - Start 20 units Lantus nightly, sliding scale  - Hold home oral medications  -Recent A1c 7.7%    Anxiety  -Continue BuSpar and Zoloft    I discussed the patient's findings and my recommendations with patient and consulting provider.    VTE Prophylaxis - Lovenox 40 mg SC daily.  Code Status - Full code.       Niya Vitale MD  [facility]  01/13/24  11:10 EST

## 2024-01-14 ENCOUNTER — READMISSION MANAGEMENT (OUTPATIENT)
Dept: CALL CENTER | Facility: HOSPITAL | Age: 54
End: 2024-01-14
Payer: COMMERCIAL

## 2024-01-14 VITALS
BODY MASS INDEX: 26.29 KG/M2 | TEMPERATURE: 97.7 F | OXYGEN SATURATION: 100 % | RESPIRATION RATE: 16 BRPM | DIASTOLIC BLOOD PRESSURE: 76 MMHG | HEART RATE: 109 BPM | WEIGHT: 154 LBS | HEIGHT: 64 IN | SYSTOLIC BLOOD PRESSURE: 112 MMHG

## 2024-01-14 LAB
ALBUMIN SERPL-MCNC: 3.8 G/DL (ref 3.5–5.2)
ALP SERPL-CCNC: 74 U/L (ref 39–117)
ALT SERPL W P-5'-P-CCNC: 16 U/L (ref 1–33)
ANION GAP SERPL CALCULATED.3IONS-SCNC: 13 MMOL/L (ref 5–15)
AST SERPL-CCNC: 14 U/L (ref 1–32)
BILIRUB CONJ SERPL-MCNC: <0.2 MG/DL (ref 0–0.3)
BILIRUB INDIRECT SERPL-MCNC: NORMAL MG/DL
BILIRUB SERPL-MCNC: 0.2 MG/DL (ref 0–1.2)
BUN SERPL-MCNC: 21 MG/DL (ref 6–20)
BUN/CREAT SERPL: 23.9 (ref 7–25)
CALCIUM SPEC-SCNC: 9 MG/DL (ref 8.6–10.5)
CHLORIDE SERPL-SCNC: 101 MMOL/L (ref 98–107)
CO2 SERPL-SCNC: 25 MMOL/L (ref 22–29)
CREAT SERPL-MCNC: 0.88 MG/DL (ref 0.57–1)
DEPRECATED RDW RBC AUTO: 43.7 FL (ref 37–54)
EGFRCR SERPLBLD CKD-EPI 2021: 77.2 ML/MIN/1.73
ERYTHROCYTE [DISTWIDTH] IN BLOOD BY AUTOMATED COUNT: 12 % (ref 12.3–15.4)
GLUCOSE BLDC GLUCOMTR-MCNC: 221 MG/DL (ref 70–130)
GLUCOSE BLDC GLUCOMTR-MCNC: 262 MG/DL (ref 70–130)
GLUCOSE SERPL-MCNC: 190 MG/DL (ref 65–99)
HCT VFR BLD AUTO: 36 % (ref 34–46.6)
HGB BLD-MCNC: 11.7 G/DL (ref 12–15.9)
MCH RBC QN AUTO: 31.9 PG (ref 26.6–33)
MCHC RBC AUTO-ENTMCNC: 32.5 G/DL (ref 31.5–35.7)
MCV RBC AUTO: 98.1 FL (ref 79–97)
PLATELET # BLD AUTO: 321 10*3/MM3 (ref 140–450)
PMV BLD AUTO: 9.3 FL (ref 6–12)
POTASSIUM SERPL-SCNC: 3.5 MMOL/L (ref 3.5–5.2)
PROT SERPL-MCNC: 6.3 G/DL (ref 6–8.5)
RBC # BLD AUTO: 3.67 10*6/MM3 (ref 3.77–5.28)
SODIUM SERPL-SCNC: 139 MMOL/L (ref 136–145)
WBC NRBC COR # BLD AUTO: 11.22 10*3/MM3 (ref 3.4–10.8)

## 2024-01-14 PROCEDURE — 80048 BASIC METABOLIC PNL TOTAL CA: CPT | Performed by: INTERNAL MEDICINE

## 2024-01-14 PROCEDURE — 80076 HEPATIC FUNCTION PANEL: CPT | Performed by: INTERNAL MEDICINE

## 2024-01-14 PROCEDURE — 85027 COMPLETE CBC AUTOMATED: CPT | Performed by: INTERNAL MEDICINE

## 2024-01-14 PROCEDURE — 63710000001 INSULIN LISPRO (HUMAN) PER 5 UNITS: Performed by: NURSE PRACTITIONER

## 2024-01-14 PROCEDURE — 82948 REAGENT STRIP/BLOOD GLUCOSE: CPT

## 2024-01-14 PROCEDURE — G0378 HOSPITAL OBSERVATION PER HR: HCPCS

## 2024-01-14 RX ORDER — SPIRONOLACTONE 25 MG/1
12.5 TABLET ORAL DAILY
Qty: 15 TABLET | Refills: 0 | Status: SHIPPED | OUTPATIENT
Start: 2024-01-15

## 2024-01-14 RX ORDER — METOPROLOL SUCCINATE 25 MG/1
12.5 TABLET, EXTENDED RELEASE ORAL NIGHTLY
Qty: 15 TABLET | Refills: 0 | Status: SHIPPED | OUTPATIENT
Start: 2024-01-14

## 2024-01-14 RX ORDER — METOPROLOL SUCCINATE 25 MG/1
12.5 TABLET, EXTENDED RELEASE ORAL NIGHTLY
Status: DISCONTINUED | OUTPATIENT
Start: 2024-01-14 | End: 2024-01-14 | Stop reason: HOSPADM

## 2024-01-14 RX ORDER — FUROSEMIDE 40 MG/1
40 TABLET ORAL DAILY
Status: DISCONTINUED | OUTPATIENT
Start: 2024-01-14 | End: 2024-01-14 | Stop reason: HOSPADM

## 2024-01-14 RX ORDER — FUROSEMIDE 40 MG/1
40 TABLET ORAL DAILY
Qty: 30 TABLET | Refills: 0 | Status: SHIPPED | OUTPATIENT
Start: 2024-01-15

## 2024-01-14 RX ADMIN — DULOXETINE HYDROCHLORIDE 60 MG: 60 CAPSULE, DELAYED RELEASE ORAL at 09:50

## 2024-01-14 RX ADMIN — INSULIN LISPRO 4 UNITS: 100 INJECTION, SOLUTION INTRAVENOUS; SUBCUTANEOUS at 11:44

## 2024-01-14 RX ADMIN — BUSPIRONE HYDROCHLORIDE 10 MG: 10 TABLET ORAL at 09:50

## 2024-01-14 RX ADMIN — CLOPIDOGREL BISULFATE 75 MG: 75 TABLET, FILM COATED ORAL at 09:51

## 2024-01-14 RX ADMIN — Medication 12.5 MG: at 09:50

## 2024-01-14 RX ADMIN — ASPIRIN 81 MG: 81 TABLET, COATED ORAL at 09:50

## 2024-01-14 RX ADMIN — CETIRIZINE HYDROCHLORIDE 10 MG: 10 TABLET ORAL at 09:50

## 2024-01-14 RX ADMIN — Medication 10 ML: at 09:52

## 2024-01-14 RX ADMIN — FUROSEMIDE 40 MG: 40 TABLET ORAL at 09:50

## 2024-01-14 RX ADMIN — SERTRALINE 25 MG: 25 TABLET, FILM COATED ORAL at 09:50

## 2024-01-14 RX ADMIN — INSULIN LISPRO 3 UNITS: 100 INJECTION, SOLUTION INTRAVENOUS; SUBCUTANEOUS at 09:51

## 2024-01-14 NOTE — PLAN OF CARE
Problem: Adult Inpatient Plan of Care  Goal: Plan of Care Review  Outcome: Ongoing, Progressing  Flowsheets (Taken 1/14/2024 1252)  Outcome Evaluation:   VSS, AOX4, ACHS, No c/o pain. room air.   eavimg this afternoon  Goal: Patient-Specific Goal (Individualized)  Outcome: Ongoing, Progressing  Goal: Absence of Hospital-Acquired Illness or Injury  Outcome: Ongoing, Progressing  Intervention: Identify and Manage Fall Risk  Recent Flowsheet Documentation  Taken 1/14/2024 1200 by Elizabeth Molina RN  Safety Promotion/Fall Prevention:   safety round/check completed   nonskid shoes/slippers when out of bed   clutter free environment maintained  Taken 1/14/2024 1000 by Elizabeth Molina RN  Safety Promotion/Fall Prevention:   safety round/check completed   nonskid shoes/slippers when out of bed   clutter free environment maintained  Taken 1/14/2024 0800 by Elizabeth Molina RN  Safety Promotion/Fall Prevention:   safety round/check completed   nonskid shoes/slippers when out of bed   fall prevention program maintained   clutter free environment maintained  Intervention: Prevent Skin Injury  Recent Flowsheet Documentation  Taken 1/14/2024 1200 by Elizabeth Molina RN  Body Position: position changed independently  Taken 1/14/2024 1000 by Elizabeth Molina RN  Body Position: position changed independently  Taken 1/14/2024 0800 by Elizabeth Molina RN  Body Position: position changed independently  Skin Protection:   adhesive use limited   tubing/devices free from skin contact  Intervention: Prevent and Manage VTE (Venous Thromboembolism) Risk  Recent Flowsheet Documentation  Taken 1/14/2024 1200 by Elizabeth Molina RN  Activity Management: activity encouraged  Taken 1/14/2024 0800 by Elizabeth Molina RN  Activity Management: activity encouraged  Intervention: Prevent Infection  Recent Flowsheet Documentation  Taken 1/14/2024 0800 by Elizabeth Molina RN  Infection Prevention: single patient room provided  Goal: Optimal  Comfort and Wellbeing  Outcome: Ongoing, Progressing  Intervention: Provide Person-Centered Care  Recent Flowsheet Documentation  Taken 1/14/2024 0800 by Elizabeth Molina RN  Trust Relationship/Rapport:   choices provided   care explained  Goal: Readiness for Transition of Care  Outcome: Ongoing, Progressing   Goal Outcome Evaluation:              Outcome Evaluation: VSS, AOX4, ACHS, No c/o pain. room air. ;eavimg this afternoon

## 2024-01-14 NOTE — DISCHARGE SUMMARY
Patient Name: Jade Clement  : 1970  MRN: [id number]    Date of Admission: 2024  Date of Discharge:  2024  Primary Care Physician: Provider, No Known      Chief Complaint:   No chief complaint on file.      Discharge Diagnoses     Active Hospital Problems    Diagnosis  POA    **Acute on chronic combined systolic (congestive) and diastolic (congestive) heart failure [I50.43]  Yes    Pleural effusion [J90]  Yes    Acute pulmonary edema [J81.0]  Yes    GERD without esophagitis [K21.9]  Yes    ACS (acute coronary syndrome) [I24.9]  Yes    Type 2 diabetes mellitus with microalbuminuria, with long-term current use of insulin [E11.29, R80.9, Z79.4]  Not Applicable    Fatigue [R53.83]  Yes    Hypertension [I10]  Yes      Resolved Hospital Problems   No resolved problems to display.        Hospital Course     Ms. Clement is a 54 y.o. female with a history of chronic combined systolic/diastolic heart failure, CAD with recent PCI on 2024, ischemic cardiomyopathy, type 2 diabetes, anxiety, GERD who presented to [facility] initially complaining of shortness of breath.  Please see the admitting history and physical for further details.  She was found to have acute on chronic combined systolic/diastolic CHF exacerbation and was admitted to the hospital for further evaluation and treatment.  Cardiology consulted on admission, the patient received IV diuresis with significant improvement in her symptoms.  Given her systolic heart failure, cardiology has uptitrated her goal-directed medical therapy w as below.  She will discharge home on Lasix and has discussed with cardiology to take either daily versus every other day per their instructions.  Patient should follow-up with cardiology at her already scheduled appointment in 2 days.  She should also follow-up with her PCP in a week for posthospital follow-up visit.    Day of Discharge     Subjective:  Resting in bed, feels good, wants to leave.      Physical Exam:  Temp:  [97.7 °F (36.5 °C)-98.9 °F (37.2 °C)] 97.7 °F (36.5 °C)  Heart Rate:  [105-114] 109  Resp:  [16-17] 16  BP: (103-127)/(67-81) 112/76  Body mass index is 26.43 kg/m².  Physical Exam  Constitutional:       General: She is not in acute distress.     Appearance: Normal appearance. She is ill-appearing (chronically).   Cardiovascular:      Rate and Rhythm: Normal rate and regular rhythm.   Pulmonary:      Effort: Pulmonary effort is normal. No respiratory distress.   Abdominal:      General: Abdomen is flat.      Palpations: Abdomen is soft.      Tenderness: There is no abdominal tenderness.   Musculoskeletal:         General: No swelling or tenderness.      Right lower leg: No edema.      Left lower leg: No edema.   Skin:     General: Skin is warm and dry.   Neurological:      General: No focal deficit present.      Mental Status: She is alert and oriented to person, place, and time. Mental status is at baseline.         Consultants     Consult Orders (all) (From admission, onward)       Start     Ordered    01/13/24 0702  Inpatient Cardiology Consult  IN AM        Specialty:  Cardiology  Provider:  Billy Esquivel MD    01/13/24 0600                  Procedures     Imaging Results (All)       None            Pertinent Labs     Results from last 7 days   Lab Units 01/14/24  0259 01/13/24  0647 01/09/24  0310   WBC 10*3/mm3 11.22* 13.18* 10.03   HEMOGLOBIN g/dL 11.7* 11.5* 10.1*   PLATELETS 10*3/mm3 321 341 271     Results from last 7 days   Lab Units 01/14/24  0259 01/13/24  0647 01/09/24  0310 01/08/24  0332   SODIUM mmol/L 139 137 138 141   POTASSIUM mmol/L 3.5 4.1 3.5 4.6   CHLORIDE mmol/L 101 102 108* 107   CO2 mmol/L 25.0 21.2* 21.0* 25.0   BUN mg/dL 21* 16 12 15   CREATININE mg/dL 0.88 0.77 0.80 0.78   GLUCOSE mg/dL 190* 227* 61* 186*   Estimated Creatinine Clearance: 68.5 mL/min (by C-G formula based on SCr of 0.88 mg/dL).  Results from last 7 days   Lab Units 01/14/24  0560    ALBUMIN g/dL 3.8   BILIRUBIN mg/dL 0.2   ALK PHOS U/L 74   AST (SGOT) U/L 14   ALT (SGPT) U/L 16     Results from last 7 days   Lab Units 01/14/24  0259 01/13/24  0647 01/09/24  0310 01/08/24  0332   CALCIUM mg/dL 9.0 9.2 8.3* 8.9   ALBUMIN g/dL 3.8  --   --   --    MAGNESIUM mg/dL  --  1.8 1.6 1.8       Results from last 7 days   Lab Units 01/13/24  0859 01/13/24  0647   HSTROP T ng/L 50* 49*       Results from last 7 days   Lab Units 01/09/24  0310   CHOLESTEROL mg/dL 109   TRIGLYCERIDES mg/dL 93   HDL CHOL mg/dL 30*   LDL CHOL mg/dL 61           Test Results Pending at Discharge       Discharge Details        Discharge Medications        New Medications        Instructions Start Date   furosemide 40 MG tablet  Commonly known as: LASIX   40 mg, Oral, Daily   Start Date: January 15, 2024     metoprolol succinate XL 25 MG 24 hr tablet  Commonly known as: TOPROL-XL   12.5 mg, Oral, Nightly      spironolactone 25 MG tablet  Commonly known as: ALDACTONE   12.5 mg, Oral, Daily   Start Date: January 15, 2024            Changes to Medications        Instructions Start Date   Insulin Lispro (1 Unit Dial) 100 UNIT/ML solution pen-injector  Commonly known as: HumaLOG KwikPen  What changed: additional instructions   12 units 3 times daily with each meal.  Prime needle with 2 units before each use.             Continue These Medications        Instructions Start Date   Aspirin Low Dose 81 MG EC tablet  Generic drug: aspirin   81 mg, Oral, Daily      aspirin-acetaminophen-caffeine 250-250-65 MG per tablet  Commonly known as: EXCEDRIN MIGRAINE   1 tablet, Oral, Every 8 Hours PRN      busPIRone 10 MG tablet  Commonly known as: BUSPAR   10 mg, Oral, Daily      cetirizine 10 MG tablet  Commonly known as: zyrTEC   1 tablet, Oral, Daily      clopidogrel 75 MG tablet  Commonly known as: PLAVIX   75 mg, Oral, Daily      Cymbalta 60 MG capsule  Generic drug: DULoxetine   60 mg, Oral, 2 Times Daily      ergocalciferol 1.25 MG (52725  UT) capsule  Commonly known as: ERGOCALCIFEROL   50,000 Units, Oral, Weekly      FreeStyle Balwinder 3 Sensor misc   1 each, Does not apply, Every 14 Days      Insulin Pen Needle 32G X 4 MM misc  Commonly known as: BD Pen Needle Ashwini U/F   Use to inject insulin 2 times daily      Methylcobalamin 1 MG chewable tablet   1 mg, Oral, Daily      Naproxen Sodium 220 MG capsule   220 mg, Oral, Daily PRN      nitroglycerin 0.4 MG SL tablet  Commonly known as: NITROSTAT   0.4 mg, Sublingual, Every 5 Minutes PRN, Take no more than 3 doses in 15 minutes.      ondansetron ODT 4 MG disintegrating tablet  Commonly known as: ZOFRAN-ODT       pravastatin 20 MG tablet  Commonly known as: PRAVACHOL   20 mg, Oral, Nightly      sertraline 50 MG tablet  Commonly known as: ZOLOFT   25 mg, Oral, Daily      Tresiba FlexTouch 200 UNIT/ML solution pen-injector pen injection  Generic drug: Insulin Degludec   36 units every evening.  Prime needle with 4 units before each use.               Allergies   Allergen Reactions    Metformin Diarrhea    Ozempic [Semaglutide] GI Intolerance     Pt stopped due to stomach pain    Bydureon [Exenatide] Other (See Comments)     Site reaction    Statins Myalgia     Muscle aches    Xigduo Xr [Dapagliflozin Pro-Metformin Er] Diarrhea         Discharge Disposition:  Home or Self Care    Discharge Diet:  Diet Order   Procedures    Diet: Diabetic Diets; Consistent Carbohydrate; Texture: Regular Texture (IDDSI 7); Fluid Consistency: Thin (IDDSI 0)       Discharge Activity:   Activity Instructions       Activity as Tolerated              CODE STATUS:    Code Status and Medical Interventions:   Ordered at: 01/13/24 1103     Code Status (Patient has no pulse and is not breathing):    CPR (Attempt to Resuscitate)     Medical Interventions (Patient has pulse or is breathing):    Full Support       Future Appointments   Date Time Provider Department Center   1/16/2024  1:15 PM Lilli Greene MD [id number] None    1/29/2024  3:00 PM Hesham Cheney MD [facility]   6/25/2024  3:00 PM Hesham Cheney MD [facility]     Additional Instructions for the Follow-ups that You Need to Schedule       Discharge Follow-up with PCP   As directed       Currently Documented PCP:    Provider, No Known    PCP Phone Number:    [phone]     Follow Up Details: post-hospital follow up        Discharge Follow-up with Specified Provider: Cardiology as directed   As directed      To: Cardiology as directed               Follow-up Information       Provider, No Known .    Why: post-hospital follow up  Contact information:  [facility] [address]  [phone]                             Additional Instructions for the Follow-ups that You Need to Schedule       Discharge Follow-up with PCP   As directed       Currently Documented PCP:    Provider, No Known    PCP Phone Number:    [phone]     Follow Up Details: post-hospital follow up        Discharge Follow-up with Specified Provider: Cardiology as directed   As directed      To: Cardiology as directed            Time Spent on Discharge:  I spent greater than 30 minutes on this discharge activity which included: face-to-face encounter with the patient, reviewing the data in the system, coordination of the care with the nursing staff as well as consultants, documentation, and entering orders.       Niya Vitale MD  [facility]  01/14/24  15:29 EST

## 2024-01-14 NOTE — OUTREACH NOTE
Medical Week 1 Survey      Flowsheet Row Responses   [facility] patient discharged from? [place]   Does the patient have one of the following disease processes/diagnoses(primary or secondary)? Other   Week 1 attempt successful? No   Unsuccessful attempts Attempt 1   Revoke Readmitted            TRI ALVAREZ - Registered Nurse

## 2024-01-14 NOTE — PROGRESS NOTES
LOS: 1 day   Patient Care Team:  Provider, No Known as PCP - Hesham Ray MD as Consulting Physician (Endocrinology)    Chief Complaint: Follow-up acute on chronic systolic CHF, ischemic cardiomyopathy, coronary artery disease.    Interval History: Feels much better.  Ready to go home.  She is breathing easier.  She diuresed very well overnight.    Vital Signs:  Temp:  [97.7 °F (36.5 °C)-98.9 °F (37.2 °C)] 97.7 °F (36.5 °C)  Heart Rate:  [105-114] 109  Resp:  [16-17] 16  BP: (103-127)/(67-81) 112/76    Intake/Output Summary (Last 24 hours) at 1/14/2024 1426  Last data filed at 1/14/2024 0800  Gross per 24 hour   Intake 240 ml   Output 800 ml   Net -560 ml       Physical Exam:   General Appearance:    No acute distress, alert and oriented x4   Lungs:     Clear to auscultation bilaterally     Heart:    Regular rhythm and mildly tachycardic rate.  No murmurs, gallops, or rubs.   Abdomen:     Soft, nontender, nondistended.    Extremities:   No clubbing, cyanosis, or edema.     Results Review:    Results from last 7 days   Lab Units 01/14/24  0259   SODIUM mmol/L 139   POTASSIUM mmol/L 3.5   CHLORIDE mmol/L 101   CO2 mmol/L 25.0   BUN mg/dL 21*   CREATININE mg/dL 0.88   GLUCOSE mg/dL 190*   CALCIUM mg/dL 9.0     Results from last 7 days   Lab Units 01/13/24  0859 01/13/24  0647   HSTROP T ng/L 50* 49*     Results from last 7 days   Lab Units 01/14/24  0259   WBC 10*3/mm3 11.22*   HEMOGLOBIN g/dL 11.7*   HEMATOCRIT % 36.0   PLATELETS 10*3/mm3 321         Results from last 7 days   Lab Units 01/09/24  0310   CHOLESTEROL mg/dL 109     Results from last 7 days   Lab Units 01/13/24  0647   MAGNESIUM mg/dL 1.8     Results from last 7 days   Lab Units 01/09/24  0310   CHOLESTEROL mg/dL 109   TRIGLYCERIDES mg/dL 93   HDL CHOL mg/dL 30*   LDL CHOL mg/dL 61       I reviewed the patient's new clinical results.        Assessment:  1.  Acute on chronic systolic CHF secondary to ischemic cardiomyopathy and coronary  artery disease  2.  Ischemic cardiomyopathy, EF 20% or less by echo on 12/27/2023  3.  Severe coronary artery disease, status post Impella supported high risk PCI on 1/3/2024 by Dr. Greene (status post 3.5 x 28 mm Xience Skypoint MERRITT of the left main into the proximal LAD, 2.5 x 28 mm Xience Skypoint MERRITT to the mid LAD, and 3.0 x 28 mm Xience Skypoint MERRITT to the ostial and proximal OM1 branch).  This was followed by staged PCI to the proximal to mid RCA with 2 Xience Skypoint MERRITT on 1/8/2024.  4.  Sinus tachycardia  5.  Insulin-dependent diabetes since 2014, with neuropathy  6.  Tobacco use, quit on 12/18/2023  7.  Statin intolerance with myalgias  8.  Dyslipidemia  9.  Hypotension    Plan:  -Spoke with Dr. Vitale.  I am okay with the patient going home today.  She already has follow-up scheduled next week.    -I would keep her on Lasix either 40 mg/day or 40 mg every other day.  The patient is ultimately going to watch her weight at home, which we discussed in detail.  She will adjust her diuretics as needed if she goes up by 1 to 2 pounds in a day.  I also discussed 2000 mg sodium restriction with her.    -I added spironolactone 12.5 mg/day to assist with diuresis and to add guideline directed medical agent.  I am also going to see if she can tolerate Toprol-XL 12.5 mg to be taken at night before bed.  Her blood pressure has greatly limited her medical therapy for the cardiomyopathy.    -No angina.  Continue dual antiplatelet therapy with aspirin and Plavix for the recent stents.  Continue pravastatin 20 mg/day as she is intolerant to other statins with severe myalgias.    Billy Esquivel MD  01/14/24  14:26 EST        never

## 2024-01-14 NOTE — NURSING NOTE
Went over d/c paperwork w/ pt. Pt understands and has no further questions. Pt has all belongings and is going home. D/C IV

## 2024-01-14 NOTE — OUTREACH NOTE
Prep Survey      Flowsheet Row Responses   [facility] patient discharged from? [place]   Is LACE score < 7 ? No   Eligibility Readm Mgmt   Discharge diagnosis *Acute on chronic combined systolic (congestive) and diastolic (congestive) heart failure (I50.43)   Does the patient have one of the following disease processes/diagnoses(primary or secondary)? CHF   Does the patient have Home health ordered? No   Is there a DME ordered? No   Prep survey completed? Yes            TRI ALVAREZ - Registered Nurse

## 2024-01-15 ENCOUNTER — READMISSION MANAGEMENT (OUTPATIENT)
Dept: CALL CENTER | Facility: HOSPITAL | Age: 54
End: 2024-01-15
Payer: COMMERCIAL

## 2024-01-15 NOTE — CASE MANAGEMENT/SOCIAL WORK
Case Management Discharge Note      Final Note: dc home         Selected Continued Care - Discharged on 1/14/2024 Admission date: 1/13/2024 - Discharge disposition: Home or Self Care      Destination    No services have been selected for the patient.                Durable Medical Equipment    No services have been selected for the patient.                Dialysis/Infusion    No services have been selected for the patient.                Home Medical Care    No services have been selected for the patient.                Therapy    No services have been selected for the patient.                Community Resources    No services have been selected for the patient.                Community & DME    No services have been selected for the patient.                         Final Discharge Disposition Code: 01 - home or self-care

## 2024-01-15 NOTE — OUTREACH NOTE
CHF Week 1 Survey      Flowsheet Row Responses   [facility] patient discharged from? [place]   Does the patient have one of the following disease processes/diagnoses(primary or secondary)? CHF   CHF Week 1 attempt successful? Yes   Call start time 1242   Call end time 1256   Discharge diagnosis *Acute on chronic combined systolic (congestive) and diastolic (congestive) heart failure (I50.43)   Meds reviewed with patient/caregiver? Yes   Is the patient having any side effects they believe may be caused by any medication additions or changes? No   Does the patient have all medications ordered at discharge? Yes   Is the patient taking all medications as directed (includes completed medication regime)? Yes   Comments regarding appointments Pt referral to [facility]F Clinic, contact info given to pt and pt transferred to [facility] [place] at end of our call. Pt v/u of need to schedule appt with HF Clinic.   Does the patient have a primary care provider?  No   Does the patient have an appointment with their PCP within 7 days of discharge? Yes   Comments regarding PCP Pt declined assistance for finding PCP at this time.   Comments [facility] sites: [facility] & [facility] site, are w/o issues, pt reports. Pt reports glucose is not monitored at home currently r/t pt unable to afford the Balwinder monthly copaywas $120 but now has medicaid so should be covered &Glucometer is out of batteries at this time. Pt denies chest pain, dizziness, SOA or edema.   Did the patient receive a copy of their discharge instructions? Yes   Nursing interventions Reviewed instructions with patient   What is the patient's perception of their health status since discharge? Improving   Nursing interventions Nurse provided patient education   Is the patient able to teach back signs and symptoms of worsening condition? (i.e. weight gain, shortness of air, etc.) Yes   If the patient is a current smoker, are they able to teach back resources for cessation? Not a smoker   Is  the patient/caregiver able to teach back the hierarchy of who to call/visit for symptoms/problems? PCP, Specialist, Home health nurse, Urgent Care, ED, 911 Yes   CHF Nursing Interventions Education provided on various zones   CHF Zone this Call Green Zone   Green Zone Patient reports doing well, No new or worsening shortness of breath, Physical activity level is normal for you, No new swelling -  feet, ankles and legs look normal for you, No chest pain, Weight check stable   Green Zone Interventions Daily weight check, Meds as directed, Low sodium diet, Follow up visits planned    CHF Week 1 call completed? Yes   Revoked No further contact(revokes)-requires comment   Call end time 5007            Sadaf LEACH - Registered Nurse

## 2024-01-23 ENCOUNTER — HOSPITAL ENCOUNTER (EMERGENCY)
Facility: HOSPITAL | Age: 54
Discharge: HOME | End: 2024-01-23
Attending: EMERGENCY MEDICINE | Admitting: EMERGENCY MEDICINE
Payer: COMMERCIAL

## 2024-01-23 ENCOUNTER — APPOINTMENT (OUTPATIENT)
Dept: GENERAL RADIOLOGY | Facility: HOSPITAL | Age: 54
End: 2024-01-23
Payer: COMMERCIAL

## 2024-01-23 VITALS
RESPIRATION RATE: 18 BRPM | WEIGHT: 150 LBS | HEART RATE: 94 BPM | SYSTOLIC BLOOD PRESSURE: 111 MMHG | HEIGHT: 64 IN | TEMPERATURE: 96.8 F | BODY MASS INDEX: 25.61 KG/M2 | OXYGEN SATURATION: 86 % | DIASTOLIC BLOOD PRESSURE: 82 MMHG

## 2024-01-23 DIAGNOSIS — R11.0 NAUSEA: ICD-10-CM

## 2024-01-23 DIAGNOSIS — Z86.79 HISTORY OF CAD (CORONARY ARTERY DISEASE): ICD-10-CM

## 2024-01-23 DIAGNOSIS — R10.13 EPIGASTRIC PAIN: Primary | ICD-10-CM

## 2024-01-23 LAB
ALBUMIN SERPL-MCNC: 4.5 G/DL (ref 3.5–5.2)
ALBUMIN/GLOB SERPL: 1.7 G/DL
ALP SERPL-CCNC: 85 U/L (ref 39–117)
ALT SERPL W P-5'-P-CCNC: 27 U/L (ref 1–33)
ANION GAP SERPL CALCULATED.3IONS-SCNC: 15 MMOL/L (ref 5–15)
AST SERPL-CCNC: 26 U/L (ref 1–32)
BASOPHILS # BLD AUTO: 0.07 10*3/MM3 (ref 0–0.2)
BASOPHILS NFR BLD AUTO: 0.5 % (ref 0–1.5)
BILIRUB SERPL-MCNC: 0.4 MG/DL (ref 0–1.2)
BUN SERPL-MCNC: 23 MG/DL (ref 6–20)
BUN/CREAT SERPL: 25.6 (ref 7–25)
CALCIUM SPEC-SCNC: 9.8 MG/DL (ref 8.6–10.5)
CHLORIDE SERPL-SCNC: 98 MMOL/L (ref 98–107)
CO2 SERPL-SCNC: 22 MMOL/L (ref 22–29)
CREAT SERPL-MCNC: 0.9 MG/DL (ref 0.57–1)
DEPRECATED RDW RBC AUTO: 43.3 FL (ref 37–54)
EGFRCR SERPLBLD CKD-EPI 2021: 75.2 ML/MIN/1.73
EOSINOPHIL # BLD AUTO: 0.07 10*3/MM3 (ref 0–0.4)
EOSINOPHIL NFR BLD AUTO: 0.5 % (ref 0.3–6.2)
ERYTHROCYTE [DISTWIDTH] IN BLOOD BY AUTOMATED COUNT: 12.4 % (ref 12.3–15.4)
GEN 5 2HR TROPONIN T REFLEX: 25 NG/L
GLOBULIN UR ELPH-MCNC: 2.6 GM/DL
GLUCOSE SERPL-MCNC: 343 MG/DL (ref 65–99)
HCT VFR BLD AUTO: 40.2 % (ref 34–46.6)
HGB BLD-MCNC: 13.2 G/DL (ref 12–15.9)
HOLD SPECIMEN: NORMAL
HOLD SPECIMEN: NORMAL
IMM GRANULOCYTES # BLD AUTO: 0.06 10*3/MM3 (ref 0–0.05)
IMM GRANULOCYTES NFR BLD AUTO: 0.5 % (ref 0–0.5)
LIPASE SERPL-CCNC: 47 U/L (ref 13–60)
LYMPHOCYTES # BLD AUTO: 2.25 10*3/MM3 (ref 0.7–3.1)
LYMPHOCYTES NFR BLD AUTO: 16.9 % (ref 19.6–45.3)
MCH RBC QN AUTO: 31.6 PG (ref 26.6–33)
MCHC RBC AUTO-ENTMCNC: 32.8 G/DL (ref 31.5–35.7)
MCV RBC AUTO: 96.2 FL (ref 79–97)
MONOCYTES # BLD AUTO: 0.58 10*3/MM3 (ref 0.1–0.9)
MONOCYTES NFR BLD AUTO: 4.4 % (ref 5–12)
NEUTROPHILS NFR BLD AUTO: 10.25 10*3/MM3 (ref 1.7–7)
NEUTROPHILS NFR BLD AUTO: 77.2 % (ref 42.7–76)
NRBC BLD AUTO-RTO: 0 /100 WBC (ref 0–0.2)
PLATELET # BLD AUTO: 351 10*3/MM3 (ref 140–450)
PMV BLD AUTO: 9.2 FL (ref 6–12)
POTASSIUM SERPL-SCNC: 4.1 MMOL/L (ref 3.5–5.2)
PROT SERPL-MCNC: 7.1 G/DL (ref 6–8.5)
QT INTERVAL: 371 MS
QTC INTERVAL: 486 MS
RBC # BLD AUTO: 4.18 10*6/MM3 (ref 3.77–5.28)
SODIUM SERPL-SCNC: 135 MMOL/L (ref 136–145)
TROPONIN T DELTA: -2 NG/L
TROPONIN T SERPL HS-MCNC: 27 NG/L
WBC NRBC COR # BLD AUTO: 13.28 10*3/MM3 (ref 3.4–10.8)
WHOLE BLOOD HOLD COAG: NORMAL
WHOLE BLOOD HOLD SPECIMEN: NORMAL

## 2024-01-23 PROCEDURE — 84484 ASSAY OF TROPONIN QUANT: CPT | Performed by: EMERGENCY MEDICINE

## 2024-01-23 PROCEDURE — 80053 COMPREHEN METABOLIC PANEL: CPT

## 2024-01-23 PROCEDURE — 63710000001 INSULIN REGULAR HUMAN PER 5 UNITS: Performed by: PHYSICIAN ASSISTANT

## 2024-01-23 PROCEDURE — 93010 ELECTROCARDIOGRAM REPORT: CPT | Performed by: INTERNAL MEDICINE

## 2024-01-23 PROCEDURE — 85025 COMPLETE CBC W/AUTO DIFF WBC: CPT

## 2024-01-23 PROCEDURE — 93005 ELECTROCARDIOGRAM TRACING: CPT

## 2024-01-23 PROCEDURE — 25010000002 ONDANSETRON PER 1 MG: Performed by: PHYSICIAN ASSISTANT

## 2024-01-23 PROCEDURE — 71045 X-RAY EXAM CHEST 1 VIEW: CPT

## 2024-01-23 PROCEDURE — 96361 HYDRATE IV INFUSION ADD-ON: CPT

## 2024-01-23 PROCEDURE — 99284 EMERGENCY DEPT VISIT MOD MDM: CPT

## 2024-01-23 PROCEDURE — 25810000003 SODIUM CHLORIDE 0.9 % SOLUTION: Performed by: PHYSICIAN ASSISTANT

## 2024-01-23 PROCEDURE — 83690 ASSAY OF LIPASE: CPT | Performed by: PHYSICIAN ASSISTANT

## 2024-01-23 PROCEDURE — 84484 ASSAY OF TROPONIN QUANT: CPT

## 2024-01-23 PROCEDURE — 96374 THER/PROPH/DIAG INJ IV PUSH: CPT

## 2024-01-23 PROCEDURE — 36415 COLL VENOUS BLD VENIPUNCTURE: CPT

## 2024-01-23 RX ORDER — ALUMINA, MAGNESIA, AND SIMETHICONE 2400; 2400; 240 MG/30ML; MG/30ML; MG/30ML
15 SUSPENSION ORAL ONCE
Status: COMPLETED | OUTPATIENT
Start: 2024-01-23 | End: 2024-01-23

## 2024-01-23 RX ORDER — SODIUM CHLORIDE 0.9 % (FLUSH) 0.9 %
10 SYRINGE (ML) INJECTION AS NEEDED
Status: DISCONTINUED | OUTPATIENT
Start: 2024-01-23 | End: 2024-01-23 | Stop reason: HOSPADM

## 2024-01-23 RX ORDER — ASPIRIN 325 MG
325 TABLET ORAL ONCE
Status: COMPLETED | OUTPATIENT
Start: 2024-01-23 | End: 2024-01-23

## 2024-01-23 RX ORDER — ONDANSETRON 4 MG/1
4 TABLET, ORALLY DISINTEGRATING ORAL 4 TIMES DAILY PRN
Qty: 20 TABLET | Refills: 0 | Status: SHIPPED | OUTPATIENT
Start: 2024-01-23

## 2024-01-23 RX ORDER — ONDANSETRON 2 MG/ML
4 INJECTION INTRAMUSCULAR; INTRAVENOUS ONCE
Status: COMPLETED | OUTPATIENT
Start: 2024-01-23 | End: 2024-01-23

## 2024-01-23 RX ADMIN — INSULIN HUMAN 6 UNITS: 100 INJECTION, SOLUTION PARENTERAL at 15:30

## 2024-01-23 RX ADMIN — SODIUM CHLORIDE 500 ML: 9 INJECTION, SOLUTION INTRAVENOUS at 15:36

## 2024-01-23 RX ADMIN — ONDANSETRON HYDROCHLORIDE 4 MG: 2 INJECTION, SOLUTION INTRAMUSCULAR; INTRAVENOUS at 15:33

## 2024-01-23 RX ADMIN — ALUMINUM HYDROXIDE, MAGNESIUM HYDROXIDE, AND DIMETHICONE 15 ML: 400; 400; 40 SUSPENSION ORAL at 15:20

## 2024-01-23 RX ADMIN — ASPIRIN 325 MG: 325 TABLET ORAL at 14:14

## 2024-01-23 NOTE — ED PROVIDER NOTES
MD ATTESTATION NOTE    SHARED VISIT: This visit was performed by BOTH a physician and an APC. The substantive portion of the medical decision making was performed by this attesting physician who made or approved the management plan and takes responsibility for patient management. All studies in the APC note (if performed) were independently interpreted by me.     The BRODY and I have discussed this patient's history, physical exam, and treatment plan.  I have reviewed the documentation and affirm the documentation and agree with the treatment and plan.  The attached note describes my personal findings.      Independent Historians: Patient    A complete HPI/ROS/PMH/PSH/SH/FH are unobtainable due to: None    Chronic or social conditions impacting patient care (social determinants of health): None    Jade Clement is a 54 y.o. female with h/o DM, HLD, HTN, and CAD s/p stents, CHF who presents to the ED c/o acute epigastric pain with nausea similar to prior MI.  Pt here recently with stents placed for NSTEMI. No fever, no v/d, no sob.          On exam:  GENERAL: cooperative and conversant f, alert, no acute distress  SKIN: Warm, dry  HENT: Normocephalic, atraumatic  EYES: no scleral icterus  CV: regular rhythm, regular rate  RESPIRATORY: normal effort, lungs clear, no rhochi  ABDOMEN: soft, nontender, nondistended  NEURO: alert, moves all extremities, follows commands                                                             Labs  Trop X 2   27 -->  25   CMP with gluc 343, Na 135, BUN 23, Cr 0.9  Lipase 47  CBC with wbc 13.28, Hb 13, Plt 351      Radiology  CXR with cardiomegaly and lll scarring/atelectasis    Medical Decision Making:  ED Course as of 01/24/24 1711 Tue Jan 23, 2024   1410 First look: Patient presents with recurrent epigastric abdominal pain.  Patient has had 2 recent heart caths with stents.  Plan for EKG, serial enzymes. [EE]   1516 Anion Gap: 15.0 [DC]   1516 Glucose(!): 343 [DC]   1516 Creatinine:  0.90 [DC]   1516 HS Troponin T(!): 27  Improving from recent values. [DC]   1616 HS Troponin T(!): 25 [DC]   1624 I discussed the case with MD Ramona with cardiology at this time regarding the patient. She knows the patient well.   I discussed work-up, results, concerns.  I discussed the consulting provider's desire for discharging her to follow-up in the office this week as instructed.    [DC]   1625 Patient presentation and evaluation consistent with uncomplicated epigastric discomfort and some mild nausea from uncertain etiology.  With a history of gastritis I do feel this could be playing a role in her symptoms today.   No indication that she is feeling a cardiac equivalent.  After consultation with cardiology the recommendation was for discharge at this time and close monitoring of symptoms at home and encouragement to follow-up this week in the office.  With downtrending troponin levels and symptoms that are absent on reassessment I feel she is appropriate for discharge.  Patient agreeable and all questions answered. [DC]      ED Course User Index  [DC] Alexys Delcid, PA  [EE] Alexis Cantu PA       MDM: The differential diagnosis for this patient includes gastritis, PUD, H. Pylori infection, atypical ACS, pancreatitis, cholecystitis/ biliary colic, esophagitis, esophageal spasm, appendicitis, sbo, pneumonia/pneumothorax, hepatitis, AAA, Aortic dissection, bowel perforation, and malignancy. Abdominal exam is without peritoneal signs. There is no evidence of acute abdomen at this time. The patient is well appearing. I have a low suspicion for acute hepatobiliary disease (including acute cholecystitis), acute pancreatitis, PUD (including perforation), acute infectious processes (pneumonia, hepatitis, pyelonephritis), acute appendicitis, vascular catastrophe, bowel obstruction or viscus perforation. This patient´s main concern is atypical ACS given her history.  Plan serum labs, EKG, serial trop, IMAGING CXR,  and serial reassessment.    Procedures:  Procedures        PPE: I followed hospital protocols for proper PPE based on patient presentation including use of N95 mask for suspected infectious respiratory conditions.  Proper hand hygiene was performed both before and after the patient encounter.          Diagnosis  Final diagnoses:   Epigastric pain   Nausea   History of CAD (coronary artery disease)       Note Disclaimer: At [facility], we believe that sharing information builds trust and better relationships. You are receiving this note because you recently visited [facility]. It is possible you will see health information before a provider has talked with you about it. This kind of information can be easy to misunderstand. To help you fully understand what it means for your health, we urge you to discuss this note with your provider.       Kym Hermosillo MD  01/24/24 5717

## 2024-01-23 NOTE — ED PROVIDER NOTES
EMERGENCY DEPARTMENT ENCOUNTER      Room Number:  02/02  PCP: Provider, No Known  Independent Historians: Patient  Patient Care Team:  Provider, No Known as PCP - Hesham Ray MD as Consulting Physician (Endocrinology)       HPI:  Chief Complaint: CP    A complete HPI/ROS/PMH/PSH/SH/FH are unobtainable due to: None    Chronic or social conditions impacting patient care (Social Determinants of Health): None      Context: Jade Clement is a 54 y.o. female with a PMH significant for hyperlipidemia, hypertension, type 2 diabetes, CAD with multiple cardiac stents, congestive heart failure, GERD, anxiety who presents to the ED c/o acute epigastric discomfort with associated nausea.  The patient started feeling some mild dyspnea when she laid flat last night for bed and some mild nausea at that time.  Symptoms were worse when she woke up this morning and have been persisting through the day.  She had a cardiology appointment today to follow-up from a recent admission where multiple cardiac stents were placed but she came here instead due to her symptoms.  Denies leg swelling.  No fever, chills, cough, dizziness, syncope.  She does report significant symptom improvement spontaneously since onset.      Upon review of prior external notes (non-ED) -and- Review of prior external test results outside of this encounter it appears the patient was evaluated in the office for fibromyalgia by pain management on 12/6/2023.  The patient had a normal magnesium on 1/13/2024 and a normal hepatic function panel on 1/14/2024.      PAST MEDICAL HISTORY  Active Ambulatory Problems     Diagnosis Date Noted    Fatigue 09/08/2016    Hyperlipidemia 09/08/2016    Hypertension 09/08/2016    Microalbuminuria 09/08/2016    Adiposity 09/08/2016    Type 2 diabetes mellitus with microalbuminuria, with long-term current use of insulin 09/08/2016    Vitamin D deficiency 09/08/2016    Noncompliance with medication regimen 09/14/2016     Hypertriglyceridemia 08/15/2017    Hypercalcemia 08/15/2017    Adrenal nodule 10/19/2020    Peptic ulcer disease 10/19/2020    Hiatal hernia 10/19/2020    Pain of upper abdomen 04/09/2021    Dyspepsia 04/09/2021    Poor appetite 04/09/2021    Nausea 04/09/2021    Type 2 diabetes mellitus with complications 09/28/2021    ACS (acute coronary syndrome) 12/26/2023    Constipation 01/01/2024    NOELLE (generalized anxiety disorder) 01/01/2024    GERD without esophagitis 01/01/2024    Pleural effusion 01/13/2024    Acute pulmonary edema 01/13/2024    Acute on chronic combined systolic (congestive) and diastolic (congestive) heart failure 01/13/2024     Resolved Ambulatory Problems     Diagnosis Date Noted    Peripheral neuropathy 09/08/2016    Diabetic peripheral neuropathy 09/08/2016     Past Medical History:   Diagnosis Date    Adrenal adenoma     Anxiety     Diabetes mellitus     Diarrhea     GERD (gastroesophageal reflux disease)     Gestational diabetes 2000    Head ache     Kidney stone 1985    Type 2 diabetes mellitus     Urinary tract infection 1986    Varicella Unknown         PAST SURGICAL HISTORY  Past Surgical History:   Procedure Laterality Date    CARDIAC CATHETERIZATION N/A 12/28/2023    Procedure: Left Heart Cath;  Surgeon: Lilli Greene MD;  Location:  [facility];  Service: Cardiovascular;  Laterality: N/A;    CARDIAC CATHETERIZATION N/A 12/28/2023    Procedure: Coronary angiography;  Surgeon: Lilli Greene MD;  Location:  [facility];  Service: Cardiovascular;  Laterality: N/A;    CARDIAC CATHETERIZATION N/A 1/3/2024    Procedure: Percutaneous Coronary Intervention;  Surgeon: Lilli Greene MD;  Location:  [facility];  Service: Cardiology;  Laterality: N/A;    CARDIAC CATHETERIZATION N/A 1/3/2024    Procedure: Stent MERRITT coronary;  Surgeon: Lilli Greene MD;  Location:  [facility];  Service: Cardiology;  Laterality: N/A;     CARDIAC CATHETERIZATION N/A 2024    Procedure: Percutaneous Coronary Intervention;  Surgeon: Lilli Greene MD;  Location:  [facility];  Service: Cardiology;  Laterality: N/A;    CARDIAC CATHETERIZATION N/A 2024    Procedure: Stent MERRITT coronary;  Surgeon: Lilli Greene MD;  Location:  [facility];  Service: Cardiology;  Laterality: N/A;    CARDIAC CATHETERIZATION N/A 2024    Procedure: Optical Coherence Tomography;  Surgeon: Lilli Greene MD;  Location:  [facility];  Service: Cardiology;  Laterality: N/A;    CARDIAC ELECTROPHYSIOLOGY PROCEDURE  1/3/2024    Procedure: Impella Insertion;  Surgeon: Lilli Greene MD;  Location:  [facility];  Service: Cardiology;;     SECTION       SECTION WITH TUBAL  2001    CHOLECYSTECTOMY      COLONOSCOPY  05/10/2019    Grzegorz ABEL    CYSTOSCOPY      ENDOSCOPY  2020    ENDOSCOPY N/A 2021    Procedure: ESOPHAGOGASTRODUODENOSCOPY WITH COLD BIOPSIES;  Surgeon: Jasson Nguyen MD;  Location:  [facility];  Service: Gastroenterology;  Laterality: N/A;  PRE- ABD PAIN  POST- NORMAL    INTERVENTIONAL RADIOLOGY PROCEDURE N/A 1/3/2024    Procedure: Intravascular Ultrasound;  Surgeon: Lilli Greene MD;  Location: [facility] CATH INVASIVE LOC[facility];  Service: Cardiology;  Laterality: N/A;    LAPAROSCOPIC CHOLECYSTECTOMY  2016    TUBAL ABDOMINAL LIGATION  2001    UPPER GASTROINTESTINAL ENDOSCOPY  2020    Grzegorz ABEL gastritis, ulcers    UPPER GASTROINTESTINAL ENDOSCOPY  2020    Andrew ABEL         FAMILY HISTORY  Family History   Problem Relation Age of Onset    Thyroid disease Mother         goiter    Hyperlipidemia Mother     ALS Mother     Heart disease Father     Stroke Father     Hypertension Father     Drug abuse Brother     Diabetes Paternal Grandfather     Diabetes Paternal Aunt     Diabetes Paternal Uncle     Malig Hyperthermia Neg Hx           SOCIAL HISTORY  Social History     Socioeconomic History    Marital status: Single   Tobacco Use    Smoking status: Former     Packs/day: 0.50     Years: 25.00     Additional pack years: 0.00     Total pack years: 12.50     Types: Cigarettes     Start date: 1/10/1985     Quit date: 2023     Years since quittin.0    Smokeless tobacco: Never   Vaping Use    Vaping Use: Never used   Substance and Sexual Activity    Alcohol use: Yes     Comment: Only drink about 2 times a year    Drug use: Yes     Types: Marijuana     Comment: daily    Sexual activity: Yes     Partners: Male     Birth control/protection: Surgical, Post-menopausal         ALLERGIES  Metformin, Ozempic [semaglutide], Bydureon [exenatide], Statins, and Xigduo xr [dapagliflozin pro-metformin er]      REVIEW OF SYSTEMS  Included in HPI  All systems reviewed and negative except for those discussed in HPI.      PHYSICAL EXAM    I have reviewed the triage vital signs and nursing notes.    ED Triage Vitals   Temp Heart Rate Resp BP SpO2   24 1259 24 1259 24 1259 24 1307 24 1259   96.8 °F (36 °C) 106 18 112/80 99 %      Temp src Heart Rate Source Patient Position BP Location FiO2 (%)   24 1259 24 1259 24 1307 24 1307 --   Tympanic Monitor Lying Left arm        Physical Exam  Constitutional:       General: She is not in acute distress.     Appearance: She is well-developed.   HENT:      Head: Normocephalic and atraumatic.   Eyes:      General: No scleral icterus.     Conjunctiva/sclera: Conjunctivae normal.   Neck:      Trachea: No tracheal deviation.   Cardiovascular:      Rate and Rhythm: Regular rhythm.      Comments: Borderline tachycardia  Pulmonary:      Effort: Pulmonary effort is normal.      Breath sounds: Normal breath sounds.   Abdominal:      Palpations: Abdomen is soft.      Tenderness: There is no abdominal tenderness in the epigastric area.   Musculoskeletal:         General: No  deformity.      Cervical back: Normal range of motion.   Lymphadenopathy:      Cervical: No cervical adenopathy.   Skin:     General: Skin is warm and dry.   Neurological:      Mental Status: She is alert and oriented to person, place, and time.   Psychiatric:         Behavior: Behavior normal.         Vital signs and nursing notes reviewed.      PPE: I wore a surgical mask throughout my encounters with the pt. I performed hand hygiene on entry into the pt room and upon exit.     LAB RESULTS  Recent Results (from the past 24 hour(s))   ECG 12 Lead Chest Pain    Collection Time: 01/23/24  1:02 PM   Result Value Ref Range    QT Interval 371 ms    QTC Interval 486 ms   Comprehensive Metabolic Panel    Collection Time: 01/23/24  1:06 PM    Specimen: Blood   Result Value Ref Range    Glucose 343 (H) 65 - 99 mg/dL    BUN 23 (H) 6 - 20 mg/dL    Creatinine 0.90 0.57 - 1.00 mg/dL    Sodium 135 (L) 136 - 145 mmol/L    Potassium 4.1 3.5 - 5.2 mmol/L    Chloride 98 98 - 107 mmol/L    CO2 22.0 22.0 - 29.0 mmol/L    Calcium 9.8 8.6 - 10.5 mg/dL    Total Protein 7.1 6.0 - 8.5 g/dL    Albumin 4.5 3.5 - 5.2 g/dL    ALT (SGPT) 27 1 - 33 U/L    AST (SGOT) 26 1 - 32 U/L    Alkaline Phosphatase 85 39 - 117 U/L    Total Bilirubin 0.4 0.0 - 1.2 mg/dL    Globulin 2.6 gm/dL    A/G Ratio 1.7 g/dL    BUN/Creatinine Ratio 25.6 (H) 7.0 - 25.0    Anion Gap 15.0 5.0 - 15.0 mmol/L    eGFR 75.2 >60.0 mL/min/1.73   High Sensitivity Troponin T    Collection Time: 01/23/24  1:06 PM    Specimen: Blood   Result Value Ref Range    HS Troponin T 27 (H) <14 ng/L   Green Top (Gel)    Collection Time: 01/23/24  1:06 PM   Result Value Ref Range    Extra Tube Hold for add-ons.    Lavender Top    Collection Time: 01/23/24  1:06 PM   Result Value Ref Range    Extra Tube hold for add-on    Gold Top - SST    Collection Time: 01/23/24  1:06 PM   Result Value Ref Range    Extra Tube Hold for add-ons.    Light Blue Top    Collection Time: 01/23/24  1:06 PM   Result  Value Ref Range    Extra Tube Hold for add-ons.    CBC Auto Differential    Collection Time: 01/23/24  1:06 PM    Specimen: Blood   Result Value Ref Range    WBC 13.28 (H) 3.40 - 10.80 10*3/mm3    RBC 4.18 3.77 - 5.28 10*6/mm3    Hemoglobin 13.2 12.0 - 15.9 g/dL    Hematocrit 40.2 34.0 - 46.6 %    MCV 96.2 79.0 - 97.0 fL    MCH 31.6 26.6 - 33.0 pg    MCHC 32.8 31.5 - 35.7 g/dL    RDW 12.4 12.3 - 15.4 %    RDW-SD 43.3 37.0 - 54.0 fl    MPV 9.2 6.0 - 12.0 fL    Platelets 351 140 - 450 10*3/mm3    Neutrophil % 77.2 (H) 42.7 - 76.0 %    Lymphocyte % 16.9 (L) 19.6 - 45.3 %    Monocyte % 4.4 (L) 5.0 - 12.0 %    Eosinophil % 0.5 0.3 - 6.2 %    Basophil % 0.5 0.0 - 1.5 %    Immature Grans % 0.5 0.0 - 0.5 %    Neutrophils, Absolute 10.25 (H) 1.70 - 7.00 10*3/mm3    Lymphocytes, Absolute 2.25 0.70 - 3.10 10*3/mm3    Monocytes, Absolute 0.58 0.10 - 0.90 10*3/mm3    Eosinophils, Absolute 0.07 0.00 - 0.40 10*3/mm3    Basophils, Absolute 0.07 0.00 - 0.20 10*3/mm3    Immature Grans, Absolute 0.06 (H) 0.00 - 0.05 10*3/mm3    nRBC 0.0 0.0 - 0.2 /100 WBC   Lipase    Collection Time: 01/23/24  1:06 PM    Specimen: Blood   Result Value Ref Range    Lipase 47 13 - 60 U/L   High Sensitivity Troponin T 2Hr    Collection Time: 01/23/24  3:41 PM    Specimen: Blood   Result Value Ref Range    HS Troponin T 25 (H) <14 ng/L    Troponin T Delta -2 >=-4 - <+4 ng/L         RADIOLOGY  XR Chest 1 View    Result Date: 1/23/2024  XR CHEST 1 VW-  HISTORY: Female who is 54 years-old, chest pain  TECHNIQUE: Frontal view of the chest  COMPARISON: 12/27/2023  FINDINGS: The heart is enlarged. Pulmonary vasculature is unremarkable. Small likely atelectasis or scarring in the left lower lung. No pleural effusion, or pneumothorax. No acute osseous process.      Small likely atelectasis or scarring in the left lower lung. Cardiomegaly. Follow-up as clinical indications persist.  This report was finalized on 1/23/2024 1:37 PM by Dr. Dale Bowman M.D on  Workstation: [id number]         MEDICATIONS GIVEN IN ER  Medications   sodium chloride 0.9 % flush 10 mL (has no administration in time range)   sodium chloride 0.9 % bolus 500 mL (500 mL Intravenous New Bag 1/23/24 1536)   aspirin tablet 325 mg (325 mg Oral Given 1/23/24 1414)   insulin regular (humuLIN R,novoLIN R) injection 6 Units (6 Units Intravenous Given 1/23/24 1530)   aluminum-magnesium hydroxide-simethicone (MAALOX MAX) 400-400-40 MG/5ML suspension 15 mL (15 mL Oral Given 1/23/24 1520)   ondansetron (ZOFRAN) injection 4 mg (4 mg Intravenous Given 1/23/24 1533)         ORDERS PLACED DURING THIS VISIT:  Orders Placed This Encounter   Procedures    XR Chest 1 View    [place] Draw    Comprehensive Metabolic Panel    High Sensitivity Troponin T    CBC Auto Differential    High Sensitivity Troponin T 2Hr    Lipase    NPO Diet NPO Type: Strict NPO    Undress & Gown    Continuous Pulse Oximetry    Cardiology (on-call MD unless specified)    Oxygen Therapy- Nasal Cannula; Titrate 1-6 LPM Per SpO2; 90 - 95%    ECG 12 Lead Chest Pain    ECG 12 Lead ED Triage Standing Order; Chest Pain    Insert Peripheral IV    CBC & Differential    Green Top (Gel)    Lavender Top    Gold Top - SST    Light Blue Top         OUTPATIENT MEDICATION MANAGEMENT:  Current Facility-Administered Medications Ordered in Epic   Medication Dose Route Frequency Provider Last Rate Last Admin    sodium chloride 0.9 % bolus 500 mL  500 mL Intravenous Once Alexys Delcid  mL/hr at 01/23/24 1536 500 mL at 01/23/24 1536    sodium chloride 0.9 % flush 10 mL  10 mL Intravenous Kym Weaver MD         Current Outpatient Medications Ordered in Epic   Medication Sig Dispense Refill    aspirin 81 MG EC tablet Take 1 tablet by mouth Daily. 90 tablet 3    aspirin-acetaminophen-caffeine (EXCEDRIN MIGRAINE) 250-250-65 MG per tablet Take 1 tablet by mouth Every 8 (Eight) Hours As Needed.      busPIRone (BUSPAR) 10 MG tablet Take 1 tablet by  mouth Daily.      cetirizine (ZyrTEC) 10 MG tablet Take 1 tablet by mouth Daily.      clopidogrel (PLAVIX) 75 MG tablet Take 1 tablet by mouth Daily. 30 tablet 0    Continuous Blood Gluc Sensor (FreeStyle Balwinder 3 Sensor) [facility] Use 1 each Every 14 (Fourteen) Days. 6 each 3    Cymbalta 60 MG capsule Take 1 capsule by mouth 2 (Two) Times a Day.      ergocalciferol (ERGOCALCIFEROL) 1.25 MG (54528 UT) capsule Take 1 capsule by mouth 1 (One) Time Per Week. 12 capsule 1    furosemide (LASIX) 40 MG tablet Take 1 tablet by mouth Daily. 30 tablet 0    Insulin Degludec (Tresiba FlexTouch) 200 UNIT/ML solution pen-injector pen injection 36 units every evening.  Prime needle with 4 units before each use. 18 mL 2    Insulin Lispro, 1 Unit Dial, (HumaLOG KwikPen) 100 UNIT/ML solution pen-injector 12 units 3 times daily with each meal.  Prime needle with 2 units before each use. (Patient taking differently: 12 units 3 times daily with each meal.  Prime needle with 2 units before each use.    Patient currently not takign this, only does sliding scale) 38 mL 2    Insulin Pen Needle (BD PEN NEEDLE PAUL U/F) 32G X 4 MM misc Use to inject insulin 2 times daily 300 each 1    Methylcobalamin 1 MG chewable tablet Chew 1 mg Daily.      metoprolol succinate XL (TOPROL-XL) 25 MG 24 hr tablet Take 0.5 tablets by mouth Every Night. 15 tablet 0    Naproxen Sodium 220 MG capsule Take 220 mg by mouth Daily As Needed.      nitroglycerin (NITROSTAT) 0.4 MG SL tablet Place 1 tablet under the tongue Every 5 (Five) Minutes As Needed for Chest Pain (Systolic BP Greater Than 100). Take no more than 3 doses in 15 minutes. 100 tablet 0    ondansetron ODT (ZOFRAN-ODT) 4 MG disintegrating tablet Place 1 tablet on the tongue 4 (Four) Times a Day As Needed for Nausea. 20 tablet 0    pravastatin (PRAVACHOL) 20 MG tablet Take 1 tablet by mouth Every Night. 90 tablet 3    sertraline (ZOLOFT) 50 MG tablet Take 0.5 tablets by mouth Daily.      spironolactone  (ALDACTONE) 25 MG tablet Take 0.5 tablets by mouth Daily. 15 tablet 0           PROGRESS, DATA ANALYSIS, CONSULTS, AND MEDICAL DECISION MAKING  All labs have been independently interpreted by me.  All radiology studies have been reviewed by me. All EKG's have been independently viewed and interpreted by me.  Discussion below represents my analysis of pertinent findings related to patient's condition, differential diagnosis, treatment plan and final disposition.      DIFFERENTIAL DIAGNOSIS INCLUDE BUT NOT LIMITED TO:     Differential diagnosis includes but is not limited to:  -acute coronary syndrome  -pulmonary embolism  -thoracic aortic dissection  -pneumonia  -pneumothorax  -musculoskeletal pain  -GERD  -esophageal spasm  -anxiety  -myocarditis/pericarditis  -esophageal rupture  -pancreatitis.           ED Course as of 01/23/24 1628   Tue Jan 23, 2024   1410 First look: Patient presents with recurrent epigastric abdominal pain.  Patient has had 2 recent heart caths with stents.  Plan for EKG, serial enzymes. [EE]   1516 Anion Gap: 15.0 [DC]   1516 Glucose(!): 343 [DC]   1516 Creatinine: 0.90 [DC]   1516 HS Troponin T(!): 27  Improving from recent values. [DC]   1616 HS Troponin T(!): 25 [DC]   1624 I discussed the case with MD Ramona with cardiology at this time regarding the patient. She knows the patient well.   I discussed work-up, results, concerns.  I discussed the consulting provider's desire for discharging her to follow-up in the office this week as instructed.    [DC]   1625 Patient presentation and evaluation consistent with uncomplicated epigastric discomfort and some mild nausea from uncertain etiology.  With a history of gastritis I do feel this could be playing a role in her symptoms today.   No indication that she is feeling a cardiac equivalent.  After consultation with cardiology the recommendation was for discharge at this time and close monitoring of symptoms at home and encouragement to  follow-up this week in the office.  With downtrending troponin levels and symptoms that are absent on reassessment I feel she is appropriate for discharge.  Patient agreeable and all questions answered. [DC]      ED Course User Index  [DC] Alexys Delcid PA  [EE] Alexis Cantu PA         6820 I rechecked the patient.  I discussed the patient's labs, radiology findings (including all incidental findings), diagnosis, and plan for discharge.  A repeat exam reveals no new worrisome changes from my initial exam findings.  The patient understands that the fact that they are being discharged does not denote that nothing is abnormal, it indicates that no clinical emergency is present and that they must follow-up as directed in order to properly maintain their health.  Follow-up instructions (specifically listed below) and return to ER precautions were given at this time.  I specifically instructed the patient to follow-up with their PCP.  The patient understands and agrees with the plan, and is ready for discharge.  All questions answered.        AS OF 16:28 EST VITALS:    BP - 111/82  HR - 94  TEMP - 96.8 °F (36 °C) (Tympanic)  O2 SATS - 100%    COMPLEXITY OF CARE  Admission was considered but after careful review of the patient's presentation, physical examination, diagnostic results, and response to treatment the patient may be safely discharged with outpatient follow-up.      DIAGNOSIS  Final diagnoses:   Epigastric pain   Nausea   History of CAD (coronary artery disease)         DISPOSITION  ED Disposition       ED Disposition   Discharge    Condition   Stable    Comment   --                Please note that portions of this document were completed with a voice recognition program.    Note Disclaimer: At [facility], we believe that sharing information builds trust and better relationships. You are receiving this note because you recently visited [facility]. It is possible you will see health information  before a provider has talked with you about it. This kind of information can be easy to misunderstand. To help you fully understand what it means for your health, we urge you to discuss this note with your provider.         Alexys Delcid PA  01/23/24 [id number]

## 2024-01-23 NOTE — ED NOTES
Pt to ed from home via PV    Pt c/o CP since last night. Pt was going to cardiology office today but cp had worsened.

## 2024-01-24 ENCOUNTER — TELEPHONE (OUTPATIENT)
Dept: CARDIOLOGY | Facility: CLINIC | Age: 54
End: 2024-01-24

## 2024-01-24 NOTE — TELEPHONE ENCOUNTER
Caller: Jade Clement    Relationship: Self    Best call back number: [phone]    What is the best time to reach you: ANYTIME    Who are you requesting to speak with (clinical staff, provider,  specific staff member): CLINICAL        What was the call regarding: PT HAS RESCHEDULE HOSPITAL F/U SEVERAL TIMES.  SHE HAD APPT YESTERDAY BUT WAS IN ED.  FIRST AVAILABLE APPT WAS 02/01/2024.   IF SHE NEEDS TO BE SOONER PLEASE CALL TO RESCHEDULE APPT.      Is it okay if the provider responds through MyChart: NO

## 2024-01-31 NOTE — PROGRESS NOTES
Subjective:     Encounter Date:02/01/2024      Patient ID: Jade Clement is a 54 y.o. female.    Chief Complaint:  History of Present Illness    This is a 54-year-old with diabetes mellitus type 2, hypertension, tobacco use, multivessel coronary artery disease status post stent placement of the left main to proximal and mid LAD, obtuse marginal branch, and proximal right coronary artery in 1/2024, ischemic cardiomyopathy, who presents for follow-up.     The patient initially presented to the hospital on 12/26/2023 with complaints of worsening shortness of breath and orthopnea.  She also reported episodes of epigastric discomfort and indigestion about a week prior on 12/18/2023 associated with shortness of breath, nausea and diarrhea.  Her GI symptoms resolved after couple of days but her shortness of breath continued.  She was evaluated by her PCP around that time and treated for a peptic ulcer.  Her symptoms however continued at which point she went to [facility] in [place], [place].  She also reported 10 pound weight gain at the time of her ER visit.  EKG at that time showed sinus tachycardia and prior anteroseptal infarct.  Troponins were noted to be elevated along with her BNP.  CT of the chest showed evidence of pulmonary edema and pleural effusions.  She was started on a heparin infusion and transferred to [facility] where she was evaluated by Dr. Esquivel.  She was started on IV diuresis and a small dose of oral beta-blockers.  An echocardiogram was performed which showed 6 severely depressed left ventricular function with akinesis of her septal, anterior, lateral, and apical walls.  Her EF appeared to be less than 20% and she was noted to have mild mitral and mild to moderate tricuspid valve regurgitation with mildly elevated right ventricular systolic pressure of 41 mmHg.  When she was diuresed she underwent a cardiac catheterization on 12/28/2023 which showed severe  multivessel coronary artery disease and elevated left ventricular filling pressures of 20 to 30 mmHg.  She was referred to cardiac surgery and the plan was to proceed with CABG along with possible mitral and tricuspid valve repair when she had undergone further diuresis.  While she was being diuresed the patient developed issues with hypotension and required initiation of dobutamine.  She was reevaluated by surgery around this time and they felt that she was too high risk for CABG.     At this point we opted to proceed with multivessel PCI.  On 1/3/2024 she underwent Impella supported PCI of her left main and obtuse marginal branches.  This included stent placement of the left main into the proximal LAD and it second overlapping stent of the mid LAD.  Additionally she underwent drug-eluting stent placement of the obtuse marginal branch in the proximal vessel.  She was then brought back to the cardiac catheterization laboratory on 1/8/2024 for stage PCI and drug-eluting stent placement of the proximal to mid RCA.  She was started on both aspirin and clopidogrel.  She reported that she had been intolerant to statins in the past so she was started on pravastatin which she seemed to be tolerating.  She had been weaned quickly off of the dobutamine prior to her initial PCI and was able to remain off of this but we were unable to start any guideline directed management for ischemic cardiomyopathy because of hypotension.  Even low-dose beta-blocker was not possible.     By the day of her discharge on 1/9/2024 she reported that she was still feeling really well but the best she had felt in weeks.  No other changes were made to her management.     Unfortunately she returned to the hospital on 1/13/2020 for after presenting to an outside hospital with complaints of shortness of breath, orthopnea, and chest pain.  Chest x-ray again revealed evidence of volume overload.  She denies any significant chest pain following her  arrival to the hospital.  She received IV furosemide with improvement in her symptoms.  She was kept on oral furosemide 40 mg daily or every other day.  Instructions on how to adjust her diuretics were discussed with the patient.  Very low-dose of spironolactone 12.5 mg was added along with 12.5 mg of metoprolol succinate.    She was scheduled to see me back in follow-up on 1/19/2024.  She ended up canceling this appointment due to the weather.  She was rescheduled to see me on 1/23/2024 but ended up going to the emergency room instead with complaints of epigastric pain and nausea.  She was concerned because she felt the symptoms are similar to what she experienced before her recent admission.  Her cardiac workup was unremarkable.  No changes were made to her management.  Her symptoms had resolved by the time of her discharge.    The patient reports that since that ER visit she has experimented with her medications and noted that stopping the metoprolol seem to help with her recurrent issues with nausea and epigastric pain.  In fact since she stopped taking this medication she has had no further issues.  She has been compliant with the remainder of her medications including the pravastatin and clopidogrel.  She denies any further episodes of epigastric pain since her ER visit.  She reports that her shortness of breath is slowly improving as is her exercise tolerance.  She does report on occasion she will have days where she feels more out of breath or feels like she cannot take a deep breath.  On those days she will take an additional furosemide.  She indicates this happens about once a week that she has to take additional diuretic.  She is doing more housework without any significant issues.  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  She denies any palpitations, near-syncope or syncope or lower extremity swelling.  She is having a lot of issues with insomnia and has not been  able to sleep for 2 to 3 days.  She is currently in between primary care providers.      Review of Systems   Constitutional: Positive for malaise/fatigue.   HENT:  Negative for hearing loss, hoarse voice, nosebleeds and sore throat.    Eyes:  Negative for pain.   Cardiovascular:  Positive for dyspnea on exertion. Negative for chest pain, claudication, cyanosis, irregular heartbeat, leg swelling, near-syncope, orthopnea, palpitations, paroxysmal nocturnal dyspnea and syncope.   Respiratory:  Negative for shortness of breath and snoring.    Endocrine: Negative for cold intolerance, heat intolerance, polydipsia, polyphagia and polyuria.   Skin:  Negative for itching and rash.   Musculoskeletal:  Negative for arthritis, falls, joint pain, joint swelling, muscle cramps, muscle weakness and myalgias.   Gastrointestinal:  Negative for constipation, diarrhea, dysphagia, heartburn, hematemesis, hematochezia, melena, nausea and vomiting.   Genitourinary:  Negative for frequency, hematuria and hesitancy.   Neurological:  Negative for excessive daytime sleepiness, dizziness, headaches, light-headedness, numbness and weakness.   Psychiatric/Behavioral:  Negative for depression. The patient has insomnia. The patient is not nervous/anxious.          Current Outpatient Medications:     aspirin 81 MG EC tablet, Take 1 tablet by mouth Daily., Disp: 90 tablet, Rfl: 3    aspirin-acetaminophen-caffeine (EXCEDRIN MIGRAINE) 250-250-65 MG per tablet, Take 1 tablet by mouth Every 8 (Eight) Hours As Needed., Disp: , Rfl:     busPIRone (BUSPAR) 10 MG tablet, Take 1 tablet by mouth Daily., Disp: , Rfl:     cetirizine (ZyrTEC) 10 MG tablet, Take 1 tablet by mouth Daily., Disp: , Rfl:     clopidogrel (PLAVIX) 75 MG tablet, Take 1 tablet by mouth Daily., Disp: 30 tablet, Rfl: 0    Continuous Blood Gluc Sensor (FreeStyle Balwinder 3 Sensor) misc, Use 1 each Every 14 (Fourteen) Days., Disp: 6 each, Rfl: 3    Cymbalta 60 MG capsule, Take 1 capsule by  mouth 2 (Two) Times a Day., Disp: , Rfl:     ergocalciferol (ERGOCALCIFEROL) 1.25 MG (31800 UT) capsule, Take 1 capsule by mouth 1 (One) Time Per Week., Disp: 12 capsule, Rfl: 1    furosemide (LASIX) 40 MG tablet, Take 1 tablet by mouth Daily., Disp: 30 tablet, Rfl: 0    Insulin Degludec (Tresiba FlexTouch) 200 UNIT/ML solution pen-injector pen injection, 36 units every evening.  Prime needle with 4 units before each use., Disp: 18 mL, Rfl: 2    Insulin Lispro, 1 Unit Dial, (HumaLOG KwikPen) 100 UNIT/ML solution pen-injector, 12 units 3 times daily with each meal.  Prime needle with 2 units before each use. (Patient taking differently: 12 units 3 times daily with each meal.  Prime needle with 2 units before each use.  Patient currently not takign this, only does sliding scale), Disp: 38 mL, Rfl: 2    Insulin Pen Needle (BD PEN NEEDLE PAUL U/F) 32G X 4 MM misc, Use to inject insulin 2 times daily, Disp: 300 each, Rfl: 1    Methylcobalamin 1 MG chewable tablet, Chew 1 mg Daily., Disp: , Rfl:     metoprolol succinate XL (TOPROL-XL) 25 MG 24 hr tablet, Take 0.5 tablets by mouth Every Night., Disp: 15 tablet, Rfl: 0    Naproxen Sodium 220 MG capsule, Take 220 mg by mouth Daily As Needed., Disp: , Rfl:     nitroglycerin (NITROSTAT) 0.4 MG SL tablet, Place 1 tablet under the tongue Every 5 (Five) Minutes As Needed for Chest Pain (Systolic BP Greater Than 100). Take no more than 3 doses in 15 minutes., Disp: 100 tablet, Rfl: 0    ondansetron ODT (ZOFRAN-ODT) 4 MG disintegrating tablet, Place 1 tablet on the tongue 4 (Four) Times a Day As Needed for Nausea., Disp: 20 tablet, Rfl: 0    pravastatin (PRAVACHOL) 20 MG tablet, Take 1 tablet by mouth Every Night., Disp: 90 tablet, Rfl: 3    sertraline (ZOLOFT) 50 MG tablet, Take 0.5 tablets by mouth Daily., Disp: , Rfl:     spironolactone (ALDACTONE) 25 MG tablet, Take 0.5 tablets by mouth Daily., Disp: 15 tablet, Rfl: 0    Past Medical History:   Diagnosis Date    Adrenal adenoma      Anxiety     Coronary artery disease involving native coronary artery of native heart without angina pectoris 2/1/2024    Diabetes mellitus     Diarrhea     GERD (gastroesophageal reflux disease)     Gestational diabetes 2000    Head ache     Hyperlipidemia     Hypertension     Kidney stone 1985    Nausea     Type 2 diabetes mellitus     Urinary tract infection 1986    Frequently    Varicella Unknown    Vitamin D deficiency        Past Surgical History:   Procedure Laterality Date    CARDIAC CATHETERIZATION N/A 12/28/2023    Procedure: Left Heart Cath;  Surgeon: Lilli Greene MD;  Location:  [facility];  Service: Cardiovascular;  Laterality: N/A;    CARDIAC CATHETERIZATION N/A 12/28/2023    Procedure: Coronary angiography;  Surgeon: Lilli Greene MD;  Location:  [facility];  Service: Cardiovascular;  Laterality: N/A;    CARDIAC CATHETERIZATION N/A 1/3/2024    Procedure: Percutaneous Coronary Intervention;  Surgeon: Lilli Greene MD;  Location:  [facility];  Service: Cardiology;  Laterality: N/A;    CARDIAC CATHETERIZATION N/A 1/3/2024    Procedure: Stent MERRITT coronary;  Surgeon: Lilli Greene MD;  Location:  [facility];  Service: Cardiology;  Laterality: N/A;    CARDIAC CATHETERIZATION N/A 1/8/2024    Procedure: Percutaneous Coronary Intervention;  Surgeon: Lilli Greene MD;  Location:  [facility];  Service: Cardiology;  Laterality: N/A;    CARDIAC CATHETERIZATION N/A 1/8/2024    Procedure: Stent MERRITT coronary;  Surgeon: Lilli Greene MD;  [facility];  Service: Cardiology;  Laterality: N/A;    CARDIAC CATHETERIZATION N/A 1/8/2024    Procedure: Optical Coherence Tomography;  Surgeon: Lilli Greene MD;  Location:  [facility];  Service: Cardiology;  Laterality: N/A;    CARDIAC ELECTROPHYSIOLOGY PROCEDURE  1/3/2024    Procedure: Impella Insertion;  Surgeon: Ramona  MD Lilli;  Location:  [facility];  Service: Cardiology;;     SECTION       SECTION WITH TUBAL  2001    CHOLECYSTECTOMY      COLONOSCOPY  05/10/2019    Grzegorz ABEL    CYSTOSCOPY      ENDOSCOPY  2020    ENDOSCOPY N/A 2021    Procedure: ESOPHAGOGASTRODUODENOSCOPY WITH COLD BIOPSIES;  Surgeon: Jasson Nguyen MD;  Location:  [facility];  Service: Gastroenterology;  Laterality: N/A;  PRE- ABD PAIN  POST- NORMAL    INTERVENTIONAL RADIOLOGY PROCEDURE N/A 1/3/2024    Procedure: Intravascular Ultrasound;  Surgeon: Lilli Greene MD;  Location:  [facility];  Service: Cardiology;  Laterality: N/A;    LAPAROSCOPIC CHOLECYSTECTOMY  2016    TUBAL ABDOMINAL LIGATION  2001    UPPER GASTROINTESTINAL ENDOSCOPY  2020    Grzegorz ABEL gastritis, ulcers    UPPER GASTROINTESTINAL ENDOSCOPY  2020    Andrew ABEL       Family History   Problem Relation Age of Onset    Thyroid disease Mother         goiter    Hyperlipidemia Mother     ALS Mother     Heart disease Father     Stroke Father     Hypertension Father     Drug abuse Brother     Diabetes Paternal Grandfather     Diabetes Paternal Aunt     Diabetes Paternal Uncle     Malig Hyperthermia Neg Hx        Social History     Tobacco Use    Smoking status: Former     Packs/day: 0.50     Years: 25.00     Additional pack years: 0.00     Total pack years: 12.50     Types: Cigarettes     Start date: 1/10/1985     Quit date: 2023     Years since quittin.1    Smokeless tobacco: Never   Vaping Use    Vaping Use: Never used   Substance Use Topics    Alcohol use: Yes     Comment: Only drink about 2 times a year    Drug use: Yes     Types: Marijuana     Comment: daily         ECG 12 Lead    Date/Time: 2024 2:05 PM  Performed by: Lilli Greene MD    Authorized by: Lilli Greene MD  Comparison: compared with previous ECG   Similar to previous ECG  Rhythm: sinus tachycardia  Q waves: V1, V2  "and V3    T inversion: I, aVL, V4, V5 and V6             Objective:     Visit Vitals  /80 (BP Location: Left arm, Patient Position: Sitting, Cuff Size: Adult)   Ht 162.6 cm (64\")   Wt 71 kg (156 lb 9.6 oz)   SpO2 97%   BMI 26.88 kg/m²         Constitutional:       Appearance: Normal appearance. Well-developed.   Eyes:      General: Lids are normal.      Conjunctiva/sclera: Conjunctivae normal.      Pupils: Pupils are equal, round, and reactive to light.   HENT:      Head: Normocephalic and atraumatic.   Neck:      Vascular: No carotid bruit or JVD.      Lymphadenopathy: No cervical adenopathy.   Pulmonary:      Effort: Pulmonary effort is normal.      Breath sounds: Normal breath sounds.   Cardiovascular:      Tachycardia present. Regular rhythm.      No gallop.    Pulses:     Radial: 2+ bilaterally.  Edema:     Peripheral edema absent.   Abdominal:      Palpations: Abdomen is soft.   Musculoskeletal:      Cervical back: Full passive range of motion without pain, normal range of motion and neck supple. Skin:     General: Skin is warm and dry.   Neurological:      Mental Status: Alert and oriented to person, place, and time.             Assessment:          Diagnosis Plan   1. Coronary artery disease involving native coronary artery of native heart without angina pectoris        2. Status post coronary artery stent placement        3. Primary hypertension        4. Hyperlipidemia, unspecified hyperlipidemia type        5. Chronic systolic congestive heart failure        6. Type 2 diabetes mellitus with complications        7. NOELLE (generalized anxiety disorder)               Plan:       1.  Coronary artery disease.  Status post multivessel stenting including of the left main to LAD, obtuse marginal branch, and a proximal to mid right coronary artery.  Currently appears to be stable from the standpoint.  EKG shows no acute changes.  Continue current medical management including dual antiplatelet therapy which she " will need to continue indefinitely.  2.  Ischemic cardiomyopathy.  Prior EF of less than 20%.  Guideline directed management has been severely limited by her blood pressures.  She was started on spironolactone and low-dose metoprolol during her recent readmission.  Unfortunately she is unable to tolerate the metoprolol.  We did discuss the possibility of trying an alternative beta-blocker but the patient is hesitant to do so.  Unfortunately do not have any good alternatives for rate control since the calcium channel blockers are relatively contraindicated in the setting of severe LV dysfunction.  Continue low-dose spironolactone for now.  Will add a low-dose of lisinopril 2.5 mg daily which the patient reports that she was on in the past and tolerated.  Will plan on a repeat echocardiogram at her next follow-up in a couple of months.  3.  Chronic systolic congestive heart failure.  She does not appear to have any issues with volume overload at this time and would continue current dose of furosemide.  4.  Hypertension.  Blood pressures were relatively low during her admission.  They appear better today.  Will see how she does with the addition of lisinopril along with continuing spironolactone.  5.  Hyperlipidemia.  Previously statin intolerant.  She appears to be tolerating low-dose pravastatin.  Ideally would like to obtain a goal LDL of around 70 or below.  If this cannot be accomplished with pravastatin we will need to consider adding ezetimibe or even look into PCSK9 inhibitor.  6.  Diabetes mellitus type 2  7.  Anxiety  8.  Insomnia.  Encouraged her to discuss this with her new PCP.    Will plan on seeing the patient back again in 2 months with a repeat echocardiogram.

## 2024-02-01 ENCOUNTER — OFFICE VISIT (OUTPATIENT)
Age: 54
End: 2024-02-01
Payer: MEDICAID

## 2024-02-01 VITALS
HEIGHT: 64 IN | BODY MASS INDEX: 26.73 KG/M2 | OXYGEN SATURATION: 97 % | SYSTOLIC BLOOD PRESSURE: 130 MMHG | DIASTOLIC BLOOD PRESSURE: 80 MMHG | WEIGHT: 156.6 LBS

## 2024-02-01 DIAGNOSIS — E11.8 TYPE 2 DIABETES MELLITUS WITH COMPLICATIONS: ICD-10-CM

## 2024-02-01 DIAGNOSIS — I25.10 CORONARY ARTERY DISEASE INVOLVING NATIVE CORONARY ARTERY OF NATIVE HEART WITHOUT ANGINA PECTORIS: Primary | ICD-10-CM

## 2024-02-01 DIAGNOSIS — F41.1 GAD (GENERALIZED ANXIETY DISORDER): ICD-10-CM

## 2024-02-01 DIAGNOSIS — Z95.5 STATUS POST CORONARY ARTERY STENT PLACEMENT: ICD-10-CM

## 2024-02-01 DIAGNOSIS — I10 PRIMARY HYPERTENSION: ICD-10-CM

## 2024-02-01 DIAGNOSIS — E78.5 HYPERLIPIDEMIA, UNSPECIFIED HYPERLIPIDEMIA TYPE: ICD-10-CM

## 2024-02-01 DIAGNOSIS — I50.22 CHRONIC SYSTOLIC CONGESTIVE HEART FAILURE: ICD-10-CM

## 2024-02-01 PROBLEM — I24.9 ACS (ACUTE CORONARY SYNDROME): Status: RESOLVED | Noted: 2023-12-26 | Resolved: 2024-02-01

## 2024-02-01 RX ORDER — LISINOPRIL 2.5 MG/1
2.5 TABLET ORAL DAILY
Qty: 30 TABLET | Refills: 5 | Status: SHIPPED | OUTPATIENT
Start: 2024-02-01

## 2024-02-09 ENCOUNTER — TELEPHONE (OUTPATIENT)
Dept: CARDIOLOGY | Facility: CLINIC | Age: 54
End: 2024-02-09
Payer: MEDICAID

## 2024-02-09 RX ORDER — PRAVASTATIN SODIUM 20 MG
20 TABLET ORAL NIGHTLY
Qty: 90 TABLET | Refills: 3 | Status: SHIPPED | OUTPATIENT
Start: 2024-02-09

## 2024-02-09 RX ORDER — SPIRONOLACTONE 25 MG/1
12.5 TABLET ORAL DAILY
Qty: 15 TABLET | Refills: 3 | Status: SHIPPED | OUTPATIENT
Start: 2024-02-09

## 2024-02-09 RX ORDER — ASPIRIN 81 MG/1
81 TABLET ORAL DAILY
Qty: 90 TABLET | Refills: 3 | Status: SHIPPED | OUTPATIENT
Start: 2024-02-09

## 2024-02-09 RX ORDER — CLOPIDOGREL BISULFATE 75 MG/1
75 TABLET ORAL DAILY
Qty: 90 TABLET | Refills: 3 | Status: SHIPPED | OUTPATIENT
Start: 2024-02-09

## 2024-02-09 NOTE — TELEPHONE ENCOUNTER
[facility] [place] [address] [phone] [facility] [phone] [facility]    Relationship: Pharmacy    Best call back number: [phone]    Requested Prescriptions:   Requested Prescriptions     Pending Prescriptions Disp Refills    pravastatin (PRAVACHOL) 20 MG tablet 90 tablet 3     Sig: Take 1 tablet by mouth Every Night.    spironolactone (ALDACTONE) 25 MG tablet 15 tablet 0     Sig: Take 0.5 tablets by mouth Daily.    clopidogrel (PLAVIX) 75 MG tablet 30 tablet 0     Sig: Take 1 tablet by mouth Daily.    aspirin-acetaminophen-caffeine (EXCEDRIN MIGRAINE) 250-250-65 MG per tablet       Sig: Take 1 tablet by mouth Every 8 (Eight) Hours As Needed.        Pharmacy where request should be sent: [place] [address] [facility] - [phone] [facility] [phone] FX     Last office visit with prescribing clinician: Visit date not found   Last telemedicine visit with prescribing clinician: Visit date not found   Next office visit with prescribing clinician: Visit date not found     Additional details provided by patient:     Does the patient have less than a 3 day supply:  [] Yes  [] No    Would you like a call back once the refill request has been completed: [] Yes [] No    If the office needs to give you a call back, can they leave a voicemail: [] Yes [] No    Danni Mario   02/09/24 11:08 EST

## 2024-02-09 NOTE — TELEPHONE ENCOUNTER
Rx sent to [facility] electronically.   Per Sujata Pritchard NP  ok to refill all but Excederin for migraine, refill ASA.   Deshawn french

## 2024-04-01 ENCOUNTER — HOSPITAL ENCOUNTER (OUTPATIENT)
Dept: CARDIOLOGY | Facility: HOSPITAL | Age: 54
Discharge: HOME | End: 2024-04-01
Admitting: INTERNAL MEDICINE
Payer: MEDICAID

## 2024-04-01 VITALS
HEIGHT: 64 IN | WEIGHT: 156 LBS | DIASTOLIC BLOOD PRESSURE: 62 MMHG | SYSTOLIC BLOOD PRESSURE: 110 MMHG | BODY MASS INDEX: 26.63 KG/M2

## 2024-04-01 DIAGNOSIS — I10 PRIMARY HYPERTENSION: ICD-10-CM

## 2024-04-01 DIAGNOSIS — I25.10 CORONARY ARTERY DISEASE INVOLVING NATIVE CORONARY ARTERY OF NATIVE HEART WITHOUT ANGINA PECTORIS: ICD-10-CM

## 2024-04-01 LAB
BH CV ECHO LEFT VENTRICLE GLOBAL LONGITUDINAL STRAIN: -8.1 %
BH CV ECHO MEAS - EDV(CUBED): 227 ML
BH CV ECHO MEAS - EDV(MOD-SP2): 162 ML
BH CV ECHO MEAS - EDV(MOD-SP4): 209 ML
BH CV ECHO MEAS - EF(MOD-BP): 20.5 %
BH CV ECHO MEAS - EF(MOD-SP2): 20.4 %
BH CV ECHO MEAS - EF(MOD-SP4): 20.6 %
BH CV ECHO MEAS - ESV(CUBED): 125.5 ML
BH CV ECHO MEAS - ESV(MOD-SP2): 129 ML
BH CV ECHO MEAS - ESV(MOD-SP4): 166 ML
BH CV ECHO MEAS - FS: 17.9 %
BH CV ECHO MEAS - IVS/LVPW: 0.9 CM
BH CV ECHO MEAS - IVSD: 0.9 CM
BH CV ECHO MEAS - LAT PEAK E' VEL: 3.8 CM/SEC
BH CV ECHO MEAS - LV DIASTOLIC VOL/BSA (35-75): 118.7 CM2
BH CV ECHO MEAS - LV MASS(C)D: 237.7 GRAMS
BH CV ECHO MEAS - LV SYSTOLIC VOL/BSA (12-30): 94.3 CM2
BH CV ECHO MEAS - LVIDD: 6.1 CM
BH CV ECHO MEAS - LVIDS: 5 CM
BH CV ECHO MEAS - LVPWD: 1 CM
BH CV ECHO MEAS - MED PEAK E' VEL: 4.5 CM/SEC
BH CV ECHO MEAS - MR MAX PG: 69 MMHG
BH CV ECHO MEAS - MR MAX VEL: 415.4 CM/SEC
BH CV ECHO MEAS - MV A DUR: 0.1 SEC
BH CV ECHO MEAS - MV A MAX VEL: 97.9 CM/SEC
BH CV ECHO MEAS - MV DEC SLOPE: 425.1 CM/SEC2
BH CV ECHO MEAS - MV DEC TIME: 0.11 SEC
BH CV ECHO MEAS - MV E MAX VEL: 66.2 CM/SEC
BH CV ECHO MEAS - MV E/A: 0.68
BH CV ECHO MEAS - MV MAX PG: 3.8 MMHG
BH CV ECHO MEAS - MV MEAN PG: 1.18 MMHG
BH CV ECHO MEAS - MV P1/2T: 45.4 MSEC
BH CV ECHO MEAS - MV V2 VTI: 15 CM
BH CV ECHO MEAS - MVA(P1/2T): 4.8 CM2
BH CV ECHO MEAS - PULM A REVS DUR: 0.1 SEC
BH CV ECHO MEAS - PULM A REVS VEL: 28.6 CM/SEC
BH CV ECHO MEAS - PULM DIAS VEL: 30.5 CM/SEC
BH CV ECHO MEAS - PULM S/D: 1.8
BH CV ECHO MEAS - PULM SYS VEL: 54.7 CM/SEC
BH CV ECHO MEAS - SI(MOD-SP2): 18.7 ML/M2
BH CV ECHO MEAS - SI(MOD-SP4): 24.4 ML/M2
BH CV ECHO MEAS - SV(MOD-SP2): 33 ML
BH CV ECHO MEAS - SV(MOD-SP4): 43 ML
BH CV ECHO MEAS - TR MAX PG: 32.9 MMHG
BH CV ECHO MEAS - TR MAX VEL: 286.7 CM/SEC
BH CV ECHO MEASUREMENTS AVERAGE E/E' RATIO: 15.95
BH CV XLRA - RV BASE: 3.4 CM
BH CV XLRA - RV LENGTH: 8.3 CM
BH CV XLRA - RV MID: 3 CM
BH CV XLRA - TDI S': 10.3 CM/SEC

## 2024-04-01 PROCEDURE — 93321 DOPPLER ECHO F-UP/LMTD STD: CPT

## 2024-04-01 PROCEDURE — 93308 TTE F-UP OR LMTD: CPT

## 2024-04-01 PROCEDURE — 93356 MYOCRD STRAIN IMG SPCKL TRCK: CPT

## 2024-04-01 PROCEDURE — 93325 DOPPLER ECHO COLOR FLOW MAPG: CPT

## 2024-04-01 PROCEDURE — 25510000001 PERFLUTREN (DEFINITY) 8.476 MG IN SODIUM CHLORIDE (PF) 0.9 % 10 ML INJECTION: Performed by: INTERNAL MEDICINE

## 2024-04-01 RX ADMIN — PERFLUTREN 2 ML: 6.52 INJECTION, SUSPENSION INTRAVENOUS at 11:45

## 2024-04-03 NOTE — PROGRESS NOTES
Subjective:     Encounter Date:04/04/2024      Patient ID: Jade Clement is a 54 y.o. female.    Chief Complaint:  History of Present Illness    This is a 54-year-old with diabetes mellitus type 2, hypertension, tobacco use, multivessel coronary artery disease status post stent placement of the left main to proximal and mid LAD, obtuse marginal branch, and proximal right coronary artery in 1/2024, ischemic cardiomyopathy, who presents for follow-up.     The patient initially presented to the hospital on 12/26/2023 with complaints of worsening shortness of breath and orthopnea.  She also reported episodes of epigastric discomfort and indigestion about a week prior on 12/18/2023 associated with shortness of breath, nausea and diarrhea.  Her GI symptoms resolved after couple of days but her shortness of breath continued.  She was evaluated by her PCP around that time and treated for a peptic ulcer.  Her symptoms however continued at which point she went to [facility] in [place], [place].  She also reported 10 pound weight gain at the time of her ER visit.  EKG at that time showed sinus tachycardia and prior anteroseptal infarct.  Troponins were noted to be elevated along with her BNP.  CT of the chest showed evidence of pulmonary edema and pleural effusions.  She was started on a heparin infusion and transferred to [facility] where she was evaluated by Dr. Esquivel.  She was started on IV diuresis and a small dose of oral beta-blockers.  An echocardiogram was performed which showed 6 severely depressed left ventricular function with akinesis of her septal, anterior, lateral, and apical walls.  Her EF appeared to be less than 20% and she was noted to have mild mitral and mild to moderate tricuspid valve regurgitation with mildly elevated right ventricular systolic pressure of 41 mmHg.  When she was diuresed she underwent a cardiac catheterization on 12/28/2023 which showed severe  multivessel coronary artery disease and elevated left ventricular filling pressures of 20 to 30 mmHg.  She was referred to cardiac surgery and the plan was to proceed with CABG along with possible mitral and tricuspid valve repair when she had undergone further diuresis.  While she was being diuresed the patient developed issues with hypotension and required initiation of dobutamine.  She was reevaluated by surgery around this time and they felt that she was too high risk for CABG.     At this point we opted to proceed with multivessel PCI.  On 1/3/2024 she underwent Impella supported PCI of her left main and obtuse marginal branches.  This included stent placement of the left main into the proximal LAD and it second overlapping stent of the mid LAD.  Additionally she underwent drug-eluting stent placement of the obtuse marginal branch in the proximal vessel.  She was then brought back to the cardiac catheterization laboratory on 1/8/2024 for stage PCI and drug-eluting stent placement of the proximal to mid RCA.  She was started on both aspirin and clopidogrel.  She reported that she had been intolerant to statins in the past so she was started on pravastatin which she seemed to be tolerating.  She had been weaned quickly off of the dobutamine prior to her initial PCI and was able to remain off of this but we were unable to start any guideline directed management for ischemic cardiomyopathy because of hypotension.  Even low-dose beta-blocker was not possible.     By the day of her discharge on 1/9/2024 she reported that she was still feeling really well but the best she had felt in weeks.  No other changes were made to her management.     Unfortunately she returned to the hospital on 1/13/2020 for after presenting to an outside hospital with complaints of shortness of breath, orthopnea, and chest pain.  Chest x-ray again revealed evidence of volume overload.  She denies any significant chest pain following her  arrival to the hospital.  She received IV furosemide with improvement in her symptoms.  She was kept on oral furosemide 40 mg daily or every other day.  Instructions on how to adjust her diuretics were discussed with the patient.  Very low-dose of spironolactone 12.5 mg was added along with 12.5 mg of metoprolol succinate.     She was scheduled to see me back in follow-up on 1/19/2024.  She ended up canceling this appointment due to the weather.  She was rescheduled to see me on 1/23/2024 but ended up going to the emergency room instead with complaints of epigastric pain and nausea.  She was concerned because she felt the symptoms are similar to what she experienced before her recent admission.  Her cardiac workup was unremarkable.  No changes were made to her management.  Her symptoms had resolved by the time of her discharge.     I saw her back in follow-up on 2/1/2024.  At that office visit she indicated that stopping the metoprolol seem to help with her recurrent issues with nausea and epigastric pain.  Her shortness of breath was slowly improving along with her exercise tolerance.  On occasion she will take an additional furosemide on days where she feels like she could not take a deep breath.  This was occurring about once a week.  At that office visit we did discuss trying an alternative beta-blocker for guideline directed management of her cardiomyopathy.  The patient was hesitant to do so.  I did not want to start her on a calcium channel blocker because of her severe LV dysfunction.  She was continued on low-dose spironolactone.  Also started on a low-dose of lisinopril 2.5 mg daily.  She was tolerating low-dose pravastatin at the time.  Recommended following up in a repeat lipid panel to determine if we could continue the pravastatin or if we need to consider adding ezetimibe or looking into a PCSK9 inhibitor.    She returns today for routine follow-up.  She underwent a repeat echocardiogram earlier this  week on 4/1/2024 unfortunately continues to show severely depressed left ventricular function with an EF of 21%, global hypokinesis, grade 1A diastolic dysfunction, and no significant valvular disease.    Since she was here last she ended up stopping the lisinopril and spironolactone because this caused symptomatic low blood pressures.  She has been compliant with aspirin, clopidogrel, pravastatin, and furosemide.  She takes the furosemide at least once a day but sometimes twice a day if she notes increased shortness of breath.  She reports that her shortness of breath and exercise tolerance is slowly improving.  She reports that she has to take less breaks in order to complete activities such as housework.  She notes intermittent episodes of aching chest discomfort that can occur at any time.  This occurs about once maybe twice a week.  Symptoms resolve on their own.  She reports that the symptoms are present over the last couple months and have not really changed in frequency or duration.  She does not recall having had similar episodes previously.      Review of Systems   Constitutional: Negative for malaise/fatigue.   HENT:  Negative for hearing loss, hoarse voice, nosebleeds and sore throat.    Eyes:  Negative for pain.   Cardiovascular:  Positive for chest pain, dyspnea on exertion and palpitations. Negative for claudication, cyanosis, irregular heartbeat, leg swelling, near-syncope, orthopnea, paroxysmal nocturnal dyspnea and syncope.   Respiratory:  Negative for shortness of breath and snoring.    Endocrine: Negative for cold intolerance, heat intolerance, polydipsia, polyphagia and polyuria.   Skin:  Negative for itching and rash.   Musculoskeletal:  Negative for arthritis, falls, joint pain, joint swelling, muscle cramps, muscle weakness and myalgias.   Gastrointestinal:  Negative for constipation, diarrhea, dysphagia, heartburn, hematemesis, hematochezia, melena, nausea and vomiting.   Genitourinary:   Negative for frequency, hematuria and hesitancy.   Neurological:  Positive for light-headedness. Negative for excessive daytime sleepiness, dizziness, headaches, numbness and weakness.   Psychiatric/Behavioral:  Negative for depression. The patient is not nervous/anxious.          Current Outpatient Medications:     aspirin 81 MG EC tablet, Take 1 tablet by mouth Daily., Disp: 90 tablet, Rfl: 3    aspirin-acetaminophen-caffeine (EXCEDRIN MIGRAINE) 250-250-65 MG per tablet, Take 1 tablet by mouth Every 8 (Eight) Hours As Needed., Disp: , Rfl:     cetirizine (ZyrTEC) 10 MG tablet, Take 1 tablet by mouth Daily., Disp: , Rfl:     clopidogrel (PLAVIX) 75 MG tablet, Take 1 tablet by mouth Daily., Disp: 90 tablet, Rfl: 3    Continuous Blood Gluc Sensor (FreeStyle Balwinder 3 Sensor) misc, Use 1 each Every 14 (Fourteen) Days., Disp: 6 each, Rfl: 3    ergocalciferol (ERGOCALCIFEROL) 1.25 MG (38672 UT) capsule, Take 1 capsule by mouth 1 (One) Time Per Week., Disp: 12 capsule, Rfl: 1    furosemide (LASIX) 40 MG tablet, Take 1 tablet by mouth Daily., Disp: 30 tablet, Rfl: 0    Insulin Degludec (Tresiba FlexTouch) 200 UNIT/ML solution pen-injector pen injection, 36 units every evening.  Prime needle with 4 units before each use., Disp: 18 mL, Rfl: 2    Insulin Lispro, 1 Unit Dial, (HumaLOG KwikPen) 100 UNIT/ML solution pen-injector, 12 units 3 times daily with each meal.  Prime needle with 2 units before each use. (Patient taking differently: 12 units 3 times daily with each meal.  Prime needle with 2 units before each use.  Patient currently not takign this, only does sliding scale), Disp: 38 mL, Rfl: 2    Insulin Pen Needle (BD PEN NEEDLE PAUL U/F) 32G X 4 MM misc, Use to inject insulin 2 times daily, Disp: 300 each, Rfl: 1    Methylcobalamin 1 MG chewable tablet, Chew 1 mg Daily., Disp: , Rfl:     Naproxen Sodium 220 MG capsule, Take 220 mg by mouth Daily As Needed., Disp: , Rfl:     nitroglycerin (NITROSTAT) 0.4 MG SL tablet,  Place 1 tablet under the tongue Every 5 (Five) Minutes As Needed for Chest Pain (Systolic BP Greater Than 100). Take no more than 3 doses in 15 minutes., Disp: 100 tablet, Rfl: 0    ondansetron ODT (ZOFRAN-ODT) 4 MG disintegrating tablet, Place 1 tablet on the tongue 4 (Four) Times a Day As Needed for Nausea., Disp: 20 tablet, Rfl: 0    pravastatin (PRAVACHOL) 20 MG tablet, Take 1 tablet by mouth Every Night., Disp: 90 tablet, Rfl: 3    sertraline (ZOLOFT) 50 MG tablet, Take 0.5 tablets by mouth Daily., Disp: , Rfl:     spironolactone (ALDACTONE) 25 MG tablet, Take 0.5 tablets by mouth Daily. (Patient not taking: Reported on 4/4/2024), Disp: 15 tablet, Rfl: 3    Past Medical History:   Diagnosis Date    Adrenal adenoma     Anxiety     Coronary artery disease involving native coronary artery of native heart without angina pectoris 2/1/2024    Diabetes mellitus     Diarrhea     GERD (gastroesophageal reflux disease)     Gestational diabetes 2000    Head ache     Hyperlipidemia     Hypertension     Kidney stone 1985    Nausea     Type 2 diabetes mellitus     Urinary tract infection 1986    Frequently    Varicella Unknown    Vitamin D deficiency        Past Surgical History:   Procedure Laterality Date    CARDIAC CATHETERIZATION N/A 12/28/2023    Procedure: Left Heart Cath;  Surgeon: Lilli Greene MD;  Location: [facility] INVASIVE LOCATION;  Service: Cardiovascular;  Laterality: N/A;    CARDIAC CATHETERIZATION N/A 12/28/2023    Procedure: Coronary angiography;  Surgeon: Lilli Greene MD;  Location: [facility] CATH INVASIVE LOCATION;  Service: Cardiovascular;  Laterality: N/A;    CARDIAC CATHETERIZATION N/A 1/3/2024    Procedure: Percutaneous Coronary Intervention;  Surgeon: Lilli Greene MD;  Location: [facility] CATH INVASIVE LOCATION;  Service: Cardiology;  Laterality: N/A;    CARDIAC CATHETERIZATION N/A 1/3/2024    Procedure: Stent MERRITT coronary;  Surgeon: Lilli Gerene MD;  Location: [facility] INVASIVE  LOCATION;  Service: Cardiology;  Laterality: N/A;    CARDIAC CATHETERIZATION N/A 2024    Procedure: Percutaneous Coronary Intervention;  Surgeon: Lilli Greene MD;  Location:  [facility] CATH INVASIVE LOCATION;  Service: Cardiology;  Laterality: N/A;    CARDIAC CATHETERIZATION N/A 2024    Procedure: Stent MERRITT coronary;  Surgeon: Lilli Greene MD;  Location:  [facility] CATH INVASIVE LOCATION;  Service: Cardiology;  Laterality: N/A;    CARDIAC CATHETERIZATION N/A 2024    Procedure: Optical Coherence Tomography;  Surgeon: Lilli Greene MD;  Location:  [facility] CATH INVASIVE LOCATION;  Service: Cardiology;  Laterality: N/A;    CARDIAC ELECTROPHYSIOLOGY PROCEDURE  1/3/2024    Procedure: Impella Insertion;  Surgeon: Lilli Greene MD;  Location:  [facility] CATH INVASIVE LOCATION;  Service: Cardiology;;     SECTION       SECTION WITH TUBAL  2001    CHOLECYSTECTOMY      COLONOSCOPY  05/10/2019    Grzegorz ABEL    CYSTOSCOPY      ENDOSCOPY  2020    ENDOSCOPY N/A 2021    Procedure: ESOPHAGOGASTRODUODENOSCOPY WITH COLD BIOPSIES;  Surgeon: Jasson Nguyen MD;  Location:  [facility];  Service: Gastroenterology;  Laterality: N/A;  PRE- ABD PAIN  POST- NORMAL    INTERVENTIONAL RADIOLOGY PROCEDURE N/A 1/3/2024    Procedure: Intravascular Ultrasound;  Surgeon: Lilli Greene MD;  Location:  [facility] CATH INVASIVE LOCATION;  Service: Cardiology;  Laterality: N/A;    LAPAROSCOPIC CHOLECYSTECTOMY  2016    TUBAL ABDOMINAL LIGATION  2001    UPPER GASTROINTESTINAL ENDOSCOPY  2020    Grzegorz ABEL gastritis, ulcers    UPPER GASTROINTESTINAL ENDOSCOPY  2020    Andrew ABEL       Family History   Problem Relation Age of Onset    Thyroid disease Mother         goiter    Hyperlipidemia Mother     ALS Mother     Heart disease Father     Stroke Father     Hypertension Father     Drug abuse Brother     Diabetes Paternal Grandfather     Diabetes Paternal Aunt     Diabetes Paternal Uncle      "Malig Hyperthermia Neg Hx        Social History     Tobacco Use    Smoking status: Former     Current packs/day: 0.00     Average packs/day: 0.5 packs/day for 38.9 years (19.5 ttl pk-yrs)     Types: Cigarettes     Start date: 1/10/1985     Quit date: 2023     Years since quittin.2    Smokeless tobacco: Never   Vaping Use    Vaping status: Never Used   Substance Use Topics    Alcohol use: Yes     Comment: Only drink about 2 times a year    Drug use: Yes     Types: Marijuana     Comment: daily         ECG 12 Lead    Date/Time: 2024 3:18 PM  Performed by: Lilli Greene MD    Authorized by: Lilli Greene MD  Comparison: compared with previous ECG   Similar to previous ECG  Rhythm: sinus rhythm  T inversion: I, aVL, V5 and V6             Objective:     Visit Vitals  /60 (BP Location: Right arm, Patient Position: Sitting, Cuff Size: Adult)   Pulse 88   Ht 162.6 cm (64\")   Wt 68.9 kg (152 lb)   SpO2 100%   BMI 26.09 kg/m²         Constitutional:       Appearance: Normal appearance. Well-developed.   Eyes:      General: Lids are normal.      Conjunctiva/sclera: Conjunctivae normal.      Pupils: Pupils are equal, round, and reactive to light.   HENT:      Head: Normocephalic and atraumatic.   Neck:      Vascular: No carotid bruit or JVD.      Lymphadenopathy: No cervical adenopathy.   Pulmonary:      Effort: Pulmonary effort is normal.      Breath sounds: Normal breath sounds.   Cardiovascular:      Normal rate. Regular rhythm.      No gallop.    Pulses:     Radial: 2+ bilaterally.  Edema:     Peripheral edema absent.   Abdominal:      Palpations: Abdomen is soft.   Musculoskeletal:      Cervical back: Full passive range of motion without pain, normal range of motion and neck supple. Skin:     General: Skin is warm and dry.   Neurological:      Mental Status: Alert and oriented to person, place, and time.             Assessment:          Diagnosis Plan   1. Coronary artery disease involving native " coronary artery of native heart without angina pectoris        2. Chronic systolic congestive heart failure        3. Hyperlipidemia, unspecified hyperlipidemia type        4. Primary hypertension        5. Status post coronary artery stent placement        6. Type 2 diabetes mellitus with complications               Plan:       1.  Cardiomyopathy.  EF remains 20%.  Previously thought to be ischemic although she has had no improvement in her LV function despite multivessel revascularization 3 months ago.  Unfortunately she is unable to tolerate any guideline directed management due to low blood pressures.  Will least try to see if we can get her on an SGLT2 inhibitor.  Will discuss with her endocrinologist since she also has a history of diabetes.  At this point without any significant recovery in her LV function I think we should consider ICD placement for primary prevention.  I discussed this test with the patient and her daughter.  Will go ahead and place a referral to electrophysiology.  2.  Chronic systolic congestive heart failure.  No evidence of volume overload at this time.  Continue current dose of furosemide.  Agree with taking additional furosemide as needed.  3.  Coronary artery disease.  Status post multivessel PCI in 1/2024 including of the left main artery.  She is on aspirin and clopidogrel which she will need to continue indefinitely.  Her EKG shows no acute changes.  She does report some aching chest discomfort which is a new symptom in the last couple of months and somewhat atypical and that it does not actually occur with activity.  I be surprised if she developed issues with her recently placed stents without more typical symptoms or EKG changes.  At this point recommend monitoring her symptoms and continuing current medical management.  4.  Hyperlipidemia.  On pravastatin for goal LDL of 70 or below.  She was previously statin intolerant.  So far she appears to be tolerating the pravastatin.   Continue the same.  5.  Diabetes mellitus type 2.    Will plan on seeing the patient back again in 3 months.    ADDENDUM (4/5/2024):  Discussed with Dr. Cheney.  Will start the patient on Jardiance 10 mg daily.  Advised to watch her blood sugars and to stay well-hydrated.

## 2024-04-04 ENCOUNTER — OFFICE VISIT (OUTPATIENT)
Age: 54
End: 2024-04-04
Payer: MEDICAID

## 2024-04-04 VITALS
HEIGHT: 64 IN | DIASTOLIC BLOOD PRESSURE: 60 MMHG | BODY MASS INDEX: 25.95 KG/M2 | OXYGEN SATURATION: 100 % | WEIGHT: 152 LBS | HEART RATE: 88 BPM | SYSTOLIC BLOOD PRESSURE: 110 MMHG

## 2024-04-04 DIAGNOSIS — I10 PRIMARY HYPERTENSION: ICD-10-CM

## 2024-04-04 DIAGNOSIS — E11.8 TYPE 2 DIABETES MELLITUS WITH COMPLICATIONS: ICD-10-CM

## 2024-04-04 DIAGNOSIS — Z95.5 STATUS POST CORONARY ARTERY STENT PLACEMENT: ICD-10-CM

## 2024-04-04 DIAGNOSIS — I42.0 CARDIOMYOPATHY, DILATED: ICD-10-CM

## 2024-04-04 DIAGNOSIS — E78.5 HYPERLIPIDEMIA, UNSPECIFIED HYPERLIPIDEMIA TYPE: ICD-10-CM

## 2024-04-04 DIAGNOSIS — I25.10 CORONARY ARTERY DISEASE INVOLVING NATIVE CORONARY ARTERY OF NATIVE HEART WITHOUT ANGINA PECTORIS: Primary | ICD-10-CM

## 2024-04-04 DIAGNOSIS — I50.22 CHRONIC SYSTOLIC CONGESTIVE HEART FAILURE: ICD-10-CM

## 2024-04-04 NOTE — LETTER
April 4, 2024       No Recipients    Patient: Jade Clement   YOB: 1970   Date of Visit: 4/4/2024       Dear Hesham Cheney MD    Jade Clement was in my office today. Below is a copy of my note.    If you have questions, please do not hesitate to call me. I look forward to following Jade along with you.         Sincerely,        Lilli Greene MD        CC:   No Recipients        Subjective:     Encounter Date:04/04/2024      Patient ID: Jade Clement is a 54 y.o. female.    Chief Complaint:  History of Present Illness    This is a 54-year-old with diabetes mellitus type 2, hypertension, tobacco use, multivessel coronary artery disease status post stent placement of the left main to proximal and mid LAD, obtuse marginal branch, and proximal right coronary artery in 1/2024, ischemic cardiomyopathy, who presents for follow-up.     The patient initially presented to the hospital on 12/26/2023 with complaints of worsening shortness of breath and orthopnea.  She also reported episodes of epigastric discomfort and indigestion about a week prior on 12/18/2023 associated with shortness of breath, nausea and diarrhea.  Her GI symptoms resolved after couple of days but her shortness of breath continued.  She was evaluated by her PCP around that time and treated for a peptic ulcer.  Her symptoms however continued at which point she went to [facility] in [place], [place].  She also reported 10 pound weight gain at the time of her ER visit.  EKG at that time showed sinus tachycardia and prior anteroseptal infarct.  Troponins were noted to be elevated along with her BNP.  CT of the chest showed evidence of pulmonary edema and pleural effusions.  She was started on a heparin infusion and transferred to [facility] where she was evaluated by Dr. Esquivel.  She was started on IV diuresis and a small dose of oral beta-blockers.  An echocardiogram was performed which showed 6 severely  depressed left ventricular function with akinesis of her septal, anterior, lateral, and apical walls.  Her EF appeared to be less than 20% and she was noted to have mild mitral and mild to moderate tricuspid valve regurgitation with mildly elevated right ventricular systolic pressure of 41 mmHg.  When she was diuresed she underwent a cardiac catheterization on 12/28/2023 which showed severe multivessel coronary artery disease and elevated left ventricular filling pressures of 20 to 30 mmHg.  She was referred to cardiac surgery and the plan was to proceed with CABG along with possible mitral and tricuspid valve repair when she had undergone further diuresis.  While she was being diuresed the patient developed issues with hypotension and required initiation of dobutamine.  She was reevaluated by surgery around this time and they felt that she was too high risk for CABG.     At this point we opted to proceed with multivessel PCI.  On 1/3/2024 she underwent Impella supported PCI of her left main and obtuse marginal branches.  This included stent placement of the left main into the proximal LAD and it second overlapping stent of the mid LAD.  Additionally she underwent drug-eluting stent placement of the obtuse marginal branch in the proximal vessel.  She was then brought back to the cardiac catheterization laboratory on 1/8/2024 for stage PCI and drug-eluting stent placement of the proximal to mid RCA.  She was started on both aspirin and clopidogrel.  She reported that she had been intolerant to statins in the past so she was started on pravastatin which she seemed to be tolerating.  She had been weaned quickly off of the dobutamine prior to her initial PCI and was able to remain off of this but we were unable to start any guideline directed management for ischemic cardiomyopathy because of hypotension.  Even low-dose beta-blocker was not possible.     By the day of her discharge on 1/9/2024 she reported that she was  still feeling really well but the best she had felt in weeks.  No other changes were made to her management.     Unfortunately she returned to the hospital on 1/13/2020 for after presenting to an outside hospital with complaints of shortness of breath, orthopnea, and chest pain.  Chest x-ray again revealed evidence of volume overload.  She denies any significant chest pain following her arrival to the hospital.  She received IV furosemide with improvement in her symptoms.  She was kept on oral furosemide 40 mg daily or every other day.  Instructions on how to adjust her diuretics were discussed with the patient.  Very low-dose of spironolactone 12.5 mg was added along with 12.5 mg of metoprolol succinate.     She was scheduled to see me back in follow-up on 1/19/2024.  She ended up canceling this appointment due to the weather.  She was rescheduled to see me on 1/23/2024 but ended up going to the emergency room instead with complaints of epigastric pain and nausea.  She was concerned because she felt the symptoms are similar to what she experienced before her recent admission.  Her cardiac workup was unremarkable.  No changes were made to her management.  Her symptoms had resolved by the time of her discharge.     I saw her back in follow-up on 2/1/2024.  At that office visit she indicated that stopping the metoprolol seem to help with her recurrent issues with nausea and epigastric pain.  Her shortness of breath was slowly improving along with her exercise tolerance.  On occasion she will take an additional furosemide on days where she feels like she could not take a deep breath.  This was occurring about once a week.  At that office visit we did discuss trying an alternative beta-blocker for guideline directed management of her cardiomyopathy.  The patient was hesitant to do so.  I did not want to start her on a calcium channel blocker because of her severe LV dysfunction.  She was continued on low-dose  spironolactone.  Also started on a low-dose of lisinopril 2.5 mg daily.  She was tolerating low-dose pravastatin at the time.  Recommended following up in a repeat lipid panel to determine if we could continue the pravastatin or if we need to consider adding ezetimibe or looking into a PCSK9 inhibitor.    She returns today for routine follow-up.  She underwent a repeat echocardiogram earlier this week on 4/1/2024 unfortunately continues to show severely depressed left ventricular function with an EF of 21%, global hypokinesis, grade 1A diastolic dysfunction, and no significant valvular disease.      ROS      Current Outpatient Medications:   •  aspirin 81 MG EC tablet, Take 1 tablet by mouth Daily., Disp: 90 tablet, Rfl: 3  •  aspirin-acetaminophen-caffeine (EXCEDRIN MIGRAINE) 250-250-65 MG per tablet, Take 1 tablet by mouth Every 8 (Eight) Hours As Needed., Disp: , Rfl:   •  cetirizine (ZyrTEC) 10 MG tablet, Take 1 tablet by mouth Daily., Disp: , Rfl:   •  clopidogrel (PLAVIX) 75 MG tablet, Take 1 tablet by mouth Daily., Disp: 90 tablet, Rfl: 3  •  Continuous Blood Gluc Sensor (FreeStyle Balwinder 3 Sensor) misc, Use 1 each Every 14 (Fourteen) Days., Disp: 6 each, Rfl: 3  •  ergocalciferol (ERGOCALCIFEROL) 1.25 MG (39511 UT) capsule, Take 1 capsule by mouth 1 (One) Time Per Week., Disp: 12 capsule, Rfl: 1  •  furosemide (LASIX) 40 MG tablet, Take 1 tablet by mouth Daily., Disp: 30 tablet, Rfl: 0  •  Insulin Degludec (Tresiba FlexTouch) 200 UNIT/ML solution pen-injector pen injection, 36 units every evening.  Prime needle with 4 units before each use., Disp: 18 mL, Rfl: 2  •  Insulin Lispro, 1 Unit Dial, (HumaLOG KwikPen) 100 UNIT/ML solution pen-injector, 12 units 3 times daily with each meal.  Prime needle with 2 units before each use. (Patient taking differently: 12 units 3 times daily with each meal.  Prime needle with 2 units before each use.  Patient currently not takign this, only does sliding scale), Disp: 38  mL, Rfl: 2  •  Insulin Pen Needle (BD PEN NEEDLE PAUL U/F) 32G X 4 MM misc, Use to inject insulin 2 times daily, Disp: 300 each, Rfl: 1  •  Methylcobalamin 1 MG chewable tablet, Chew 1 mg Daily., Disp: , Rfl:   •  Naproxen Sodium 220 MG capsule, Take 220 mg by mouth Daily As Needed., Disp: , Rfl:   •  nitroglycerin (NITROSTAT) 0.4 MG SL tablet, Place 1 tablet under the tongue Every 5 (Five) Minutes As Needed for Chest Pain (Systolic BP Greater Than 100). Take no more than 3 doses in 15 minutes., Disp: 100 tablet, Rfl: 0  •  ondansetron ODT (ZOFRAN-ODT) 4 MG disintegrating tablet, Place 1 tablet on the tongue 4 (Four) Times a Day As Needed for Nausea., Disp: 20 tablet, Rfl: 0  •  pravastatin (PRAVACHOL) 20 MG tablet, Take 1 tablet by mouth Every Night., Disp: 90 tablet, Rfl: 3  •  sertraline (ZOLOFT) 50 MG tablet, Take 0.5 tablets by mouth Daily., Disp: , Rfl:   •  spironolactone (ALDACTONE) 25 MG tablet, Take 0.5 tablets by mouth Daily. (Patient not taking: Reported on 4/4/2024), Disp: 15 tablet, Rfl: 3    Past Medical History:   Diagnosis Date   • Adrenal adenoma    • Anxiety    • Coronary artery disease involving native coronary artery of native heart without angina pectoris 2/1/2024   • Diabetes mellitus    • Diarrhea    • GERD (gastroesophageal reflux disease)    • Gestational diabetes 2000   • Head ache    • Hyperlipidemia    • Hypertension    • Kidney stone 1985   • Nausea    • Type 2 diabetes mellitus    • Urinary tract infection 1986    Frequently   • Varicella Unknown   • Vitamin D deficiency        Past Surgical History:   Procedure Laterality Date   • CARDIAC CATHETERIZATION N/A 12/28/2023    Procedure: Left Heart Cath;  Surgeon: Lilli Greene MD;  Location: [facility] CATH INVASIVE LOCATION;  Service: Cardiovascular;  Laterality: N/A;   • CARDIAC CATHETERIZATION N/A 12/28/2023    Procedure: Coronary angiography;  Surgeon: Lilli Greene MD;  Location:  [facility] CATH INVASIVE LOCATION;  Service:  Cardiovascular;  Laterality: N/A;   • CARDIAC CATHETERIZATION N/A 1/3/2024    Procedure: Percutaneous Coronary Intervention;  Surgeon: Lilli Greene MD;  Location:  [facility] CATH INVASIVE LOCATION;  Service: Cardiology;  Laterality: N/A;   • CARDIAC CATHETERIZATION N/A 1/3/2024    Procedure: Stent MERRITT coronary;  Surgeon: Lilli Greene MD;  Location:  [facility] CATH INVASIVE LOCATION;  Service: Cardiology;  Laterality: N/A;   • CARDIAC CATHETERIZATION N/A 2024    Procedure: Percutaneous Coronary Intervention;  Surgeon: Lilli Greene MD;  Location:  [facility] CATH INVASIVE LOCATION;  Service: Cardiology;  Laterality: N/A;   • CARDIAC CATHETERIZATION N/A 2024    Procedure: Stent MERRITT coronary;  Surgeon: Lilli Greene MD;  [facility];  Service: Cardiology;  Laterality: N/A;   • CARDIAC CATHETERIZATION N/A 2024    Procedure: Optical Coherence Tomography;  Surgeon: Lilli Greene MD;  Location: [facility] CATH INVASIVE LOCATION;  Service: Cardiology;  Laterality: N/A;   • CARDIAC ELECTROPHYSIOLOGY PROCEDURE  1/3/2024    Procedure: Impella Insertion;  Surgeon: Lilli Greene MD;  Location: [facility] CATH INVASIVE LOCATION;  Service: Cardiology;;   •  SECTION     •  SECTION WITH TUBAL  2001   • CHOLECYSTECTOMY     • COLONOSCOPY  05/10/2019    Grzegorz ABEL   • CYSTOSCOPY     • ENDOSCOPY  2020   • ENDOSCOPY N/A 2021    Procedure: ESOPHAGOGASTRODUODENOSCOPY WITH COLD BIOPSIES;  Surgeon: Jasson Nguyen MD;  Location: [facility] ENDOSCOPY;  Service: Gastroenterology;  Laterality: N/A;  PRE- ABD PAIN  POST- NORMAL   • INTERVENTIONAL RADIOLOGY PROCEDURE N/A 1/3/2024    Procedure: Intravascular Ultrasound;  Surgeon: Lilli Greene MD;  Location: [facility] CATH INVASIVE LOCATION;  Service: Cardiology;  Laterality: N/A;   • LAPAROSCOPIC CHOLECYSTECTOMY  2016   • TUBAL ABDOMINAL LIGATION  2001   • UPPER GASTROINTESTINAL ENDOSCOPY  2020    Grzegorz ABEL  "gastritis, ulcers   • UPPER GASTROINTESTINAL ENDOSCOPY  2020    Andrew ABEL       Family History   Problem Relation Age of Onset   • Thyroid disease Mother         goiter   • Hyperlipidemia Mother    • ALS Mother    • Heart disease Father    • Stroke Father    • Hypertension Father    • Drug abuse Brother    • Diabetes Paternal Grandfather    • Diabetes Paternal Aunt    • Diabetes Paternal Uncle    • Malig Hyperthermia Neg Hx        Social History     Tobacco Use   • Smoking status: Former     Current packs/day: 0.00     Average packs/day: 0.5 packs/day for 38.9 years (19.5 ttl pk-yrs)     Types: Cigarettes     Start date: 1/10/1985     Quit date: 2023     Years since quittin.2   • Smokeless tobacco: Never   Vaping Use   • Vaping status: Never Used   Substance Use Topics   • Alcohol use: Yes     Comment: Only drink about 2 times a year   • Drug use: Yes     Types: Marijuana     Comment: daily       Procedures       Objective:     Visit Vitals  /60 (BP Location: Right arm, Patient Position: Sitting, Cuff Size: Adult)   Pulse 88   Ht 162.6 cm (64\")   Wt 68.9 kg (152 lb)   SpO2 100%   BMI 26.09 kg/m²         Physical Exam    Lab Review:       Assessment:          Diagnosis Plan   1. Coronary artery disease involving native coronary artery of native heart without angina pectoris        2. Chronic systolic congestive heart failure        3. Hyperlipidemia, unspecified hyperlipidemia type        4. Primary hypertension        5. Status post coronary artery stent placement        6. Type 2 diabetes mellitus with complications               Plan:                "

## 2024-05-28 RX ORDER — PEN NEEDLE, DIABETIC 32GX 5/32"
NEEDLE, DISPOSABLE MISCELLANEOUS
Qty: 400 EACH | Refills: 3 | Status: SHIPPED | OUTPATIENT
Start: 2024-05-28

## 2024-06-13 ENCOUNTER — OFFICE VISIT (OUTPATIENT)
Age: 54
End: 2024-06-13
Payer: MEDICAID

## 2024-06-13 ENCOUNTER — TRANSCRIBE ORDERS (OUTPATIENT)
Dept: CARDIOLOGY | Facility: CLINIC | Age: 54
End: 2024-06-13
Payer: MEDICAID

## 2024-06-13 VITALS
DIASTOLIC BLOOD PRESSURE: 84 MMHG | SYSTOLIC BLOOD PRESSURE: 120 MMHG | HEART RATE: 84 BPM | WEIGHT: 152 LBS | HEIGHT: 64 IN | BODY MASS INDEX: 25.95 KG/M2

## 2024-06-13 DIAGNOSIS — I25.5 ISCHEMIC CARDIOMYOPATHY: Primary | ICD-10-CM

## 2024-06-13 DIAGNOSIS — Z01.810 PRE-OPERATIVE CARDIOVASCULAR EXAMINATION: Primary | ICD-10-CM

## 2024-06-13 DIAGNOSIS — Z13.6 SCREENING FOR ISCHEMIC HEART DISEASE: ICD-10-CM

## 2024-06-13 NOTE — PROGRESS NOTES
Date of Office Visit: 2024  Encounter Provider: Syd Lowery MD  Place of Service: [facility] CARDIOLOGY  Patient Name: Jade Clement  :1970    Chief Complaint   Patient presents with    Cardiomyopathy   :     HPI: Jade Clement is a 54 y.o. female who presents today for consideration of primary prevention ICD    She was found to have multi vessel CAD in December during acute presentation.     She was not felt to be candidate for CABG and had PCI.      She continued to have NYHA class III symptoms and on her last echo EF for 20%              Past Medical History:   Diagnosis Date    Adrenal adenoma     Anxiety     Coronary artery disease involving native coronary artery of native heart without angina pectoris 2024    Diabetes mellitus     Diarrhea     GERD (gastroesophageal reflux disease)     Gestational diabetes     Head ache     Hyperlipidemia     Hypertension     Kidney stone     Nausea     Type 2 diabetes mellitus     Urinary tract infection     Frequently    Varicella Unknown    Vitamin D deficiency        Past Surgical History:   Procedure Laterality Date    CARDIAC CATHETERIZATION N/A 2023    Procedure: Left Heart Cath;  Surgeon: Lilli Greene MD;  Location: [facility] CATH INVASIVE LOCATION;  Service: Cardiovascular;  Laterality: N/A;    CARDIAC CATHETERIZATION N/A 2023    Procedure: Coronary angiography;  Surgeon: Lilli Greene MD;  Location:  [facility] CATH INVASIVE LOCATION;  Service: Cardiovascular;  Laterality: N/A;    CARDIAC CATHETERIZATION N/A 1/3/2024    Procedure: Percutaneous Coronary Intervention;  Surgeon: Lilli Greene MD;  Location:  [facility] CATH INVASIVE LOCATION;  Service: Cardiology;  Laterality: N/A;    CARDIAC CATHETERIZATION N/A 1/3/2024    Procedure: Stent MERRITT coronary;  Surgeon: Lilli Greene MD;  [facility];  Service: Cardiology;  Laterality: N/A;    CARDIAC CATHETERIZATION N/A  2024    Procedure: Percutaneous Coronary Intervention;  Surgeon: Lilli Greene MD;  Location:  [facility] CATH INVASIVE LOCATION;  Service: Cardiology;  Laterality: N/A;    CARDIAC CATHETERIZATION N/A 2024    Procedure: Stent MERRITT coronary;  Surgeon: Lilli Greene MD;  Location:  [facility] CATH INVASIVE LOCATION;  Service: Cardiology;  Laterality: N/A;    CARDIAC CATHETERIZATION N/A 2024    Procedure: Optical Coherence Tomography;  Surgeon: Lilli Greene MD;  Location:  [facility] CATH INVASIVE LOCATION;  Service: Cardiology;  Laterality: N/A;    CARDIAC ELECTROPHYSIOLOGY PROCEDURE  1/3/2024    Procedure: Impella Insertion;  Surgeon: Lilli Greene MD;  [facility];  Service: Cardiology;;     SECTION       SECTION WITH TUBAL  2001    CHOLECYSTECTOMY      COLONOSCOPY  05/10/2019    Grzegorz ABEL    CYSTOSCOPY      ENDOSCOPY  2020    ENDOSCOPY N/A 2021    Procedure: ESOPHAGOGASTRODUODENOSCOPY WITH COLD BIOPSIES;  Surgeon: Jasson Nguyen MD;  [facility];  Service: Gastroenterology;  Laterality: N/A;  PRE- ABD PAIN  POST- NORMAL    INTERVENTIONAL RADIOLOGY PROCEDURE N/A 1/3/2024    Procedure: Intravascular Ultrasound;  Surgeon: Lilli Greene MD;  Location: [facility] CATH INVASIVE LOCATION;  Service: Cardiology;  Laterality: N/A;    LAPAROSCOPIC CHOLECYSTECTOMY  2016    TUBAL ABDOMINAL LIGATION  2001    UPPER GASTROINTESTINAL ENDOSCOPY  2020    Grzegorz ABEL gastritis, ulcers    UPPER GASTROINTESTINAL ENDOSCOPY  2020    Andrew ABEL       Social History     Socioeconomic History    Marital status: Single   Tobacco Use    Smoking status: Former     Current packs/day: 0.00     Average packs/day: 0.5 packs/day for 38.9 years (19.5 ttl pk-yrs)     Types: Cigarettes     Start date: 1/10/1985     Quit date: 2023     Years since quittin.4    Smokeless tobacco: Never   Vaping Use    Vaping status: Never Used   Substance  and Sexual Activity    Alcohol use: Yes     Comment: Only drink about 2 times a year    Drug use: Yes     Types: Marijuana     Comment: daily    Sexual activity: Yes     Partners: Male     Birth control/protection: Surgical, Post-menopausal       Family History   Problem Relation Age of Onset    Thyroid disease Mother         goiter    Hyperlipidemia Mother     ALS Mother     Heart disease Father     Stroke Father     Hypertension Father     Drug abuse Brother     Diabetes Paternal Grandfather     Diabetes Paternal Aunt     Diabetes Paternal Uncle     Malig Hyperthermia Neg Hx        Review of Systems   Constitutional: Negative.   Cardiovascular: Negative.    Respiratory: Negative.     Gastrointestinal: Negative.        Allergies   Allergen Reactions    Metformin Diarrhea    Ozempic [Semaglutide] GI Intolerance     Pt stopped due to stomach pain    Bydureon [Exenatide] Other (See Comments)     Site reaction    Statins Myalgia     Muscle aches    Xigduo Xr [Dapagliflozin Pro-Metformin Er] Diarrhea         Current Outpatient Medications:     aspirin 81 MG EC tablet, Take 1 tablet by mouth Daily., Disp: 90 tablet, Rfl: 3    aspirin-acetaminophen-caffeine (EXCEDRIN MIGRAINE) 250-250-65 MG per tablet, Take 1 tablet by mouth Every 8 (Eight) Hours As Needed., Disp: , Rfl:     cetirizine (ZyrTEC) 10 MG tablet, Take 1 tablet by mouth Daily., Disp: , Rfl:     clopidogrel (PLAVIX) 75 MG tablet, Take 1 tablet by mouth Daily., Disp: 90 tablet, Rfl: 3    Continuous Blood Gluc Sensor (FreeStyle Balwinder 3 Sensor) misc, Use 1 each Every 14 (Fourteen) Days., Disp: 6 each, Rfl: 3    empagliflozin (Jardiance) 10 MG tablet tablet, Take 1 tablet by mouth Daily., Disp: 30 tablet, Rfl: 5    ergocalciferol (ERGOCALCIFEROL) 1.25 MG (63103 UT) capsule, Take 1 capsule by mouth 1 (One) Time Per Week., Disp: 12 capsule, Rfl: 1    furosemide (LASIX) 40 MG tablet, Take 1 tablet by mouth Daily., Disp: 30 tablet, Rfl: 0    Insulin Degludec (Tresiba  "FlexTouch) 200 UNIT/ML solution pen-injector pen injection, 36 units every evening.  Prime needle with 4 units before each use., Disp: 18 mL, Rfl: 2    Insulin Lispro, 1 Unit Dial, (HumaLOG KwikPen) 100 UNIT/ML solution pen-injector, 12 units 3 times daily with each meal.  Prime needle with 2 units before each use. (Patient taking differently: 12 units 3 times daily with each meal.  Prime needle with 2 units before each use.  Patient currently not takign this, only does sliding scale), Disp: 38 mL, Rfl: 2    Insulin Pen Needle (BD Pen Needle Ashwini U/F) 32G X 4 MM misc, Use to inject insulin 4 times daily, Disp: 400 each, Rfl: 3    Methylcobalamin 1 MG chewable tablet, Chew 1 mg Daily., Disp: , Rfl:     Naproxen Sodium 220 MG capsule, Take 220 mg by mouth Daily As Needed., Disp: , Rfl:     nitroglycerin (NITROSTAT) 0.4 MG SL tablet, Place 1 tablet under the tongue Every 5 (Five) Minutes As Needed for Chest Pain (Systolic BP Greater Than 100). Take no more than 3 doses in 15 minutes., Disp: 100 tablet, Rfl: 0    ondansetron ODT (ZOFRAN-ODT) 4 MG disintegrating tablet, Place 1 tablet on the tongue 4 (Four) Times a Day As Needed for Nausea., Disp: 20 tablet, Rfl: 0    sertraline (ZOLOFT) 50 MG tablet, Take 1 tablet by mouth Daily., Disp: , Rfl:     pravastatin (PRAVACHOL) 20 MG tablet, Take 1 tablet by mouth Every Night., Disp: 90 tablet, Rfl: 3    spironolactone (ALDACTONE) 25 MG tablet, Take 0.5 tablets by mouth Daily., Disp: 15 tablet, Rfl: 3      Objective:     Vitals:    06/13/24 1304   BP: 120/84   BP Location: Right arm   Patient Position: Sitting   Pulse: 84   Weight: 68.9 kg (152 lb)   Height: 162.6 cm (64\")     Body mass index is 26.09 kg/m².    PHYSICAL EXAM:    Vitals and nursing note reviewed.   Constitutional:       General: Not in acute distress.  Pulmonary:      Effort: Pulmonary effort is normal. No respiratory distress.   Cardiovascular:      Normal rate. Regular rhythm.   Edema:     Peripheral edema " absent.   Skin:     General: Skin is warm and dry.   Neurological:      Mental Status: Alert and oriented to person, place, and time.   Psychiatric:         Behavior: Behavior normal.         Thought Content: Thought content normal.         Judgment: Judgment normal.             ECG 12 Lead    Date/Time: 6/13/2024 1:37 PM  Performed by: Syd Lowery MD    Authorized by: Syd Lowery MD  Comparison: compared with previous ECG from 2/1/2024  Rhythm: sinus rhythm            Assessment:       Diagnosis Plan   1. Ischemic cardiomyopathy  Case Request EP Lab: ICD SC new             Plan:       We discussed primary prevention ICD.      We discussed with the use of a shared decision making tool.     After discussion, she would like to proceed with ICD placement.     I have decided to place a single chamber ICD Grant    As always, it has been a pleasure to participate in your patient's care.      Sincerely,         Syd Lowery MD

## 2024-06-13 NOTE — H&P (VIEW-ONLY)
Date of Office Visit: 2024  Encounter Provider: Syd Lowery MD  Place of Service: [facility]  Patient Name: Jade Clement  :1970    Chief Complaint   Patient presents with    Cardiomyopathy   :     HPI: Jade Clement is a 54 y.o. female who presents today for consideration of primary prevention ICD    She was found to have multi vessel CAD in December during acute presentation.     She was not felt to be candidate for CABG and had PCI.      She continued to have NYHA class III symptoms and on her last echo EF for 20%              Past Medical History:   Diagnosis Date    Adrenal adenoma     Anxiety     Coronary artery disease involving native coronary artery of native heart without angina pectoris 2024    Diabetes mellitus     Diarrhea     GERD (gastroesophageal reflux disease)     Gestational diabetes     Head ache     Hyperlipidemia     Hypertension     Kidney stone     Nausea     Type 2 diabetes mellitus     Urinary tract infection     Frequently    Varicella Unknown    Vitamin D deficiency        Past Surgical History:   Procedure Laterality Date    CARDIAC CATHETERIZATION N/A 2023    Procedure: Left Heart Cath;  Surgeon: Lilli Greene MD;  Location: [facility] CATH INVASIVE LOCATION;  Service: Cardiovascular;  Laterality: N/A;    CARDIAC CATHETERIZATION N/A 2023    Procedure: Coronary angiography;  Surgeon: Lilli Greene MD;  Location:  [facility] CATH INVASIVE LOCATION;  Service: Cardiovascular;  Laterality: N/A;    CARDIAC CATHETERIZATION N/A 1/3/2024    Procedure: Percutaneous Coronary Intervention;  Surgeon: Lilli Greene MD;  Location:  [facility] CATH INVASIVE LOCATION;  Service: Cardiology;  Laterality: N/A;    CARDIAC CATHETERIZATION N/A 1/3/2024    Procedure: Stent MERRITT coronary;  Surgeon: Lilli Greene MD;  [facility];  Service: Cardiology;  Laterality: N/A;    CARDIAC CATHETERIZATION N/A  2024    Procedure: Percutaneous Coronary Intervention;  Surgeon: Lilli Greene MD;  Location:  [facility] CATH INVASIVE LOCATION;  Service: Cardiology;  Laterality: N/A;    CARDIAC CATHETERIZATION N/A 2024    Procedure: Stent MERRITT coronary;  Surgeon: Lilli Greene MD;  Location:  [facility] CATH INVASIVE LOCATION;  Service: Cardiology;  Laterality: N/A;    CARDIAC CATHETERIZATION N/A 2024    Procedure: Optical Coherence Tomography;  Surgeon: Lilli Greene MD;  Location:  [facility] CATH INVASIVE LOCATION;  Service: Cardiology;  Laterality: N/A;    CARDIAC ELECTROPHYSIOLOGY PROCEDURE  1/3/2024    Procedure: Impella Insertion;  Surgeon: Lilli Greene MD;  [facility];  Service: Cardiology;;     SECTION       SECTION WITH TUBAL  2001    CHOLECYSTECTOMY      COLONOSCOPY  05/10/2019    Grzegorz ABEL    CYSTOSCOPY      ENDOSCOPY  2020    ENDOSCOPY N/A 2021    Procedure: ESOPHAGOGASTRODUODENOSCOPY WITH COLD BIOPSIES;  Surgeon: Jasson Nguyen MD;  [facility];  Service: Gastroenterology;  Laterality: N/A;  PRE- ABD PAIN  POST- NORMAL    INTERVENTIONAL RADIOLOGY PROCEDURE N/A 1/3/2024    Procedure: Intravascular Ultrasound;  Surgeon: Lilli Greene MD;  Location: [facility] CATH INVASIVE LOCATION;  Service: Cardiology;  Laterality: N/A;    LAPAROSCOPIC CHOLECYSTECTOMY  2016    TUBAL ABDOMINAL LIGATION  2001    UPPER GASTROINTESTINAL ENDOSCOPY  2020    Grzegorz ABEL gastritis, ulcers    UPPER GASTROINTESTINAL ENDOSCOPY  2020    Andrew ABEL       Social History     Socioeconomic History    Marital status: Single   Tobacco Use    Smoking status: Former     Current packs/day: 0.00     Average packs/day: 0.5 packs/day for 38.9 years (19.5 ttl pk-yrs)     Types: Cigarettes     Start date: 1/10/1985     Quit date: 2023     Years since quittin.4    Smokeless tobacco: Never   Vaping Use    Vaping status: Never Used   Substance  and Sexual Activity    Alcohol use: Yes     Comment: Only drink about 2 times a year    Drug use: Yes     Types: Marijuana     Comment: daily    Sexual activity: Yes     Partners: Male     Birth control/protection: Surgical, Post-menopausal       Family History   Problem Relation Age of Onset    Thyroid disease Mother         goiter    Hyperlipidemia Mother     ALS Mother     Heart disease Father     Stroke Father     Hypertension Father     Drug abuse Brother     Diabetes Paternal Grandfather     Diabetes Paternal Aunt     Diabetes Paternal Uncle     Malig Hyperthermia Neg Hx        Review of Systems   Constitutional: Negative.   Cardiovascular: Negative.    Respiratory: Negative.     Gastrointestinal: Negative.        Allergies   Allergen Reactions    Metformin Diarrhea    Ozempic [Semaglutide] GI Intolerance     Pt stopped due to stomach pain    Bydureon [Exenatide] Other (See Comments)     Site reaction    Statins Myalgia     Muscle aches    Xigduo Xr [Dapagliflozin Pro-Metformin Er] Diarrhea         Current Outpatient Medications:     aspirin 81 MG EC tablet, Take 1 tablet by mouth Daily., Disp: 90 tablet, Rfl: 3    aspirin-acetaminophen-caffeine (EXCEDRIN MIGRAINE) 250-250-65 MG per tablet, Take 1 tablet by mouth Every 8 (Eight) Hours As Needed., Disp: , Rfl:     cetirizine (ZyrTEC) 10 MG tablet, Take 1 tablet by mouth Daily., Disp: , Rfl:     clopidogrel (PLAVIX) 75 MG tablet, Take 1 tablet by mouth Daily., Disp: 90 tablet, Rfl: 3    Continuous Blood Gluc Sensor (FreeStyle Balwinder 3 Sensor) misc, Use 1 each Every 14 (Fourteen) Days., Disp: 6 each, Rfl: 3    empagliflozin (Jardiance) 10 MG tablet tablet, Take 1 tablet by mouth Daily., Disp: 30 tablet, Rfl: 5    ergocalciferol (ERGOCALCIFEROL) 1.25 MG (94482 UT) capsule, Take 1 capsule by mouth 1 (One) Time Per Week., Disp: 12 capsule, Rfl: 1    furosemide (LASIX) 40 MG tablet, Take 1 tablet by mouth Daily., Disp: 30 tablet, Rfl: 0    Insulin Degludec (Tresiba  "FlexTouch) 200 UNIT/ML solution pen-injector pen injection, 36 units every evening.  Prime needle with 4 units before each use., Disp: 18 mL, Rfl: 2    Insulin Lispro, 1 Unit Dial, (HumaLOG KwikPen) 100 UNIT/ML solution pen-injector, 12 units 3 times daily with each meal.  Prime needle with 2 units before each use. (Patient taking differently: 12 units 3 times daily with each meal.  Prime needle with 2 units before each use.  Patient currently not takign this, only does sliding scale), Disp: 38 mL, Rfl: 2    Insulin Pen Needle (BD Pen Needle Ashwini U/F) 32G X 4 MM misc, Use to inject insulin 4 times daily, Disp: 400 each, Rfl: 3    Methylcobalamin 1 MG chewable tablet, Chew 1 mg Daily., Disp: , Rfl:     Naproxen Sodium 220 MG capsule, Take 220 mg by mouth Daily As Needed., Disp: , Rfl:     nitroglycerin (NITROSTAT) 0.4 MG SL tablet, Place 1 tablet under the tongue Every 5 (Five) Minutes As Needed for Chest Pain (Systolic BP Greater Than 100). Take no more than 3 doses in 15 minutes., Disp: 100 tablet, Rfl: 0    ondansetron ODT (ZOFRAN-ODT) 4 MG disintegrating tablet, Place 1 tablet on the tongue 4 (Four) Times a Day As Needed for Nausea., Disp: 20 tablet, Rfl: 0    sertraline (ZOLOFT) 50 MG tablet, Take 1 tablet by mouth Daily., Disp: , Rfl:     pravastatin (PRAVACHOL) 20 MG tablet, Take 1 tablet by mouth Every Night., Disp: 90 tablet, Rfl: 3    spironolactone (ALDACTONE) 25 MG tablet, Take 0.5 tablets by mouth Daily., Disp: 15 tablet, Rfl: 3      Objective:     Vitals:    06/13/24 1304   BP: 120/84   BP Location: Right arm   Patient Position: Sitting   Pulse: 84   Weight: 68.9 kg (152 lb)   Height: 162.6 cm (64\")     Body mass index is 26.09 kg/m².    PHYSICAL EXAM:    Vitals and nursing note reviewed.   Constitutional:       General: Not in acute distress.  Pulmonary:      Effort: Pulmonary effort is normal. No respiratory distress.   Cardiovascular:      Normal rate. Regular rhythm.   Edema:     Peripheral edema " absent.   Skin:     General: Skin is warm and dry.   Neurological:      Mental Status: Alert and oriented to person, place, and time.   Psychiatric:         Behavior: Behavior normal.         Thought Content: Thought content normal.         Judgment: Judgment normal.             ECG 12 Lead    Date/Time: 6/13/2024 1:37 PM  Performed by: Syd Lowery MD    Authorized by: Syd Lowery MD  Comparison: compared with previous ECG from 2/1/2024  Rhythm: sinus rhythm            Assessment:       Diagnosis Plan   1. Ischemic cardiomyopathy  Case Request EP Lab: ICD [place] new             Plan:       We discussed primary prevention ICD.      We discussed with the use of a shared decision making tool.     After discussion, she would like to proceed with ICD placement.     I have decided to place a single chamber ICD [place]    As always, it has been a pleasure to participate in your patient's care.      Sincerely,         Syd Lowery MD

## 2024-06-20 ENCOUNTER — LAB (OUTPATIENT)
Dept: LAB | Facility: HOSPITAL | Age: 54
End: 2024-06-20
Payer: MEDICAID

## 2024-06-20 DIAGNOSIS — Z13.6 SCREENING FOR ISCHEMIC HEART DISEASE: ICD-10-CM

## 2024-06-20 DIAGNOSIS — Z01.810 PRE-OPERATIVE CARDIOVASCULAR EXAMINATION: ICD-10-CM

## 2024-06-20 LAB
ANION GAP SERPL CALCULATED.3IONS-SCNC: 14 MMOL/L (ref 5–15)
BASOPHILS # BLD AUTO: 0.08 10*3/MM3 (ref 0–0.2)
BASOPHILS NFR BLD AUTO: 0.7 % (ref 0–1.5)
BUN SERPL-MCNC: 27 MG/DL (ref 6–20)
BUN/CREAT SERPL: 25 (ref 7–25)
CALCIUM SPEC-SCNC: 9.7 MG/DL (ref 8.6–10.5)
CHLORIDE SERPL-SCNC: 98 MMOL/L (ref 98–107)
CO2 SERPL-SCNC: 25 MMOL/L (ref 22–29)
CREAT SERPL-MCNC: 1.08 MG/DL (ref 0.57–1)
DEPRECATED RDW RBC AUTO: 41.9 FL (ref 37–54)
EGFRCR SERPLBLD CKD-EPI 2021: 60.4 ML/MIN/1.73
EOSINOPHIL # BLD AUTO: 0.22 10*3/MM3 (ref 0–0.4)
EOSINOPHIL NFR BLD AUTO: 1.9 % (ref 0.3–6.2)
ERYTHROCYTE [DISTWIDTH] IN BLOOD BY AUTOMATED COUNT: 12.1 % (ref 12.3–15.4)
GLUCOSE SERPL-MCNC: 219 MG/DL (ref 65–99)
HCT VFR BLD AUTO: 44.9 % (ref 34–46.6)
HGB BLD-MCNC: 15.2 G/DL (ref 12–15.9)
IMM GRANULOCYTES # BLD AUTO: 0.04 10*3/MM3 (ref 0–0.05)
IMM GRANULOCYTES NFR BLD AUTO: 0.3 % (ref 0–0.5)
LYMPHOCYTES # BLD AUTO: 3.12 10*3/MM3 (ref 0.7–3.1)
LYMPHOCYTES NFR BLD AUTO: 27.2 % (ref 19.6–45.3)
MCH RBC QN AUTO: 31.5 PG (ref 26.6–33)
MCHC RBC AUTO-ENTMCNC: 33.9 G/DL (ref 31.5–35.7)
MCV RBC AUTO: 93 FL (ref 79–97)
MONOCYTES # BLD AUTO: 0.44 10*3/MM3 (ref 0.1–0.9)
MONOCYTES NFR BLD AUTO: 3.8 % (ref 5–12)
NEUTROPHILS NFR BLD AUTO: 66.1 % (ref 42.7–76)
NEUTROPHILS NFR BLD AUTO: 7.56 10*3/MM3 (ref 1.7–7)
NRBC BLD AUTO-RTO: 0 /100 WBC (ref 0–0.2)
PLATELET # BLD AUTO: 177 10*3/MM3 (ref 140–450)
PMV BLD AUTO: 10.3 FL (ref 6–12)
POTASSIUM SERPL-SCNC: 4.1 MMOL/L (ref 3.5–5.2)
RBC # BLD AUTO: 4.83 10*6/MM3 (ref 3.77–5.28)
SODIUM SERPL-SCNC: 137 MMOL/L (ref 136–145)
WBC NRBC COR # BLD AUTO: 11.46 10*3/MM3 (ref 3.4–10.8)

## 2024-06-20 PROCEDURE — 85025 COMPLETE CBC W/AUTO DIFF WBC: CPT

## 2024-06-20 PROCEDURE — 36415 COLL VENOUS BLD VENIPUNCTURE: CPT

## 2024-06-20 PROCEDURE — 80048 BASIC METABOLIC PNL TOTAL CA: CPT

## 2024-06-28 ENCOUNTER — HOSPITAL ENCOUNTER (OUTPATIENT)
Facility: HOSPITAL | Age: 54
Setting detail: HOSPITAL OUTPATIENT SURGERY
Discharge: HOME | End: 2024-06-28
Attending: INTERNAL MEDICINE | Admitting: INTERNAL MEDICINE
Payer: MEDICAID

## 2024-06-28 VITALS
WEIGHT: 150 LBS | DIASTOLIC BLOOD PRESSURE: 58 MMHG | HEART RATE: 88 BPM | SYSTOLIC BLOOD PRESSURE: 112 MMHG | HEIGHT: 63 IN | TEMPERATURE: 97.6 F | BODY MASS INDEX: 26.58 KG/M2 | RESPIRATION RATE: 15 BRPM | OXYGEN SATURATION: 95 %

## 2024-06-28 DIAGNOSIS — I25.5 ISCHEMIC CARDIOMYOPATHY: ICD-10-CM

## 2024-06-28 LAB
GLUCOSE BLDC GLUCOMTR-MCNC: 101 MG/DL (ref 70–130)
GLUCOSE BLDC GLUCOMTR-MCNC: 90 MG/DL (ref 70–130)

## 2024-06-28 PROCEDURE — 25010000002 MIDAZOLAM PER 1 MG: Performed by: INTERNAL MEDICINE

## 2024-06-28 PROCEDURE — 25810000003 SODIUM CHLORIDE 0.9 % SOLUTION: Performed by: INTERNAL MEDICINE

## 2024-06-28 PROCEDURE — 33249 INSJ/RPLCMT DEFIB W/LEAD(S): CPT | Performed by: INTERNAL MEDICINE

## 2024-06-28 PROCEDURE — 25810000003 SODIUM CHLORIDE 0.9 % SOLUTION 250 ML FLEX CONT: Performed by: INTERNAL MEDICINE

## 2024-06-28 PROCEDURE — C1894 INTRO/SHEATH, NON-LASER: HCPCS | Performed by: INTERNAL MEDICINE

## 2024-06-28 PROCEDURE — 93005 ELECTROCARDIOGRAM TRACING: CPT | Performed by: INTERNAL MEDICINE

## 2024-06-28 PROCEDURE — C1722 AICD, SINGLE CHAMBER: HCPCS | Performed by: INTERNAL MEDICINE

## 2024-06-28 PROCEDURE — 82948 REAGENT STRIP/BLOOD GLUCOSE: CPT

## 2024-06-28 PROCEDURE — 25010000002 VANCOMYCIN 1 G RECONSTITUTED SOLUTION 1 EACH VIAL: Performed by: INTERNAL MEDICINE

## 2024-06-28 PROCEDURE — C1777 LEAD, AICD, ENDO SINGLE COIL: HCPCS | Performed by: INTERNAL MEDICINE

## 2024-06-28 PROCEDURE — 25010000002 FENTANYL CITRATE (PF) 50 MCG/ML SOLUTION: Performed by: INTERNAL MEDICINE

## 2024-06-28 DEVICE — IMPLANTABLE CARDIOVERTER DEFIBRILLATOR VR
Type: IMPLANTABLE DEVICE | Site: CHEST WALL | Status: FUNCTIONAL
Brand: RESONATE™ HF ICD VR

## 2024-06-28 DEVICE — INTEGRATED BIPOLAR PACE/SENSE AND DEFIBRILLATION LEAD
Type: IMPLANTABLE DEVICE | Site: HEART | Status: FUNCTIONAL
Brand: RELIANCE 4-FRONT™

## 2024-06-28 RX ORDER — METHOHEXITAL IN WATER/PF 100MG/10ML
SYRINGE (ML) INTRAVENOUS
Status: DISCONTINUED | OUTPATIENT
Start: 2024-06-28 | End: 2024-06-28 | Stop reason: HOSPADM

## 2024-06-28 RX ORDER — FENTANYL CITRATE 50 UG/ML
INJECTION, SOLUTION INTRAMUSCULAR; INTRAVENOUS
Status: DISCONTINUED | OUTPATIENT
Start: 2024-06-28 | End: 2024-06-28 | Stop reason: HOSPADM

## 2024-06-28 RX ORDER — NITROGLYCERIN 0.4 MG/1
0.4 TABLET SUBLINGUAL
Status: DISCONTINUED | OUTPATIENT
Start: 2024-06-28 | End: 2024-06-28 | Stop reason: HOSPADM

## 2024-06-28 RX ORDER — ACETAMINOPHEN 325 MG/1
650 TABLET ORAL EVERY 4 HOURS PRN
Status: CANCELLED | OUTPATIENT
Start: 2024-06-28

## 2024-06-28 RX ORDER — SODIUM CHLORIDE 0.9 % (FLUSH) 0.9 %
10 SYRINGE (ML) INJECTION EVERY 12 HOURS SCHEDULED
Status: CANCELLED | OUTPATIENT
Start: 2024-06-28

## 2024-06-28 RX ORDER — SODIUM CHLORIDE 0.9 % (FLUSH) 0.9 %
10 SYRINGE (ML) INJECTION AS NEEDED
Status: DISCONTINUED | OUTPATIENT
Start: 2024-06-28 | End: 2024-06-28 | Stop reason: HOSPADM

## 2024-06-28 RX ORDER — MIDAZOLAM HYDROCHLORIDE 1 MG/ML
INJECTION INTRAMUSCULAR; INTRAVENOUS
Status: DISCONTINUED | OUTPATIENT
Start: 2024-06-28 | End: 2024-06-28 | Stop reason: HOSPADM

## 2024-06-28 RX ORDER — ACETAMINOPHEN 650 MG/1
650 SUPPOSITORY RECTAL EVERY 4 HOURS PRN
Status: CANCELLED | OUTPATIENT
Start: 2024-06-28

## 2024-06-28 RX ORDER — SODIUM CHLORIDE 9 MG/ML
75 INJECTION, SOLUTION INTRAVENOUS CONTINUOUS
Status: DISCONTINUED | OUTPATIENT
Start: 2024-06-28 | End: 2024-06-28 | Stop reason: HOSPADM

## 2024-06-28 RX ORDER — SODIUM CHLORIDE 9 MG/ML
40 INJECTION, SOLUTION INTRAVENOUS AS NEEDED
Status: CANCELLED | OUTPATIENT
Start: 2024-06-28

## 2024-06-28 RX ORDER — SODIUM CHLORIDE 0.9 % (FLUSH) 0.9 %
10 SYRINGE (ML) INJECTION AS NEEDED
Status: CANCELLED | OUTPATIENT
Start: 2024-06-28

## 2024-06-28 RX ORDER — TRAZODONE HYDROCHLORIDE 100 MG/1
250 TABLET ORAL NIGHTLY
COMMUNITY

## 2024-06-28 RX ADMIN — SODIUM CHLORIDE 1000 MG: 9 INJECTION, SOLUTION INTRAVENOUS at 12:33

## 2024-06-28 RX ADMIN — SODIUM CHLORIDE 75 ML/HR: 9 INJECTION, SOLUTION INTRAVENOUS at 12:33

## 2024-06-28 NOTE — Clinical Note
A 4 fr sheath was  inserted using micropuncture technique with ultrasound guidance into the left axillary vein. Sheath insertion delayed.

## 2024-06-28 NOTE — DISCHARGE INSTRUCTIONS
[facility]  [phone]  Discharge Instructions for Pacemaker, Defibrillator Implants and Generator changes  by  Dr. Reynoso    The pacemaker clinic will contact you usually within 1-2 business day to schedule a device and incision check.  This is usually done with in 7-10 business days after your procedure.  If you do not hear from the clinic in 3 days, please call the office.    You may not drive until you follow up visit at the pacemaker clinic.    Please call the office if you experience any of the following:  Bleeding or drainage from your incision  Swelling, redness, or opening of your incision  Fever >101 or chills  Pain not managed by over-the-counter pain medication.  Chest pain or difficulty breathing  Lightheadedness    If your incision was closed using staples:  You may remove the dressing in 5 days and then leave it open to air.  You may reapply a dressing if there is drainage, otherwise leave your incision open to air.  If you reapply a dressing, please notify the pacemaker clinic.   Keep the area clean, dry and intact.   Do not use lotions, powders, creams or ointments on your incision.   Do not shower until your follow up visit at the pacemaker clinic.     .    No hot tubs, pools, lakes, oceans, or tub baths until your incision is fully healed.   No heavy lifting until your incision check appointment at the pacemaker clinic.     If your incision was closed using skin glue:  The surgical glue will flake off on its own over time.  Do not pick it off.   You may shower tomorrow; however, do not allow shower water to directly hit the incision.  Keep the area clean, dry and intact.   Do not use lotions, powders, creams, or ointments on your incision.   No hot tubs, pools, lakes, oceans, or tub baths until the incision is fully healed.   No heavy lifting until your incision check appointment.     For defibrillator only patients:  If you receive a shock from your device, please  call the office.  If you receive 2 or more shocks within 24 hours OR if you receive 1 shock and feel poorly, you should be evaluated in the emergency room.  Please do not drive if you have received a shock until your device has been checked.

## 2024-06-29 LAB
QT INTERVAL: 407 MS
QTC INTERVAL: 464 MS

## 2024-07-05 ENCOUNTER — CLINICAL SUPPORT NO REQUIREMENTS (OUTPATIENT)
Age: 54
End: 2024-07-05
Payer: MEDICAID

## 2024-07-05 DIAGNOSIS — I25.5 ISCHEMIC CARDIOMYOPATHY: Primary | ICD-10-CM

## 2024-07-09 ENCOUNTER — OFFICE VISIT (OUTPATIENT)
Dept: CARDIOLOGY | Facility: CLINIC | Age: 54
End: 2024-07-09
Payer: MEDICAID

## 2024-07-09 VITALS
WEIGHT: 159 LBS | DIASTOLIC BLOOD PRESSURE: 76 MMHG | HEIGHT: 63 IN | BODY MASS INDEX: 28.17 KG/M2 | SYSTOLIC BLOOD PRESSURE: 118 MMHG | HEART RATE: 82 BPM

## 2024-07-09 DIAGNOSIS — I25.5 ISCHEMIC CARDIOMYOPATHY: ICD-10-CM

## 2024-07-09 DIAGNOSIS — I25.10 CORONARY ARTERY DISEASE INVOLVING NATIVE CORONARY ARTERY OF NATIVE HEART WITHOUT ANGINA PECTORIS: Primary | ICD-10-CM

## 2024-07-09 DIAGNOSIS — I50.22 CHRONIC SYSTOLIC CONGESTIVE HEART FAILURE: ICD-10-CM

## 2024-07-09 DIAGNOSIS — E78.5 HYPERLIPIDEMIA, UNSPECIFIED HYPERLIPIDEMIA TYPE: ICD-10-CM

## 2024-07-09 DIAGNOSIS — Z95.5 STATUS POST CORONARY ARTERY STENT PLACEMENT: ICD-10-CM

## 2024-07-09 DIAGNOSIS — I10 PRIMARY HYPERTENSION: ICD-10-CM

## 2024-07-09 NOTE — PROGRESS NOTES
Subjective:     Encounter Date:07/09/2024      Patient ID: Jade Clement is a 54 y.o. female.    Chief Complaint:  History of Present Illness  This is a 52 y/o female who follows with Dr. Greene and is known to me. She has a pmhx of diabetes, hypertension, tobacco abuse, coronary artery disease, ischemic cardiomyopathy, and hyperlipidemia.    She is here today for a follow up visit. She says she is feeling better than she has in a long time. She denies any chest pain, shortness of breath, palpitations. She has a little swelling at times and will take an extra furosemide when needed for swelling or shortness of breath. She denies orthopnea or PND. No dizziness or syncope. She ended up stopping pravastatin due to myalgias. She does continue to smoke but plans to stop again. She is now working a part time job and is having no difficulties with fatigue or decreased stamina with working. Blood pressure has been well controlled.    Prior history:  She presented to the ED on 12/26/23 with worsening shortness of breath and orthopnea. She reported that on 12/18/23 that she ate a bag of hot potato chips. Shortly after, she developed some epigastric discomfort and indigestion. Over the next several hours, she became short of breath and developed nausea and diarrhea. Her GI symptoms lasted a couple of days but her dyspnea continued. She went to her PCP who diagnosed her with a potential peptic ulcer and prescribed Carafate for treatment. Her symptoms continued through [place] and she eventually went to the ED due to the severity of her dyspnea. She also reported about a 10 lb weight gain over the course of about 1 week.      She presented to the ED at [facility] in [place]. EKG showed sinus tachycardia and a potential old anteroseptal infarct with Q waves in leads V1 through V3. Troponin was elevated at 748 and peaked at 843. ProBNP was 11,256. CT of the chest showed cardiomegaly, pulmonary edema,  moderate right pleural effusion, and small left pleural effusion. She also had a right lower lobe groundglass opacity and smaller left apical opacity. Follow up CT in 3 months was recommended.  Her pulmonary artery was also dilated, suggestive of pulmonary hypertension.  Her CT scan of the abdomen and pelvis showed nonspecific edema and a right adrenal adenoma. Se was started on a heparin gtt and transferred to [facility].      She received IV diuresis and was started on a very small dose of beta blocker. An echocardiogram was obtained that showed multiple akinetic segments with an EF of 20% which was felt to be likely ischemic. It was also felt that she likely infarcted about a week prior, when her symptoms initially started. She was diuresed prior to undergoing a cardiac catheterization as she was not have unstable symptoms. On 12/28/23, she underwent a cardiac catheterization that showed severe multivessel coronary artery disease with 80% ostial and 90% mid LAD stenosis, 80% ostial OM1 stenosis, and 95% proximal mid RCA stenosis with left to right collateral filling. LVEDP 28-30 mmHg. She was referred to cardiac surgery for CABG along with possible mitral and tricuspid valve repair. Initially, the plan was for surgery following diuresis. However, Dr. Asif and Dr. Jerez reviewed her cath and echo again and did not feel she was operable. It was felt she would need either high risk PCI or transfer to a higher level of care facility with a dedicated heart failure program for possible LVAD. On 1/3/24, she underwent high risk PCI with Impella support and stenting of the left main extending into the LAD, mid LAD and OM1 branch. She did well with the procedure. On 1/8/24, she underwent staged PCI and stent of the proximal RCA. She was started on clopidogrel and aspirin. She has been intolerant of statins in the past but was started on pravastatin during her stay and tolerated this ok. GDMT for her ischemic  cardiomyopathy was limited by her hypotension. She was unable to tolerate even a low dose beta blocker.     She was discharged home on 1/9/24 and was feeling very well. Unfortunately, she returned to the hospital on 1/13 with shortness of breath, orthopnea and chest pain. Xray showed volume overload. She received IV diuresis and was discharged on PO furosemide, spironolactone and metoprolol succinate.     She was to see Dr. Greene in office for follow up but ended up in the ED again instead with epigastric pain and nausea. Cardiac workup was unremarkable. No changes were made.    At follow up with Dr. Greene, she reported that she stopped metoprolol and this seemed to help with her symptoms. She was started on low dose lisinopril and was tolerating low dose pravastatin.     She underwent a repeat echo in April that showed continued severely depressed LV function with an EF of 21%, global hypokinesis, grade 1 diastolic dysfunction and no significant valvular disease. She had stopped lisinopril and spironolactone due to symptomatic low blood pressures. She was taking an extra furosemide as needed. She was referred to EP for AICD placement. She was also started on Jardiance. On 6/28, she underwent AICD placement with Dr. Lowery.    I have reviewed and updated as appropriate allergies, current medications, past family history, past medical history, past surgical history and problem list.    Review of Systems   Constitutional: Negative for fever, malaise/fatigue, weight gain and weight loss.   HENT:  Negative for congestion, hoarse voice and sore throat.    Eyes:  Negative for blurred vision and double vision.   Cardiovascular:  Positive for leg swelling. Negative for chest pain, dyspnea on exertion, orthopnea, palpitations and syncope.   Respiratory:  Negative for cough, shortness of breath and wheezing.    Gastrointestinal:  Negative for abdominal pain, hematemesis, hematochezia and melena.   Genitourinary:  Negative  for dysuria and hematuria.   Neurological:  Negative for dizziness, headaches, light-headedness and numbness.   Psychiatric/Behavioral:  Negative for depression. The patient is not nervous/anxious.          Current Outpatient Medications:     aspirin 81 MG EC tablet, Take 1 tablet by mouth Daily., Disp: 90 tablet, Rfl: 3    aspirin-acetaminophen-caffeine (EXCEDRIN MIGRAINE) 250-250-65 MG per tablet, Take 1 tablet by mouth Every 8 (Eight) Hours As Needed., Disp: , Rfl:     cetirizine (ZyrTEC) 10 MG tablet, Take 1 tablet by mouth Daily., Disp: , Rfl:     clopidogrel (PLAVIX) 75 MG tablet, Take 1 tablet by mouth Daily., Disp: 90 tablet, Rfl: 3    Continuous Blood Gluc Sensor (FreeStyle Balwinder 3 Sensor) misc, Use 1 each Every 14 (Fourteen) Days., Disp: 6 each, Rfl: 3    empagliflozin (Jardiance) 10 MG tablet tablet, Take 1 tablet by mouth Daily., Disp: 30 tablet, Rfl: 5    ergocalciferol (ERGOCALCIFEROL) 1.25 MG (78784 UT) capsule, Take 1 capsule by mouth 1 (One) Time Per Week., Disp: 12 capsule, Rfl: 1    furosemide (LASIX) 40 MG tablet, Take 1 tablet by mouth Daily., Disp: 30 tablet, Rfl: 0    Insulin Degludec (Tresiba FlexTouch) 200 UNIT/ML solution pen-injector pen injection, 36 units every evening.  Prime needle with 4 units before each use., Disp: 18 mL, Rfl: 2    Insulin Lispro, 1 Unit Dial, (HumaLOG KwikPen) 100 UNIT/ML solution pen-injector, 12 units 3 times daily with each meal.  Prime needle with 2 units before each use. (Patient taking differently: 12 units 3 times daily with each meal.  Prime needle with 2 units before each use.  Patient currently not takign this, only does sliding scale), Disp: 38 mL, Rfl: 2    Insulin Pen Needle (BD Pen Needle Ashwini U/F) 32G X 4 MM misc, Use to inject insulin 4 times daily, Disp: 400 each, Rfl: 3    Naproxen Sodium 220 MG capsule, Take 220 mg by mouth Daily As Needed., Disp: , Rfl:     nitroglycerin (NITROSTAT) 0.4 MG SL tablet, Place 1 tablet under the tongue Every 5  (Five) Minutes As Needed for Chest Pain (Systolic BP Greater Than 100). Take no more than 3 doses in 15 minutes., Disp: 100 tablet, Rfl: 0    ondansetron ODT (ZOFRAN-ODT) 4 MG disintegrating tablet, Place 1 tablet on the tongue 4 (Four) Times a Day As Needed for Nausea., Disp: 20 tablet, Rfl: 0    sertraline (ZOLOFT) 50 MG tablet, Take 1 tablet by mouth Daily., Disp: , Rfl:     traZODone (DESYREL) 100 MG tablet, Take 2.5 tablets by mouth Every Night., Disp: , Rfl:     Methylcobalamin 1 MG chewable tablet, Chew 1 mg Daily., Disp: , Rfl:     Past Medical History:   Diagnosis Date    Adrenal adenoma     Anxiety     Coronary artery disease involving native coronary artery of native heart without angina pectoris 02/01/2024    Diabetes mellitus     Diarrhea     GERD (gastroesophageal reflux disease)     Gestational diabetes 2000    Head ache     Hyperlipidemia     Hypertension     Kidney stone 1985    MI (myocardial infarction)     Nausea     Type 2 diabetes mellitus     Urinary tract infection 1986    Frequently    Varicella Unknown    Vitamin D deficiency        Past Surgical History:   Procedure Laterality Date    CARDIAC CATHETERIZATION N/A 12/28/2023    Procedure: Left Heart Cath;  Surgeon: Lilli Greene MD;  Location: [facility] CATH INVASIVE LOCATION;  Service: Cardiovascular;  Laterality: N/A;    CARDIAC CATHETERIZATION N/A 12/28/2023    Procedure: Coronary angiography;  Surgeon: Lilli Greene MD;  Location: [facility] CATH INVASIVE LOCATION;  Service: Cardiovascular;  Laterality: N/A;    CARDIAC CATHETERIZATION N/A 01/03/2024    Procedure: Percutaneous Coronary Intervention;  Surgeon: Lilli Greene MD;  Location: [facility] CATH INVASIVE LOCATION;  Service: Cardiology;  Laterality: N/A;    CARDIAC CATHETERIZATION N/A 01/03/2024    Procedure: Stent MERRITT coronary;  Surgeon: Lilli Greene MD;  Location: [facility] CATH INVASIVE LOCATION;  Service: Cardiology;  Laterality: N/A;    CARDIAC CATHETERIZATION N/A 01/08/2024     Procedure: Percutaneous Coronary Intervention;  Surgeon: Lilli rGeene MD;  Location:  [facility] CATH INVASIVE LOCATION;  Service: Cardiology;  Laterality: N/A;    CARDIAC CATHETERIZATION N/A 2024    Procedure: Stent MERRITT coronary;  Surgeon: Lilli Greene MD;  Location:  [facility] CATH INVASIVE LOCATION;  Service: Cardiology;  Laterality: N/A;    CARDIAC CATHETERIZATION N/A 2024    Procedure: Optical Coherence Tomography;  Surgeon: Lilli Greene MD;  Location:  [facility] CATH INVASIVE LOCATION;  Service: Cardiology;  Laterality: N/A;    CARDIAC ELECTROPHYSIOLOGY PROCEDURE  2024    Procedure: Impella Insertion;  Surgeon: Lilli Greene MD;  Location:  [facility] CATH INVASIVE LOCATION;  Service: Cardiology;;    CARDIAC ELECTROPHYSIOLOGY PROCEDURE N/A 2024    Procedure: ICD SC new  [place];  Surgeon: Otilio Reynoso MD;  Location:  [facility] CATH INVASIVE LOCATION;  Service: Cardiovascular;  Laterality: N/A;     SECTION       SECTION WITH TUBAL  2001    CHOLECYSTECTOMY      COLONOSCOPY  05/10/2019    Grzegorz ABEL    CORONARY ANGIOPLASTY      CYSTOSCOPY      ENDOSCOPY  2020    ENDOSCOPY N/A 2021    Procedure: ESOPHAGOGASTRODUODENOSCOPY WITH COLD BIOPSIES;  Surgeon: Jasson Nguyen MD;  [facility] END[facility]COPY;  Service: Gastroenterology;  Laterality: N/A;  PRE- ABD PAIN  POST- NORMAL    INTERVENTIONAL RADIOLOGY PROCEDURE N/A 2024    Procedure: Intravascular Ultrasound;  Surgeon: Lilli Greene MD;  Location: [facility] CATH INVASIVE LOCATION;  Service: Cardiology;  Laterality: N/A;    LAPAROSCOPIC CHOLECYSTECTOMY  2016    TUBAL ABDOMINAL LIGATION  2001    UPPER GASTROINTESTINAL ENDOSCOPY  2020    Grzegorz ABEL gastritis, ulcers    UPPER GASTROINTESTINAL ENDOSCOPY  2020    Andrew ABEL       Family History   Problem Relation Age of Onset    Thyroid disease Mother         goiter    Hyperlipidemia Mother     ALS Mother     Heart disease Father     Stroke  "Father     Hypertension Father     Drug abuse Brother     Diabetes Paternal Grandfather     Diabetes Paternal Aunt     Diabetes Paternal Uncle     Malig Hyperthermia Neg Hx        Social History     Tobacco Use    Smoking status: Every Day     Current packs/day: 0.50     Average packs/day: 0.5 packs/day for 39.2 years (19.6 ttl pk-yrs)     Types: Cigarettes     Start date: 1/10/1985     Last attempt to quit: 12/18/2023    Smokeless tobacco: Never   Vaping Use    Vaping status: Never Used   Substance Use Topics    Alcohol use: Yes     Comment: Only drink about 2 times a year    Drug use: Not Currently     Types: Marijuana     Comment: daily         ECG 12 Lead    Date/Time: 7/10/2024 9:19 AM  Performed by: Sujata Pritchard APRN    Authorized by: Sujata Pritchard APRN  Comparison: compared with previous ECG from 4/4/2024  Similar to previous ECG  Rhythm: sinus rhythm  T inversion: I, aVL, V6 and V5             Objective:     Visit Vitals  /76   Pulse 82   Ht 160 cm (63\")   Wt 72.1 kg (159 lb)   BMI 28.17 kg/m²             Physical Exam  Constitutional:       Appearance: Normal appearance. She is normal weight.   HENT:      Head: Normocephalic.   Neck:      Vascular: No carotid bruit.   Cardiovascular:      Rate and Rhythm: Normal rate and regular rhythm.      Chest Wall: PMI is not displaced.      Pulses: Normal pulses.           Radial pulses are 2+ on the right side and 2+ on the left side.        Posterior tibial pulses are 2+ on the right side and 2+ on the left side.      Heart sounds: Normal heart sounds. No murmur heard.     No friction rub. No gallop.   Pulmonary:      Effort: Pulmonary effort is normal.      Breath sounds: Normal breath sounds.   Abdominal:      General: Bowel sounds are normal. There is no distension.      Palpations: Abdomen is soft.   Musculoskeletal:      Right lower leg: No edema.      Left lower leg: No edema.   Skin:     General: Skin is warm and dry.      Capillary Refill: " Capillary refill takes less than 2 seconds.   Neurological:      Mental Status: She is alert and oriented to person, place, and time.   Psychiatric:         Mood and Affect: Mood normal.         Behavior: Behavior normal.         Thought Content: Thought content normal.          Lab Review:   Lipid Panel          12/27/2023    09:22 1/4/2024    05:08 1/9/2024    03:10   Lipid Panel   Total Cholesterol 199  136  109    Triglycerides 207  107  93    HDL Cholesterol 31  36  30    VLDL Cholesterol 37  20  18    LDL Cholesterol  131  80  61    LDL/HDL Ratio 4.08  2.18  2.01          Cardiac Procedures:       Assessment:         Diagnoses and all orders for this visit:    1. Coronary artery disease involving native coronary artery of native heart without angina pectoris (Primary)    2. Primary hypertension    3. Hyperlipidemia, unspecified hyperlipidemia type    4. Ischemic cardiomyopathy    5. Status post coronary artery stent placement    6. Chronic systolic congestive heart failure            Plan:       CAD: s/p multivessel PCI in January 2024 with stenting of the left main extending into the LAD, mid LAD and OM1 branch and staged PCI and stent of the proximal RCA. On aspirin and clopidogrel which we will continue indefinitely given the number and location of the stents. EKG is stable with no new changes. No anginal symptoms reported. Continue with current medical therapy.  Cardiomyopathy: felt to be ischemic. LV function remains low despite revascularization. She underwent AICD placement with Dr. Lowery a couple of weeks ago and is doing well. She is intolerant of GDMT due to symptomatic low BP. On furosemide and Jardiance.  Chronic systolic CHF: appears compensated on exam. Continue with furosemide.  HLD: She has been intolerant to multiple statins. We discussed other options including Repatha and Leqvio. She is quite comfortable with giving herself injection bi-weekly as she is on insulin. Will order  Repatha.  Diabetes    Thank you for allowing me to participate in this patient's care. Please call with any questions or concerns. Ms Urbano will follow up with Dr. Greene in 3 months.          Your medication list            Accurate as of July 9, 2024  2:27 PM. If you have any questions, ask your nurse or doctor.                CHANGE how you take these medications        Instructions Last Dose Given Next Dose Due   Insulin Lispro (1 Unit Dial) 100 UNIT/ML solution pen-injector  Commonly known as: HumaLOG KwikPen  What changed: additional instructions      12 units 3 times daily with each meal.  Prime needle with 2 units before each use.              CONTINUE taking these medications        Instructions Last Dose Given Next Dose Due   aspirin 81 MG EC tablet      Take 1 tablet by mouth Daily.       aspirin-acetaminophen-caffeine 250-250-65 MG per tablet  Commonly known as: EXCEDRIN MIGRAINE      Take 1 tablet by mouth Every 8 (Eight) Hours As Needed.       BD Pen Needle Ashwini U/F 32G X 4 MM misc  Generic drug: Insulin Pen Needle      Use to inject insulin 4 times daily       cetirizine 10 MG tablet  Commonly known as: zyrTEC      Take 1 tablet by mouth Daily.       clopidogrel 75 MG tablet  Commonly known as: PLAVIX      Take 1 tablet by mouth Daily.       empagliflozin 10 MG tablet tablet  Commonly known as: Jardiance      Take 1 tablet by mouth Daily.       ergocalciferol 1.25 MG (80408 UT) capsule  Commonly known as: ERGOCALCIFEROL      Take 1 capsule by mouth 1 (One) Time Per Week.       FreeStyle Balwinder 3 Sensor misc      Use 1 each Every 14 (Fourteen) Days.       furosemide 40 MG tablet  Commonly known as: LASIX      Take 1 tablet by mouth Daily.       Methylcobalamin 1 MG chewable tablet      Chew 1 mg Daily.       Naproxen Sodium 220 MG capsule      Take 220 mg by mouth Daily As Needed.       nitroglycerin 0.4 MG SL tablet  Commonly known as: NITROSTAT      Place 1 tablet under the tongue Every 5 (Five) Minutes  As Needed for Chest Pain (Systolic BP Greater Than 100). Take no more than 3 doses in 15 minutes.       ondansetron ODT 4 MG disintegrating tablet  Commonly known as: ZOFRAN-ODT      Place 1 tablet on the tongue 4 (Four) Times a Day As Needed for Nausea.       sertraline 50 MG tablet  Commonly known as: ZOLOFT      Take 1 tablet by mouth Daily.       traZODone 100 MG tablet  Commonly known as: DESYREL      Take 2.5 tablets by mouth Every Night.       Tresiba FlexTouch 200 UNIT/ML solution pen-injector pen injection  Generic drug: Insulin Degludec      36 units every evening.  Prime needle with 4 units before each use.                  Sujata Pritchard, KEVIN  07/09/24  2:27 PM EDT

## 2024-07-25 ENCOUNTER — OFFICE VISIT (OUTPATIENT)
Dept: ENDOCRINOLOGY | Age: 54
End: 2024-07-25
Payer: MEDICAID

## 2024-07-25 VITALS
BODY MASS INDEX: 27.89 KG/M2 | OXYGEN SATURATION: 98 % | WEIGHT: 157.4 LBS | HEIGHT: 63 IN | SYSTOLIC BLOOD PRESSURE: 114 MMHG | TEMPERATURE: 96.8 F | DIASTOLIC BLOOD PRESSURE: 74 MMHG | HEART RATE: 78 BPM

## 2024-07-25 DIAGNOSIS — R80.9 TYPE 2 DIABETES MELLITUS WITH MICROALBUMINURIA, WITH LONG-TERM CURRENT USE OF INSULIN: Primary | ICD-10-CM

## 2024-07-25 DIAGNOSIS — Z78.9 STATIN INTOLERANCE: ICD-10-CM

## 2024-07-25 DIAGNOSIS — I10 ESSENTIAL HYPERTENSION: ICD-10-CM

## 2024-07-25 DIAGNOSIS — Z79.4 TYPE 2 DIABETES MELLITUS WITH MICROALBUMINURIA, WITH LONG-TERM CURRENT USE OF INSULIN: Primary | ICD-10-CM

## 2024-07-25 DIAGNOSIS — E11.29 TYPE 2 DIABETES MELLITUS WITH MICROALBUMINURIA, WITH LONG-TERM CURRENT USE OF INSULIN: Primary | ICD-10-CM

## 2024-07-25 DIAGNOSIS — I25.5 ISCHEMIC CARDIOMYOPATHY: ICD-10-CM

## 2024-07-25 DIAGNOSIS — I25.10 CORONARY ARTERY DISEASE INVOLVING NATIVE CORONARY ARTERY OF NATIVE HEART WITHOUT ANGINA PECTORIS: ICD-10-CM

## 2024-07-25 DIAGNOSIS — E55.9 VITAMIN D DEFICIENCY: ICD-10-CM

## 2024-07-25 DIAGNOSIS — Z72.0 CURRENT TOBACCO USE: ICD-10-CM

## 2024-07-25 DIAGNOSIS — E78.5 HYPERLIPIDEMIA, UNSPECIFIED HYPERLIPIDEMIA TYPE: ICD-10-CM

## 2024-07-25 RX ORDER — INSULIN LISPRO 100 [IU]/ML
INJECTION, SOLUTION INTRAVENOUS; SUBCUTANEOUS
Start: 2024-07-25

## 2024-07-25 RX ORDER — OMEPRAZOLE 40 MG/1
1 CAPSULE, DELAYED RELEASE ORAL DAILY
COMMUNITY
Start: 2024-06-17

## 2024-07-25 NOTE — PROGRESS NOTES
Yenny Clement is a 54 y.o. female.     History of Present Illness       She was diagnosed to have diabetes mellitus in 2015 and started on insulin in 2018.  She is on Tresiba 40 units every PM and Humalog 8-10 units with each meal.  She was started on Jardiance 10 mg/day by cardiologist for cardiomyopathy.  Lantus stings. She is using freestyle shadi 3 sensor.  She has gained 4 pounds since October 2023.  Her last meal was last night.     Metformin causes diarrhea.  Ozempic causes abdominal pain.  Bydureon because irritation at injection site.  Xigduo caused diarrhea.     Sensor data from July 12, 2024 to July 25, 2024 reviewed.  Average glucose 204 mg per DL.  GMI 8.2%.  Time in target 37%.  Time above target 63%.  Time below target 0    Her last eye examination was in August 2021.  She denies retinopathy.  Urine microalbumin was elevated in October 2020.  She is no longer having tingling in her hands and feet.  She was taken off Cymbalta.     She has hyperlipidemia.  She is waiting for insurance approval for Repatha.      She had leg pain with pravastatin.  She had leg pain while on Zetia.  She was on atorvastatin 80 mg and fenofibrate which were discontinued in September 2016 because she was having restless legs at that time.  Restless legs did not improve after the discontinuation of the medication.  She has used Livalo 4 mg in the past but does not remember why she stopped taking it.    She has hypertension, coronary artery disease and ischemic cardiomyopathy.  She had a heart attack in December 2023 and had stenting of the left main and extending into the LAD, mid LAD on OM1 branch on January 3, 2024.  On January 8, 2024 she had angioplasty with stent to the proximal RCA.  She had AICD placement in June 2024.    She is on furosemide 40 mg every morning, nitroglycerin as needed, Plavix 75 mg/day, and aspirin 81 mg/day.  She follows with Dr. Greene.     She had an EGD done in July 2020 and was found to  "have gastric ulcers and hiatal hernia.  She is on Prilosec 20 mg/day.     She was seen by Dr. Nguyen and had an EGD in 2021 which was normal.  MRI of the abdomen done in May 2021 showed mild hepatic steatosis, a 2.4 cm lipid rich right adrenal nodule and subtle area of hyperenhancement within the posterior hepatic dome.  Celiac antibody was normal.     She is .  She has been amenorrheic since age 44.  She is not on HRT.  She denies hot flashes. She has never had a BMD.  She has no parental history of hip fractures.     She has history of vitamin D deficiency and is on ergocalciferol 50,000 units/week.        She smokes 1/2 pack of cigarettes per day since age 13.  She denies chronic lung disease due to smoking.  She is on disability.       The following portions of the patient's history were reviewed and updated as appropriate: allergies, current medications, past family history, past medical history, past social history, past surgical history, and problem list.    Review of Systems   Eyes:  Negative for visual disturbance.   Respiratory:  Negative for shortness of breath and wheezing.    Gastrointestinal:  Positive for diarrhea (intermittent).   Genitourinary: Negative.    Musculoskeletal:  Negative for myalgias.   Neurological:  Negative for numbness.     Vitals:    24 0951   BP: 114/74   Pulse: 78   Temp: 96.8 °F (36 °C)   TempSrc: Temporal   SpO2: 98%   Weight: 71.4 kg (157 lb 6.4 oz)   Height: 160 cm (62.99\")      Objective   Physical Exam  Constitutional:       Appearance: Normal appearance. She is not toxic-appearing or diaphoretic.   Eyes:      General: No scleral icterus.        Right eye: No discharge.         Left eye: No discharge.   Neck:      Vascular: No carotid bruit.   Cardiovascular:      Rate and Rhythm: Normal rate and regular rhythm.      Heart sounds: No murmur heard.     No friction rub. No gallop.   Pulmonary:      Breath sounds: Normal breath sounds. No rales.   Chest:      " Chest wall: No tenderness.   Abdominal:      General: Bowel sounds are normal.      Palpations: Abdomen is soft.      Tenderness: There is no right CVA tenderness or left CVA tenderness.   Musculoskeletal:      Right lower leg: No edema.      Left lower leg: No edema.      Comments: Feet warm.  No cyanosis or pedal edema.  No plantar ulcers.   Lymphadenopathy:      Cervical: No cervical adenopathy.   Neurological:      Mental Status: She is alert and oriented to person, place, and time.      Comments: Intact light touch on lower extremities       Admission on 06/28/2024, Discharged on 06/28/2024   Component Date Value Ref Range Status    QT Interval 06/28/2024 407  ms Final    QTC Interval 06/28/2024 464  ms Final    Glucose 06/28/2024 101  70 - 130 mg/dL Final    Glucose 06/28/2024 90  70 - 130 mg/dL Final     Assessment & Plan   Diagnoses and all orders for this visit:    1. Type 2 diabetes mellitus with microalbuminuria, with long-term current use of insulin (Primary)  -     Microalbumin / Creatinine Urine Ratio - Urine, Clean Catch  -     Comprehensive Metabolic Panel  -     Hemoglobin A1c  -     C-Peptide  -     ZNT8 Antibodies  -     IA-2 Autoantibodies    2. Hyperlipidemia, unspecified hyperlipidemia type  -     Comprehensive Metabolic Panel  -     Lipid Panel    3. Statin intolerance    4. Essential hypertension    5. Coronary artery disease involving native coronary artery of native heart without angina pectoris    6. Ischemic cardiomyopathy    7. Vitamin D deficiency  -     Vitamin D,25-Hydroxy    8. Current tobacco use    Other orders  -     Insulin Lispro, 1 Unit Dial, (HumaLOG KwikPen) 100 UNIT/ML solution pen-injector; 8 units at breakfast, 12 units at lunch, and 12 units at supper.  Prime needle with 2 units before each use.      Continue Tresiba 40 units every evening.  Increase Humalog to 8 units at breakfast 12 units at lunch, and 12 units at supper.  Continue Jardiance 10 mg/day per  cardiologist.    Agree with Repatha.    Will defer treatment of ischemic cardiomyopathy to Dr. Greene.    Continue Prilosec.    Continue ergocalciferol 50,000 units weekly.  Suggest baseline bone density through PCP.    Advised to discontinue smoking.    Copy of my note sent to Kymberly Morales NP, and Dr. Greene.    RTC 3 mos with ANALY Vela NP

## 2024-07-30 ENCOUNTER — TELEPHONE (OUTPATIENT)
Dept: CARDIOLOGY | Facility: CLINIC | Age: 54
End: 2024-07-30
Payer: MEDICAID

## 2024-08-05 ENCOUNTER — CLINICAL SUPPORT NO REQUIREMENTS (OUTPATIENT)
Age: 54
End: 2024-08-05
Payer: MEDICAID

## 2024-08-05 ENCOUNTER — OFFICE VISIT (OUTPATIENT)
Age: 54
End: 2024-08-05
Payer: MEDICAID

## 2024-08-05 VITALS
BODY MASS INDEX: 27.82 KG/M2 | SYSTOLIC BLOOD PRESSURE: 122 MMHG | HEART RATE: 90 BPM | WEIGHT: 157 LBS | HEIGHT: 63 IN | DIASTOLIC BLOOD PRESSURE: 80 MMHG

## 2024-08-05 DIAGNOSIS — I25.5 ISCHEMIC CARDIOMYOPATHY: ICD-10-CM

## 2024-08-05 DIAGNOSIS — Z95.810 PRESENCE OF AUTOMATIC (IMPLANTABLE) CARDIAC DEFIBRILLATOR: ICD-10-CM

## 2024-08-05 DIAGNOSIS — I50.22 CHRONIC SYSTOLIC CONGESTIVE HEART FAILURE: ICD-10-CM

## 2024-08-05 DIAGNOSIS — I25.5 ISCHEMIC CARDIOMYOPATHY: Primary | ICD-10-CM

## 2024-08-05 DIAGNOSIS — I25.10 CORONARY ARTERY DISEASE INVOLVING NATIVE CORONARY ARTERY OF NATIVE HEART WITHOUT ANGINA PECTORIS: ICD-10-CM

## 2024-08-05 DIAGNOSIS — Z95.810 PRESENCE OF BIVENTRICULAR IMPLANTABLE CARDIOVERTER-DEFIBRILLATOR (ICD): Primary | ICD-10-CM

## 2024-08-05 PROCEDURE — 93000 ELECTROCARDIOGRAM COMPLETE: CPT | Performed by: PHYSICIAN ASSISTANT

## 2024-08-05 PROCEDURE — 99214 OFFICE O/P EST MOD 30 MIN: CPT | Performed by: PHYSICIAN ASSISTANT

## 2024-08-05 PROCEDURE — 1159F MED LIST DOCD IN RCRD: CPT | Performed by: PHYSICIAN ASSISTANT

## 2024-08-05 PROCEDURE — 1160F RVW MEDS BY RX/DR IN RCRD: CPT | Performed by: PHYSICIAN ASSISTANT

## 2024-08-05 PROCEDURE — 3074F SYST BP LT 130 MM HG: CPT | Performed by: PHYSICIAN ASSISTANT

## 2024-08-05 PROCEDURE — 3079F DIAST BP 80-89 MM HG: CPT | Performed by: PHYSICIAN ASSISTANT

## 2024-08-05 RX ORDER — RANOLAZINE 500 MG/1
500 TABLET, EXTENDED RELEASE ORAL 2 TIMES DAILY
Qty: 60 TABLET | Refills: 1 | Status: SHIPPED | OUTPATIENT
Start: 2024-08-05

## 2024-08-05 NOTE — PROGRESS NOTES
"      ELECTROPHYSIOLOGY   Date of Office Visit: 2024  Patient Name: Jade Clement  : 1970  Encounter Provider: Martin Merino PA-C  Primary Cardiologist: Lilli Greene MD  Electrophysiologist: Dr. Reynoso  CHIEF COMPLAINT / REASON FOR OFFICE VISIT     Cardiomyopathy and Follow-up (1 month)  Post ICD     HISTORY OF PRESENT ILLNESS     This is a 54 y.o. year old female who presents to [facility] for a for a procedure follow up.     Patient has a history of smoking, multivessel CAD, hypertension, hyperlipidemia, ischemic cardiomyopathy,     In 2023, she was evaluated for CABG to treat multivessel coronary artery disease.  It was determined she was too high risk and subsequently underwent a PCI (1/3/2024) of her left main and obtuse marginal branches.  2024 she sagged intervention with PCI with MERRITT x 2 of the proximal mid RCA.  Prior to discharge she was unable to be started on guideline directed therapy for her ischemic cardiomyopathy d/t hypotension.    She returned to the hospital 2024 c/o shortness of breath, orthopnea, and chest pain.  There was evidence of volume overload and she was subsequently diuresed.    Limited echo in 2024 showed continued severely depressed LV function with an EF of 21%, global hypokinesis, grade 1 diastolic dysfunction and no significant valvular disease.     Dr. Lowery placed a ICD for primary prevention 2024.  She presents today for a one month follow up appointment.    Normal device testing and function today.  RVp 1%. No events.  Patient described a sensation in her left shoulder as \"prickly.\"  Thresholds were adjusted today.    Today the patient reports that she is having some of the same symptoms that she had prior to her previous coronary event.  These include thinning hair, stomach pains, bloating, fatigue, and MICHELLE.  She expressed concern that she could experience another coronary event soon.  She also " "described some skin sensitivities to sensation.  No overt chest pain.     Patient also expressed concern that she feels a \"thumping\" sensation in her chest since her ICD was implanted.  She stated that it does not bother her and that she doesn't experience associated pain.  Testing did not repeat these symptoms.     Patient stated that she does experiencing some minor swelling at times.  She takes an additional dose of furosemide on days when she notices it and it resolves.  Patient appeared euvolemic at today's visit.    Patient is taking aspiring 81 mg and Plavix as she is s/p PCI.  She denies any bleeding.    PMHx:  Diabetes type 2, hypertension, CAD s/p PCI 12/2024, Ischemic cardiomyopathy, hypelipidemia, HFrEF    PHYSICAL EXAMINATION     Vital Signs:  /80   Pulse 90   Ht 160 cm (63\")   Wt 71.2 kg (157 lb)   BMI 27.81 kg/m²   Estimated body mass index is 27.81 kg/m² as calculated from the following:    Height as of this encounter: 160 cm (63\").    Weight as of this encounter: 71.2 kg (157 lb).             Physical Exam  Constitutional:       Appearance: Normal appearance.   Cardiovascular:      Rate and Rhythm: Normal rate and regular rhythm.      Pulses: Normal pulses.      Heart sounds: Normal heart sounds, S1 normal and S2 normal.   Pulmonary:      Effort: Pulmonary effort is normal.      Breath sounds: Normal breath sounds.   Musculoskeletal:      Right lower leg: No edema.      Left lower leg: No edema.   Neurological:      Mental Status: She is alert and oriented to person, place, and time.   Psychiatric:         Mood and Affect: Mood normal.         Behavior: Behavior normal.          Cardiac Testing/Results     Cardiac Testing:   - Limited Echo 4/1/2024    Left ventricular systolic function is severely decreased. Calculated left ventricular EF = 20.5%.  Global hypokinesis is present.    The left ventricular cavity is mildly dilated.    Left ventricular diastolic function is consistent with " (grade Ia w/high LAP) impaired relaxation.    Abnormal global longitudinal LV strain (GLS) = -8.1%    Result Review :      Lipid Panel          12/27/2023    09:22 1/4/2024    05:08 1/9/2024    03:10   Lipid Panel   Total Cholesterol 199  136  109    Triglycerides 207  107  93    HDL Cholesterol 31  36  30    VLDL Cholesterol 37  20  18    LDL Cholesterol  131  80  61    LDL/HDL Ratio 4.08  2.18  2.01       Lab Results   Component Value Date     06/20/2024     (L) 01/23/2024    K 4.1 06/20/2024    K 4.1 01/23/2024    CL 98 06/20/2024    CL 98 01/23/2024    CO2 25.0 06/20/2024    CO2 22.0 01/23/2024    BUN 27 (H) 06/20/2024    BUN 23 (H) 01/23/2024    CREATININE 1.08 (H) 06/20/2024    CREATININE 0.90 01/23/2024    EGFRIFNONA 69 02/25/2022    EGFRIFNONA 65 04/09/2021    EGFRIFAFRI 83 02/25/2022    EGFRIFAFRI 78 04/09/2021    GLUCOSE 219 (H) 06/20/2024    GLUCOSE 343 (H) 01/23/2024    CALCIUM 9.7 06/20/2024    CALCIUM 9.8 01/23/2024    ALBUMIN 4.5 01/23/2024    ALBUMIN 3.8 01/14/2024    AST 26 01/23/2024    AST 14 01/14/2024    ALT 27 01/23/2024    ALT 16 01/14/2024     Lab Results   Component Value Date    WBC 11.46 (H) 06/20/2024    WBC 13.28 (H) 01/23/2024    HGB 15.2 06/20/2024    HGB 13.2 01/23/2024    HCT 44.9 06/20/2024    HCT 40.2 01/23/2024    MCV 93.0 06/20/2024    MCV 96.2 01/23/2024     06/20/2024     01/23/2024     Lab Results   Component Value Date    PROBNP 8,025.0 (H) 12/29/2023     Lab Results   Component Value Date    TROPONINT 25 (H) 01/23/2024     Lab Results   Component Value Date    TSH 1.160 12/27/2023    TSH 1.610 07/24/2023                 ECG 12 Lead    Date/Time: 8/5/2024 2:38 PM  Performed by: Martin Ramos PA-C    Authorized by: Martin Ramos PA-C  Comparison: compared with previous ECG from 7/10/2024  Rhythm: sinus rhythm  Rate: normal  BPM: 90  Q waves: III and aVF    QRS axis: left  Comments: No acute T wave changes, qtc 469                 ASSESSMENT & PLAN       Diagnoses and all orders for this visit:    1. Presence of automatic (implantable) cardiac defibrillator  Normal device testing and function.  RVp 1%. No events.  Threshold adjusted  ICD site is healing well, it is not bruised    2. Ischemic cardiomyopathy  Most recent echo showed EF 20.5%, global hypokinesis and normal valvular function  She is euvolemic on exam today, she takes lasix and SGLT2  She was unable to tolerate other meds due to hypotension   Begin ranolazine (Ranexa) 500 MG 12 hr tablet; Take 1 tablet by mouth 2 (Two) Times a Day.  Dispense: 60 tablet; Refill: 1    3. Chronic systolic congestive heart failure  Continue empagliflozin  Continue furosemide, appeared euvolemic today    4. Coronary artery disease involving native coronary artery of native heart without angina pectoris, s/p PCI 1/3/2024  Stenting of the LDA, RCA, and OM with 50% residual stenosis of the PDA, symptoms are similar to her prior anginal symptoms  Beginning a trial of ranolazine today, patient states that she is intolerant of statins. She is going to start PCSK9 this week.    Counseled patient that we will call her in a couple weeks to see how she is doing.  I will forward note to Dr. Greene for her to be in the loop         Follow Up:  Return in about 3 months (around 11/5/2024) for Dr. Reynoso- Routine.  Patient was given instructions and counseling regarding her condition or for health maintenance advice. Please contact office if worsening symptoms or proceed to ER when appropriate.      Martin Merino PA-C  08/05/24  14:04 EDT    MEDICATIONS         Discharge Medications            Accurate as of August 5, 2024  2:04 PM. If you have any questions, ask your nurse or doctor.                New Medications        Instructions Start Date   ranolazine 500 MG 12 hr tablet  Commonly known as: Ranexa  Started by: Martin Merino   500 mg, Oral, 2 Times Daily             Continue These Medications         Instructions Start Date   aspirin 81 MG EC tablet   81 mg, Oral, Daily      aspirin-acetaminophen-caffeine 250-250-65 MG per tablet  Commonly known as: EXCEDRIN MIGRAINE   1 tablet, Oral, Every 8 Hours PRN      BD Pen Needle Ashwini U/F 32G X 4 MM misc  Generic drug: Insulin Pen Needle   Use to inject insulin 4 times daily      cetirizine 10 MG tablet  Commonly known as: zyrTEC   1 tablet, Oral, Daily      clopidogrel 75 MG tablet  Commonly known as: PLAVIX   75 mg, Oral, Daily      empagliflozin 10 MG tablet tablet  Commonly known as: Jardiance   10 mg, Oral, Daily      ergocalciferol 1.25 MG (12997 UT) capsule  Commonly known as: ERGOCALCIFEROL   50,000 Units, Oral, Weekly      Evolocumab solution auto-injector SureClick injection  Commonly known as: REPATHA   140 mg, Subcutaneous, Every 14 Days      FreeStyle Balwinder 3 Sensor misc   1 each, Does not apply, Every 14 Days      furosemide 40 MG tablet  Commonly known as: LASIX   40 mg, Oral, Daily      Insulin Lispro (1 Unit Dial) 100 UNIT/ML solution pen-injector  Commonly known as: HumaLOG KwikPen   8 units at breakfast, 12 units at lunch, and 12 units at supper.  Prime needle with 2 units before each use.      Naproxen Sodium 220 MG capsule   220 mg, Oral, Daily PRN      nitroglycerin 0.4 MG SL tablet  Commonly known as: NITROSTAT   0.4 mg, Sublingual, Every 5 Minutes PRN, Take no more than 3 doses in 15 minutes.      omeprazole 40 MG capsule  Commonly known as: priLOSEC   1 capsule, Oral, Daily      ondansetron ODT 4 MG disintegrating tablet  Commonly known as: ZOFRAN-ODT   4 mg, Translingual, 4 Times Daily PRN      sertraline 50 MG tablet  Commonly known as: ZOLOFT   50 mg, Oral, Daily      traZODone 100 MG tablet  Commonly known as: DESYREL   250 mg, Oral, Nightly      Tresiba FlexTouch 200 UNIT/ML solution pen-injector pen injection  Generic drug: Insulin Degludec   36 units every evening.  Prime needle with 4 units before each use.                    **Jose Rafael Disclaimer: This note was dictated using an electronic transcription. The electronic translation of spoken language may permit erroneous, or at times, nonsensical words or phrases to be inadvertently transcribed. Although I have reviewed the note for such errors, some may still exist.

## 2024-08-05 NOTE — Clinical Note
Just ALCIDES, ICD looks great, no issues. Today she said she is starting to get her bloating, stomach pain, and fatigue back that she had had prior to her stents earlier this year. I gave her a trial of ranexa, did not do imdur due to hx of hypotension.   I am going to call her in 2-3 weeks to check on symptoms but just wanted to keep you in loop.

## 2024-08-09 LAB
25(OH)D3+25(OH)D2 SERPL-MCNC: 45.1 NG/ML (ref 30–100)
ALBUMIN SERPL-MCNC: 4.5 G/DL (ref 3.8–4.9)
ALBUMIN/CREAT UR: 17 MG/G CREAT (ref 0–29)
ALP SERPL-CCNC: 106 IU/L (ref 44–121)
ALT SERPL-CCNC: 11 IU/L (ref 0–32)
AST SERPL-CCNC: 16 IU/L (ref 0–40)
BILIRUB SERPL-MCNC: 0.2 MG/DL (ref 0–1.2)
BUN SERPL-MCNC: 18 MG/DL (ref 6–24)
BUN/CREAT SERPL: 23 (ref 9–23)
C PEPTIDE SERPL-MCNC: 2.8 NG/ML (ref 1.1–4.4)
CALCIUM SERPL-MCNC: 9.7 MG/DL (ref 8.7–10.2)
CHLORIDE SERPL-SCNC: 104 MMOL/L (ref 96–106)
CHOLEST SERPL-MCNC: 317 MG/DL (ref 100–199)
CO2 SERPL-SCNC: 19 MMOL/L (ref 20–29)
CREAT SERPL-MCNC: 0.8 MG/DL (ref 0.57–1)
CREAT UR-MCNC: 56.3 MG/DL
EGFRCR SERPLBLD CKD-EPI 2021: 86 ML/MIN/1.73
GLOBULIN SER CALC-MCNC: 3.1 G/DL (ref 1.5–4.5)
GLUCOSE SERPL-MCNC: 162 MG/DL (ref 70–99)
HBA1C MFR BLD: 8.8 % (ref 4.8–5.6)
HDLC SERPL-MCNC: 41 MG/DL
IMP & REVIEW OF LAB RESULTS: NORMAL
ISLET CELL512 AB SER-ACNC: <7.5 U/ML
LDL CALC COMMENT:: ABNORMAL
LDLC SERPL CALC-MCNC: 222 MG/DL (ref 0–99)
MICROALBUMIN UR-MCNC: 9.5 UG/ML
POTASSIUM SERPL-SCNC: 4.1 MMOL/L (ref 3.5–5.2)
PROT SERPL-MCNC: 7.6 G/DL (ref 6–8.5)
SODIUM SERPL-SCNC: 139 MMOL/L (ref 134–144)
TRIGL SERPL-MCNC: 265 MG/DL (ref 0–149)
VLDLC SERPL CALC-MCNC: 54 MG/DL (ref 5–40)
ZNT8 AB SERPL IA-ACNC: <15 U/ML

## 2024-09-09 ENCOUNTER — TREATMENT (OUTPATIENT)
Dept: ENDOCRINOLOGY | Age: 54
End: 2024-09-09
Payer: MEDICAID

## 2024-09-09 DIAGNOSIS — R80.9 TYPE 2 DIABETES MELLITUS WITH MICROALBUMINURIA, WITH LONG-TERM CURRENT USE OF INSULIN: Primary | ICD-10-CM

## 2024-09-09 DIAGNOSIS — Z79.4 TYPE 2 DIABETES MELLITUS WITH MICROALBUMINURIA, WITH LONG-TERM CURRENT USE OF INSULIN: Primary | ICD-10-CM

## 2024-09-09 DIAGNOSIS — E11.29 TYPE 2 DIABETES MELLITUS WITH MICROALBUMINURIA, WITH LONG-TERM CURRENT USE OF INSULIN: Primary | ICD-10-CM

## 2024-09-09 PROCEDURE — 95251 CONT GLUC MNTR ANALYSIS I&R: CPT | Performed by: INTERNAL MEDICINE

## 2024-09-09 NOTE — PROGRESS NOTES
Subjective   Jadelisette Clement is a 54 y.o. female.          9/9 downloaded pts shadi 3 to be uploaded and read by dr Cheney

## 2024-09-09 NOTE — Clinical Note
Freestyle shadi sensor data from August 27, 2024 to September 9, 2024 reviewed. Average glucose 171 mg per DL. GMI 7.4%. Time in range 60%. Time above range 40%. Time below range 0.  Fasting glucose is 69 or higher. She has no early morning hypoglycemia. She has postprandial hyperglycemia mostly after lunch and supper. Decrease Tresiba to 38 units every evening. Continue Humalog 8 units at breakfast. Increase Humalog to 14 units at lunch and 14 units at supper. Please notify patient results and instructions.

## 2024-09-19 ENCOUNTER — OFFICE VISIT (OUTPATIENT)
Age: 54
End: 2024-09-19
Payer: MEDICAID

## 2024-09-19 VITALS
BODY MASS INDEX: 28.21 KG/M2 | DIASTOLIC BLOOD PRESSURE: 70 MMHG | HEIGHT: 63 IN | OXYGEN SATURATION: 97 % | SYSTOLIC BLOOD PRESSURE: 130 MMHG | WEIGHT: 159.2 LBS

## 2024-09-19 DIAGNOSIS — E11.29 TYPE 2 DIABETES MELLITUS WITH MICROALBUMINURIA, WITH LONG-TERM CURRENT USE OF INSULIN: ICD-10-CM

## 2024-09-19 DIAGNOSIS — I25.5 ISCHEMIC CARDIOMYOPATHY: ICD-10-CM

## 2024-09-19 DIAGNOSIS — Z95.810 PRESENCE OF AUTOMATIC (IMPLANTABLE) CARDIAC DEFIBRILLATOR: ICD-10-CM

## 2024-09-19 DIAGNOSIS — Z95.5 STATUS POST CORONARY ARTERY STENT PLACEMENT: ICD-10-CM

## 2024-09-19 DIAGNOSIS — I50.22 CHRONIC SYSTOLIC CONGESTIVE HEART FAILURE: ICD-10-CM

## 2024-09-19 DIAGNOSIS — I10 PRIMARY HYPERTENSION: ICD-10-CM

## 2024-09-19 DIAGNOSIS — R80.9 TYPE 2 DIABETES MELLITUS WITH MICROALBUMINURIA, WITH LONG-TERM CURRENT USE OF INSULIN: ICD-10-CM

## 2024-09-19 DIAGNOSIS — Z79.4 TYPE 2 DIABETES MELLITUS WITH MICROALBUMINURIA, WITH LONG-TERM CURRENT USE OF INSULIN: ICD-10-CM

## 2024-09-19 DIAGNOSIS — E78.5 HYPERLIPIDEMIA, UNSPECIFIED HYPERLIPIDEMIA TYPE: ICD-10-CM

## 2024-09-19 DIAGNOSIS — I25.10 CORONARY ARTERY DISEASE INVOLVING NATIVE CORONARY ARTERY OF NATIVE HEART WITHOUT ANGINA PECTORIS: Primary | ICD-10-CM

## 2024-09-19 RX ORDER — FAMOTIDINE 20 MG/1
1 TABLET, FILM COATED ORAL EVERY 12 HOURS SCHEDULED
COMMUNITY
Start: 2024-08-19

## 2024-09-19 RX ORDER — CHOLECALCIFEROL (VITAMIN D3) 50 MCG
1 TABLET ORAL DAILY
COMMUNITY
Start: 2024-08-19

## 2024-09-19 RX ORDER — SUCRALFATE 1 G/1
1 TABLET ORAL 4 TIMES DAILY
COMMUNITY

## 2024-10-16 ENCOUNTER — TELEPHONE (OUTPATIENT)
Dept: ENDOCRINOLOGY | Age: 54
End: 2024-10-16

## 2024-10-16 DIAGNOSIS — R80.9 TYPE 2 DIABETES MELLITUS WITH MICROALBUMINURIA, WITH LONG-TERM CURRENT USE OF INSULIN: ICD-10-CM

## 2024-10-16 DIAGNOSIS — Z79.4 TYPE 2 DIABETES MELLITUS WITH MICROALBUMINURIA, WITH LONG-TERM CURRENT USE OF INSULIN: ICD-10-CM

## 2024-10-16 DIAGNOSIS — E11.29 TYPE 2 DIABETES MELLITUS WITH MICROALBUMINURIA, WITH LONG-TERM CURRENT USE OF INSULIN: ICD-10-CM

## 2024-10-16 RX ORDER — INSULIN ASPART 100 [IU]/ML
8 INJECTION, SOLUTION INTRAVENOUS; SUBCUTANEOUS
Qty: 15 ML | Refills: 0 | Status: SHIPPED | OUTPATIENT
Start: 2024-10-16

## 2024-10-16 RX ORDER — BLOOD-GLUCOSE SENSOR
EACH MISCELLANEOUS
Qty: 3 EACH | Refills: 11 | Status: SHIPPED | OUTPATIENT
Start: 2024-10-16

## 2024-10-16 NOTE — TELEPHONE ENCOUNTER
[facility] CALLED AND STATED THAT THEY CAN NO LONGER GET THE FREESTYLE SMOOTH 3, IT'S NOW THE FREESTYLE SMOOTH 3 PLUS. SO THEY ARE IN NEED OF A NEW RX PLEASE.    PLUS, THIS PT'S INSURANCE DOSE NOT COVER LISPRO ANY LONGER SO THEY WILL NEED A NEW RX FOR A DIFFERENT INSULIN.              GOING TO:    [facility] [place] [id number] [address]  - [phone]  - [phone] FX

## 2024-10-17 ENCOUNTER — PRIOR AUTHORIZATION (OUTPATIENT)
Dept: ENDOCRINOLOGY | Age: 54
End: 2024-10-17
Payer: MEDICAID

## 2024-12-05 ENCOUNTER — OFFICE VISIT (OUTPATIENT)
Dept: GASTROENTEROLOGY | Facility: CLINIC | Age: 54
End: 2024-12-05
Payer: COMMERCIAL

## 2024-12-05 ENCOUNTER — CLINICAL SUPPORT NO REQUIREMENTS (OUTPATIENT)
Age: 54
End: 2024-12-05
Payer: COMMERCIAL

## 2024-12-05 ENCOUNTER — OFFICE VISIT (OUTPATIENT)
Age: 54
End: 2024-12-05
Payer: COMMERCIAL

## 2024-12-05 ENCOUNTER — TELEPHONE (OUTPATIENT)
Dept: GASTROENTEROLOGY | Facility: CLINIC | Age: 54
End: 2024-12-05
Payer: COMMERCIAL

## 2024-12-05 VITALS
BODY MASS INDEX: 25.78 KG/M2 | SYSTOLIC BLOOD PRESSURE: 123 MMHG | DIASTOLIC BLOOD PRESSURE: 77 MMHG | TEMPERATURE: 97.4 F | HEIGHT: 64 IN | WEIGHT: 151 LBS | HEART RATE: 86 BPM

## 2024-12-05 VITALS
WEIGHT: 151 LBS | BODY MASS INDEX: 25.78 KG/M2 | HEART RATE: 80 BPM | HEIGHT: 64 IN | SYSTOLIC BLOOD PRESSURE: 128 MMHG | DIASTOLIC BLOOD PRESSURE: 82 MMHG

## 2024-12-05 DIAGNOSIS — R11.0 NAUSEA: ICD-10-CM

## 2024-12-05 DIAGNOSIS — I25.5 ISCHEMIC CARDIOMYOPATHY: ICD-10-CM

## 2024-12-05 DIAGNOSIS — Z95.810 PRESENCE OF AUTOMATIC (IMPLANTABLE) CARDIAC DEFIBRILLATOR: Primary | ICD-10-CM

## 2024-12-05 DIAGNOSIS — R10.13 EPIGASTRIC PAIN: Primary | ICD-10-CM

## 2024-12-05 DIAGNOSIS — I42.0 CARDIOMYOPATHY, DILATED: Primary | ICD-10-CM

## 2024-12-05 DIAGNOSIS — R93.5 ABNORMAL CT OF THE ABDOMEN: ICD-10-CM

## 2024-12-05 PROCEDURE — 99214 OFFICE O/P EST MOD 30 MIN: CPT | Performed by: NURSE PRACTITIONER

## 2024-12-05 RX ORDER — PANTOPRAZOLE SODIUM 40 MG/1
40 TABLET, DELAYED RELEASE ORAL 2 TIMES DAILY
Qty: 180 TABLET | Refills: 3 | Status: SHIPPED | OUTPATIENT
Start: 2024-12-05

## 2024-12-05 NOTE — H&P (VIEW-ONLY)
Chief Complaint   Patient presents with    Abdominal Pain       HPI    Jade Clement is a  54 y.o. female here for a overdue follow up visit for abdominal pain.    This patient follows with Dr. Nguyen, known to me.    Past medical history of anxiety, CAD on Plavix, AICD/pacemaker 2023, diabetes, hyperlipidemia, hypertension, kidney stones, MI along with vitamin D deficiency.    Patient presents today complaining of several months of epigastric pain that comes and goes described as a burning aching sensation associated with nausea.  No vomiting.  No dysphagia or odynophagia.  Her appetite is fair.  Her weight has been stable.  BMI 25.92.    She was seen in the emergency room in an outside hospital in August with incidental finding of mild gastric wall thickening on CT A/P.  She is currently on PPI therapy in the morning, Pepcid in the evening and Carafate 1-2 times a day.    No lower GI complaints.    Last colonoscopy was normal in 2019.  Recall 10 years.  No family history of colon cancer.    Past Medical History:   Diagnosis Date    Adrenal adenoma     Anxiety     Coronary artery disease involving native coronary artery of native heart without angina pectoris 02/01/2024    Diabetes mellitus     Diarrhea     GERD (gastroesophageal reflux disease)     Gestational diabetes 2000    Head ache     Hyperlipidemia     Hypertension     Kidney stone 1985    MI (myocardial infarction)     Nausea     Type 2 diabetes mellitus     Urinary tract infection 1986    Frequently    Varicella Unknown    Vitamin D deficiency        Past Surgical History:   Procedure Laterality Date    CARDIAC CATHETERIZATION N/A 12/28/2023    Procedure: Left Heart Cath;  Surgeon: Lilli Greene MD;  Location:  [facility] C[facility] INVASIVE [facility];  Service: Cardiovascular;  Laterality: N/A;    CARDIAC CATHETERIZATION N/A 12/28/2023    Procedure: Coronary angiography;  Surgeon: Lilli Greene MD;  Location:  [facility];  Service:  Cardiovascular;  Laterality: N/A;    CARDIAC CATHETERIZATION N/A 2024    Procedure: Percutaneous Coronary Intervention;  Surgeon: Lilli Greene MD;  Location:  [facility];  Service: Cardiology;  Laterality: N/A;    CARDIAC CATHETERIZATION N/A 2024    Procedure: Stent MERRITT coronary;  Surgeon: Lilli Greene MD;  Location:  [facility];  Service: Cardiology;  Laterality: N/A;    CARDIAC CATHETERIZATION N/A 2024    Procedure: Percutaneous Coronary Intervention;  Surgeon: Lilli Greene MD;  Location:  [facility];  Service: Cardiology;  Laterality: N/A;    CARDIAC CATHETERIZATION N/A 2024    Procedure: Stent MERRITT coronary;  Surgeon: Lilli Greene MD;  Location:  [facility];  Service: Cardiology;  Laterality: N/A;    CARDIAC CATHETERIZATION N/A 2024    Procedure: Optical Coherence Tomography;  Surgeon: Lilli Greene MD;  [facility];  Service: Cardiology;  Laterality: N/A;    CARDIAC ELECTROPHYSIOLOGY PROCEDURE  2024    Procedure: Impella Insertion;  Surgeon: Lilli Greene MD;  Location:  [facility]ATH INVASIVE LOCATION;  Service: Cardiology;;    CARDIAC ELECTROPHYSIOLOGY PROCEDURE N/A 2024    Procedure: ICD SC new  [place];  Surgeon: Otilio Reynoso MD;  Location:  [facility]ATH INVASIVE [facility];  Service: Cardiovascular;  Laterality: N/A;     SECTION       SECTION WITH TUBAL  2001    CHOLECYSTECTOMY      COLONOSCOPY  05/10/2019    Grzegorz ABEL    CORONARY ANGIOPLASTY      CYSTOSCOPY      ENDOSCOPY  2020    ENDOSCOPY N/A 2021    Procedure: ESOPHAGOGASTRODUODENOSCOPY WITH COLD BIOPSIES;  Surgeon: Jasson Nguyen MD;  Location: [facility] ENDOSCOPY;  Service: Gastroenterology;  Laterality: N/A;  PRE- ABD PAIN  POST- NORMAL    INTERVENTIONAL RADIOLOGY PROCEDURE N/A 2024    Procedure: Intravascular Ultrasound;  Surgeon: Lilli Greene MD;   Location: [facility] INVASIVE LOCATION;  Service: Cardiology;  Laterality: N/A;    LAPAROSCOPIC CHOLECYSTECTOMY  2016    TUBAL ABDOMINAL LIGATION  01/29/2001    UPPER GASTROINTESTINAL ENDOSCOPY  06/25/2020    Gzregorz ABEL gastritis, ulcers    UPPER GASTROINTESTINAL ENDOSCOPY  12/04/2020    Andrew ABEL       Scheduled Meds:     Continuous Infusions: No current facility-administered medications for this visit.      PRN Meds:     Allergies   Allergen Reactions    Metformin Diarrhea    Ozempic [Semaglutide] GI Intolerance     Pt stopped due to stomach pain    Beta Adrenergic Blockers Diarrhea    Bydureon [Exenatide] Other (See Comments)     Site reaction    Statins Myalgia     Muscle aches    Xigduo Xr [Dapagliflozin Pro-Metformin Er] Diarrhea       Social History     Socioeconomic History    Marital status: Single   Tobacco Use    Smoking status: Former     Current packs/day: 0.50     Average packs/day: 0.5 packs/day for 39.6 years (19.8 ttl pk-yrs)     Types: Cigarettes     Start date: 1/10/1985     Quit date: 12/18/2023    Smokeless tobacco: Never   Vaping Use    Vaping status: Never Used   Substance and Sexual Activity    Alcohol use: Not Currently     Comment: Only drink about 2 times a year    Drug use: Yes     Types: Marijuana     Comment: daily    Sexual activity: Not Currently     Partners: Male     Birth control/protection: Post-menopausal, Tubal ligation, Surgical       Family History   Problem Relation Age of Onset    Thyroid disease Mother         goiter    Hyperlipidemia Mother     ALS Mother     Heart disease Father     Stroke Father     Hypertension Father     Drug abuse Brother     Diabetes Paternal Grandfather     Diabetes Paternal Aunt     Diabetes Paternal Uncle     Malig Hyperthermia Neg Hx        Review of Systems   Gastrointestinal:  Positive for abdominal pain and nausea.       Vitals:    12/05/24 1100   BP: 123/77   Pulse: 86   Temp: 97.4 °F (36.3 °C)       Physical Exam  Constitutional:        Appearance: She is well-developed.   Abdominal:      General: Bowel sounds are normal. There is no distension.      Palpations: Abdomen is soft. There is no mass.      Tenderness: There is abdominal tenderness. There is no guarding.      Hernia: No hernia is present.   Skin:     General: Skin is warm and dry.      Capillary Refill: Capillary refill takes less than 2 seconds.   Neurological:      Mental Status: She is alert and oriented to person, place, and time.   Psychiatric:         Behavior: Behavior normal.     Assessment    Diagnoses and all orders for this visit:    1. Epigastric pain (Primary)  -     CBC (No Diff)  -     Comprehensive Metabolic Panel  -     Case Request; Standing  -     Case Request    2. Nausea  -     Case Request; Standing  -     Case Request    3. Abnormal CT of the abdomen  -     Case Request; Standing  -     Case Request    Other orders  -     pantoprazole (PROTONIX) 40 MG EC tablet; Take 1 tablet by mouth 2 (Two) Times a Day.  Dispense: 180 tablet; Refill: 3    Plan    Increase Protonix to twice daily dosing   Schedule EGD with Dr. Nguyen  Risk-benefit of procedure reviewed the patient and questions were answered  Antireflux measures and dietary modifications reviewed. Low acid diet reviewed. Keep head of bed elevated. Stop eating/drinking at least 3 hours prior to bedtime. Eliminate caffeine and carbonated beverages.     Obtain clearance from cardiology for patient to hold Plavix prior to procedure  Further recommendations and follow-up pending procedural findings         KEVIN Braswell  [facility]  [address] [place] [address] [address] [place] [address]  Office: [phone]

## 2024-12-05 NOTE — PROGRESS NOTES
Chief Complaint   Patient presents with    Abdominal Pain       HPI    Jade Clement is a  54 y.o. female here for a overdue follow up visit for abdominal pain.    This patient follows with Dr. Nguyen, known to me.    Past medical history of anxiety, CAD on Plavix, AICD/pacemaker 2023, diabetes, hyperlipidemia, hypertension, kidney stones, MI along with vitamin D deficiency.    Patient presents today complaining of several months of epigastric pain that comes and goes described as a burning aching sensation associated with nausea.  No vomiting.  No dysphagia or odynophagia.  Her appetite is fair.  Her weight has been stable.  BMI 25.92.    She was seen in the emergency room in an outside hospital in August with incidental finding of mild gastric wall thickening on CT A/P.  She is currently on PPI therapy in the morning, Pepcid in the evening and Carafate 1-2 times a day.    No lower GI complaints.    Last colonoscopy was normal in 2019.  Recall 10 years.  No family history of colon cancer.    Past Medical History:   Diagnosis Date    Adrenal adenoma     Anxiety     Coronary artery disease involving native coronary artery of native heart without angina pectoris 02/01/2024    Diabetes mellitus     Diarrhea     GERD (gastroesophageal reflux disease)     Gestational diabetes 2000    Head ache     Hyperlipidemia     Hypertension     Kidney stone 1985    MI (myocardial infarction)     Nausea     Type 2 diabetes mellitus     Urinary tract infection 1986    Frequently    Varicella Unknown    Vitamin D deficiency        Past Surgical History:   Procedure Laterality Date    CARDIAC CATHETERIZATION N/A 12/28/2023    Procedure: Left Heart Cath;  Surgeon: Lilli Greene MD;  Location:  [facility];  Service: Cardiovascular;  Laterality: N/A;    CARDIAC CATHETERIZATION N/A 12/28/2023    Procedure: Coronary angiography;  Surgeon: Lilli Greene MD;  Location:  [facility];  Service:  Cardiovascular;  Laterality: N/A;    CARDIAC CATHETERIZATION N/A 2024    Procedure: Percutaneous Coronary Intervention;  Surgeon: Lilli Greene MD;  Location:  [facility]AS[facility];  Service: Cardiology;  Laterality: N/A;    CARDIAC CATHETERIZATION N/A 2024    Procedure: Stent MERRITT coronary;  Surgeon: Lilli Greene MD;  Location:  [facility] INV[facility];  Service: Cardiology;  Laterality: N/A;    CARDIAC CATHETERIZATION N/A 2024    Procedure: Percutaneous Coronary Intervention;  Surgeon: Lilli Greene MD;  Location:  [facility] INVASIVE [facility];  Service: Cardiology;  Laterality: N/A;    CARDIAC CATHETERIZATION N/A 2024    Procedure: Stent MERRITT coronary;  Surgeon: Lilli Greene MD;  Location:  [facility] INVAS[facility];  Service: Cardiology;  Laterality: N/A;    CARDIAC CATHETERIZATION N/A 2024    Procedure: Optical Coherence Tomography;  Surgeon: Lilli Greene MD;  Location[facility] CATH INVAS[facility];  Service: Cardiology;  Laterality: N/A;    CARDIAC ELECTROPHYSIOLOGY PROCEDURE  2024    Procedure: Impella Insertion;  Surgeon: Lilli Greene MD;  Location:  [facility] INVAS[facility];  Service: Cardiology;;    CARDIAC ELECTROPHYSIOLOGY PROCEDURE N/A 2024    Procedure: ICD SC new  [place];  Surgeon: Otilio Reynoso MD;  Location:  [facility] INV[facility];  Service: Cardiovascular;  Laterality: N/A;     SECTION       SECTION WITH TUBAL  2001    CHOLECYSTECTOMY      COLONOSCOPY  05/10/2019    Grzegorz ABEL    CORONARY ANGIOPLASTY      CYSTOSCOPY      ENDOSCOPY  2020    ENDOSCOPY N/A 2021    Procedure: ESOPHAGOGASTRODUODENOSCOPY WITH COLD BIOPSIES;  Surgeon: Jasson Nguyen MD;  Location: [facility] ENDOSCOPY;  Service: Gastroenterology;  Laterality: N/A;  PRE- ABD PAIN  POST- NORMAL    INTERVENTIONAL RADIOLOGY PROCEDURE N/A 2024    Procedure: Intravascular Ultrasound;  Surgeon: Lilli Greene MD;   Location: [facility] INVASIVE LOCATION;  Service: Cardiology;  Laterality: N/A;    LAPAROSCOPIC CHOLECYSTECTOMY  2016    TUBAL ABDOMINAL LIGATION  01/29/2001    UPPER GASTROINTESTINAL ENDOSCOPY  06/25/2020    Grzegorz ABEL gastritis, ulcers    UPPER GASTROINTESTINAL ENDOSCOPY  12/04/2020    Andrew ABEL       Scheduled Meds:     Continuous Infusions: No current facility-administered medications for this visit.      PRN Meds:     Allergies   Allergen Reactions    Metformin Diarrhea    Ozempic [Semaglutide] GI Intolerance     Pt stopped due to stomach pain    Beta Adrenergic Blockers Diarrhea    Bydureon [Exenatide] Other (See Comments)     Site reaction    Statins Myalgia     Muscle aches    Xigduo Xr [Dapagliflozin Pro-Metformin Er] Diarrhea       Social History     Socioeconomic History    Marital status: Single   Tobacco Use    Smoking status: Former     Current packs/day: 0.50     Average packs/day: 0.5 packs/day for 39.6 years (19.8 ttl pk-yrs)     Types: Cigarettes     Start date: 1/10/1985     Quit date: 12/18/2023    Smokeless tobacco: Never   Vaping Use    Vaping status: Never Used   Substance and Sexual Activity    Alcohol use: Not Currently     Comment: Only drink about 2 times a year    Drug use: Yes     Types: Marijuana     Comment: daily    Sexual activity: Not Currently     Partners: Male     Birth control/protection: Post-menopausal, Tubal ligation, Surgical       Family History   Problem Relation Age of Onset    Thyroid disease Mother         goiter    Hyperlipidemia Mother     ALS Mother     Heart disease Father     Stroke Father     Hypertension Father     Drug abuse Brother     Diabetes Paternal Grandfather     Diabetes Paternal Aunt     Diabetes Paternal Uncle     Malig Hyperthermia Neg Hx        Review of Systems   Gastrointestinal:  Positive for abdominal pain and nausea.       Vitals:    12/05/24 1100   BP: 123/77   Pulse: 86   Temp: 97.4 °F (36.3 °C)       Physical Exam  Constitutional:        Appearance: She is well-developed.   Abdominal:      General: Bowel sounds are normal. There is no distension.      Palpations: Abdomen is soft. There is no mass.      Tenderness: There is abdominal tenderness. There is no guarding.      Hernia: No hernia is present.   Skin:     General: Skin is warm and dry.      Capillary Refill: Capillary refill takes less than 2 seconds.   Neurological:      Mental Status: She is alert and oriented to person, place, and time.   Psychiatric:         Behavior: Behavior normal.     Assessment    Diagnoses and all orders for this visit:    1. Epigastric pain (Primary)  -     CBC (No Diff)  -     Comprehensive Metabolic Panel  -     Case Request; Standing  -     Case Request    2. Nausea  -     Case Request; Standing  -     Case Request    3. Abnormal CT of the abdomen  -     Case Request; Standing  -     Case Request    Other orders  -     pantoprazole (PROTONIX) 40 MG EC tablet; Take 1 tablet by mouth 2 (Two) Times a Day.  Dispense: 180 tablet; Refill: 3    Plan    Increase Protonix to twice daily dosing   Schedule EGD with Dr. Nguyen  Risk-benefit of procedure reviewed the patient and questions were answered  Antireflux measures and dietary modifications reviewed. Low acid diet reviewed. Keep head of bed elevated. Stop eating/drinking at least 3 hours prior to bedtime. Eliminate caffeine and carbonated beverages.     Obtain clearance from cardiology for patient to hold Plavix prior to procedure  Further recommendations and follow-up pending procedural findings         KEVIN Braswell  [facility]  [address] [place] [address] [address] [place] [address]  Office: [phone]

## 2024-12-05 NOTE — Clinical Note
Good afternoon.  [place] patient of ours.  She needs to undergo EGD.  Can she hold Plavix 5 days prior to procedure?  Thanks BG

## 2024-12-05 NOTE — TELEPHONE ENCOUNTER
KARRIE CURRY IN SCHEDULING PT SCHEDULED 12/31/2024 ARRIVING AT 10:00AM EGD PREP INFORMATION HANDED TO PT OK FOR HUB TO RELAY

## 2024-12-05 NOTE — TELEPHONE ENCOUNTER
Msg sent to [facility] for approval to hold Plavix for 5 days prior to procedure.

## 2024-12-06 DIAGNOSIS — R74.8 ELEVATED ALKALINE PHOSPHATASE LEVEL: Primary | ICD-10-CM

## 2024-12-06 LAB
ALBUMIN SERPL-MCNC: 4.3 G/DL (ref 3.5–5.2)
ALBUMIN/GLOB SERPL: 1.4 G/DL
ALP SERPL-CCNC: 129 U/L (ref 39–117)
ALT SERPL-CCNC: 11 U/L (ref 1–33)
AST SERPL-CCNC: 10 U/L (ref 1–32)
BILIRUB SERPL-MCNC: 0.3 MG/DL (ref 0–1.2)
BUN SERPL-MCNC: 19 MG/DL (ref 6–20)
BUN/CREAT SERPL: 20.9 (ref 7–25)
CALCIUM SERPL-MCNC: 9.8 MG/DL (ref 8.6–10.5)
CHLORIDE SERPL-SCNC: 97 MMOL/L (ref 98–107)
CO2 SERPL-SCNC: 24.7 MMOL/L (ref 22–29)
CREAT SERPL-MCNC: 0.91 MG/DL (ref 0.57–1)
EGFRCR SERPLBLD CKD-EPI 2021: 74.2 ML/MIN/1.73
ERYTHROCYTE [DISTWIDTH] IN BLOOD BY AUTOMATED COUNT: 11.2 % (ref 12.3–15.4)
GLOBULIN SER CALC-MCNC: 3.1 GM/DL
GLUCOSE SERPL-MCNC: 388 MG/DL (ref 65–99)
HCT VFR BLD AUTO: 45.5 % (ref 34–46.6)
HGB BLD-MCNC: 15.7 G/DL (ref 12–15.9)
MCH RBC QN AUTO: 31.8 PG (ref 26.6–33)
MCHC RBC AUTO-ENTMCNC: 34.5 G/DL (ref 31.5–35.7)
MCV RBC AUTO: 92.3 FL (ref 79–97)
PLATELET # BLD AUTO: 225 10*3/MM3 (ref 140–450)
POTASSIUM SERPL-SCNC: 4.4 MMOL/L (ref 3.5–5.2)
PROT SERPL-MCNC: 7.4 G/DL (ref 6–8.5)
RBC # BLD AUTO: 4.93 10*6/MM3 (ref 3.77–5.28)
SODIUM SERPL-SCNC: 136 MMOL/L (ref 136–145)
WBC # BLD AUTO: 9.5 10*3/MM3 (ref 3.4–10.8)

## 2024-12-06 NOTE — TELEPHONE ENCOUNTER
.Called pt and advised of Dr Greene's note regarding Plavix. Pt verbalized understanding.     Pt states [facility] was going to order MRI but pt has defibrillator.  Pt states she had an appt with cardiologist yesterday and her defibrillator is MRI compatible and would like to proceed with MRI.  Advised will send a msg to [facility].

## 2024-12-06 NOTE — PROGRESS NOTES
Please inform the patient lab work shows no evidence of anemia.  Her blood sugar was 388.  Follow-up with primary care provider about that.    One of her liver function tests are mildly elevated.  Would recommend additional lab work to look at this further if she is agreeable.  Await endoscopic evaluation.  Labs have been placed.

## 2024-12-06 NOTE — TELEPHONE ENCOUNTER
Since it will be about a year by the time she undergoes her endoscopy I am okay with her holding Plavix 5 days before.

## 2024-12-09 NOTE — TELEPHONE ENCOUNTER
Her device is MRI compatible, we will fill out her MRI form and have it signed by EP and scan/fax to the office she is scheduled for her MRI.     Please let us know if there is anything else we can assist with!

## 2024-12-09 NOTE — TELEPHONE ENCOUNTER
Lets get clearance from her cardiologist (Dr. Lowery) directly for her to undergo MRI as she does have an AICD/PPM.

## 2024-12-11 DIAGNOSIS — K76.9 LIVER LESION: Primary | ICD-10-CM

## 2024-12-16 ENCOUNTER — TELEPHONE (OUTPATIENT)
Age: 54
End: 2024-12-16
Payer: COMMERCIAL

## 2024-12-16 NOTE — TELEPHONE ENCOUNTER
Received message regarding needing Cardiac clearance for MRI of the Abdomen. Form received.    Called and spoke with Sophy @ [facility] and advised we did receive form and will have it signed tomorrow when Dr. Harris returns to the office. Sophy verbalized understanding.    VICK Ding

## 2024-12-18 ENCOUNTER — TELEPHONE (OUTPATIENT)
Dept: GASTROENTEROLOGY | Facility: CLINIC | Age: 54
End: 2024-12-18
Payer: COMMERCIAL

## 2024-12-18 DIAGNOSIS — K76.9 LIVER LESION: Primary | ICD-10-CM

## 2024-12-18 DIAGNOSIS — R74.8 ELEVATED ALKALINE PHOSPHATASE LEVEL: ICD-10-CM

## 2024-12-18 DIAGNOSIS — R10.13 EPIGASTRIC PAIN: ICD-10-CM

## 2024-12-18 DIAGNOSIS — R93.5 ABNORMAL CT OF THE ABDOMEN: ICD-10-CM

## 2024-12-18 DIAGNOSIS — R11.0 NAUSEA: ICD-10-CM

## 2024-12-18 NOTE — TELEPHONE ENCOUNTER
MRI OF ABD W WO CONTRAST WAS DENIED PER [facility] INSURANCE IS REQUIRING A CURRENT ULTRASOUND BEFORE MRI CAN BE APPROVED.  MSG SENT TO PROVIDER PT WAS NOTIFIED AND WOULD LIKE TO KNOW IF US AND MRI HAVE TO BE DONE PRIOR TO HER SCHEDULED EGD  ON DEC 31/2024

## 2024-12-20 NOTE — TELEPHONE ENCOUNTER
Please inform the patient insurance does not want to pay for her MRI and wants her to have an liver ultrasound first.  Please order ultrasound and provider her with radiology's number to schedule.

## 2024-12-20 NOTE — TELEPHONE ENCOUNTER
Called pt and advised of Lea BARR's note.  Pt verbalized understanding.    Order placed for liver US, sent to [facility] to cosign.     Pt will call to schedule.

## 2024-12-30 RX ORDER — FAMOTIDINE 40 MG/1
1 TABLET, FILM COATED ORAL EVERY 12 HOURS SCHEDULED
COMMUNITY
Start: 2024-12-05 | End: 2024-12-31 | Stop reason: HOSPADM

## 2024-12-31 ENCOUNTER — ANESTHESIA (OUTPATIENT)
Dept: GASTROENTEROLOGY | Facility: HOSPITAL | Age: 54
End: 2024-12-31
Payer: COMMERCIAL

## 2024-12-31 ENCOUNTER — HOSPITAL ENCOUNTER (OUTPATIENT)
Facility: HOSPITAL | Age: 54
Setting detail: HOSPITAL OUTPATIENT SURGERY
Discharge: HOME | End: 2024-12-31
Attending: INTERNAL MEDICINE | Admitting: INTERNAL MEDICINE
Payer: COMMERCIAL

## 2024-12-31 ENCOUNTER — ANESTHESIA EVENT (OUTPATIENT)
Dept: GASTROENTEROLOGY | Facility: HOSPITAL | Age: 54
End: 2024-12-31
Payer: COMMERCIAL

## 2024-12-31 VITALS
DIASTOLIC BLOOD PRESSURE: 66 MMHG | BODY MASS INDEX: 26.09 KG/M2 | RESPIRATION RATE: 18 BRPM | HEART RATE: 76 BPM | SYSTOLIC BLOOD PRESSURE: 123 MMHG | WEIGHT: 152.8 LBS | OXYGEN SATURATION: 98 % | HEIGHT: 64 IN

## 2024-12-31 DIAGNOSIS — R11.0 NAUSEA: ICD-10-CM

## 2024-12-31 DIAGNOSIS — R10.13 EPIGASTRIC PAIN: ICD-10-CM

## 2024-12-31 DIAGNOSIS — R93.5 ABNORMAL CT OF THE ABDOMEN: ICD-10-CM

## 2024-12-31 LAB
GLUCOSE BLDC GLUCOMTR-MCNC: 78 MG/DL (ref 70–130)
GLUCOSE BLDC GLUCOMTR-MCNC: 79 MG/DL (ref 70–130)
GLUCOSE BLDC GLUCOMTR-MCNC: 94 MG/DL (ref 70–130)

## 2024-12-31 PROCEDURE — 25810000003 LACTATED RINGERS PER 1000 ML: Performed by: INTERNAL MEDICINE

## 2024-12-31 PROCEDURE — 82948 REAGENT STRIP/BLOOD GLUCOSE: CPT

## 2024-12-31 PROCEDURE — 25010000002 LIDOCAINE 2% SOLUTION: Performed by: ANESTHESIOLOGY

## 2024-12-31 PROCEDURE — 25010000002 PROPOFOL 200 MG/20ML EMULSION: Performed by: ANESTHESIOLOGY

## 2024-12-31 PROCEDURE — 43239 EGD BIOPSY SINGLE/MULTIPLE: CPT | Performed by: INTERNAL MEDICINE

## 2024-12-31 PROCEDURE — 88305 TISSUE EXAM BY PATHOLOGIST: CPT | Performed by: INTERNAL MEDICINE

## 2024-12-31 RX ORDER — PROMETHAZINE HYDROCHLORIDE 25 MG/1
25 TABLET ORAL ONCE AS NEEDED
Status: DISCONTINUED | OUTPATIENT
Start: 2024-12-31 | End: 2024-12-31 | Stop reason: HOSPADM

## 2024-12-31 RX ORDER — SODIUM CHLORIDE, SODIUM LACTATE, POTASSIUM CHLORIDE, CALCIUM CHLORIDE 600; 310; 30; 20 MG/100ML; MG/100ML; MG/100ML; MG/100ML
30 INJECTION, SOLUTION INTRAVENOUS CONTINUOUS PRN
Status: DISCONTINUED | OUTPATIENT
Start: 2024-12-31 | End: 2024-12-31 | Stop reason: HOSPADM

## 2024-12-31 RX ORDER — LIDOCAINE HYDROCHLORIDE 20 MG/ML
INJECTION, SOLUTION INFILTRATION; PERINEURAL AS NEEDED
Status: DISCONTINUED | OUTPATIENT
Start: 2024-12-31 | End: 2024-12-31 | Stop reason: SURG

## 2024-12-31 RX ORDER — PROPOFOL 10 MG/ML
INJECTION, EMULSION INTRAVENOUS CONTINUOUS PRN
Status: DISCONTINUED | OUTPATIENT
Start: 2024-12-31 | End: 2024-12-31 | Stop reason: SURG

## 2024-12-31 RX ORDER — OMEPRAZOLE 40 MG/1
40 CAPSULE, DELAYED RELEASE ORAL 2 TIMES DAILY
Qty: 60 CAPSULE | Refills: 12 | Status: SHIPPED | OUTPATIENT
Start: 2024-12-31

## 2024-12-31 RX ORDER — PROMETHAZINE HYDROCHLORIDE 25 MG/1
25 SUPPOSITORY RECTAL ONCE AS NEEDED
Status: DISCONTINUED | OUTPATIENT
Start: 2024-12-31 | End: 2024-12-31 | Stop reason: HOSPADM

## 2024-12-31 RX ADMIN — LIDOCAINE HYDROCHLORIDE 60 MG: 20 INJECTION, SOLUTION INFILTRATION; PERINEURAL at 11:53

## 2024-12-31 RX ADMIN — SODIUM CHLORIDE, SODIUM LACTATE, POTASSIUM CHLORIDE, CALCIUM CHLORIDE 30 ML/HR: 20; 30; 600; 310 INJECTION, SOLUTION INTRAVENOUS at 10:22

## 2024-12-31 RX ADMIN — PROPOFOL 200 MCG/KG/MIN: 10 INJECTION, EMULSION INTRAVENOUS at 11:52

## 2024-12-31 NOTE — DISCHARGE INSTRUCTIONS
For the next 24 hours patient needs to be with a responsible adult.    For 24 hours DO NOT drive, operate machinery, appliances, drink alcohol, make important decisions or sign legal documents.    Start with a light or bland diet if you are feeling sick to your stomach otherwise advance to regular diet as tolerated.    Follow recommendations on procedure report if provided by your doctor.    Call Dr Nguyen for problems [phone]    Problems may include but not limited to: large amounts of bleeding, trouble breathing, repeated vomiting, severe unrelieved pain, fever or chills.

## 2024-12-31 NOTE — PROGRESS NOTES
Date of Office Visit: 2024  Encounter Provider: Syd Lowery MD  Place of Service: [facility] CARDIOLOGY  Patient Name: Jade Clement  :1970    Chief Complaint   Patient presents with    Cardiomyopathy    Pacemaker Check   :     HPI: Jade Clement is a 54 y.o. female who presents today for follow-up of primary prevention ICD.      She had a primary prevention ICD implanted in .      She has done well in the interval since implant, no issues detected.     She has some awareness of ventricular lead testing, but these resolved after auto testing stopped.     No current heart failure symptoms.           Past Medical History:   Diagnosis Date    Adrenal adenoma     Anxiety     Coronary artery disease involving native coronary artery of native heart without angina pectoris 2024    Diabetes mellitus     Diarrhea     GERD (gastroesophageal reflux disease)     Gestational diabetes     Head ache     Hyperlipidemia     Hypertension     RESOLVED, NO MEDS    Kidney stone     MI (myocardial infarction) 2023    Nausea     Type 2 diabetes mellitus     Urinary tract infection     Frequently    Varicella Unknown    Vitamin D deficiency        Past Surgical History:   Procedure Laterality Date    CARDIAC CATHETERIZATION N/A 2023    Procedure: Left Heart Cath;  Surgeon: Lilli Greene MD;  Location: [facility] CATH INVASIVE LOCATION;  Service: Cardiovascular;  Laterality: N/A;    CARDIAC CATHETERIZATION N/A 2023    Procedure: Coronary angiography;  Surgeon: Lilli Greene MD;  Location:  [facility] CATH INVASIVE LOCATION;  Service: Cardiovascular;  Laterality: N/A;    CARDIAC CATHETERIZATION N/A 2024    Procedure: Percutaneous Coronary Intervention;  Surgeon: Lilli Greene MD;  [facility];  Service: Cardiology;  Laterality: N/A;    CARDIAC CATHETERIZATION N/A 2024    Procedure: Stent MERRITT coronary;  Surgeon: Ramona  MD Lilli;  Location:  [facility] CATH INVASIVE LOCATION;  Service: Cardiology;  Laterality: N/A;    CARDIAC CATHETERIZATION N/A 2024    Procedure: Percutaneous Coronary Intervention;  Surgeon: Lilli Greene MD;  Location:  [facility] CATH INVASIVE LOCATION;  Service: Cardiology;  Laterality: N/A;    CARDIAC CATHETERIZATION N/A 2024    Procedure: Stent MERRITT coronary;  Surgeon: Lilli Greene MD;  Location:  [facility] CATH INVASIVE LOCATION;  Service: Cardiology;  Laterality: N/A;    CARDIAC CATHETERIZATION N/A 2024    Procedure: Optical Coherence Tomography;  Surgeon: Lilli Greene MD;  Location:  [facility] CATH INVASIVE LOCATION;  Service: Cardiology;  Laterality: N/A;    CARDIAC ELECTROPHYSIOLOGY PROCEDURE  2024    Procedure: Impella Insertion;  Surgeon: Lilli Greene MD;  Location:  [facility] CATH INVASIVE LOCATION;  Service: Cardiology;;    CARDIAC ELECTROPHYSIOLOGY PROCEDURE N/A 2024    Procedure: ICD SC [facility]  [place];  Surgeon: Otilio Reynoso MD;  [facility];  Service: Cardiovascular;  Laterality: N/A;     SECTION       SECTION WITH TUBAL  2001    CHOLECYSTECTOMY      COLONOSCOPY  05/10/2019    Grzegorz ABEL    CORONARY ANGIOPLASTY      CYSTOSCOPY      ENDOSCOPY  2020    ENDOSCOPY N/A 2021    Procedure: ESOPHAGOGASTRODUODENOSCOPY WITH COLD BIOPSIES;  Surgeon: Jasson Nguyen MD;  [facility] END[facility];  Service: Gastroenterology;  Laterality: N/A;  PRE- ABD PAIN  POST- NORMAL    INTERVENTIONAL RADIOLOGY PROCEDURE N/A 2024    Procedure: Intravascular Ultrasound;  Surgeon: Lilli Greene MD;  Location: [facility] CATH INVASIVE LOCATION;  Service: Cardiology;  Laterality: N/A;    LAPAROSCOPIC CHOLECYSTECTOMY  2016    TUBAL ABDOMINAL LIGATION  2001    UPPER GASTROINTESTINAL ENDOSCOPY  2020    Grzegorz ABLE gastritis, ulcers    UPPER GASTROINTESTINAL ENDOSCOPY  2020    Andrew ABEL       Social History      Socioeconomic History    Marital status: Single   Tobacco Use    Smoking status: Former     Current packs/day: 0.50     Average packs/day: 0.5 packs/day for 39.7 years (19.8 ttl pk-yrs)     Types: Cigarettes     Start date: 1/10/1985     Quit date: 12/18/2023    Smokeless tobacco: Never   Vaping Use    Vaping status: Never Used   Substance and Sexual Activity    Alcohol use: Yes     Comment: Only drink about 2 times a year    Drug use: Yes     Types: Marijuana     Comment: daily    Sexual activity: Not Currently     Partners: Male     Birth control/protection: Post-menopausal, Tubal ligation, Surgical       Family History   Problem Relation Age of Onset    Thyroid disease Mother         goiter    Hyperlipidemia Mother     ALS Mother     Heart disease Father     Stroke Father     Hypertension Father     Drug abuse Brother     Diabetes Paternal Grandfather     Diabetes Paternal Aunt     Diabetes Paternal Uncle     Malig Hyperthermia Neg Hx        Review of Systems   Constitutional: Negative.   Cardiovascular: Negative.    Respiratory: Negative.     Gastrointestinal: Negative.        Allergies   Allergen Reactions    Metformin Diarrhea    Ozempic [Semaglutide] GI Intolerance     Pt stopped due to stomach pain    Beta Adrenergic Blockers Diarrhea    Bydureon [Exenatide] Other (See Comments)     Site reaction    Statins Myalgia     Muscle aches    Xigduo Xr [Dapagliflozin Pro-Metformin Er] Diarrhea         Current Outpatient Medications:     aspirin 81 MG EC tablet, Take 1 tablet by mouth Daily., Disp: 90 tablet, Rfl: 3    aspirin-acetaminophen-caffeine (EXCEDRIN MIGRAINE) 250-250-65 MG per tablet, Take 1 tablet by mouth Every 8 (Eight) Hours As Needed., Disp: , Rfl:     cetirizine (ZyrTEC) 10 MG tablet, Take 1 tablet by mouth Daily., Disp: , Rfl:     clopidogrel (PLAVIX) 75 MG tablet, Take 1 tablet by mouth Daily. (Patient taking differently: Take 1 tablet by mouth Daily. HELD AS DIRECTED), Disp: 90 tablet, Rfl: 3     Continuous Blood Gluc Sensor (FreeStyle Balwinder 3 Sensor) misc, Use 1 each Every 14 (Fourteen) Days. (Patient not taking: Reported on 12/30/2024), Disp: 6 each, Rfl: 3    Continuous Glucose Sensor (FreeStyle Balwinder 3 Plus Sensor), Use Every 10 (Ten) Days. (Patient not taking: Reported on 12/30/2024), Disp: 3 each, Rfl: 11    D3 50 MCG (2000 UT) tablet, Take 1 tablet by mouth Daily., Disp: , Rfl:     furosemide (LASIX) 40 MG tablet, Take 1 tablet by mouth Daily., Disp: 30 tablet, Rfl: 0    insulin aspart (NovoLOG FlexPen) 100 UNIT/ML solution pen-injector sc pen, Inject 8 Units under the skin into the appropriate area as directed 3 (Three) Times a Day With Meals., Disp: 15 mL, Rfl: 0    Insulin Degludec (Tresiba FlexTouch) 200 UNIT/ML solution pen-injector pen injection, 36 units every evening.  Prime needle with 4 units before each use., Disp: 18 mL, Rfl: 2    Insulin Lispro, 1 Unit Dial, (HumaLOG KwikPen) 100 UNIT/ML solution pen-injector, 8 units at breakfast, 12 units at lunch, and 12 units at supper.  Prime needle with 2 units before each use., Disp: , Rfl:     Insulin Pen Needle (BD Pen Needle Ashwini U/F) 32G X 4 MM misc, Use to inject insulin 4 times daily, Disp: 400 each, Rfl: 3    nitroglycerin (NITROSTAT) 0.4 MG SL tablet, Place 1 tablet under the tongue Every 5 (Five) Minutes As Needed for Chest Pain (Systolic BP Greater Than 100). Take no more than 3 doses in 15 minutes., Disp: 100 tablet, Rfl: 0    sertraline (ZOLOFT) 50 MG tablet, Take 1 tablet by mouth Daily., Disp: , Rfl:     sucralfate (CARAFATE) 1 g tablet, Take 1 tablet by mouth 4 (Four) Times a Day As Needed (REFLUX SX)., Disp: , Rfl:     traZODone (DESYREL) 100 MG tablet, Take 2.5 tablets by mouth Every Night., Disp: , Rfl:     VITAMIN D PO, Take  by mouth Daily., Disp: , Rfl:     empagliflozin (Jardiance) 10 MG tablet tablet, Take 1 tablet by mouth Daily., Disp: 90 tablet, Rfl: 3    omeprazole (priLOSEC) 40 MG capsule, Take 1 capsule by mouth 2  "(Two) Times a Day., Disp: 60 capsule, Rfl: 12  No current facility-administered medications for this visit.      Objective:     Vitals:    12/05/24 1322   BP: 128/82   Pulse: 80   Weight: 68.5 kg (151 lb)   Height: 162.6 cm (64\")     Body mass index is 25.92 kg/m².    PHYSICAL EXAM:    Vitals and nursing note reviewed.   Constitutional:       General: Not in acute distress.  Pulmonary:      Effort: Pulmonary effort is normal. No respiratory distress.   Cardiovascular:      Normal rate. Regular rhythm.   Skin:     General: Skin is warm and dry.   Neurological:      Mental Status: Alert and oriented to person, place, and time.   Psychiatric:         Behavior: Behavior normal.         Thought Content: Thought content normal.         Judgment: Judgment normal.             ECG 12 Lead    Date/Time: 12/31/2024 2:58 PM  Performed by: Syd Lowery MD    Authorized by: Syd Lowery MD  Comparison: compared with previous ECG from 9/19/2024  Similar to previous ECG  Rhythm: sinus rhythm            Assessment:       Diagnosis Plan   1. Presence of automatic (implantable) cardiac defibrillator        2. Ischemic cardiomyopathy               Plan:       Normal device function.      Pocket appears well healed and normal.     Follow-up in one year.     As always, it has been a pleasure to participate in your patient's care.      Sincerely,         Syd Lowery MD  "

## 2024-12-31 NOTE — ANESTHESIA PREPROCEDURE EVALUATION
Anesthesia Evaluation     NPO Solid Status: > 8 hours             Airway   Mallampati: II  TM distance: >3 FB  Neck ROM: full  Dental - normal exam     Pulmonary - normal exam   Cardiovascular - normal exam    (+) pacemaker ICD interrogated 1-3 months ago, hypertension, past MI , CAD, cardiac stents within the past 12 months , CHF Systolic <55%, hyperlipidemia      Neuro/Psych  (+) psychiatric history Anxiety  GI/Hepatic/Renal/Endo    (+) GERD, diabetes mellitus type 2 using insulin    Musculoskeletal     Abdominal    Substance History      OB/GYN          Other                    Anesthesia Plan    ASA 3     MAC       Anesthetic plan, risks, benefits, and alternatives have been provided, discussed and informed consent has been obtained with: patient.    CODE STATUS:

## 2024-12-31 NOTE — ANESTHESIA POSTPROCEDURE EVALUATION
Patient: Jade Clement    Procedure Summary       Date: 12/31/24 Room / Location: [facility] ENDOSCOPY 8 /  [facility]OSCOPY    Anesthesia Start: 1149 Anesthesia Stop: 1201    Procedure: ESOPHAGOGASTRODUODENOSCOPY with cold bxs (Esophagus) Diagnosis:       Epigastric pain      Nausea      Abnormal CT of the abdomen      (Epigastric pain [R10.13])      (Nausea [R11.0])      (Abnormal CT of the abdomen [R93.5])    Surgeons: Jasson Nguyen MD Provider: Mario Rea MD    Anesthesia Type: MAC ASA Status: 3            Anesthesia Type: MAC    Vitals  Vitals Value Taken Time   /66 12/31/24 1222   Temp     Pulse 77 12/31/24 1222   Resp 18 12/31/24 1220   SpO2 94 % 12/31/24 1222   Vitals shown include unfiled device data.        Anesthesia Post Evaluation

## 2025-01-03 ENCOUNTER — TELEPHONE (OUTPATIENT)
Dept: GASTROENTEROLOGY | Facility: CLINIC | Age: 55
End: 2025-01-03
Payer: COMMERCIAL

## 2025-01-03 NOTE — TELEPHONE ENCOUNTER
----- Message from Jasson Nguyen sent at 1/3/2025  1:31 PM EST -----  Pathology benign  Office visit [facility] 12 weeks

## 2025-01-03 NOTE — TELEPHONE ENCOUNTER
Pt reviewed results via [employer].     Sent pt [employer] msg advising of results and recommendations. Advised to call if any questions.

## 2025-01-15 ENCOUNTER — HOSPITAL ENCOUNTER (OUTPATIENT)
Dept: ULTRASOUND IMAGING | Facility: HOSPITAL | Age: 55
Discharge: HOME | End: 2025-01-15
Admitting: NURSE PRACTITIONER
Payer: COMMERCIAL

## 2025-01-15 DIAGNOSIS — R10.13 EPIGASTRIC PAIN: ICD-10-CM

## 2025-01-15 DIAGNOSIS — R11.0 NAUSEA: ICD-10-CM

## 2025-01-15 DIAGNOSIS — R93.5 ABNORMAL CT OF THE ABDOMEN: ICD-10-CM

## 2025-01-15 DIAGNOSIS — R74.8 ELEVATED ALKALINE PHOSPHATASE LEVEL: ICD-10-CM

## 2025-01-15 DIAGNOSIS — K76.9 LIVER LESION: ICD-10-CM

## 2025-01-15 PROCEDURE — 76705 ECHO EXAM OF ABDOMEN: CPT

## 2025-01-16 ENCOUNTER — TELEPHONE (OUTPATIENT)
Dept: GASTROENTEROLOGY | Facility: CLINIC | Age: 55
End: 2025-01-16
Payer: COMMERCIAL

## 2025-01-16 NOTE — TELEPHONE ENCOUNTER
Pt reviewed results via [employer].     Sent pt [employer] msg advising of results and recommendations. Advised to call if any questions.

## 2025-01-16 NOTE — PROGRESS NOTES
Let her know liver ultrasound is normal looks like Dr. Nguyen wants her to follow-up in the office in 12 weeks.

## 2025-01-16 NOTE — TELEPHONE ENCOUNTER
----- Message from Lea Miner sent at 1/16/2025  3:29 PM EST -----  Let her know liver ultrasound is normal looks like Dr. Nguyen wants her to follow-up in the office in 12 weeks.

## 2025-02-21 PROCEDURE — 93297 REM INTERROG DEV EVAL ICPMS: CPT | Performed by: INTERNAL MEDICINE

## 2025-02-28 RX ORDER — ASPIRIN 81 MG/1
81 TABLET ORAL DAILY
Qty: 90 TABLET | Refills: 3 | Status: SHIPPED | OUTPATIENT
Start: 2025-02-28

## 2025-02-28 RX ORDER — CLOPIDOGREL BISULFATE 75 MG/1
75 TABLET ORAL DAILY
Qty: 90 TABLET | Refills: 3 | Status: SHIPPED | OUTPATIENT
Start: 2025-02-28 | End: 2025-02-28 | Stop reason: SDUPTHER

## 2025-02-28 RX ORDER — ASPIRIN 81 MG/1
81 TABLET ORAL DAILY
Qty: 90 TABLET | Refills: 3 | Status: SHIPPED | OUTPATIENT
Start: 2025-02-28 | End: 2025-02-28 | Stop reason: SDUPTHER

## 2025-02-28 RX ORDER — CLOPIDOGREL BISULFATE 75 MG/1
75 TABLET ORAL DAILY
Qty: 90 TABLET | Refills: 3 | Status: SHIPPED | OUTPATIENT
Start: 2025-02-28

## 2025-04-09 ENCOUNTER — OFFICE VISIT (OUTPATIENT)
Dept: CARDIOLOGY | Age: 55
End: 2025-04-09
Payer: COMMERCIAL

## 2025-04-09 VITALS
HEIGHT: 64 IN | BODY MASS INDEX: 26.23 KG/M2 | DIASTOLIC BLOOD PRESSURE: 74 MMHG | SYSTOLIC BLOOD PRESSURE: 116 MMHG | HEART RATE: 83 BPM

## 2025-04-09 DIAGNOSIS — Z95.810 PRESENCE OF AUTOMATIC (IMPLANTABLE) CARDIAC DEFIBRILLATOR: ICD-10-CM

## 2025-04-09 DIAGNOSIS — I50.22 CHRONIC SYSTOLIC CONGESTIVE HEART FAILURE: ICD-10-CM

## 2025-04-09 DIAGNOSIS — I25.119 CORONARY ARTERY DISEASE INVOLVING NATIVE CORONARY ARTERY OF NATIVE HEART WITH ANGINA PECTORIS: ICD-10-CM

## 2025-04-09 DIAGNOSIS — I10 PRIMARY HYPERTENSION: ICD-10-CM

## 2025-04-09 DIAGNOSIS — I25.5 ISCHEMIC CARDIOMYOPATHY: ICD-10-CM

## 2025-04-09 DIAGNOSIS — Z95.5 STATUS POST CORONARY ARTERY STENT PLACEMENT: Primary | ICD-10-CM

## 2025-04-09 DIAGNOSIS — E78.1 HYPERTRIGLYCERIDEMIA: ICD-10-CM

## 2025-04-09 RX ORDER — NICOTINE 21 MG/24HR
1 PATCH, TRANSDERMAL 24 HOURS TRANSDERMAL EVERY 24 HOURS
Qty: 28 EACH | Refills: 0 | Status: SHIPPED | OUTPATIENT
Start: 2025-04-09 | End: 2025-04-14

## 2025-04-09 RX ORDER — RANOLAZINE 500 MG/1
500 TABLET, EXTENDED RELEASE ORAL 2 TIMES DAILY
Qty: 60 TABLET | Refills: 0 | Status: SHIPPED | OUTPATIENT
Start: 2025-04-09

## 2025-04-09 NOTE — PROGRESS NOTES
Subjective:     Encounter Date:04/10/2025      Patient ID: Jade Clement is a 55 y.o. female.    Chief Complaint:  History of Present Illness  This is a 54 y/o female who follows with Dr. Greene and is known to me. She has a pmhx of diabetes, hypertension, tobacco abuse, coronary artery disease, ischemic cardiomyopathy, and hyperlipidemia.    She is here today for follow-up visit.  She tells me for the last couple of weeks, she has noticed that she is feeling more tired.  She said if she is not working she tends to take naps which is new for her.  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  She still having discomfort in the upper epigastric area and has a poor appetite.  She says she gets nauseated.  She says this does not feel similar to her prior gastritis.  The discomfort is occurring every day but some days it is worse than others.  She denies any shortness of breath with her usual activities.  She says sometimes while cleaning her house, if she overdoes it she gets short of breath and tired and will have to lay down to rest.  No complaints of dizziness or syncope.  No complaints of swelling in her legs, orthopnea or PND.  Blood pressure has been well-controlled.    Prior history:  She presented to the ED on 12/26/23 with worsening shortness of breath and orthopnea. She reported that on 12/18/23 that she ate a bag of hot potato chips. Shortly after, she developed some epigastric discomfort and indigestion. Over the next several hours, she became short of breath and developed nausea and diarrhea. Her GI symptoms lasted a couple of days but her dyspnea continued. She went to her PCP who diagnosed her with a potential peptic ulcer and prescribed Carafate for treatment. Her symptoms continued through [place] and she eventually went to the ED due to the severity of her dyspnea. She also reported about a 10 lb weight gain over the course of about 1 week.      She presented to the ED at [facility] in [place]. EKG showed sinus tachycardia and a potential old anteroseptal infarct with Q waves in leads V1 through V3. Troponin was elevated at 748 and peaked at 843. ProBNP was 11,256. CT of the chest showed cardiomegaly, pulmonary edema, moderate right pleural effusion, and small left pleural effusion. She also had a right lower lobe groundglass opacity and smaller left apical opacity. Follow up CT in 3 months was recommended.  Her pulmonary artery was also dilated, suggestive of pulmonary hypertension.  Her CT scan of the abdomen and pelvis showed nonspecific edema and a right adrenal adenoma. Se was started on a heparin gtt and transferred to [facility].      She received IV diuresis and was started on a very small dose of beta blocker. An echocardiogram was obtained that showed multiple akinetic segments with an EF of 20% which was felt to be likely ischemic. It was also felt that she likely infarcted about a week prior, when her symptoms initially started. She was diuresed prior to undergoing a cardiac catheterization as she was not have unstable symptoms. On 12/28/23, she underwent a cardiac catheterization that showed severe multivessel coronary artery disease with 80% ostial and 90% mid LAD stenosis, 80% ostial OM1 stenosis, and 95% proximal mid RCA stenosis with left to right collateral filling. LVEDP 28-30 mmHg. She was referred to cardiac surgery for CABG along with possible mitral and tricuspid valve repair. Initially, the plan was for surgery following diuresis. However, Dr. Asif and Dr. Jerez reviewed her cath and echo again and did not feel she was operable. It was felt she would need either high risk PCI or transfer to a higher level of care facility with a dedicated heart failure program for possible LVAD. On 1/3/24, she underwent high risk PCI with Impella support and stenting of the left main extending into the LAD, mid LAD and OM1 branch. She did well with the procedure.  On 1/8/24, she underwent staged PCI and stent of the proximal RCA. She was started on clopidogrel and aspirin. She has been intolerant of statins in the past but was started on pravastatin during her stay and tolerated this ok. GDMT for her ischemic cardiomyopathy was limited by her hypotension. She was unable to tolerate even a low dose beta blocker.     She was discharged home on 1/9/24 and was feeling very well. Unfortunately, she returned to the hospital on 1/13 with shortness of breath, orthopnea and chest pain. Xray showed volume overload. She received IV diuresis and was discharged on PO furosemide, spironolactone and metoprolol succinate.     She was to see Dr. Greene in office for follow up but ended up in the ED again instead with epigastric pain and nausea. Cardiac workup was unremarkable. No changes were made.    At follow up with Dr. Greene, she reported that she stopped metoprolol and this seemed to help with her symptoms. She was started on low dose lisinopril and was tolerating low dose pravastatin.     She underwent a repeat echo in April that showed continued severely depressed LV function with an EF of 21%, global hypokinesis, grade 1 diastolic dysfunction and no significant valvular disease. She had stopped lisinopril and spironolactone due to symptomatic low blood pressures. She was taking an extra furosemide as needed. She was referred to EP for AICD placement. She was also started on Jardiance. On 6/28, she underwent AICD placement with Dr. Lowery.    I saw her in July 2024 and she was feeling well.  She was having issues with statin intolerance and we switched her to Repatha at that time.  In follow-up with JACLYN Barnes in August 2024 she began complaining of symptoms that were present before her prior hospitalization including fatigue, bloating, shortness of breath and palpitation.  Ranolazine 500 mg twice a day was started.    Following that visit she went to the ER at her local  hospital for those symptoms and her cardiac workup was reportedly unremarkable.  She underwent a CT of the abdomen that showed gastritis and was started on Pepcid and Prilosec.  She was also started on Carafate.  With these medications her symptoms resolved.  At the time of follow-up with Dr. Greene in September she was feeling well and no changes were made.    I have reviewed and updated as appropriate allergies, current medications, past family history, past medical history, past surgical history and problem list.    Review of Systems   Constitutional: Positive for malaise/fatigue. Negative for fever, weight gain and weight loss.   HENT:  Negative for congestion, hoarse voice and sore throat.    Eyes:  Negative for blurred vision and double vision.   Cardiovascular:  Negative for chest pain, dyspnea on exertion, leg swelling, orthopnea, palpitations and syncope.   Respiratory:  Positive for shortness of breath. Negative for cough and wheezing.    Gastrointestinal:  Positive for nausea. Negative for abdominal pain, hematemesis, hematochezia and melena.        Decreased appetite and upper epigastric discomfort   Genitourinary:  Negative for dysuria and hematuria.   Neurological:  Negative for dizziness, headaches, light-headedness and numbness.   Psychiatric/Behavioral:  Negative for depression. The patient is not nervous/anxious.          Current Outpatient Medications:     aspirin 81 MG EC tablet, Take 1 tablet by mouth Daily., Disp: 90 tablet, Rfl: 3    aspirin-acetaminophen-caffeine (EXCEDRIN MIGRAINE) 250-250-65 MG per tablet, Take 1 tablet by mouth Every 8 (Eight) Hours As Needed., Disp: , Rfl:     cetirizine (ZyrTEC) 10 MG tablet, Take 1 tablet by mouth Daily., Disp: , Rfl:     clopidogrel (PLAVIX) 75 MG tablet, Take 1 tablet by mouth Daily., Disp: 90 tablet, Rfl: 3    Continuous Blood Gluc Sensor (FreeStyle Balwinder 3 Sensor) misc, Use 1 each Every 14 (Fourteen) Days., Disp: 6 each, Rfl: 3    Continuous Glucose  Sensor (FreeStyle Balwinder 3 Plus Sensor), Use Every 10 (Ten) Days., Disp: 3 each, Rfl: 11    D3 50 MCG (2000 UT) tablet, Take 1 tablet by mouth Daily., Disp: , Rfl:     empagliflozin (Jardiance) 10 MG tablet tablet, Take 1 tablet by mouth Daily., Disp: 90 tablet, Rfl: 3    furosemide (LASIX) 40 MG tablet, Take 1 tablet by mouth Daily., Disp: 30 tablet, Rfl: 0    insulin aspart (NovoLOG FlexPen) 100 UNIT/ML solution pen-injector sc pen, Inject 8 Units under the skin into the appropriate area as directed 3 (Three) Times a Day With Meals., Disp: 15 mL, Rfl: 0    Insulin Degludec (Tresiba FlexTouch) 200 UNIT/ML solution pen-injector pen injection, 36 units every evening.  Prime needle with 4 units before each use., Disp: 18 mL, Rfl: 2    Insulin Lispro, 1 Unit Dial, (HumaLOG KwikPen) 100 UNIT/ML solution pen-injector, 8 units at breakfast, 12 units at lunch, and 12 units at supper.  Prime needle with 2 units before each use., Disp: , Rfl:     Insulin Pen Needle (BD Pen Needle Ashwini U/F) 32G X 4 MM misc, Use to inject insulin 4 times daily, Disp: 400 each, Rfl: 3    nitroglycerin (NITROSTAT) 0.4 MG SL tablet, Place 1 tablet under the tongue Every 5 (Five) Minutes As Needed for Chest Pain (Systolic BP Greater Than 100). Take no more than 3 doses in 15 minutes., Disp: 100 tablet, Rfl: 0    omeprazole (priLOSEC) 40 MG capsule, Take 1 capsule by mouth 2 (Two) Times a Day., Disp: 60 capsule, Rfl: 12    sertraline (ZOLOFT) 50 MG tablet, Take 1 tablet by mouth Daily., Disp: , Rfl:     sucralfate (CARAFATE) 1 g tablet, Take 1 tablet by mouth 4 (Four) Times a Day As Needed (REFLUX SX)., Disp: , Rfl:     traZODone (DESYREL) 100 MG tablet, Take 2.5 tablets by mouth Every Night., Disp: , Rfl:     VITAMIN D PO, Take  by mouth Daily., Disp: , Rfl:     nicotine (NICODERM CQ) 21 MG/24HR patch, Place 1 patch on the skin as directed by provider Daily., Disp: 28 each, Rfl: 0    ranolazine (Ranexa) 500 MG 12 hr tablet, Take 1 tablet by mouth 2  (Two) Times a Day., Disp: 60 tablet, Rfl: 0    Past Medical History:   Diagnosis Date    Adrenal adenoma     Anxiety     Coronary artery disease involving native coronary artery of native heart without angina pectoris 02/01/2024    Diabetes mellitus     Diarrhea     GERD (gastroesophageal reflux disease)     Gestational diabetes 2000    Head ache     Hyperlipidemia     Hypertension     RESOLVED, NO MEDS    Kidney stone 1985    MI (myocardial infarction) 12/2023    Nausea     Type 2 diabetes mellitus     Urinary tract infection 1986    Frequently    Varicella Unknown    Vitamin D deficiency        Past Surgical History:   Procedure Laterality Date    CARDIAC CATHETERIZATION N/A 12/28/2023    Procedure: Left Heart Cath;  Surgeon: Lilli Greene MD;  Location[facility];  Service: Cardiovascular;  Laterality: N/A;    CARDIAC CATHETERIZATION N/A 12/28/2023    Procedure: Coronary angiography;  Surgeon: Lilli Greene MD;  [facility];  Service: Cardiovascular;  Laterality: N/A;    CARDIAC CATHETERIZATION N/A 01/03/2024    Procedure: Percutaneous Coronary Intervention;  Surgeon: Lilli Greene MD;  Location[facility];  Service: Cardiology;  Laterality: N/A;    CARDIAC CATHETERIZATION N/A 01/03/2024    Procedure: Stent MERRITT coronary;  Surgeon: Lilli Greene MD;  [facility]: [facility];  Service: Cardiology;  Laterality: N/A;    CARDIAC CATHETERIZATION N/A 01/08/2024    Procedure: Percutaneous Coronary Intervention;  Surgeon: Lilli Greene MD;  [facility];  Service: Cardiology;  Laterality: N/A;    CARDIAC CATHETERIZATION N/A 01/08/2024    Procedure: Stent MERRITT coronary;  Surgeon: Lilli Greene MD;  [facility];  Service: Cardiology;  Laterality: N/A;    CARDIAC CATHETERIZATION N/A 01/08/2024    Procedure: Optical Coherence Tomography;  Surgeon: Lilli Greene MD;   Location:  [facility] CATH INVASIVE LOCATION;  Service: Cardiology;  Laterality: N/A;    CARDIAC ELECTROPHYSIOLOGY PROCEDURE  2024    Procedure: Impella Insertion;  Surgeon: Lilli Greene MD;  [facility];  Service: Cardiology;;    CARDIAC ELECTROPHYSIOLOGY PROCEDURE N/A 2024    Procedure: ICD SC new  [place];  Surgeon: Otilio Reynoso MD;  Location:  [facility] CATH INVASIVE LOCATION;  Service: Cardiovascular;  Laterality: N/A;     SECTION       SECTION WITH TUBAL  2001    CHOLECYSTECTOMY      COLONOSCOPY  05/10/2019    Grzegorz ABEL    CORONARY ANGIOPLASTY      CYSTOSCOPY      ENDOSCOPY  2020    ENDOSCOPY N/A 2021    Procedure: ESOPHAGOGASTRODUODENOSCOPY WITH COLD BIOPSIES;  Surgeon: Jasson Nguyen MD;  Location: [facility] ENDOSCOPY;  Service: Gastroenterology;  Laterality: N/A;  PRE- ABD PAIN  POST- NORMAL    ENDOSCOPY N/A 2024    Procedure: ESOPHAGOGASTRODUODENOSCOPY with cold bxs;  Surgeon: Jasson Nguyen MD;  Location: [facility] ENDOSCOPY;  Service: Gastroenterology;  Laterality: N/A;  pre abn ct abd pain  post gastritis    INTERVENTIONAL RADIOLOGY PROCEDURE N/A 2024    Procedure: Intravascular Ultrasound;  Surgeon: Lilli Greene MD;  Location: [facility] CATH INVASIVE LOCATION;  Service: Cardiology;  Laterality: N/A;    LAPAROSCOPIC CHOLECYSTECTOMY  2016    TUBAL ABDOMINAL LIGATION  2001    UPPER GASTROINTESTINAL ENDOSCOPY  2020    Grzegorz ABEL gastritis, ulcers    UPPER GASTROINTESTINAL ENDOSCOPY  2020    Andrew ABEL       Family History   Problem Relation Age of Onset    Thyroid disease Mother         goiter    Hyperlipidemia Mother     ALS Mother     Heart disease Father     Stroke Father     Hypertension Father     Drug abuse Brother     Diabetes Paternal Grandfather     Diabetes Paternal Aunt     Diabetes Paternal Uncle     Malig Hyperthermia Neg Hx        Social History     Tobacco Use    Smoking status: Former      "Current packs/day: 0.50     Average packs/day: 0.5 packs/day for 40.0 years (20.0 ttl pk-yrs)     Types: Cigarettes     Start date: 1/10/1985     Quit date: 12/18/2023    Smokeless tobacco: Never   Vaping Use    Vaping status: Never Used   Substance Use Topics    Alcohol use: Yes     Comment: Only drink about 2 times a year    Drug use: Yes     Types: Marijuana     Comment: daily         ECG 12 Lead    Date/Time: 4/10/2025 8:47 AM  Performed by: Sujata Pritchard APRN    Authorized by: Sujata Pritchard APRN  Comparison: compared with previous ECG from 12/5/2024  Similar to previous ECG  Rhythm: sinus rhythm             Objective:     Visit Vitals  /74 (BP Location: Right arm, Patient Position: Sitting, Cuff Size: Adult)   Pulse 83   Ht 162.6 cm (64\")   BMI 26.23 kg/m²             Physical Exam  Constitutional:       Appearance: Normal appearance. She is normal weight.   HENT:      Head: Normocephalic.   Neck:      Vascular: No carotid bruit.   Cardiovascular:      Rate and Rhythm: Normal rate and regular rhythm.      Chest Wall: PMI is not displaced.      Pulses: Normal pulses.           Radial pulses are 2+ on the right side and 2+ on the left side.        Posterior tibial pulses are 2+ on the right side and 2+ on the left side.      Heart sounds: Normal heart sounds. No murmur heard.     No friction rub. No gallop.   Pulmonary:      Effort: Pulmonary effort is normal.      Breath sounds: Normal breath sounds.   Abdominal:      General: Bowel sounds are normal. There is no distension.      Palpations: Abdomen is soft.   Musculoskeletal:      Right lower leg: No edema.      Left lower leg: No edema.   Skin:     General: Skin is warm and dry.      Capillary Refill: Capillary refill takes less than 2 seconds.   Neurological:      Mental Status: She is alert and oriented to person, place, and time.   Psychiatric:         Mood and Affect: Mood normal.         Behavior: Behavior normal.         Thought Content: " Thought content normal.          Lab Review:   Lipid Panel          7/25/2024    11:13   Lipid Panel   Total Cholesterol 317    Triglycerides 265    HDL Cholesterol 41    VLDL Cholesterol 54    LDL Cholesterol  222          Cardiac Procedures:       Assessment:         Diagnoses and all orders for this visit:    1. Status post coronary artery stent placement (Primary)    2. Presence of automatic (implantable) cardiac defibrillator    3. Ischemic cardiomyopathy    4. Hypertriglyceridemia    5. Primary hypertension    6. Chronic systolic congestive heart failure    7. Coronary artery disease involving native coronary artery of native heart with angina pectoris  -     Stress Test With Myocardial Perfusion One Day; Future    Other orders  -     nicotine (NICODERM CQ) 21 MG/24HR patch; Place 1 patch on the skin as directed by provider Daily.  Dispense: 28 each; Refill: 0  -     empagliflozin (Jardiance) 10 MG tablet tablet; Take 1 tablet by mouth Daily.  Dispense: 90 tablet; Refill: 3  -     ranolazine (Ranexa) 500 MG 12 hr tablet; Take 1 tablet by mouth 2 (Two) Times a Day.  Dispense: 60 tablet; Refill: 0  -     ECG 12 Lead            Plan:       CAD: s/p multivessel PCI in January 2024 with stenting of the left main extending into the LAD, mid LAD and OM1 branch and staged PCI and stent of the proximal RCA. On aspirin and clopidogrel which we will continue indefinitely given the number and location of the stents. EKG is stable with no new changes. She is having symptoms similar to what she experienced prior to her MI. Initially this was felt to be secondary to gastritis but despite continued treatment with Pepcid, Prilosec and Carafate, she is still having the discomfort. At this point, I feel we need to proceed with a stress test.  Cardiomyopathy: felt to be ischemic. LV function remains low despite revascularization. She underwent AICD placement with Dr. Lowery. GDMT is limited due to symptomatic low BP. On  furosemide and Jardiance.  Chronic systolic CHF: appears compensated on exam.   HLD: She has been intolerant to multiple statins. Now on Repatha  Diabetes    Thank you for allowing me to participate in this patient's care. Please call with any questions or concerns. Ms Clement will follow up with Dr. Greene in 3 months.          Your medication list            Accurate as of April 9, 2025 11:59 PM. If you have any questions, ask your nurse or doctor.                START taking these medications        Instructions Last Dose Given Next Dose Due   nicotine 21 MG/24HR patch  Commonly known as: NICODERM CQ  Started by: Sujata Francheska      Place 1 patch on the skin as directed by provider Daily.       ranolazine 500 MG 12 hr tablet  Commonly known as: Ranexa  Started by: Sujata Francheska      Take 1 tablet by mouth 2 (Two) Times a Day.              CONTINUE taking these medications        Instructions Last Dose Given Next Dose Due   aspirin 81 MG EC tablet      Take 1 tablet by mouth Daily.       aspirin-acetaminophen-caffeine 250-250-65 MG per tablet  Commonly known as: EXCEDRIN MIGRAINE      Take 1 tablet by mouth Every 8 (Eight) Hours As Needed.       BD Pen Needle Ashwini U/F 32G X 4 MM misc  Generic drug: Insulin Pen Needle      Use to inject insulin 4 times daily       cetirizine 10 MG tablet  Commonly known as: zyrTEC      Take 1 tablet by mouth Daily.       clopidogrel 75 MG tablet  Commonly known as: PLAVIX      Take 1 tablet by mouth Daily.       D3 50 MCG (2000 UT) tablet  Generic drug: Cholecalciferol      Take 1 tablet by mouth Daily.       empagliflozin 10 MG tablet tablet  Commonly known as: Jardiance      Take 1 tablet by mouth Daily.       FreeStyle Balwinder 3 Sensor misc      Use 1 each Every 14 (Fourteen) Days.       FreeStyle Balwinder 3 Plus Sensor      Use Every 10 (Ten) Days.       furosemide 40 MG tablet  Commonly known as: LASIX      Take 1 tablet by mouth Daily.       Insulin Lispro (1 Unit Dial) 100 UNIT/ML  solution pen-injector  Commonly known as: HumaLOG KwikPen      8 units at breakfast, 12 units at lunch, and 12 units at supper.  Prime needle with 2 units before each use.       nitroglycerin 0.4 MG SL tablet  Commonly known as: NITROSTAT      Place 1 tablet under the tongue Every 5 (Five) Minutes As Needed for Chest Pain (Systolic BP Greater Than 100). Take no more than 3 doses in 15 minutes.       NovoLOG FlexPen 100 UNIT/ML solution pen-injector sc pen  Generic drug: insulin aspart      Inject 8 Units under the skin into the appropriate area as directed 3 (Three) Times a Day With Meals.       omeprazole 40 MG capsule  Commonly known as: priLOSEC      Take 1 capsule by mouth 2 (Two) Times a Day.       sertraline 50 MG tablet  Commonly known as: ZOLOFT      Take 1 tablet by mouth Daily.       sucralfate 1 g tablet  Commonly known as: CARAFATE      Take 1 tablet by mouth 4 (Four) Times a Day As Needed (REFLUX SX).       traZODone 100 MG tablet  Commonly known as: DESYREL      Take 2.5 tablets by mouth Every Night.       Tresiba FlexTouch 200 UNIT/ML solution pen-injector pen injection  Generic drug: Insulin Degludec      36 units every evening.  Prime needle with 4 units before each use.       VITAMIN D PO      Take  by mouth Daily.                 Where to Get Your Medications        These medications were sent to [facility], [place] - [address]   [phone]  - [phone]   [address] [facility] [address]      Phone: [phone]   empagliflozin 10 MG tablet tablet  nicotine 21 MG/24HR patch  ranolazine 500 MG 12 hr tablet           Sujata Pritchard, APRN  04/10/25  2:27 PM EDT

## 2025-04-10 ENCOUNTER — PATIENT MESSAGE (OUTPATIENT)
Dept: ENDOCRINOLOGY | Age: 55
End: 2025-04-10
Payer: COMMERCIAL

## 2025-04-11 ENCOUNTER — TELEPHONE (OUTPATIENT)
Dept: GASTROENTEROLOGY | Facility: CLINIC | Age: 55
End: 2025-04-11
Payer: COMMERCIAL

## 2025-04-11 RX ORDER — BLOOD-GLUCOSE METER
1 EACH MISCELLANEOUS ONCE
Qty: 1 KIT | Refills: 0 | Status: SHIPPED | OUTPATIENT
Start: 2025-04-11 | End: 2025-04-11

## 2025-04-11 RX ORDER — ACYCLOVIR 400 MG/1
1 TABLET ORAL
Qty: 9 EACH | Refills: 3 | Status: SHIPPED | OUTPATIENT
Start: 2025-04-11

## 2025-04-11 RX ORDER — LANCETS
EACH MISCELLANEOUS
Qty: 400 EACH | Refills: 3 | Status: SHIPPED | OUTPATIENT
Start: 2025-04-11

## 2025-04-11 RX ORDER — BLOOD SUGAR DIAGNOSTIC
STRIP MISCELLANEOUS
Qty: 400 EACH | Refills: 2 | Status: SHIPPED | OUTPATIENT
Start: 2025-04-11

## 2025-04-11 NOTE — TELEPHONE ENCOUNTER
Message from patient via my chart:     I am out of refills on my Pepcid. Can someone please send in refills for this medication?      Thank you!

## 2025-04-11 NOTE — TELEPHONE ENCOUNTER
Patient called. Advised patient Famotidine is not listed on her medication list, questioned if she meant she wanted an Omeprazole refill she states no.    Patient checked the label on her Famotidine and realized it was her PCP who prescribed the medication. She states she will call him today.

## 2025-04-14 ENCOUNTER — PRIOR AUTHORIZATION (OUTPATIENT)
Dept: ENDOCRINOLOGY | Age: 55
End: 2025-04-14
Payer: COMMERCIAL

## 2025-04-14 RX ORDER — NICOTINE 21 MG/24HR
1 PATCH, TRANSDERMAL 24 HOURS TRANSDERMAL EVERY 24 HOURS
Qty: 28 EACH | Refills: 0 | Status: SHIPPED | OUTPATIENT
Start: 2025-04-14

## 2025-04-14 NOTE — TELEPHONE ENCOUNTER
4/14 pa started on dexcom g7 sensor   Key [id number]    This request has received a Favorable outcome.  Approved. Approved for DEXCOM G7 SENSOR Misc, quantity up to 9 per 90 days, under the pharmacy benefit. The drug has been approved from 04/17/2025 to 04/17/2026. Generic or biosimilar substitution may be required when available and preferred on the formulary. Please note that dispensing of non-maintenance and specialty medications may be limited to a monthly supply.

## 2025-04-16 ENCOUNTER — HOSPITAL ENCOUNTER (OUTPATIENT)
Dept: CARDIOLOGY | Facility: HOSPITAL | Age: 55
Discharge: HOME | End: 2025-04-16
Admitting: NURSE PRACTITIONER
Payer: COMMERCIAL

## 2025-04-16 VITALS — WEIGHT: 152 LBS | HEIGHT: 64 IN | BODY MASS INDEX: 25.95 KG/M2

## 2025-04-16 DIAGNOSIS — I25.119 CORONARY ARTERY DISEASE INVOLVING NATIVE CORONARY ARTERY OF NATIVE HEART WITH ANGINA PECTORIS: ICD-10-CM

## 2025-04-16 LAB
BH CV NUCLEAR PRIOR STUDY: 2
BH CV REST NUCLEAR ISOTOPE DOSE: 10.3 MCI
BH CV STRESS BP STAGE 1: NORMAL
BH CV STRESS COMMENTS STAGE 1: NORMAL
BH CV STRESS DOSE REGADENOSON STAGE 1: 0.4
BH CV STRESS DURATION MIN STAGE 1: 0
BH CV STRESS DURATION SEC STAGE 1: 10
BH CV STRESS HR STAGE 1: 97
BH CV STRESS NUCLEAR ISOTOPE DOSE: 33.4 MCI
BH CV STRESS PROTOCOL 1: NORMAL
BH CV STRESS RECOVERY BP: NORMAL MMHG
BH CV STRESS RECOVERY HR: 90 BPM
BH CV STRESS STAGE 1: 1
MAXIMAL PREDICTED HEART RATE: 163 BPM
PERCENT MAX PREDICTED HR: 59.51 %
SPECT HRT GATED+EF W RNC IV: 45 %
STRESS BASELINE BP: NORMAL MMHG
STRESS BASELINE HR: 76 BPM
STRESS PERCENT HR: 70 %
STRESS POST EXERCISE DUR MIN: 0 MIN
STRESS POST EXERCISE DUR SEC: 10 SEC
STRESS POST PEAK BP: NORMAL MMHG
STRESS POST PEAK HR: 97 BPM
STRESS TARGET HR: 139 BPM

## 2025-04-16 PROCEDURE — A9502 TC99M TETROFOSMIN: HCPCS | Performed by: NURSE PRACTITIONER

## 2025-04-16 PROCEDURE — 34310000005 TECHNETIUM TETROFOSMIN KIT: Performed by: NURSE PRACTITIONER

## 2025-04-16 PROCEDURE — 25010000002 REGADENOSON 0.4 MG/5ML SOLUTION: Performed by: NURSE PRACTITIONER

## 2025-04-16 PROCEDURE — 78452 HT MUSCLE IMAGE SPECT MULT: CPT

## 2025-04-16 PROCEDURE — 93017 CV STRESS TEST TRACING ONLY: CPT

## 2025-04-16 RX ORDER — REGADENOSON 0.08 MG/ML
0.4 INJECTION, SOLUTION INTRAVENOUS
Status: COMPLETED | OUTPATIENT
Start: 2025-04-16 | End: 2025-04-16

## 2025-04-16 RX ADMIN — REGADENOSON 0.4 MG: 0.08 INJECTION, SOLUTION INTRAVENOUS at 13:48

## 2025-04-16 RX ADMIN — TETROFOSMIN 1 DOSE: 1.38 INJECTION, POWDER, LYOPHILIZED, FOR SOLUTION INTRAVENOUS at 13:01

## 2025-04-16 RX ADMIN — TETROFOSMIN 1 DOSE: 1.38 INJECTION, POWDER, LYOPHILIZED, FOR SOLUTION INTRAVENOUS at 13:49

## 2025-04-17 ENCOUNTER — RESULTS FOLLOW-UP (OUTPATIENT)
Dept: CARDIOLOGY | Age: 55
End: 2025-04-17
Payer: COMMERCIAL

## 2025-07-01 LAB
MC_CV_MDC_IDC_RATE_1: 180
MC_CV_MDC_IDC_RATE_1: 230
MC_CV_MDC_IDC_SHOCK_MEASURED_IMPEDANCE: 68
MC_CV_MDC_IDC_THERAPIES: NORMAL
MC_CV_MDC_IDC_THERAPIES: NORMAL
MC_CV_MDC_IDC_ZONE_ID: 1
MC_CV_MDC_IDC_ZONE_ID: 2
MDC_IDC_MSMT_BATTERY_REMAINING_LONGEVITY: 168 MO
MDC_IDC_MSMT_BATTERY_REMAINING_PERCENTAGE: 100 %
MDC_IDC_MSMT_BATTERY_STATUS: NORMAL
MDC_IDC_MSMT_CAP_CHARGE_TIME: 10.3
MDC_IDC_MSMT_LEADCHNL_RV_DTM: NORMAL
MDC_IDC_MSMT_LEADCHNL_RV_IMPEDANCE_VALUE: 454
MDC_IDC_MSMT_LEADCHNL_RV_PACING_THRESHOLD_POLARITY: NORMAL
MDC_IDC_MSMT_LEADCHNL_RV_SENSING_INTR_AMPL: 8.8
MDC_IDC_PG_IMPLANT_DTM: NORMAL
MDC_IDC_PG_MFG: NORMAL
MDC_IDC_PG_MODEL: NORMAL
MDC_IDC_PG_SERIAL: NORMAL
MDC_IDC_PG_TYPE: NORMAL
MDC_IDC_SESS_DTM: NORMAL
MDC_IDC_SESS_TYPE: NORMAL
MDC_IDC_SET_BRADY_LOWRATE: 40
MDC_IDC_SET_BRADY_MODE: NORMAL
MDC_IDC_SET_LEADCHNL_RV_PACING_AMPLITUDE: 2
MDC_IDC_SET_LEADCHNL_RV_PACING_POLARITY: NORMAL
MDC_IDC_SET_LEADCHNL_RV_PACING_PULSEWIDTH: 0.4
MDC_IDC_SET_LEADCHNL_RV_SENSING_POLARITY: NORMAL
MDC_IDC_SET_LEADCHNL_RV_SENSING_SENSITIVITY: 0.6
MDC_IDC_SET_ZONE_STATUS: NORMAL
MDC_IDC_SET_ZONE_STATUS: NORMAL
MDC_IDC_SET_ZONE_TYPE: NORMAL
MDC_IDC_SET_ZONE_TYPE: NORMAL
MDC_IDC_STAT_BRADY_RV_PERCENT_PACED: 0
MDC_IDC_STAT_TACHYTHERAPY_ATP_DELIVERED_RECENT: 0
MDC_IDC_STAT_TACHYTHERAPY_SHOCKS_ABORTED_RECENT: 0
MDC_IDC_STAT_TACHYTHERAPY_SHOCKS_DELIVERED_RECENT: 0

## 2025-08-26 ENCOUNTER — OFFICE VISIT (OUTPATIENT)
Age: 55
End: 2025-08-26
Payer: COMMERCIAL

## 2025-08-26 VITALS
WEIGHT: 149.4 LBS | OXYGEN SATURATION: 98 % | SYSTOLIC BLOOD PRESSURE: 110 MMHG | BODY MASS INDEX: 25.51 KG/M2 | DIASTOLIC BLOOD PRESSURE: 70 MMHG | HEART RATE: 84 BPM | HEIGHT: 64 IN

## 2025-08-26 DIAGNOSIS — I25.10 CORONARY ARTERY DISEASE INVOLVING NATIVE CORONARY ARTERY OF NATIVE HEART WITHOUT ANGINA PECTORIS: Primary | ICD-10-CM

## 2025-08-26 DIAGNOSIS — I10 PRIMARY HYPERTENSION: ICD-10-CM

## 2025-08-26 DIAGNOSIS — Z95.810 PRESENCE OF AUTOMATIC (IMPLANTABLE) CARDIAC DEFIBRILLATOR: ICD-10-CM

## 2025-08-26 DIAGNOSIS — I50.22 CHRONIC SYSTOLIC CONGESTIVE HEART FAILURE: ICD-10-CM

## 2025-08-26 DIAGNOSIS — R80.9 TYPE 2 DIABETES MELLITUS WITH MICROALBUMINURIA, WITH LONG-TERM CURRENT USE OF INSULIN: ICD-10-CM

## 2025-08-26 DIAGNOSIS — I25.5 ISCHEMIC CARDIOMYOPATHY: ICD-10-CM

## 2025-08-26 DIAGNOSIS — E11.29 TYPE 2 DIABETES MELLITUS WITH MICROALBUMINURIA, WITH LONG-TERM CURRENT USE OF INSULIN: ICD-10-CM

## 2025-08-26 DIAGNOSIS — Z95.5 STATUS POST CORONARY ARTERY STENT PLACEMENT: ICD-10-CM

## 2025-08-26 DIAGNOSIS — E78.5 HYPERLIPIDEMIA, UNSPECIFIED HYPERLIPIDEMIA TYPE: ICD-10-CM

## 2025-08-26 DIAGNOSIS — Z79.4 TYPE 2 DIABETES MELLITUS WITH MICROALBUMINURIA, WITH LONG-TERM CURRENT USE OF INSULIN: ICD-10-CM

## (undated) DEVICE — Device

## (undated) DEVICE — KT CATH IMG DRAGONFLY/OPSTAR 2.7F 135CM

## (undated) DEVICE — THE PHOTONBLADE WITH ADAPTIVE SMOKE EVACUATION IS A MONOPOLAR RF DEVICE COUPLED WITH ILLUMINATION THAT IS INDICATED FOR CUTTING AND COAGULATION OF TISSUE DURING GENERAL SURGICAL PROCEDURES AND FOR REMOVING SMOKE GENERATED BY ELECTROSURGERY WHEN USED IN CONJUNCTION WITH AN EFFECTIVE SMOKE EVACUATION SYSTEM.: Brand: PHOTONBLADE WITH ADAPTIVE SMOKE EVACUATION

## (undated) DEVICE — PK CATH CARD 40

## (undated) DEVICE — CATH DIAG IMPULSE FR4 5F 100CM

## (undated) DEVICE — FRCP BX RADJAW4 NDL 2.8 240CM LG OG BX40

## (undated) DEVICE — TREK CORONARY DILATATION CATHETER 2.50 MM X 20 MM / RAPID-EXCHANGE: Brand: TREK

## (undated) DEVICE — INTRO SHEATH ART/FEM ENGAGE .035 6F12CM

## (undated) DEVICE — LOU PACE DEFIB: Brand: MEDLINE INDUSTRIES, INC.

## (undated) DEVICE — SENSR O2 OXIMAX FNGR A/ 18IN NONSTR

## (undated) DEVICE — TREK CORONARY DILATATION CATHETER 3.0 MM X 25 MM / RAPID-EXCHANGE: Brand: TREK

## (undated) DEVICE — KT ORCA ORCAPOD DISP STRL

## (undated) DEVICE — PERCLOSE™ PROSTYLE™ SUTURE-MEDIATED CLOSURE AND REPAIR SYSTEM: Brand: PERCLOSE™ PROSTYLE™

## (undated) DEVICE — INTRO PERFORMER CHECKFLO/LG RAD/BND NO/GW 14F .038IN 30CM

## (undated) DEVICE — THE VASC BAND HEMOSTAT IS A COMPRESSION DEVICE TO ASSIST HEMOSTASIS OF ARTERIAL, VENOUS AND HEMODIALYSIS PERCUTANEOUS ACCESS SITES.: Brand: VASC BAND™ HEMOSTAT

## (undated) DEVICE — CANN O2 ETCO2 FITS ALL CONN CO2 SMPL A/ 7IN DISP LF

## (undated) DEVICE — GLIDESHEATH SLENDER STAINLESS STEEL KIT: Brand: GLIDESHEATH SLENDER

## (undated) DEVICE — CORONARY IMAGING CATHETER: Brand: OPTICROSS™ HD

## (undated) DEVICE — NC TREK CORONARY DILATATION CATHETER 4.5 MM X 12 MM / RAPID-EXCHANGE: Brand: NC TREK

## (undated) DEVICE — NC TREK NEO™ CORONARY DILATATION CATHETER 3.75 MM X 20 MM / RAPID-EXCHANGE: Brand: NC TREK NEO™

## (undated) DEVICE — INTRO SHEATH PRELUDE SNAP .038 8F 13CM W/SDPRT

## (undated) DEVICE — TREK CORONARY DILATATION CATHETER 3.0 MM X 20 MM / RAPID-EXCHANGE: Brand: TREK

## (undated) DEVICE — DIL VESL 10FR .038 20CM

## (undated) DEVICE — GW EMR FIX EXCHG J STD .035 3MM 260CM

## (undated) DEVICE — CATH DIAG IMPULSE FL3.5 5F 100CM

## (undated) DEVICE — DIL VESL 12F.038 20CM

## (undated) DEVICE — KT MANIFLD CARDIAC

## (undated) DEVICE — HI-TORQUE BALANCE MIDDLEWEIGHT GUIDE WIRE .014 STRAIGHT TIP 3.0 CM X 190 CM: Brand: HI-TORQUE BALANCE MIDDLEWEIGHT

## (undated) DEVICE — NC TREK NEO™ CORONARY DILATATION CATHETER 4.00 MM X 15 MM / RAPID-EXCHANGE: Brand: NC TREK NEO™

## (undated) DEVICE — ANGIO-SEAL VIP VASCULAR CLOSURE DEVICE: Brand: ANGIO-SEAL

## (undated) DEVICE — BLCK/BITE BLOX W/DENTL/RIM W/STRAP 54F

## (undated) DEVICE — DESTINATION PERIPHERAL GUIDING SHEATH: Brand: DESTINATION

## (undated) DEVICE — RUNTHROUGH NS EXTRA FLOPPY PTCA GUIDEWIRE: Brand: RUNTHROUGH

## (undated) DEVICE — DEV INDEFLATOR P/N 580289

## (undated) DEVICE — GC 5F 056 JR 4 LBT: Brand: BRITE TIP

## (undated) DEVICE — Device: Brand: CORSAIR PRO

## (undated) DEVICE — CATH F5 INF PIG145 110CM 6SH: Brand: INFINITI

## (undated) DEVICE — TUBING, SUCTION, 1/4" X 10', STRAIGHT: Brand: MEDLINE

## (undated) DEVICE — KT CATH CAD SUP IMPELLA/CP 9/14F 5L

## (undated) DEVICE — PINNACLE INTRODUCER SHEATH: Brand: PINNACLE

## (undated) DEVICE — TREK CORONARY DILATATION CATHETER 3.50 MM X 20 MM / RAPID-EXCHANGE: Brand: TREK

## (undated) DEVICE — BITEBLOCK OMNI BLOC

## (undated) DEVICE — ADAPT CLN BIOGUARD AIR/H2O DISP

## (undated) DEVICE — LN SMPL CO2 SHTRM SD STREAM W/M LUER

## (undated) DEVICE — VSI MICRO-INTRODUCER KITS ARE INTENDED FOR USE IN PERCUTANEOUS INTRODUCTION OF UP TO A 0.018 INCH OR 0.038 INCH GUIDEWIRE OR CATHETER INTO THE VASCULAR SYSTEM FOLLOWING A SMALL GAUGE NEEDLE STICK.: Brand: VSI MICRO-INTRODUCER KIT

## (undated) DEVICE — NC TREK NEO™ CORONARY DILATATION CATHETER 3.00 MM X 20 MM / RAPID-EXCHANGE: Brand: NC TREK NEO™

## (undated) DEVICE — KT PK ANGIOPLASTY ACC 9 MERIT